# Patient Record
Sex: MALE | HISPANIC OR LATINO | Employment: OTHER | URBAN - METROPOLITAN AREA
[De-identification: names, ages, dates, MRNs, and addresses within clinical notes are randomized per-mention and may not be internally consistent; named-entity substitution may affect disease eponyms.]

---

## 2018-01-04 ENCOUNTER — TELEPHONE CONVERTED (OUTPATIENT)
Dept: ONCOLOGY | Facility: HOSPITAL | Age: 63
End: 2018-01-04

## 2018-03-06 ENCOUNTER — OFFICE VISIT CONVERTED (OUTPATIENT)
Dept: ONCOLOGY | Facility: HOSPITAL | Age: 63
End: 2018-03-06
Attending: INTERNAL MEDICINE

## 2018-03-08 ENCOUNTER — OFFICE VISIT CONVERTED (OUTPATIENT)
Dept: ORTHOPEDIC SURGERY | Facility: CLINIC | Age: 63
End: 2018-03-08
Attending: ORTHOPAEDIC SURGERY

## 2018-04-03 ENCOUNTER — OFFICE VISIT CONVERTED (OUTPATIENT)
Dept: ONCOLOGY | Facility: HOSPITAL | Age: 63
End: 2018-04-03
Attending: INTERNAL MEDICINE

## 2018-07-06 ENCOUNTER — OFFICE VISIT CONVERTED (OUTPATIENT)
Dept: ONCOLOGY | Facility: HOSPITAL | Age: 63
End: 2018-07-06
Attending: NURSE PRACTITIONER

## 2018-07-20 ENCOUNTER — OFFICE VISIT CONVERTED (OUTPATIENT)
Dept: SURGERY | Facility: CLINIC | Age: 63
End: 2018-07-20
Attending: SURGERY

## 2018-11-06 ENCOUNTER — OFFICE VISIT CONVERTED (OUTPATIENT)
Dept: ONCOLOGY | Facility: HOSPITAL | Age: 63
End: 2018-11-06
Attending: INTERNAL MEDICINE

## 2018-11-29 ENCOUNTER — OFFICE VISIT CONVERTED (OUTPATIENT)
Dept: NEUROSURGERY | Facility: CLINIC | Age: 63
End: 2018-11-29
Attending: PHYSICIAN ASSISTANT

## 2018-12-18 ENCOUNTER — OFFICE VISIT CONVERTED (OUTPATIENT)
Dept: NEUROSURGERY | Facility: CLINIC | Age: 63
End: 2018-12-18
Attending: PHYSICIAN ASSISTANT

## 2018-12-31 ENCOUNTER — OFFICE VISIT CONVERTED (OUTPATIENT)
Dept: ONCOLOGY | Facility: HOSPITAL | Age: 63
End: 2018-12-31
Attending: INTERNAL MEDICINE

## 2019-03-25 ENCOUNTER — OFFICE VISIT CONVERTED (OUTPATIENT)
Dept: ONCOLOGY | Facility: HOSPITAL | Age: 64
End: 2019-03-25
Attending: INTERNAL MEDICINE

## 2019-03-25 ENCOUNTER — HOSPITAL ENCOUNTER (OUTPATIENT)
Dept: OTHER | Facility: HOSPITAL | Age: 64
Discharge: HOME | End: 2019-03-25
Attending: INTERNAL MEDICINE

## 2019-03-25 LAB
ALBUMIN SERPL-MCNC: 3.8 G/DL (ref 3.5–5)
ALBUMIN/GLOB SERPL: 1 {RATIO} (ref 1.4–2.6)
ALP SERPL-CCNC: 131 U/L (ref 56–155)
ALT SERPL-CCNC: 6 U/L (ref 10–40)
ANION GAP SERPL CALC-SCNC: 18 MMOL/L (ref 8–19)
AST SERPL-CCNC: 11 U/L (ref 15–50)
BASOPHILS # BLD AUTO: 0.11 10*3/UL (ref 0–0.2)
BASOPHILS NFR BLD AUTO: 0.7 % (ref 0–3)
BILIRUB SERPL-MCNC: 0.43 MG/DL (ref 0.2–1.3)
BUN SERPL-MCNC: 19 MG/DL (ref 5–25)
BUN/CREAT SERPL: 13 {RATIO} (ref 6–20)
CALCIUM SERPL-MCNC: 9.8 MG/DL (ref 8.7–10.4)
CEA SERPL-MCNC: 7.6 NG/ML (ref 0–5)
CHLORIDE SERPL-SCNC: 103 MMOL/L (ref 99–111)
CONV ABS IMM GRAN: 0.05 10*3/UL (ref 0–0.2)
CONV CO2: 24 MMOL/L (ref 22–32)
CONV IMMATURE GRAN: 0.3 % (ref 0–1.8)
CONV TOTAL PROTEIN: 7.8 G/DL (ref 6.3–8.2)
CREAT UR-MCNC: 1.45 MG/DL (ref 0.7–1.2)
DEPRECATED RDW RBC AUTO: 50.1 FL (ref 35.1–43.9)
EOSINOPHIL # BLD AUTO: 0.31 10*3/UL (ref 0–0.7)
EOSINOPHIL # BLD AUTO: 2 % (ref 0–7)
ERYTHROCYTE [DISTWIDTH] IN BLOOD BY AUTOMATED COUNT: 16.3 % (ref 11.6–14.4)
GFR SERPLBLD BASED ON 1.73 SQ M-ARVRAT: 50 ML/MIN/{1.73_M2}
GLOBULIN UR ELPH-MCNC: 4 G/DL (ref 2–3.5)
GLUCOSE SERPL-MCNC: 58 MG/DL (ref 70–99)
HBA1C MFR BLD: 15.2 G/DL (ref 14–18)
HCT VFR BLD AUTO: 46.1 % (ref 42–52)
LYMPHOCYTES # BLD AUTO: 2.83 10*3/UL (ref 1–5)
MCH RBC QN AUTO: 27.6 PG (ref 27–31)
MCHC RBC AUTO-ENTMCNC: 33 G/DL (ref 33–37)
MCV RBC AUTO: 83.7 FL (ref 80–96)
MONOCYTES # BLD AUTO: 0.95 10*3/UL (ref 0.2–1.2)
MONOCYTES NFR BLD AUTO: 6.1 % (ref 3–10)
NEUTROPHILS # BLD AUTO: 11.24 10*3/UL (ref 2–8)
NEUTROPHILS NFR BLD AUTO: 72.6 % (ref 30–85)
NRBC CBCN: 0 % (ref 0–0.7)
OSMOLALITY SERPL CALC.SUM OF ELEC: 292 MOSM/KG (ref 273–304)
PLATELET # BLD AUTO: 363 10*3/UL (ref 130–400)
PMV BLD AUTO: 10.1 FL (ref 9.4–12.4)
POTASSIUM SERPL-SCNC: 4.1 MMOL/L (ref 3.5–5.3)
RBC # BLD AUTO: 5.51 10*6/UL (ref 4.7–6.1)
SODIUM SERPL-SCNC: 141 MMOL/L (ref 135–147)
VARIANT LYMPHS NFR BLD MANUAL: 18.3 % (ref 20–45)
WBC # BLD AUTO: 15.49 10*3/UL (ref 4.8–10.8)

## 2019-05-07 ENCOUNTER — OFFICE VISIT CONVERTED (OUTPATIENT)
Dept: CARDIOLOGY | Facility: CLINIC | Age: 64
End: 2019-05-07
Attending: SPECIALIST

## 2019-06-11 ENCOUNTER — HOSPITAL ENCOUNTER (OUTPATIENT)
Dept: OTHER | Facility: HOSPITAL | Age: 64
Discharge: HOME | End: 2019-06-11
Attending: SPECIALIST

## 2019-06-11 LAB
ALBUMIN SERPL-MCNC: 4.1 G/DL (ref 3.5–5)
ALP SERPL-CCNC: 127 U/L (ref 56–155)
ALT SERPL-CCNC: 13 U/L (ref 10–40)
ANION GAP SERPL CALC-SCNC: 19 MMOL/L (ref 8–19)
AST SERPL-CCNC: 13 U/L (ref 15–50)
BILIRUB SERPL-MCNC: 0.37 MG/DL (ref 0.2–1.3)
BUN SERPL-MCNC: 14 MG/DL (ref 5–25)
BUN/CREAT SERPL: 16 {RATIO} (ref 6–20)
CALCIUM SERPL-MCNC: 9.3 MG/DL (ref 8.7–10.4)
CHLORIDE SERPL-SCNC: 101 MMOL/L (ref 99–111)
CHOLEST SERPL-MCNC: 208 MG/DL (ref 107–200)
CHOLEST/HDLC SERPL: 4 {RATIO} (ref 3–6)
CONV BILI, CONJUGATED: <0.2 MG/DL (ref 0–0.6)
CONV CO2: 25 MMOL/L (ref 22–32)
CONV TOTAL PROTEIN: 7.3 G/DL (ref 6.3–8.2)
CONV UNCONJUGATED BILIRUBIN: 0.2 MG/DL (ref 0–1.1)
CREAT UR-MCNC: 0.9 MG/DL (ref 0.7–1.2)
GFR SERPLBLD BASED ON 1.73 SQ M-ARVRAT: >60 ML/MIN/{1.73_M2}
GLUCOSE SERPL-MCNC: 230 MG/DL (ref 70–99)
HDLC SERPL-MCNC: 52 MG/DL (ref 40–60)
LDLC SERPL CALC-MCNC: 127 MG/DL (ref 70–100)
OSMOLALITY SERPL CALC.SUM OF ELEC: 298 MOSM/KG (ref 273–304)
POTASSIUM SERPL-SCNC: 4.7 MMOL/L (ref 3.5–5.3)
SODIUM SERPL-SCNC: 140 MMOL/L (ref 135–147)
TRIGL SERPL-MCNC: 144 MG/DL (ref 40–150)
VLDLC SERPL-MCNC: 29 MG/DL (ref 5–37)

## 2019-06-13 ENCOUNTER — HOSPITAL ENCOUNTER (OUTPATIENT)
Dept: OTHER | Facility: HOSPITAL | Age: 64
Discharge: HOME | End: 2019-06-13
Attending: INTERNAL MEDICINE

## 2019-06-13 LAB
ALBUMIN SERPL-MCNC: 4 G/DL (ref 3.5–5)
ALBUMIN/GLOB SERPL: 1.3 {RATIO} (ref 1.4–2.6)
ALP SERPL-CCNC: 130 U/L (ref 56–155)
ALT SERPL-CCNC: 12 U/L (ref 10–40)
ANION GAP SERPL CALC-SCNC: 18 MMOL/L (ref 8–19)
AST SERPL-CCNC: 13 U/L (ref 15–50)
BASOPHILS # BLD AUTO: 0.13 10*3/UL (ref 0–0.2)
BASOPHILS NFR BLD AUTO: 0.9 % (ref 0–3)
BILIRUB SERPL-MCNC: 0.62 MG/DL (ref 0.2–1.3)
BUN SERPL-MCNC: 13 MG/DL (ref 5–25)
BUN/CREAT SERPL: 13 {RATIO} (ref 6–20)
CALCIUM SERPL-MCNC: 9.5 MG/DL (ref 8.7–10.4)
CHLORIDE SERPL-SCNC: 98 MMOL/L (ref 99–111)
CHOLEST SERPL-MCNC: 218 MG/DL (ref 107–200)
CHOLEST/HDLC SERPL: 3.7 {RATIO} (ref 3–6)
CONV ABS IMM GRAN: 0.06 10*3/UL (ref 0–0.2)
CONV CO2: 28 MMOL/L (ref 22–32)
CONV IMMATURE GRAN: 0.4 % (ref 0–1.8)
CONV TOTAL PROTEIN: 7.2 G/DL (ref 6.3–8.2)
CREAT UR-MCNC: 1.03 MG/DL (ref 0.7–1.2)
DEPRECATED RDW RBC AUTO: 57.1 FL (ref 35.1–43.9)
EOSINOPHIL # BLD AUTO: 0.24 10*3/UL (ref 0–0.7)
EOSINOPHIL # BLD AUTO: 1.7 % (ref 0–7)
ERYTHROCYTE [DISTWIDTH] IN BLOOD BY AUTOMATED COUNT: 19.3 % (ref 11.6–14.4)
EST. AVERAGE GLUCOSE BLD GHB EST-MCNC: 223 MG/DL
FOLATE SERPL-MCNC: >20 NG/ML (ref 4.8–20)
GFR SERPLBLD BASED ON 1.73 SQ M-ARVRAT: >60 ML/MIN/{1.73_M2}
GLOBULIN UR ELPH-MCNC: 3.2 G/DL (ref 2–3.5)
GLUCOSE SERPL-MCNC: 249 MG/DL (ref 70–99)
HBA1C MFR BLD: 17.2 G/DL (ref 14–18)
HBA1C MFR BLD: 9.4 % (ref 3.5–5.7)
HCT VFR BLD AUTO: 52.1 % (ref 42–52)
HDLC SERPL-MCNC: 59 MG/DL (ref 40–60)
LDLC SERPL CALC-MCNC: 133 MG/DL (ref 70–100)
LYMPHOCYTES # BLD AUTO: 2.41 10*3/UL (ref 1–5)
MCH RBC QN AUTO: 27.7 PG (ref 27–31)
MCHC RBC AUTO-ENTMCNC: 33 G/DL (ref 33–37)
MCV RBC AUTO: 84 FL (ref 80–96)
MONOCYTES # BLD AUTO: 1.01 10*3/UL (ref 0.2–1.2)
MONOCYTES NFR BLD AUTO: 7 % (ref 3–10)
NEUTROPHILS # BLD AUTO: 10.49 10*3/UL (ref 2–8)
NEUTROPHILS NFR BLD AUTO: 73.2 % (ref 30–85)
NRBC CBCN: 0 % (ref 0–0.7)
OSMOLALITY SERPL CALC.SUM OF ELEC: 298 MOSM/KG (ref 273–304)
PLATELET # BLD AUTO: 209 10*3/UL (ref 130–400)
PMV BLD AUTO: 9.7 FL (ref 9.4–12.4)
POTASSIUM SERPL-SCNC: 4.4 MMOL/L (ref 3.5–5.3)
PSA SERPL-MCNC: 1.09 NG/ML (ref 0–4)
RBC # BLD AUTO: 6.2 10*6/UL (ref 4.7–6.1)
SODIUM SERPL-SCNC: 140 MMOL/L (ref 135–147)
TRIGL SERPL-MCNC: 132 MG/DL (ref 40–150)
TSH SERPL-ACNC: 0.6 M[IU]/L (ref 0.27–4.2)
VARIANT LYMPHS NFR BLD MANUAL: 16.8 % (ref 20–45)
VIT B12 SERPL-MCNC: 451 PG/ML (ref 211–911)
VLDLC SERPL-MCNC: 26 MG/DL (ref 5–37)
WBC # BLD AUTO: 14.34 10*3/UL (ref 4.8–10.8)

## 2019-06-18 ENCOUNTER — CONVERSION ENCOUNTER (OUTPATIENT)
Dept: CARDIOLOGY | Facility: CLINIC | Age: 64
End: 2019-06-18

## 2019-06-18 ENCOUNTER — CONVERSION ENCOUNTER (OUTPATIENT)
Dept: CARDIOLOGY | Facility: CLINIC | Age: 64
End: 2019-06-18
Attending: SPECIALIST

## 2019-07-05 LAB — GLUCOSE BLD-MCNC: 283 MG/DL (ref 70–99)

## 2019-07-08 ENCOUNTER — HOSPITAL ENCOUNTER (OUTPATIENT)
Dept: OTHER | Facility: HOSPITAL | Age: 64
Discharge: HOME | End: 2019-07-08
Attending: INTERNAL MEDICINE

## 2019-07-08 ENCOUNTER — HOSPITAL ENCOUNTER (OUTPATIENT)
Dept: CARDIAC REHAB | Facility: HOSPITAL | Age: 64
Setting detail: RECURRING SERIES
Discharge: HOME | End: 2019-10-25
Attending: SPECIALIST

## 2019-07-08 LAB
CONV CREATININE URINE, RANDOM: 45.4 MG/DL (ref 10–300)
CONV MICROALBUM.,U,RANDOM: 64.9 MG/L (ref 0–20)
EST. AVERAGE GLUCOSE BLD GHB EST-MCNC: 235 MG/DL
GLUCOSE BLD-MCNC: 242 MG/DL (ref 70–99)
HBA1C MFR BLD: 9.8 % (ref 3.5–5.7)
MICROALBUMIN/CREAT UR: 143 MG/G{CRE} (ref 0–25)

## 2019-07-10 LAB
GLUCOSE BLD-MCNC: 165 MG/DL (ref 70–99)
GLUCOSE BLD-MCNC: 198 MG/DL (ref 70–99)

## 2019-07-15 LAB
GLUCOSE BLD-MCNC: 123 MG/DL (ref 70–99)
GLUCOSE BLD-MCNC: 70 MG/DL (ref 70–99)
GLUCOSE BLD-MCNC: 76 MG/DL (ref 70–99)
GLUCOSE BLD-MCNC: 83 MG/DL (ref 70–99)
GLUCOSE BLD-MCNC: 91 MG/DL (ref 70–99)
GLUCOSE BLD-MCNC: 92 MG/DL (ref 70–99)

## 2019-07-17 LAB
GLUCOSE BLD-MCNC: 150 MG/DL (ref 70–99)
GLUCOSE BLD-MCNC: 228 MG/DL (ref 70–99)

## 2019-07-19 LAB
GLUCOSE BLD-MCNC: 112 MG/DL (ref 70–99)
GLUCOSE BLD-MCNC: 284 MG/DL (ref 70–99)
GLUCOSE BLD-MCNC: 72 MG/DL (ref 70–99)

## 2019-07-22 LAB
GLUCOSE BLD-MCNC: 120 MG/DL (ref 70–99)
GLUCOSE BLD-MCNC: 162 MG/DL (ref 70–99)

## 2019-07-24 LAB
GLUCOSE BLD-MCNC: 132 MG/DL (ref 70–99)
GLUCOSE BLD-MCNC: 152 MG/DL (ref 70–99)
GLUCOSE BLD-MCNC: 85 MG/DL (ref 70–99)

## 2019-07-26 LAB
GLUCOSE BLD-MCNC: 270 MG/DL (ref 70–99)
GLUCOSE BLD-MCNC: 295 MG/DL (ref 70–99)

## 2019-07-29 LAB
GLUCOSE BLD-MCNC: 142 MG/DL (ref 70–99)
GLUCOSE BLD-MCNC: 158 MG/DL (ref 70–99)

## 2019-07-30 ENCOUNTER — OFFICE VISIT CONVERTED (OUTPATIENT)
Dept: SURGERY | Facility: CLINIC | Age: 64
End: 2019-07-30
Attending: SURGERY

## 2019-07-31 LAB
GLUCOSE BLD-MCNC: 119 MG/DL (ref 70–99)
GLUCOSE BLD-MCNC: 221 MG/DL (ref 70–99)
GLUCOSE BLD-MCNC: 318 MG/DL (ref 70–99)

## 2019-08-02 LAB
GLUCOSE BLD-MCNC: 164 MG/DL (ref 70–99)
GLUCOSE BLD-MCNC: 166 MG/DL (ref 70–99)

## 2019-08-05 LAB
GLUCOSE BLD-MCNC: 191 MG/DL (ref 70–99)
GLUCOSE BLD-MCNC: 263 MG/DL (ref 70–99)
GLUCOSE BLD-MCNC: 315 MG/DL (ref 70–99)

## 2019-08-07 LAB
GLUCOSE BLD-MCNC: 205 MG/DL (ref 70–99)
GLUCOSE BLD-MCNC: 214 MG/DL (ref 70–99)

## 2019-08-09 LAB — GLUCOSE BLD-MCNC: 347 MG/DL (ref 70–99)

## 2019-08-12 LAB
GLUCOSE BLD-MCNC: 186 MG/DL (ref 70–99)
GLUCOSE BLD-MCNC: 197 MG/DL (ref 70–99)

## 2019-08-16 LAB
GLUCOSE BLD-MCNC: 118 MG/DL (ref 70–99)
GLUCOSE BLD-MCNC: 264 MG/DL (ref 70–99)

## 2019-08-19 ENCOUNTER — CONVERSION ENCOUNTER (OUTPATIENT)
Dept: CARDIOLOGY | Facility: CLINIC | Age: 64
End: 2019-08-19
Attending: SPECIALIST

## 2019-08-26 LAB
GLUCOSE BLD-MCNC: 180 MG/DL (ref 70–99)
GLUCOSE BLD-MCNC: 229 MG/DL (ref 70–99)

## 2019-08-27 ENCOUNTER — OFFICE VISIT CONVERTED (OUTPATIENT)
Dept: CARDIOLOGY | Facility: CLINIC | Age: 64
End: 2019-08-27
Attending: SPECIALIST

## 2019-08-28 LAB
GLUCOSE BLD-MCNC: 314 MG/DL (ref 70–99)
GLUCOSE BLD-MCNC: 402 MG/DL (ref 70–99)

## 2019-09-20 ENCOUNTER — OFFICE VISIT CONVERTED (OUTPATIENT)
Dept: ORTHOPEDIC SURGERY | Facility: CLINIC | Age: 64
End: 2019-09-20
Attending: ORTHOPAEDIC SURGERY

## 2019-09-24 ENCOUNTER — HOSPITAL ENCOUNTER (OUTPATIENT)
Dept: PREADMISSION TESTING | Facility: HOSPITAL | Age: 64
Discharge: HOME | End: 2019-09-24
Attending: SURGERY

## 2019-09-24 LAB
ANION GAP SERPL CALC-SCNC: 14 MMOL/L (ref 8–19)
BUN SERPL-MCNC: 15 MG/DL (ref 5–25)
BUN/CREAT SERPL: 15 {RATIO} (ref 6–20)
CALCIUM SERPL-MCNC: 9.5 MG/DL (ref 8.7–10.4)
CHLORIDE SERPL-SCNC: 105 MMOL/L (ref 99–111)
CONV CO2: 27 MMOL/L (ref 22–32)
CREAT UR-MCNC: 1 MG/DL (ref 0.7–1.2)
GFR SERPLBLD BASED ON 1.73 SQ M-ARVRAT: >60 ML/MIN/{1.73_M2}
GLUCOSE SERPL-MCNC: 189 MG/DL (ref 70–99)
OSMOLALITY SERPL CALC.SUM OF ELEC: 298 MOSM/KG (ref 273–304)
POTASSIUM SERPL-SCNC: 4.6 MMOL/L (ref 3.5–5.3)
SODIUM SERPL-SCNC: 141 MMOL/L (ref 135–147)

## 2019-09-26 ENCOUNTER — HOSPITAL ENCOUNTER (OUTPATIENT)
Dept: CT IMAGING | Facility: HOSPITAL | Age: 64
Discharge: HOME | End: 2019-09-26
Attending: NURSE PRACTITIONER

## 2019-09-30 ENCOUNTER — OFFICE VISIT CONVERTED (OUTPATIENT)
Dept: ONCOLOGY | Facility: HOSPITAL | Age: 64
End: 2019-09-30
Attending: INTERNAL MEDICINE

## 2019-09-30 ENCOUNTER — HOSPITAL ENCOUNTER (OUTPATIENT)
Dept: OTHER | Facility: HOSPITAL | Age: 64
Discharge: HOME | End: 2019-09-30
Attending: INTERNAL MEDICINE

## 2019-09-30 LAB
ALBUMIN SERPL-MCNC: 4.2 G/DL (ref 3.5–5)
ALBUMIN/GLOB SERPL: 1.4 {RATIO} (ref 1.4–2.6)
ALP SERPL-CCNC: 123 U/L (ref 56–155)
ALT SERPL-CCNC: 13 U/L (ref 10–40)
ANION GAP SERPL CALC-SCNC: 20 MMOL/L (ref 8–19)
AST SERPL-CCNC: 14 U/L (ref 15–50)
BASOPHILS # BLD AUTO: 0.08 10*3/UL (ref 0–0.2)
BASOPHILS NFR BLD AUTO: 0.6 % (ref 0–3)
BILIRUB SERPL-MCNC: 0.7 MG/DL (ref 0.2–1.3)
BUN SERPL-MCNC: 17 MG/DL (ref 5–25)
BUN/CREAT SERPL: 17 {RATIO} (ref 6–20)
CALCIUM SERPL-MCNC: 9.4 MG/DL (ref 8.7–10.4)
CEA SERPL-MCNC: 11.5 NG/ML (ref 0–5)
CHLORIDE SERPL-SCNC: 103 MMOL/L (ref 99–111)
CONV ABS IMM GRAN: 0.06 10*3/UL (ref 0–0.2)
CONV CO2: 22 MMOL/L (ref 22–32)
CONV IMMATURE GRAN: 0.5 % (ref 0–1.8)
CONV TOTAL PROTEIN: 7.2 G/DL (ref 6.3–8.2)
CREAT UR-MCNC: 1.01 MG/DL (ref 0.7–1.2)
DEPRECATED RDW RBC AUTO: 55.7 FL (ref 35.1–43.9)
EOSINOPHIL # BLD AUTO: 0.19 10*3/UL (ref 0–0.7)
EOSINOPHIL # BLD AUTO: 1.5 % (ref 0–7)
ERYTHROCYTE [DISTWIDTH] IN BLOOD BY AUTOMATED COUNT: 18.8 % (ref 11.6–14.4)
GFR SERPLBLD BASED ON 1.73 SQ M-ARVRAT: >60 ML/MIN/{1.73_M2}
GLOBULIN UR ELPH-MCNC: 3 G/DL (ref 2–3.5)
GLUCOSE SERPL-MCNC: 323 MG/DL (ref 70–99)
HCT VFR BLD AUTO: 55.5 % (ref 42–52)
HGB BLD-MCNC: 18.9 G/DL (ref 14–18)
LYMPHOCYTES # BLD AUTO: 2.38 10*3/UL (ref 1–5)
LYMPHOCYTES NFR BLD AUTO: 18.5 % (ref 20–45)
MCH RBC QN AUTO: 29.3 PG (ref 27–31)
MCHC RBC AUTO-ENTMCNC: 34.1 G/DL (ref 33–37)
MCV RBC AUTO: 85.9 FL (ref 80–96)
MONOCYTES # BLD AUTO: 0.84 10*3/UL (ref 0.2–1.2)
MONOCYTES NFR BLD AUTO: 6.5 % (ref 3–10)
NEUTROPHILS # BLD AUTO: 9.34 10*3/UL (ref 2–8)
NEUTROPHILS NFR BLD AUTO: 72.4 % (ref 30–85)
NRBC CBCN: 0 % (ref 0–0.7)
OSMOLALITY SERPL CALC.SUM OF ELEC: 304 MOSM/KG (ref 273–304)
PLATELET # BLD AUTO: 157 10*3/UL (ref 130–400)
PMV BLD AUTO: 10.4 FL (ref 9.4–12.4)
POTASSIUM SERPL-SCNC: 4.8 MMOL/L (ref 3.5–5.3)
RBC # BLD AUTO: 6.46 10*6/UL (ref 4.7–6.1)
SODIUM SERPL-SCNC: 140 MMOL/L (ref 135–147)
WBC # BLD AUTO: 12.89 10*3/UL (ref 4.8–10.8)

## 2019-10-03 ENCOUNTER — HOSPITAL ENCOUNTER (OUTPATIENT)
Dept: PERIOP | Facility: HOSPITAL | Age: 64
Setting detail: HOSPITAL OUTPATIENT SURGERY
Discharge: HOME | End: 2019-10-03
Attending: SURGERY

## 2019-10-03 LAB — GLUCOSE BLD-MCNC: 245 MG/DL (ref 70–99)

## 2019-10-28 ENCOUNTER — OFFICE VISIT CONVERTED (OUTPATIENT)
Dept: SURGERY | Facility: CLINIC | Age: 64
End: 2019-10-28
Attending: SURGERY

## 2019-10-28 ENCOUNTER — CONVERSION ENCOUNTER (OUTPATIENT)
Dept: SURGERY | Facility: CLINIC | Age: 64
End: 2019-10-28

## 2019-11-07 ENCOUNTER — CONVERSION ENCOUNTER (OUTPATIENT)
Dept: ORTHOPEDIC SURGERY | Facility: CLINIC | Age: 64
End: 2019-11-07

## 2019-11-07 ENCOUNTER — HOSPITAL ENCOUNTER (OUTPATIENT)
Dept: URGENT CARE | Facility: CLINIC | Age: 64
Discharge: HOME | End: 2019-11-07
Attending: EMERGENCY MEDICINE

## 2019-11-07 ENCOUNTER — OFFICE VISIT CONVERTED (OUTPATIENT)
Dept: ORTHOPEDIC SURGERY | Facility: CLINIC | Age: 64
End: 2019-11-07
Attending: ORTHOPAEDIC SURGERY

## 2019-11-12 ENCOUNTER — OFFICE VISIT CONVERTED (OUTPATIENT)
Dept: GASTROENTEROLOGY | Facility: CLINIC | Age: 64
End: 2019-11-12
Attending: NURSE PRACTITIONER

## 2019-12-13 ENCOUNTER — HOSPITAL ENCOUNTER (OUTPATIENT)
Dept: GASTROENTEROLOGY | Facility: HOSPITAL | Age: 64
Setting detail: HOSPITAL OUTPATIENT SURGERY
Discharge: HOME | End: 2019-12-13
Attending: INTERNAL MEDICINE

## 2019-12-13 LAB — GLUCOSE BLD-MCNC: 257 MG/DL (ref 70–99)

## 2020-01-03 ENCOUNTER — OFFICE VISIT CONVERTED (OUTPATIENT)
Dept: CARDIOLOGY | Facility: CLINIC | Age: 65
End: 2020-01-03
Attending: SPECIALIST

## 2020-02-19 ENCOUNTER — OFFICE VISIT CONVERTED (OUTPATIENT)
Dept: NEUROSURGERY | Facility: CLINIC | Age: 65
End: 2020-02-19
Attending: PHYSICIAN ASSISTANT

## 2020-02-24 ENCOUNTER — HOSPITAL ENCOUNTER (OUTPATIENT)
Dept: MRI IMAGING | Facility: HOSPITAL | Age: 65
Discharge: HOME | End: 2020-02-24
Attending: PHYSICIAN ASSISTANT

## 2020-03-10 ENCOUNTER — OFFICE VISIT CONVERTED (OUTPATIENT)
Dept: NEUROSURGERY | Facility: CLINIC | Age: 65
End: 2020-03-10
Attending: NEUROLOGICAL SURGERY

## 2020-03-10 ENCOUNTER — CONVERSION ENCOUNTER (OUTPATIENT)
Dept: NEUROLOGY | Facility: CLINIC | Age: 65
End: 2020-03-10

## 2020-03-16 ENCOUNTER — HOSPITAL ENCOUNTER (OUTPATIENT)
Dept: PREADMISSION TESTING | Facility: HOSPITAL | Age: 65
Discharge: HOME | End: 2020-03-16
Attending: NEUROLOGICAL SURGERY

## 2020-03-16 LAB
ANION GAP SERPL CALC-SCNC: 14 MMOL/L (ref 8–19)
BUN SERPL-MCNC: 8 MG/DL (ref 5–25)
BUN/CREAT SERPL: 10 {RATIO} (ref 6–20)
CALCIUM SERPL-MCNC: 8.8 MG/DL (ref 8.7–10.4)
CHLORIDE SERPL-SCNC: 100 MMOL/L (ref 99–111)
CONV CO2: 24 MMOL/L (ref 22–32)
CREAT UR-MCNC: 0.82 MG/DL (ref 0.7–1.2)
GFR SERPLBLD BASED ON 1.73 SQ M-ARVRAT: >60 ML/MIN/{1.73_M2}
GLUCOSE SERPL-MCNC: 352 MG/DL (ref 70–99)
OSMOLALITY SERPL CALC.SUM OF ELEC: 288 MOSM/KG (ref 273–304)
POTASSIUM SERPL-SCNC: 4.5 MMOL/L (ref 3.5–5.3)
SODIUM SERPL-SCNC: 133 MMOL/L (ref 135–147)

## 2020-03-17 ENCOUNTER — OFFICE VISIT CONVERTED (OUTPATIENT)
Dept: GASTROENTEROLOGY | Facility: CLINIC | Age: 65
End: 2020-03-17
Attending: NURSE PRACTITIONER

## 2020-06-25 ENCOUNTER — HOSPITAL ENCOUNTER (OUTPATIENT)
Dept: OTHER | Facility: HOSPITAL | Age: 65
Discharge: HOME | End: 2020-06-25
Attending: NURSE PRACTITIONER

## 2020-06-25 LAB
ALBUMIN SERPL-MCNC: 4.6 G/DL (ref 3.5–5)
ALBUMIN/GLOB SERPL: 1.6 {RATIO} (ref 1.4–2.6)
ALP SERPL-CCNC: 130 U/L (ref 56–155)
ALT SERPL-CCNC: 10 U/L (ref 10–40)
ANION GAP SERPL CALC-SCNC: 17 MMOL/L (ref 8–19)
AST SERPL-CCNC: 11 U/L (ref 15–50)
BASOPHILS # BLD AUTO: 0.08 10*3/UL (ref 0–0.2)
BASOPHILS NFR BLD AUTO: 0.6 % (ref 0–3)
BILIRUB SERPL-MCNC: 0.73 MG/DL (ref 0.2–1.3)
BUN SERPL-MCNC: 11 MG/DL (ref 5–25)
BUN/CREAT SERPL: 10 {RATIO} (ref 6–20)
CALCIUM SERPL-MCNC: 9.7 MG/DL (ref 8.7–10.4)
CEA SERPL-MCNC: 14.1 NG/ML (ref 0–5)
CHLORIDE SERPL-SCNC: 100 MMOL/L (ref 99–111)
CONV ABS IMM GRAN: 0.03 10*3/UL (ref 0–0.2)
CONV CO2: 26 MMOL/L (ref 22–32)
CONV IMMATURE GRAN: 0.2 % (ref 0–1.8)
CONV TOTAL PROTEIN: 7.4 G/DL (ref 6.3–8.2)
CREAT BLD-MCNC: 0.9 MG/DL (ref 0.6–1.4)
CREAT UR-MCNC: 1.08 MG/DL (ref 0.7–1.2)
DEPRECATED RDW RBC AUTO: 48 FL (ref 35.1–43.9)
EOSINOPHIL # BLD AUTO: 0.16 10*3/UL (ref 0–0.7)
EOSINOPHIL # BLD AUTO: 1.3 % (ref 0–7)
ERYTHROCYTE [DISTWIDTH] IN BLOOD BY AUTOMATED COUNT: 14.7 % (ref 11.6–14.4)
GFR SERPLBLD BASED ON 1.73 SQ M-ARVRAT: >60 ML/MIN/{1.73_M2}
GFR SERPLBLD BASED ON 1.73 SQ M-ARVRAT: >60 ML/MIN/{1.73_M2}
GLOBULIN UR ELPH-MCNC: 2.8 G/DL (ref 2–3.5)
GLUCOSE SERPL-MCNC: 494 MG/DL (ref 70–99)
HCT VFR BLD AUTO: 55.2 % (ref 42–52)
HGB BLD-MCNC: 18.8 G/DL (ref 14–18)
LYMPHOCYTES # BLD AUTO: 1.64 10*3/UL (ref 1–5)
LYMPHOCYTES NFR BLD AUTO: 13.3 % (ref 20–45)
MCH RBC QN AUTO: 31 PG (ref 27–31)
MCHC RBC AUTO-ENTMCNC: 34.1 G/DL (ref 33–37)
MCV RBC AUTO: 91.1 FL (ref 80–96)
MONOCYTES # BLD AUTO: 0.81 10*3/UL (ref 0.2–1.2)
MONOCYTES NFR BLD AUTO: 6.6 % (ref 3–10)
NEUTROPHILS # BLD AUTO: 9.59 10*3/UL (ref 2–8)
NEUTROPHILS NFR BLD AUTO: 78 % (ref 30–85)
NRBC CBCN: 0 % (ref 0–0.7)
OSMOLALITY SERPL CALC.SUM OF ELEC: 305 MOSM/KG (ref 273–304)
PLATELET # BLD AUTO: 170 10*3/UL (ref 130–400)
PMV BLD AUTO: 11 FL (ref 9.4–12.4)
POTASSIUM SERPL-SCNC: 5.6 MMOL/L (ref 3.5–5.3)
RBC # BLD AUTO: 6.06 10*6/UL (ref 4.7–6.1)
SODIUM SERPL-SCNC: 137 MMOL/L (ref 135–147)
WBC # BLD AUTO: 12.31 10*3/UL (ref 4.8–10.8)

## 2020-06-30 ENCOUNTER — HOSPITAL ENCOUNTER (OUTPATIENT)
Dept: ONCOLOGY | Facility: HOSPITAL | Age: 65
Discharge: HOME | End: 2020-06-30
Attending: INTERNAL MEDICINE

## 2020-06-30 ENCOUNTER — OFFICE VISIT CONVERTED (OUTPATIENT)
Dept: ONCOLOGY | Facility: HOSPITAL | Age: 65
End: 2020-06-30
Attending: INTERNAL MEDICINE

## 2020-07-06 ENCOUNTER — OFFICE VISIT CONVERTED (OUTPATIENT)
Dept: CARDIOLOGY | Facility: CLINIC | Age: 65
End: 2020-07-06
Attending: SPECIALIST

## 2020-09-07 ENCOUNTER — HOSPITAL ENCOUNTER (OUTPATIENT)
Dept: PREADMISSION TESTING | Facility: HOSPITAL | Age: 65
Discharge: HOME | End: 2020-09-07
Attending: INTERNAL MEDICINE

## 2020-09-08 LAB — SARS-COV-2 RNA SPEC QL NAA+PROBE: NOT DETECTED

## 2020-09-10 ENCOUNTER — OFFICE VISIT CONVERTED (OUTPATIENT)
Dept: ORTHOPEDIC SURGERY | Facility: CLINIC | Age: 65
End: 2020-09-10
Attending: ORTHOPAEDIC SURGERY

## 2020-09-10 ENCOUNTER — CONVERSION ENCOUNTER (OUTPATIENT)
Dept: ORTHOPEDIC SURGERY | Facility: CLINIC | Age: 65
End: 2020-09-10

## 2020-09-11 ENCOUNTER — HOSPITAL ENCOUNTER (OUTPATIENT)
Dept: GASTROENTEROLOGY | Facility: HOSPITAL | Age: 65
Setting detail: HOSPITAL OUTPATIENT SURGERY
Discharge: HOME | End: 2020-09-11
Attending: INTERNAL MEDICINE

## 2020-09-11 LAB
GLUCOSE BLD-MCNC: 275 MG/DL (ref 70–99)
GLUCOSE BLD-MCNC: 364 MG/DL (ref 70–99)
GLUCOSE BLD-MCNC: 439 MG/DL (ref 70–99)

## 2021-01-08 ENCOUNTER — HOSPITAL ENCOUNTER (OUTPATIENT)
Dept: CT IMAGING | Facility: HOSPITAL | Age: 66
Discharge: HOME | End: 2021-01-08
Attending: INTERNAL MEDICINE

## 2021-01-08 LAB
ALBUMIN SERPL-MCNC: 4 G/DL (ref 3.5–5)
ALBUMIN/GLOB SERPL: 1.6 {RATIO} (ref 1.4–2.6)
ALP SERPL-CCNC: 104 U/L (ref 56–155)
ALT SERPL-CCNC: 10 U/L (ref 10–40)
ANION GAP SERPL CALC-SCNC: 16 MMOL/L (ref 8–19)
AST SERPL-CCNC: 14 U/L (ref 15–50)
BASOPHILS # BLD AUTO: 0.06 10*3/UL (ref 0–0.2)
BASOPHILS NFR BLD AUTO: 0.5 % (ref 0–3)
BILIRUB SERPL-MCNC: 0.62 MG/DL (ref 0.2–1.3)
BUN SERPL-MCNC: 9 MG/DL (ref 5–25)
BUN/CREAT SERPL: 9 {RATIO} (ref 6–20)
CALCIUM SERPL-MCNC: 8.8 MG/DL (ref 8.7–10.4)
CEA SERPL-MCNC: 8.9 NG/ML (ref 0–5)
CHLORIDE SERPL-SCNC: 98 MMOL/L (ref 99–111)
CONV ABS IMM GRAN: 0.05 10*3/UL (ref 0–0.2)
CONV CO2: 27 MMOL/L (ref 22–32)
CONV IMMATURE GRAN: 0.4 % (ref 0–1.8)
CONV TOTAL PROTEIN: 6.5 G/DL (ref 6.3–8.2)
CREAT BLD-MCNC: 0.9 MG/DL (ref 0.6–1.4)
CREAT UR-MCNC: 0.98 MG/DL (ref 0.7–1.2)
DEPRECATED RDW RBC AUTO: 48 FL (ref 35.1–43.9)
EOSINOPHIL # BLD AUTO: 0.23 10*3/UL (ref 0–0.7)
EOSINOPHIL # BLD AUTO: 1.9 % (ref 0–7)
ERYTHROCYTE [DISTWIDTH] IN BLOOD BY AUTOMATED COUNT: 14.9 % (ref 11.6–14.4)
GFR SERPLBLD BASED ON 1.73 SQ M-ARVRAT: >60 ML/MIN/{1.73_M2}
GFR SERPLBLD BASED ON 1.73 SQ M-ARVRAT: >60 ML/MIN/{1.73_M2}
GLOBULIN UR ELPH-MCNC: 2.5 G/DL (ref 2–3.5)
GLUCOSE SERPL-MCNC: 370 MG/DL (ref 70–99)
HCT VFR BLD AUTO: 49.6 % (ref 42–52)
HGB BLD-MCNC: 17.2 G/DL (ref 14–18)
LYMPHOCYTES # BLD AUTO: 2.12 10*3/UL (ref 1–5)
LYMPHOCYTES NFR BLD AUTO: 17.7 % (ref 20–45)
MCH RBC QN AUTO: 31 PG (ref 27–31)
MCHC RBC AUTO-ENTMCNC: 34.7 G/DL (ref 33–37)
MCV RBC AUTO: 89.4 FL (ref 80–96)
MONOCYTES # BLD AUTO: 0.85 10*3/UL (ref 0.2–1.2)
MONOCYTES NFR BLD AUTO: 7.1 % (ref 3–10)
NEUTROPHILS # BLD AUTO: 8.68 10*3/UL (ref 2–8)
NEUTROPHILS NFR BLD AUTO: 72.4 % (ref 30–85)
NRBC CBCN: 0 % (ref 0–0.7)
OSMOLALITY SERPL CALC.SUM OF ELEC: 298 MOSM/KG (ref 273–304)
PLATELET # BLD AUTO: 123 10*3/UL (ref 130–400)
PMV BLD AUTO: 10.3 FL (ref 9.4–12.4)
POTASSIUM SERPL-SCNC: 4.2 MMOL/L (ref 3.5–5.3)
RBC # BLD AUTO: 5.55 10*6/UL (ref 4.7–6.1)
SODIUM SERPL-SCNC: 137 MMOL/L (ref 135–147)
WBC # BLD AUTO: 11.99 10*3/UL (ref 4.8–10.8)

## 2021-01-11 ENCOUNTER — HOSPITAL ENCOUNTER (OUTPATIENT)
Dept: ONCOLOGY | Facility: HOSPITAL | Age: 66
Discharge: HOME | End: 2021-01-11
Attending: INTERNAL MEDICINE

## 2021-01-11 ENCOUNTER — OFFICE VISIT CONVERTED (OUTPATIENT)
Dept: URGENT CARE | Facility: CLINIC | Age: 66
End: 2021-01-11
Attending: INTERNAL MEDICINE

## 2021-01-26 ENCOUNTER — OFFICE VISIT CONVERTED (OUTPATIENT)
Dept: CARDIOLOGY | Facility: CLINIC | Age: 66
End: 2021-01-26
Attending: SPECIALIST

## 2021-01-26 ENCOUNTER — CONVERSION ENCOUNTER (OUTPATIENT)
Dept: OTHER | Facility: HOSPITAL | Age: 66
End: 2021-01-26

## 2021-01-29 ENCOUNTER — HOSPITAL ENCOUNTER (OUTPATIENT)
Dept: GASTROENTEROLOGY | Facility: HOSPITAL | Age: 66
Setting detail: HOSPITAL OUTPATIENT SURGERY
Discharge: HOME | End: 2021-01-29
Attending: INTERNAL MEDICINE

## 2021-01-29 LAB — GLUCOSE BLD-MCNC: 345 MG/DL (ref 70–99)

## 2021-03-12 ENCOUNTER — OFFICE VISIT CONVERTED (OUTPATIENT)
Dept: ORTHOPEDIC SURGERY | Facility: CLINIC | Age: 66
End: 2021-03-12
Attending: ORTHOPAEDIC SURGERY

## 2021-04-15 ENCOUNTER — OFFICE VISIT CONVERTED (OUTPATIENT)
Dept: ORTHOPEDIC SURGERY | Facility: CLINIC | Age: 66
End: 2021-04-15
Attending: ORTHOPAEDIC SURGERY

## 2021-04-27 ENCOUNTER — OFFICE VISIT CONVERTED (OUTPATIENT)
Dept: GASTROENTEROLOGY | Facility: CLINIC | Age: 66
End: 2021-04-27
Attending: NURSE PRACTITIONER

## 2021-05-04 ENCOUNTER — HOSPITAL ENCOUNTER (OUTPATIENT)
Dept: MRI IMAGING | Facility: HOSPITAL | Age: 66
Discharge: HOME | End: 2021-05-04
Attending: ORTHOPAEDIC SURGERY

## 2021-05-10 NOTE — H&P
History and Physical      Patient Name: Dhaval Nieto   Patient ID: [id number]   Sex: Male   YOB: 1955    Primary Care Provider: Christiano Mcbride MD   Referring Provider: Randy Rand MD    Visit Date: September 10, 2020    Provider: Misbah Vick MD   Location: [facility]   Location Address: [address] [place] [address]  [id number]   Location Phone: [phone]          Chief Complaint  · Right wrist pain      History Of Present Illness  Dhaval Nieto is a 65 year old,  or  male who presents today to [facility].      Patient is here for right wrist pain. Patient states pain on the ulnar side of the wrist. Patient states pain with gripping.       Past Medical History  Arthritis; Asthma; Bladder Disorder; Blood Diseases; BPH with Urinary Obstruction; Cancer; Cervical radiculopathy; Cervical spinal stenosis; Cervical stenosis of spinal canal; Cervicalgia; Chest pain; Decreased sensation; Depression; Diabetes; Diabetes mellitus, insulin dependent (IDDM), controlled; Headache; Hematuria (Gross); Hernia; Herniated Disc; History of colon cancer; Hyperlipemia; Hyperlipidemia; Hypertension; Hypertension, Benign Essential; Insomnia; Leg pain; Limb Swelling; Loss of balance; Low back pain; Lumbar degenerative disc disease; Lumbar radiculopathy; Mid back pain; Migraines; Muscle cramps; Neurologic disorder; Neurologic disorder, unspecified; Pain, Back; Pain, Cervical Spine; Pain, Generalized; Pain, Joint; Pain, Leg; Seasonal allergies; Shortness of Breath; Stomach Disorder; Thoracic disc degeneration         Past Surgical History  Abdomen; Bladder Surg.; CABG; Carpal Tunnel Release; Cholecystecomy; Colon Resection; Colonoscopy; Cystoscopy; Gallbladder; Hernia; Hernia Repair; Joint Surgery; Shoulder surgery; Ventral Hernia Repair         Medication List  Ambien 10 mg oral tablet; diclofenac sodium 75 mg oral tablet,delayed release (DR/EC); Eliquis 5  "mg oral tablet; famotidine 20 mg oral tablet; gabapentin 300 mg oral capsule; Lantus Solostar 100 unit/mL (3 mL) subcutaneous insulin pen; Linzess 72 mcg oral capsule; metoprolol tartrate 25 mg oral tablet; MoviPrep 100-7.5-2.691 gram oral powder in packet; Novolog Flexpen 100 unit/mL Subcutaneous Insulin Pen; omeprazole 40 mg oral capsule,delayed release(DR/EC); Trulicity 1.5 mg/0.5 mL subcutaneous pen injector; Viagra 100 mg oral tablet; Vitamin D2 50,000 unit oral capsule         Allergy List  NO KNOWN DRUG ALLERGIES; Latex; Latex Exam Gloves         Family Medical History  Stroke; Heart failure; Heart Attack (MI)         Social History  Alcohol (Never); ; lives alone; lives with spouse; .; Recreational Drug Use (Never); Retired; Single; Tobacco (Current some day)         Review of Systems  · Constitutional  o Denies  o : fever, chills, weight loss  · Cardiovascular  o Denies  o : chest pain, shortness of breath  · Gastrointestinal  o Denies  o : liver disease, heartburn, nausea, blood in stools  · Genitourinary  o Denies  o : painful urination, blood in urine  · Integument  o Denies  o : rash, itching  · Neurologic  o Denies  o : headache, weakness, loss of consciousness  · Musculoskeletal  o Denies  o : painful, swollen joints  · Psychiatric  o Denies  o : drug/alcohol addiction, anxiety, depression      Vitals  Date Time BP Position Site L\R Cuff Size HR RR TEMP (F) WT  HT  BMI kg/m2 BSA m2 O2 Sat FR L/min FiO2        09/10/2020 03:14 PM      92 - R   170lbs 0oz 5'  9\" 25.1 1.94 96 %            Physical Examination  · Constitutional  o Appearance  o : well developed, well-nourished, no obvious deformities present  · Head and Face  o Head  o :   § Inspection  § : normocephalic  o Face  o :   § Inspection  § : no facial lesions  · Eyes  o Conjunctivae  o : conjunctivae normal  o Sclerae  o : sclerae white  · Ears, Nose, Mouth and Throat  o Ears  o :   § External Ears  § : appearance within " normal limits  § Hearing  § : intact  o Nose  o :   § External Nose  § : appearance normal  · Neck  o Inspection/Palpation  o : normal appearance  o Range of Motion  o : full range of motion  · Respiratory  o Respiratory Effort  o : breathing unlabored  o Inspection of Chest  o : normal appearance  o Auscultation of Lungs  o : no audible wheezing or rales  · Cardiovascular  o Heart  o : regular rate  · Gastrointestinal  o Abdominal Examination  o : soft and non-tender  · Skin and Subcutaneous Tissue  o General Inspection  o : intact, no rashes  · Psychiatric  o General  o : Alert and oriented x3  o Judgement and Insight  o : judgment and insight intact  o Mood and Affect  o : mood normal, affect appropriate  · Right Hand-Street  o Inspection  o : No swelling, no erythema, no ecchymosis, no thenar atrophy, no mallet deformity, no boutonniere deformity, no heberden's nodes, no rima's nodes  o Palpation  o : No tenderness at distal radius, tenderness at UCL, tenderness at ECU, tenderness at distal ulna  o ROM  o : Full wrist extension, full wrist flexion, full ulnar deviation, full raidal deviation, full MCP/PIP/DIP flexion, full MCP/PIP/DIP Extension, full opposition  o Strength  o : full wrist extension, full wrist flexion, weak , full thumb oppostion, full PIP flexors, full DIP flexors, full PIP extensors, full finger adduction, full finger abduction  o Special Tests  o : negative compression test, negative shift test  o Neurovascular  o : Full sensation, radial pulse 2+, ulnar pulse 2+          Assessment  · Pain: Wrist     719.43/M25.539  · Wrist pain     719.43/M25.539      Plan  · Medications  o Medications have been Reconciled  o Transition of Care or Provider Policy  · Instructions  o Reviewed the patient's Past Medical, Social, and Family history as well as the ROS at today's visit, no changes.  o Call or return if worsening symptoms.  o Exercise handout given.  o Follow Up PRN.  o Electronically  Identified Patient Education Materials Provided Electronically     Prescribed Voltaren 75mg one po BID #60 with food  Provided brace and HEP             Electronically Signed by: Tarsha Miller PA-C -Author on November 6, 2020 08:06:03 AM  Electronically Co-signed by: Misbah Vick MD -Reviewer on November 6, 2020 08:20:48 AM

## 2021-05-13 ENCOUNTER — OFFICE VISIT CONVERTED (OUTPATIENT)
Dept: ORTHOPEDIC SURGERY | Facility: CLINIC | Age: 66
End: 2021-05-13
Attending: ORTHOPAEDIC SURGERY

## 2021-05-13 NOTE — PROGRESS NOTES
"   Progress Note      Patient Name: Dhaval Nieto   Patient ID: [id number]   Sex: Male   YOB: 1955    Primary Care Provider: Christiano Mcbride MD   Referring Provider: Randy Rand MD    Visit Date: July 6, 2020    Provider: Viral Alvarez MD   Location: [facility]   Location Address: [address] [place] [address], [facility]   [place], [place]  [id number]   Location Phone: [phone]          Chief Complaint  · Status post mitral valve repair   · Hypertension       History Of Present Illness  Dhaval Nieto is a 65 year old,  or  male with a history of hypertension, mitral valve repair, who denies any chest pain or shortness of breath.   CURRENT MEDICATIONS: include Eliquis 5 mg b.i.d.; Metoprolol Tartrate 25 mg b.i.d.; Lantus; NovoLog; Gabapentin 300 mg 2 tablets p.r.n.; Glucophage 500 mg daily; Lansoprazole. The dosage and frequency of the medications were reviewed with the patient.   PAST MEDICAL HISTORY: Coronary artery disease; diabetes mellitus; hypertension; mitral valve replacement in February 2019 at [facility].   FAMILY HISTORY: Negative for diabetes, hypertension and heart disease.   PSYCHOSOCIAL HISTORY: Positive for mood changes and depression. He never used alcohol. He smokes 3-4 cigarettes a day.       Review of Systems  · Cardiovascular  o Admits  o : swelling (feet, ankles, hands), chest pain or angina pectoris   o Denies  o : palpitations (fast, fluttering, or skipping beats), shortness of breath while walking or lying flat  · Respiratory  o Admits  o : asthma or wheezing  o Denies  o : chronic or frequent cough      Vitals  Date Time BP Position Site L\R Cuff Size HR RR TEMP (F) WT  HT  BMI kg/m2 BSA m2 O2 Sat        07/06/2020 01:49 /72 Sitting    98 - R   165lbs 0oz 5'  9\" 24.37 1.91           Physical Examination  · Constitutional  o Appearance  o : Awake, alert, cooperative, pleasant.  · Respiratory  o Inspection of " Chest  o : No chest wall deformities, moving equal.  o Auscultation of Lungs  o : Good air entry with vesicular breath sounds.  · Cardiovascular  o Heart  o :   § Auscultation of Heart  § : S1 and S2 regular. No S3. No S4. No murmurs.  o Peripheral Vascular System  o :   § Extremities  § : Peripheral pulses were well felt. No edema. No cyanosis.  · Gastrointestinal  o Abdominal Examination  o : No masses or organomegaly noted.     Neurologic:  Mental Status Examination:  Mental status within normal limits.  Alert and oriented x 3.  Psychiatric:  Mood and Affect:  Mood and Affect appear normal.           Assessment     ASSESSMENT AND PLAN:    1.  Mitral stenosis, s/p bioprosthetic mitral valve, stable:  Continue Eliquis for left atrial thrombus.  Continue        Metoprolol for rate control.  Increase Metoprolol 50 mg b.i.d.  2.  Type II diabetes mellitus:  Managed by his PMD.  3.  Essential hypertension controlled:  Continue current dose of Metoprolol.  4.  Positive for nicotine use:  Smoking-cessation instructions were discussed with the patient.    Viral Alvarez MD, [facility]  WILLIE/rahul           This note was transcribed by Eri Godwin.  rahul/WILLIE  The above service was transcribed by Eri Godwin, and I attest to the accuracy of the note.  WILLIE               Electronically Signed by: Eri Godwin-, -Author on July 8, 2020 07:56:41 AM  Electronically Co-signed by: Viral Alvarez MD -Reviewer on July 8, 2020 11:45:21 AM

## 2021-05-14 VITALS — HEIGHT: 69 IN | WEIGHT: 175 LBS | HEART RATE: 65 BPM | OXYGEN SATURATION: 98 % | BODY MASS INDEX: 25.92 KG/M2

## 2021-05-14 VITALS — BODY MASS INDEX: 25.92 KG/M2 | WEIGHT: 175 LBS | HEART RATE: 92 BPM | HEIGHT: 69 IN | OXYGEN SATURATION: 96 %

## 2021-05-14 VITALS
TEMPERATURE: 98.2 F | HEIGHT: 69 IN | HEART RATE: 102 BPM | DIASTOLIC BLOOD PRESSURE: 67 MMHG | SYSTOLIC BLOOD PRESSURE: 103 MMHG | BODY MASS INDEX: 25.55 KG/M2 | WEIGHT: 172.5 LBS

## 2021-05-14 VITALS — OXYGEN SATURATION: 96 % | BODY MASS INDEX: 25.18 KG/M2 | HEIGHT: 69 IN | HEART RATE: 92 BPM | WEIGHT: 170 LBS

## 2021-05-14 VITALS
HEART RATE: 99 BPM | WEIGHT: 172 LBS | DIASTOLIC BLOOD PRESSURE: 64 MMHG | SYSTOLIC BLOOD PRESSURE: 116 MMHG | HEIGHT: 69 IN | BODY MASS INDEX: 25.48 KG/M2

## 2021-05-14 NOTE — PROGRESS NOTES
Progress Note      Patient Name: Dhaval Nieto   Patient ID: [id number]   Sex: Male   YOB: 1955    Primary Care Provider: Christiano Mcbride MD   Referring Provider: Randy Rand MD    Visit Date: April 15, 2021    Provider: Misbah Vick MD   Location: [facility]   Location Address: [address] [place] [address]  [id number]   Location Phone: [phone]          Chief Complaint  · Follow up Bilateral Wrist Pain      History Of Present Illness  Dhaval Nieto is a 65 year old,  or  male who presents today to [facility]. Patient is here for evaluation of his right wrist. He is still being bothered by ulnar sided pain. We tried injections. We tried therapy, a brace and he is still being bothered by pain. He localizes it to the lateral aspect of the wrist, ulnar sided.       Past Medical History  Arthritis; Asthma; Bladder disorder; Blood Diseases; BPH with Urinary Obstruction; Cancer; Cervical radiculopathy; Cervical spinal stenosis; Cervical stenosis of spinal canal; Cervicalgia; Chest pain; Decreased sensation; Depression; Diabetes; Diabetes mellitus, insulin dependent (IDDM), controlled; Headache; Hematuria (Gross); Hernia; Herniated Disc; History of colon cancer; Hyperlipemia; Hyperlipidemia; Hypertension; Hypertension, Benign Essential; Insomnia; Leg pain; Limb Swelling; Loss of balance; Low back pain; Lumbar degenerative disc disease; Lumbar radiculopathy; Mid back pain; Migraines; Muscle cramps; Neurologic disorder; Neurologic disorder, unspecified; Pain, Back; Pain, Cervical Spine; Pain, Generalized; Pain, Joint; Pain, Leg; Seasonal allergies; Shortness of Breath; Stomach Disorder; Thoracic disc degeneration         Past Surgical History  Abdomen; Bladder Surg.; CABG; Carpal Tunnel Release; Cholecystecomy; Colon Resection; Colonoscopy; Cystoscopy; Gallbladder; Hernia; Hernia Repair; Joint Surgery; Shoulder surgery; Ventral Hernia  "Repair         Medication List  Ambien 10 mg oral tablet; diclofenac sodium 75 mg oral tablet,delayed release (DR/EC); Eliquis 5 mg oral tablet; famotidine 20 mg oral tablet; gabapentin 300 mg oral capsule; Lantus Solostar 100 unit/mL (3 mL) subcutaneous insulin pen; Linzess 72 mcg oral capsule; metoprolol tartrate 25 mg oral tablet; MoviPrep 100-7.5-2.691 gram oral powder in packet; Novolog Flexpen 100 unit/mL Subcutaneous Insulin Pen; omeprazole 40 mg oral capsule,delayed release(DR/EC); Percocet 5-325 mg oral tablet; Trulicity 1.5 mg/0.5 mL subcutaneous pen injector; Viagra 100 mg oral tablet; Vitamin D2 50,000 unit oral capsule; Voltaren 1 % topical gel         Allergy List  NO KNOWN DRUG ALLERGIES; Latex; Latex Exam Gloves         Family Medical History  Stroke; Heart failure; Heart Attack (MI)         Social History  Alcohol (Never); ; lives alone; lives with spouse; .; Recreational Drug Use (Never); Retired; Single; Tobacco (Current some day)         Review of Systems  · Constitutional  o Denies  o : fever, chills, weight loss  · Cardiovascular  o Denies  o : chest pain, shortness of breath  · Gastrointestinal  o Denies  o : liver disease, heartburn, nausea, blood in stools  · Genitourinary  o Denies  o : painful urination, blood in urine  · Integument  o Denies  o : rash, itching  · Neurologic  o Denies  o : headache, weakness, loss of consciousness  · Musculoskeletal  o Denies  o : painful, swollen joints  · Psychiatric  o Denies  o : drug/alcohol addiction, anxiety, depression      Vitals  Date Time BP Position Site L\R Cuff Size HR RR TEMP (F) WT  HT  BMI kg/m2 BSA m2 O2 Sat FR L/min FiO2        04/15/2021 02:35 PM      92 - R   174lbs 16oz 5'  9\" 25.84 1.97 96 %            Physical Examination  · Constitutional  o Appearance  o : well developed, well-nourished, no obvious deformities present  · Head and Face  o Head  o :   § Inspection  § : normocephalic  o Face  o :   § Inspection  § : " no facial lesions  · Eyes  o Conjunctivae  o : conjunctivae normal  o Sclerae  o : sclerae white  · Ears, Nose, Mouth and Throat  o Ears  o :   § External Ears  § : appearance within normal limits  § Hearing  § : intact  o Nose  o :   § External Nose  § : appearance normal  · Neck  o Inspection/Palpation  o : normal appearance  o Range of Motion  o : full range of motion  · Respiratory  o Respiratory Effort  o : breathing unlabored  o Inspection of Chest  o : normal appearance  o Auscultation of Lungs  o : no audible wheezing or rales  · Cardiovascular  o Heart  o : regular rate  · Gastrointestinal  o Abdominal Examination  o : soft and non-tender  · Skin and Subcutaneous Tissue  o General Inspection  o : intact, no rashes  · Psychiatric  o General  o : Alert and oriented x3  o Judgement and Insight  o : judgment and insight intact  o Mood and Affect  o : mood normal, affect appropriate  · Right Wrist  o Inspection  o : No skin discoloration, atrophy or swelling. Patient able to wiggle fingers and make a fist. Full wrist extension, full wrist flexion.           Assessment  · Pain in both wrists       Pain in right wrist     719.43/M25.531  Pain in left wrist     719.43/M25.532  · Wrist pain, right ulnar sided     719.43/M25.531      Plan  · Medications  o Medications have been Reconciled  o Transition of Care or Provider Policy  · Instructions  o Reviewed the patient's Past Medical, Social, and Family history as well as the ROS at today's visit, no changes.  o Call or return if worsening symptoms.  o This note is transcribed by Isabel Escobar /sofi  o We are going to set him up for a MRI to rule out TFCC tear or other ligamentous injury. See him back after the MRI for the results.             Electronically Signed by: Isabel Escobar, -Author on April 20, 2021 10:14:07 AM  Electronically Co-signed by: Misbah Vick MD -Reviewer on April 20, 2021 05:57:49 PM

## 2021-05-14 NOTE — PROGRESS NOTES
"   Progress Note      Patient Name: Dhaval Nieto   Patient ID: [id number]   Sex: Male   YOB: 1955    Primary Care Provider: Christiano Mcbride MD   Referring Provider: Randy Rand MD    Visit Date: January 26, 2021    Provider: Viral Alvarez MD   Location: [facility]   Location Address: [address] [place] [address], [facility]   [place], [place]  [id number]   Location Phone: [phone]          Chief Complaint  · Palpitations  · Hypertension   · Status post mitral valve repair      History Of Present Illness  Dhaval Nieto is a 65-year-old male with history of hypertension and mitral valve repair, denies any chest pain or shortness of breath. No PND, no orthopnea, no syncopal or presyncopal episodes. He has some occasional palpitations.   CURRENT MEDICATIONS: Novolog; Lantus; Eliquis 5 mg b.i.d.; Trulicity once a week; Melatonin 2 daily; Prazosin 182 mg daily; Metoprolol Tartrate 50 mg b.i.d.   PAST MEDICAL HISTORY: Coronary artery disease; diabetes mellitus; hypertension; mitral valve replacement in February 2019 at [facility].   PSYCHOSOCIAL HISTORY: Denies alcohol. Current smoker.      ALLERGIES:  Aspirin, latex.       Review of Systems  · Cardiovascular  o Admits  o : shortness of breath while walking or lying flat, chest pain or angina pectoris   o Denies  o : palpitations (fast, fluttering, or skipping beats), swelling (feet, ankles, hands)  · Respiratory  o Denies  o : chronic or frequent cough      Vitals  Date Time BP Position Site L\R Cuff Size HR RR TEMP (F) WT  HT  BMI kg/m2 BSA m2 O2 Sat FR L/min FiO2 HC       01/26/2021 02:09 /64 Sitting    99 - R   172lbs 0oz 5'  9\" 25.4 1.95             Physical Examination  · Constitutional  o Appearance  o : Awake, alert, cooperative, pleasant.  · Respiratory  o Inspection of Chest  o : No chest wall deformities, moving equal.  o Auscultation of Lungs  o : Good air entry with vesicular breath " sounds.  · Cardiovascular  o Heart  o :   § Auscultation of Heart  § : S1 and S2 regular. No S3. No S4. No murmurs.  o Peripheral Vascular System  o :   § Extremities  § : Peripheral pulses were well felt. No edema. No cyanosis.  · Gastrointestinal  o Abdominal Examination  o : No masses or organomegaly noted.          Assessment     ASSESSMENT AND PLAN:  1.  Mitral stenosis, status post bioprosthetic mitral valve repair, stable. History of left atrial thrombus. Continue        Eliquis. Increase Metoprolol to 75 mg b.i.d. in view of his palpitations.  2.  Type 2 diabetes mellitus, managed by PMD.  3.  Essential hypertension, controlled. Continue current dose of Metoprolol.   4.  See me back in 6 months.      Viral Alvarez MD  WILLIE/pap             Electronically Signed by: Merlene Hutchins-, Other -Author on February 2, 2021 08:03:24 AM  Electronically Co-signed by: Viral Alvarez MD -Reviewer on February 8, 2021 09:08:57 AM

## 2021-05-14 NOTE — PROGRESS NOTES
Progress Note      Patient Name: Dhaval Nieto   Patient ID: [id number]   Sex: Male   YOB: 1955    Primary Care Provider: Christiano Mcbride MD   Referring Provider: Randy Rand MD    Visit Date: April 27, 2021    Provider: DUANE Morales   Location: [facility]   Location Address: [address] [place] [address], [address]  [place], [place]  [id number]   Location Phone: [phone]          Chief Complaint  · Follow up GERD  · Follow up Constipation      History Of Present Illness     He presents for follow-up of GERD and constipation.      Past medical history includes colon cancer.    1/29/2021 colonoscopy-evidence of previous colocolonic anastomosis seen in the sigmoid colon.  2 polyps (5 mm - 1 cm) in the descending colon-tubular adenoma, completely removed. 7 sessile polyps (4-7 mm) in the sigmoid colon hyperplastic, completely removed.  Recommend repeat colonoscopy in 3 years.    He was told by hem/onc that he needed a colonoscopy every year and is concerned about waiting 3 years.      States that he has reduced the amount of spicy foods that he was eating, but is continuing to experience some reflux after meals.  Previously felt that Prilosec was working well.  He needs a refill of this.  Otherwise reports that he's doing well.  Denies change in bowel pattern, hematochezia and melena.             Past Medical History  Arthritis; Asthma; Bladder disorder; Blood Diseases; BPH with Urinary Obstruction; Cancer; Cervical radiculopathy; Cervical spinal stenosis; Cervical stenosis of spinal canal; Cervicalgia; Chest pain; Decreased sensation; Depression; Diabetes; Diabetes mellitus, insulin dependent (IDDM), controlled; Headache; Hematuria (Gross); Hernia; Herniated Disc; History of colon cancer; Hyperlipemia; Hyperlipidemia; Hypertension; Hypertension, Benign Essential; Insomnia; Leg pain; Limb Swelling; Loss of balance; Low back pain; Lumbar degenerative disc  "disease; Lumbar radiculopathy; Mid back pain; Migraines; Muscle cramps; Neurologic disorder; Neurologic disorder, unspecified; Pain, Back; Pain, Cervical Spine; Pain, Generalized; Pain, Joint; Pain, Leg; Seasonal allergies; Shortness of Breath; Stomach Disorder; Thoracic disc degeneration         Past Surgical History  Abdomen; Bladder Surg.; CABG; Carpal Tunnel Release; Cholecystecomy; Colon Resection; Colonoscopy; Cystoscopy; Gallbladder; Hernia; Hernia Repair; Joint Surgery; Shoulder surgery; Ventral Hernia Repair         Medication List  Ambien 10 mg oral tablet; diclofenac sodium 75 mg oral tablet,delayed release (DR/EC); Eliquis 5 mg oral tablet; famotidine 20 mg oral tablet; gabapentin 300 mg oral capsule; Lantus Solostar 100 unit/mL (3 mL) subcutaneous insulin pen; Linzess 72 mcg oral capsule; metoprolol tartrate 25 mg oral tablet; Novolog Flexpen 100 unit/mL Subcutaneous Insulin Pen; omeprazole 40 mg oral capsule,delayed release(DR/EC); Percocet 5-325 mg oral tablet; Trulicity 1.5 mg/0.5 mL subcutaneous pen injector; Viagra 100 mg oral tablet; Voltaren 1 % topical gel         Allergy List  NO KNOWN DRUG ALLERGIES; Latex; Latex Exam Gloves       Allergies Reconciled  Family Medical History  Stroke; Heart failure; Heart Attack (MI)         Social History  Alcohol (Never); ; lives alone; lives with spouse; .; Recreational Drug Use (Never); Retired; Single; Tobacco (Current some day)         Review of Systems  · Constitutional  o Denies  o : chills, fever  · Cardiovascular  o Denies  o : chest pain, irregular heart beats  · Respiratory  o Denies  o : cough, shortness of breath  · Gastrointestinal  o Admits  o : see HPI   · Endocrine  o Denies  o : weight gain, weight loss      Vitals  Date Time BP Position Site L\R Cuff Size HR RR TEMP (F) WT  HT  BMI kg/m2 BSA m2 O2 Sat FR L/min FiO2        04/27/2021 02:51 /67 Sitting    102 - R  98.2 172lbs 8oz 5'  9\" 25.47 1.95             Physical " Examination  · Constitutional  o Appearance  o : Healthy-appearing, awake and alert in no acute distress  · Head and Face  o Head  o : Normocephalic with no worriesome skin lesions  · Eyes  o Vision  o :   § Visual Fields  § : eyes move symmetrical in all directions  o Sclerae  o : sclerae anicteric  o Pupils and Irises  o : pupils equal and symmetrical  · Neck  o Inspection/Palpation  o : Trachea is midline, no adenopathy  · Respiratory  o Respiratory Effort  o : Breathing is unlabored.  o Inspection of Chest  o : normal appearance  o Auscultation of Lungs  o : Chest is clear to auscultation bilaterally.  · Cardiovascular  o Heart  o :   § Auscultation of Heart  § : no murmurs, rubs, or gallops  o Peripheral Vascular System  o :   § Extremities  § : no cyanosis, clubbing or edema;   · Gastrointestinal  o Abdominal Examination  o : Abdomen is soft, nontender to palpation, with normal active bowel sounds, no appreciable hepatosplenomegaly.  o Digital Rectal Exam  o : deferred  · Skin and Subcutaneous Tissue  o General Inspection  o : without focal lesions; turgor is normal  · Psychiatric  o General  o : Alert and oriented x3  o Mood and Affect  o : Mood and affect are appropriate to circumstances  · Extremities  o Extremities  o : No edema, no cyanosis          Assessment  · Adenomatous polyp of descending colon     211.3/D12.4  · Constipation     564.00/K59.00  · Gastroesophageal Reflux     530.81/K21.9  · History of colon cancer     V10.05/Z85.038  · History of colon resection     V45.89/Z90.49      Plan  · Medications  o famotidine 20 mg oral tablet   SIG: take 1 tablet by oral route 2 times a day for 90 days PRN heartburn   DISP: (180) Tablet with 2 refills  Adjusted on 04/27/2021     o Linzess 72 mcg oral capsule   SIG: take 1 capsule (72 mcg) by oral route once daily on an empty stomach at least 30 minutes before 1st meal of the day   DISP: (90) Capsule with 1 refills  Refilled on 04/27/2021     o omeprazole 40  mg oral capsule,delayed release(/EC)   SIG: take 1 capsule (40 mg) by oral route once daily before breakfast   DISP: (90) Capsule with 1 refills  Refilled on 04/27/2021     o MoviPrep 100-7.5-2.691 gram oral powder in packet   SIG: Take as directed by your providers instructions   DISP: (1) Box with 0 refills  Discontinued on 04/27/2021     o Medications have been Reconciled  o Transition of Care or Provider Policy  · Instructions  o Information given on current diagnoses.  · Disposition  o Follow up 6 months            Electronically Signed by: Tarsha Nichols APRN -Author on April 27, 2021 04:04:48 PM

## 2021-05-14 NOTE — PROGRESS NOTES
Progress Note      Patient Name: Dhaval Nieto   Patient ID: [id number]   Sex: Male   YOB: 1955    Primary Care Provider: Christiano Mcbride MD   Referring Provider: Randy Rand MD    Visit Date: March 12, 2021    Provider: Misbah Vick MD   Location: [facility]   Location Address: [address] [place] [address]  [id number]   Location Phone: [phone]          Chief Complaint  · Right Hand/Wrist Pain      History Of Present Illness  Dhaval Nieto is a 65 year old,  or  male who presents today to [facility].      Patient presents today for a follow-up of right wrist pain, right hand pain. Patient has been having pain on the ulnar aspect of his right wrist for some time. He states trouble with gripping. He denies any recent trauma or injury to his right wrist.          Past Medical History  Arthritis; Asthma; Bladder disorder; Blood Diseases; BPH with Urinary Obstruction; Cancer; Cervical radiculopathy; Cervical spinal stenosis; Cervical stenosis of spinal canal; Cervicalgia; Chest pain; Decreased sensation; Depression; Diabetes; Diabetes mellitus, insulin dependent (IDDM), controlled; Headache; Hematuria (Gross); Hernia; Herniated Disc; History of colon cancer; Hyperlipemia; Hyperlipidemia; Hypertension; Hypertension, Benign Essential; Insomnia; Leg pain; Limb Swelling; Loss of balance; Low back pain; Lumbar degenerative disc disease; Lumbar radiculopathy; Mid back pain; Migraines; Muscle cramps; Neurologic disorder; Neurologic disorder, unspecified; Pain, Back; Pain, Cervical Spine; Pain, Generalized; Pain, Joint; Pain, Leg; Seasonal allergies; Shortness of Breath; Stomach Disorder; Thoracic disc degeneration         Past Surgical History  Abdomen; Bladder Surg.; CABG; Carpal Tunnel Release; Cholecystecomy; Colon Resection; Colonoscopy; Cystoscopy; Gallbladder; Hernia; Hernia Repair; Joint Surgery; Shoulder surgery; Ventral Hernia  "Repair         Medication List  Ambien 10 mg oral tablet; diclofenac sodium 75 mg oral tablet,delayed release (DR/EC); Eliquis 5 mg oral tablet; famotidine 20 mg oral tablet; gabapentin 300 mg oral capsule; Lantus Solostar 100 unit/mL (3 mL) subcutaneous insulin pen; Linzess 72 mcg oral capsule; metoprolol tartrate 25 mg oral tablet; MoviPrep 100-7.5-2.691 gram oral powder in packet; Novolog Flexpen 100 unit/mL Subcutaneous Insulin Pen; omeprazole 40 mg oral capsule,delayed release(DR/EC); Trulicity 1.5 mg/0.5 mL subcutaneous pen injector; Viagra 100 mg oral tablet; Vitamin D2 50,000 unit oral capsule; Voltaren 1 % topical gel         Allergy List  NO KNOWN DRUG ALLERGIES; Latex; Latex Exam Gloves       Allergies Reconciled  Family Medical History  Stroke; Heart failure; Heart Attack (MI)         Social History  Alcohol (Never); ; lives alone; lives with spouse; .; Recreational Drug Use (Never); Retired; Single; Tobacco (Current some day)         Review of Systems  · Constitutional  o Denies  o : fever, chills, weight loss  · Cardiovascular  o Denies  o : chest pain, shortness of breath  · Gastrointestinal  o Denies  o : liver disease, heartburn, nausea, blood in stools  · Genitourinary  o Denies  o : painful urination, blood in urine  · Integument  o Denies  o : rash, itching  · Neurologic  o Denies  o : headache, weakness, loss of consciousness  · Musculoskeletal  o Denies  o : painful, swollen joints  · Psychiatric  o Denies  o : drug/alcohol addiction, anxiety, depression      Vitals  Date Time BP Position Site L\R Cuff Size HR RR TEMP (F) WT  HT  BMI kg/m2 BSA m2 O2 Sat FR L/min FiO2 HC       03/12/2021 09:52 AM      65 - R   174lbs 16oz 5'  9\" 25.84 1.97 98 %            Physical Examination  · Constitutional  o Appearance  o : well developed, well-nourished, no obvious deformities present  · Head and Face  o Head  o :   § Inspection  § : normocephalic  o Face  o :   § Inspection  § : no facial " lesions  · Eyes  o Conjunctivae  o : conjunctivae normal  o Sclerae  o : sclerae white  · Ears, Nose, Mouth and Throat  o Ears  o :   § External Ears  § : appearance within normal limits  § Hearing  § : intact  o Nose  o :   § External Nose  § : appearance normal  · Neck  o Inspection/Palpation  o : normal appearance  o Range of Motion  o : full range of motion  · Respiratory  o Respiratory Effort  o : breathing unlabored  o Inspection of Chest  o : normal appearance  o Auscultation of Lungs  o : no audible wheezing or rales  · Cardiovascular  o Heart  o : regular rate  · Gastrointestinal  o Abdominal Examination  o : soft and non-tender  · Skin and Subcutaneous Tissue  o General Inspection  o : intact, no rashes  · Psychiatric  o General  o : Alert and oriented x3  o Judgement and Insight  o : judgment and insight intact  o Mood and Affect  o : mood normal, affect appropriate  · Right Hand  o Inspection  o : No swelling, skin discoloration or atrophy. Skin intact. Pulses are pleasant. Patient able to wiggle fingers and make a fist. Full wrist extension, full wrist flexion, full , full thumb opposition, full PIP flexors, full DIP flexors, full PIP extensors, full finger adduction, full finger abduction. Radial pulse 2+, ulnar pulse 2+. Tender ECU. Tender UCL. Negative Phalen's. Negative Tinel's. Negative finkelstein's.           Assessment  · Arthralgia of right hand     719.44/M25.541  · Right wrist pain     719.43/M25.531  · Right Hand ECU Tendinitis     726.90/M77.9      Plan  · Medications  o Medications have been Reconciled  o Transition of Care or Provider Policy  · Instructions  o Dr. Vick saw and examined the patient and agrees with plan.   o Reviewed the patient's Past Medical, Social, and Family history as well as the ROS at today's visit, no changes.  o Call or return if worsening symptoms.  o Follow Up PRN.  o The above service was scribed by Waleska Vincent on my behalf and I attest to the accuracy  of the note. mc   o Discussed treatment plans and diagnosis with the patient. Patient given a brace, Voltaren gel and a prescription for hand therapy.            Electronically Signed by: Waleska Vincent-, Other -Author on March 12, 2021 01:10:03 PM  Electronically Co-signed by: Misbah Vick MD -Reviewer on March 12, 2021 02:45:32 PM

## 2021-05-15 VITALS
HEIGHT: 69 IN | SYSTOLIC BLOOD PRESSURE: 118 MMHG | HEART RATE: 110 BPM | BODY MASS INDEX: 28.29 KG/M2 | DIASTOLIC BLOOD PRESSURE: 72 MMHG | WEIGHT: 191 LBS

## 2021-05-15 VITALS
BODY MASS INDEX: 26.36 KG/M2 | HEIGHT: 69 IN | DIASTOLIC BLOOD PRESSURE: 80 MMHG | WEIGHT: 178 LBS | HEART RATE: 92 BPM | SYSTOLIC BLOOD PRESSURE: 122 MMHG

## 2021-05-15 VITALS
BODY MASS INDEX: 26.22 KG/M2 | HEIGHT: 69 IN | WEIGHT: 177 LBS | SYSTOLIC BLOOD PRESSURE: 141 MMHG | DIASTOLIC BLOOD PRESSURE: 80 MMHG | HEART RATE: 101 BPM

## 2021-05-15 VITALS
SYSTOLIC BLOOD PRESSURE: 128 MMHG | DIASTOLIC BLOOD PRESSURE: 73 MMHG | HEIGHT: 69 IN | WEIGHT: 180 LBS | BODY MASS INDEX: 26.66 KG/M2

## 2021-05-15 VITALS
HEIGHT: 69 IN | BODY MASS INDEX: 24.44 KG/M2 | HEART RATE: 98 BPM | DIASTOLIC BLOOD PRESSURE: 72 MMHG | WEIGHT: 165 LBS | SYSTOLIC BLOOD PRESSURE: 116 MMHG

## 2021-05-15 VITALS — HEART RATE: 111 BPM | WEIGHT: 187.37 LBS | OXYGEN SATURATION: 95 % | BODY MASS INDEX: 27.75 KG/M2 | HEIGHT: 69 IN

## 2021-05-15 VITALS — HEIGHT: 69 IN | WEIGHT: 197.25 LBS | HEART RATE: 79 BPM | BODY MASS INDEX: 29.21 KG/M2 | OXYGEN SATURATION: 97 %

## 2021-05-15 VITALS
SYSTOLIC BLOOD PRESSURE: 132 MMHG | BODY MASS INDEX: 26.36 KG/M2 | DIASTOLIC BLOOD PRESSURE: 78 MMHG | WEIGHT: 178 LBS | HEIGHT: 69 IN | HEART RATE: 90 BPM

## 2021-05-15 VITALS
DIASTOLIC BLOOD PRESSURE: 62 MMHG | BODY MASS INDEX: 26.84 KG/M2 | HEIGHT: 69 IN | WEIGHT: 181.19 LBS | SYSTOLIC BLOOD PRESSURE: 106 MMHG

## 2021-05-15 VITALS — BODY MASS INDEX: 28.44 KG/M2 | HEIGHT: 69 IN | WEIGHT: 192 LBS | RESPIRATION RATE: 16 BRPM

## 2021-05-15 VITALS — WEIGHT: 179 LBS | HEIGHT: 69 IN | BODY MASS INDEX: 26.51 KG/M2 | RESPIRATION RATE: 14 BRPM

## 2021-05-15 VITALS
SYSTOLIC BLOOD PRESSURE: 136 MMHG | HEART RATE: 109 BPM | BODY MASS INDEX: 25.98 KG/M2 | HEIGHT: 69 IN | WEIGHT: 175.37 LBS | DIASTOLIC BLOOD PRESSURE: 64 MMHG

## 2021-05-15 VITALS
BODY MASS INDEX: 25.92 KG/M2 | DIASTOLIC BLOOD PRESSURE: 64 MMHG | WEIGHT: 175 LBS | HEIGHT: 69 IN | SYSTOLIC BLOOD PRESSURE: 126 MMHG

## 2021-05-16 VITALS
DIASTOLIC BLOOD PRESSURE: 65 MMHG | HEART RATE: 100 BPM | BODY MASS INDEX: 27.4 KG/M2 | HEIGHT: 69 IN | SYSTOLIC BLOOD PRESSURE: 103 MMHG | WEIGHT: 185 LBS

## 2021-05-16 VITALS — WEIGHT: 181.37 LBS | HEIGHT: 69 IN | OXYGEN SATURATION: 98 % | BODY MASS INDEX: 26.86 KG/M2 | HEART RATE: 95 BPM

## 2021-05-16 VITALS — RESPIRATION RATE: 16 BRPM | WEIGHT: 178 LBS | BODY MASS INDEX: 26.36 KG/M2 | HEIGHT: 69 IN

## 2021-05-16 VITALS
HEIGHT: 69 IN | BODY MASS INDEX: 27.7 KG/M2 | SYSTOLIC BLOOD PRESSURE: 122 MMHG | DIASTOLIC BLOOD PRESSURE: 78 MMHG | WEIGHT: 187 LBS | HEART RATE: 89 BPM

## 2021-05-18 ENCOUNTER — HOSPITAL ENCOUNTER (OUTPATIENT)
Dept: URGENT CARE | Facility: CLINIC | Age: 66
Discharge: HOME | End: 2021-05-18
Attending: EMERGENCY MEDICINE

## 2021-05-22 ENCOUNTER — TRANSCRIBE ORDERS (OUTPATIENT)
Dept: ONCOLOGY | Facility: HOSPITAL | Age: 66
End: 2021-05-22

## 2021-05-22 DIAGNOSIS — C18.9 MALIGNANT NEOPLASM OF COLON, UNSPECIFIED PART OF COLON (HCC): Primary | ICD-10-CM

## 2021-05-28 VITALS
SYSTOLIC BLOOD PRESSURE: 95 MMHG | BODY MASS INDEX: 28.34 KG/M2 | DIASTOLIC BLOOD PRESSURE: 68 MMHG | HEIGHT: 69 IN | SYSTOLIC BLOOD PRESSURE: 113 MMHG | HEIGHT: 69 IN | BODY MASS INDEX: 25.31 KG/M2 | OXYGEN SATURATION: 98 % | TEMPERATURE: 98 F | RESPIRATION RATE: 16 BRPM | BODY MASS INDEX: 24.71 KG/M2 | HEART RATE: 106 BPM | DIASTOLIC BLOOD PRESSURE: 70 MMHG | TEMPERATURE: 98.5 F | TEMPERATURE: 98 F | WEIGHT: 167.33 LBS | SYSTOLIC BLOOD PRESSURE: 106 MMHG | DIASTOLIC BLOOD PRESSURE: 59 MMHG | HEART RATE: 87 BPM | OXYGEN SATURATION: 100 % | WEIGHT: 170.86 LBS | WEIGHT: 191.36 LBS | HEART RATE: 94 BPM | RESPIRATION RATE: 16 BRPM | RESPIRATION RATE: 20 BRPM | OXYGEN SATURATION: 94 %

## 2021-05-28 VITALS
HEIGHT: 69 IN | DIASTOLIC BLOOD PRESSURE: 75 MMHG | OXYGEN SATURATION: 99 % | WEIGHT: 198.63 LBS | DIASTOLIC BLOOD PRESSURE: 67 MMHG | HEIGHT: 69 IN | OXYGEN SATURATION: 97 % | HEIGHT: 69 IN | SYSTOLIC BLOOD PRESSURE: 113 MMHG | OXYGEN SATURATION: 96 % | TEMPERATURE: 98.6 F | BODY MASS INDEX: 29.42 KG/M2 | BODY MASS INDEX: 27 KG/M2 | TEMPERATURE: 98.5 F | SYSTOLIC BLOOD PRESSURE: 116 MMHG | HEART RATE: 105 BPM | BODY MASS INDEX: 26.71 KG/M2 | SYSTOLIC BLOOD PRESSURE: 123 MMHG | WEIGHT: 182.32 LBS | TEMPERATURE: 99.1 F | HEART RATE: 103 BPM | SYSTOLIC BLOOD PRESSURE: 112 MMHG | OXYGEN SATURATION: 97 % | WEIGHT: 180.34 LBS | WEIGHT: 182.76 LBS | HEART RATE: 95 BPM | BODY MASS INDEX: 27.07 KG/M2 | HEART RATE: 97 BPM | HEIGHT: 69 IN | DIASTOLIC BLOOD PRESSURE: 65 MMHG | TEMPERATURE: 97.5 F | DIASTOLIC BLOOD PRESSURE: 70 MMHG

## 2021-05-28 VITALS
RESPIRATION RATE: 16 BRPM | SYSTOLIC BLOOD PRESSURE: 130 MMHG | DIASTOLIC BLOOD PRESSURE: 65 MMHG | OXYGEN SATURATION: 99 % | BODY MASS INDEX: 26.37 KG/M2 | HEART RATE: 85 BPM | TEMPERATURE: 98 F | WEIGHT: 178.57 LBS

## 2021-05-28 VITALS
DIASTOLIC BLOOD PRESSURE: 68 MMHG | BODY MASS INDEX: 27.46 KG/M2 | TEMPERATURE: 98.5 F | SYSTOLIC BLOOD PRESSURE: 127 MMHG | HEART RATE: 87 BPM | WEIGHT: 185.41 LBS | OXYGEN SATURATION: 98 % | HEIGHT: 69 IN

## 2021-05-28 NOTE — PROGRESS NOTES
Patient: WILLIAN CA     Acct: [id number]     Report: #[id number]  UNIT #: [id number]     : 1955    Encounter Date:2021  PRIMARY CARE: Shonda Seals  ***Signed***  --------------------------------------------------------------------------------------------------------------------  NURSE INTAKE      Visit Type      Established Patient Visit            Chief Complaint      COLON CA F/U      Intent of Therapy:  Curative            Referring Provider/Copies To      Copies To:   BEN SANDOVAL; Shonda Seals            History and Present Illness      Past Oncology Illness History      This is a very pleasant 65-year-old man with history of colon cancer.Patient     returns for follow-up of colon cancer. . Colon Resection; left     hemicolectomy and transverse colectomy.  Invasive adenocarcinoma moderately to     poorly differentiated.  Extensive lymphovascular invasion.  Margins negative.  8    out of 16 lymph nodes positive. [id number]. Patient had severe side effects of     hand-foot syndrome.  Therefore a lower dose of Xeloda was given to start [place]     ox.  2017 completed 6C FOLFOX      17 thru 10/11/17 [facility]            HPI - Oncology Interim      Patient returns for follow-up of colon cancer.  He completed resection and     adjuvant treatment 10/17.  Patient states he is doing well.  He denies new     masses lymphadenopathy.  No unusual aches or pains.  He has an upcoming     colonoscopy later this month.  He notes normal appetite and energy level     although not as good as he would like.  It is still adequate for all of his     daily needs.  He continues to smoke around 1 pack/week and is not motivated     toward cessation.            Cancer Details            Sigmoid colon--moderate to poorly differentiated invasive adenocarcinoma            Clinical Staging      Stage IIIC ([id number])            Treatments      Chemotherapy      2017 completed 6C FOLFOX       17 thru 10/11/17 [facility]      Surgeries      . Colon Resection; left hemicolectomy and transverse colectomy.      Invasive adenocarcinoma moderately to poorly differentiated.  Extensive     lymphovascular invasion.  Margins negative.  8 out of 16 lymph nodes positive.     [id number].            Clinical Trial Participant      No            ECOG Performance Status      0            Most Recent Lab Findings            Item Value  Date Time             Carcinoembryonic Antigen 8.9 ng/mL H 21 0918            Laboratory Tests      21 09:18            Most Recent Imaging Findings      Patient: WILLIAN CA   Acct: #[id number]   Report: #[id number]            UNIT #: [id number]    DOS: 21 0905      INSURANCE:MEDICARE PART A   LOCATION:CT     : 1955            PROVIDERS      ADMITTING:     ATTENDING: MIKE SEGURA      FAMILY:  Christiano Mcbride   ORDERING:  MIKE SEGURA         OTHER:    DICTATING:  NICK GOMEZ MD            REQ #:[id number]   EXAM:CTACP[facility] - CT ABD CHEST PEL w CONTR      REASON FOR EXAM:  COLON CA      REASON FOR VISIT:  COLON CA            *******Signed******         PROCEDURE:   CT ABDOMEN; CT CHEST; CT PELVIS WITH CONTRAST             COMPARISON:   [facility], CT, CT CHEST ABD PEL W CONTRAST,     2020, 10:24.             INDICATIONS:   COLON CA             TECHNIQUE:   After obtaining the patient's consent, CT images were obtained with    intravenous contrast       material.             FINDINGS:         CT chest:      The visualized soft tissue structures at the base of the neck including the     thyroid appear within       normal limits.  There is no lower cervical or axillary adenopathy.             The heart size is normal.  There is no pericardial effusion.  There is a mitral     valve replacement.        The aorta and main pulmonary artery is normal in caliber.  There are no central     filling defects to       suggest presence  of underlying pulmonary embolism.  There is no mediastinal or     hilar       lymphadenopathy.  The esophagus is normal in course and caliber.             The tracheobronchial tree is patent.  There is no abnormal bronchial wall thic    kening or       bronchiectasis.  There is minimal bandlike atelectasis within the bilateral     lower lobes, left       greater than right.  There are no pleural effusions or pneumothorax.  There is a    small amount of       paraseptal emphysema within the lung apices.  There are no suspicious pulmonary     nodules.  No lytic       or sclerotic osseous lesion within the thorax.             CT abdomen and pelvis:      The liver is normal in size and contour.  There is a small hypodensity within     the posterior right       hepatic lobe measuring 7 mm which is unchanged from prior examinations.  The     gallbladder is       surgically absent.  There is no intrahepatic or extrahepatic biliary ductal     dilatation.  The       spleen, adrenal glands, and pancreas appear within normal limits.             The kidneys are symmetric in size and enhancement.  There are bilateral simple     appearing renal       cysts.  There is no hydronephrosis or hydroureter.  The urinary bladder is fluid    filled without       focal wall thickening.  The prostate is enlarged.             The stomach and duodenum are normal in caliber and configuration.  There no     abnormally dilated       loops of small bowel to suggest small bowel obstruction or small bowel     inflammation.  The appendix       is well visualized and normal within the right lower quadrant.  There is a     moderate to large       colonic stool burden.  There is evidence of prior left-sided colonic resection     without evidence of       local recurrence.  There are postsurgical changes of prior ventral hernia repair    with diastasis       rectus.  There is no adenopathy within the abdomen or pelvis.  There is     aortoiliac  atherosclerotic       calcification.  There are multilevel degenerative changes of the lumbar spine.      No suspicious lytic       or sclerotic osseous lesion.             CONCLUSION:         1. No evidence of residual or recurrent disease within the abdomen or pelvis.      2. Stable 7 mm low-density lesion within the posterior right hepatic lobe,     unchanged from prior       exams.      3. No evidence of adenopathy within the chest, abdomen, or pelvis.                NICK GOMEZ MD             Electronically Signed and Approved By: NICK GOMEZ MD on 1/08/2021 at 10:11                                  Until signed, this is an unconfirmed preliminary report that may contain      errors and is subject to change.                                              BATB1:      D:01/08/21 1012            PAST, FAMILY   Past Medical History      Past Medical History:  Diabetes Type 2      Hematology/Oncology (M):  Colon            Past Surgical History      Biopsy, CABG Surgery, VAD Placement            Social History      Lives independently:  Yes      Number of Children:  7            Tobacco Use      Tobacco status:  Current every day smoker      Smoking packs/day:  .50            Substance Use      Substance use:  Denies use            REVIEW OF SYSTEMS      General:  Admits: Weight Gain;          Denies: Fatigue      Eye:  Denies Vision Changes      ENT:  Denies Headache      Cardiovascular:  Denies Chest Pain, Denies Palpitations      Respiratory:  Denies: Cough, Shortness of Air      Gastrointestinal:  Denies Diarrhea      Musculoskeletal:  Denies Painful Joints            VITAL SIGNS AND SCORES      Vitals      Weight 178 lbs 9.162 oz / 81 kg      Temperature 98 F / 36.67 C - Temporal      Pulse 85      Respirations 16      Blood Pressure 130/65 Sitting, Left Arm      Pulse Oximetry 99%, RM AIR            Pain Score      Pain Scale Used:  Numerical            Fatigue Score      Fatigue (0-10 scale):  0  (none)            EXAM      General Appearance:  Positive for: Alert, Cooperative;          Negative for: Acute Distress      Neck:  Positive for: Supple;          Negative for: JVD, Masses      Respiratory:  Positive for: CTAB, Normal Respiratory Effort      Abdomen/Gastro:  Positive for: Normal Active Bowel Sounds, Soft;          Negative for: Distention, Hepatosplenomegaly, Tenderness      Cardiovascular:  Positive for: RRR;          Negative for: Gallop, Murmur, Peripheral Edema, Rub      Psychiatric:  Positive for: Appropriate Affect, Intact Judgement      Lymphatic:  Negative for: Cervical, Infraclavicular, Supraclavicular            PREVENTION      Hx Influenza Vaccination:  Yes      Date Influenza Vaccine Given:  Oct 1, 2020      Influenza Vaccine Declined:  Yes      2 or More Falls in Past Year?:  No      Fall Past Year with Injury?:  No      Encouraged to follow-up with:  PCP regarding preventative exams.      Chart initiated by      ANTOINE PAGAN MA            ALLERGY/MEDS      Allergies      Coded Allergies:             NO KNOWN DRUG ALLERGIES (Verified  Allergy, Unknown, 1/11/21)            Medications      Last Reconciled on 1/11/21 15:43 by MIKE SEGURA      Metoprolol Succinate (Metoprolol Succinate) 50 Mg Tab.er.24h      50 MG PO BID, #60 TAB 0 Refills         Reported         9/10/20       Ergocalciferol (Vitamin D2) (Vitamin D2) 50,000 Units      33096 UNITS PO MO, #8 CAP         Reported         3/16/20       Omeprazole (Omeprazole*) 40 Mg Capsule      40 MG PO QDAY PRN for HEARTBURN, #30 CAP 0 Refills         Reported         3/16/20       (Linzess)   No Conflict Check      72 MCG PD QDAY PRN for CONSTIPATION         Reported         3/16/20       Apixaban (Eliquis) 5 Mg Tablet      5 MG PO BID for 30 Days, #60 TAB         Reported         3/16/20       MDI-Albuterol (Proair HFA) 8.5 Gm Hfa.aer.ad      1 PUFFS INH Q6H PRN for SHORTNESS OF BREATH, #1 MDI 0 Refills         Reported          12/11/19       Dulaglutide (Trulicity) 1.5 Mg/0.5 Ml Pen.injctr      1.5 MG SUBQ Q7DAY, #1 PEN.INJCTR         Reported         12/11/19       Insulin Human Aspart (novoLOG VIAL) 100 Unit/1 Ml Vial      30 UNITS SUBQ TID, #1 VIAL 0 Refills         Reported         12/11/19       Insulin Glargine (Lantus SOLOSTAR) 100 Unit/1 Ml Insuln.pen      50 UNITS SUBQ HS, #1 BOX 0 Refills         Reported         2/1/19       Sildenafil Citrate (Viagra) 100 Mg Tab      100 MG PO QDAY PRN for sexual activity, TAB         Reported         8/6/15      Medications Reviewed:  No Changes made to meds            IMPRESSION/PLAN      Diagnosis      Colon adenocarcinoma - C18.9      Status post treatments as outlined.  Patient is doing well.  I see no evidence     of disease recurrence by his history, physical examination, scans-small     subcentimeter hepatic mass is stable and likely  represents benign etiology.      CEA is mildly elevated although decreased over previous.  I suspect that this is     related to his ongoing tobacco use.  Counseled smoking cessation but he is not     motivated.  RTC 6 months for ongoing surveillance with labs and scans prior.            Notes      New Diagnostics      * CT Abd/Pelvis/Chest W/Contrast, 6 Months         Dx: Colon adenocarcinoma - C18.9      * CBC With Auto Diff, 6 Months         Dx: Colon adenocarcinoma - C18.9      * CMP Comp Metabolic Panel, 6 Months         Dx: Colon adenocarcinoma - C18.9      * Cea/Carcinoembryonic, 6 Months         Dx: Colon adenocarcinoma - C18.9            Pain      Pain Zero Today            Advanced Care Plan Discussion      Declines Discussion [id number]            Patient Education            All Forms of Smoking Are Bad for You      Patient Education Provided:  Yes            Tobacco Counseling      Tobacco Cessation Counseling:  for 3 - 10 minutes            Electronically signed by MIKE SEGURA  01/11/2021 15:43       Disclaimer: Converted document may not  contain table formatting or lab diagrams. Please see [employer] for the authenticated document.

## 2021-05-28 NOTE — PROGRESS NOTES
Patient: WILLIAN CA     Acct: [id number]     Report: #[id number]  UNIT #: [id number]     : 1955    Encounter Date:2019  PRIMARY CARE: Shonda eSals  ***Signed***  --------------------------------------------------------------------------------------------------------------------  NURSE INTAKE      Visit Type      Established Patient Visit            Chief Complaint      colon ca      Intent of Therapy:  Curative            Referring Provider/Copies To      Referring Provider:  Randy Rand      Primary Care Provider:  Shonda Seals            History and Present Illness      Past Oncology Illness History      This is a very pleasant 62-year-old man with history of colon cancer.  Patient     is currently receiving chemotherapy.  . Colon Resection; left     hemicolectomy and transverse colectomy.  Invasive adenocarcinoma moderately to     poorly differentiated.  Extensive lymphovascular invasion.  Margins negative.  8    out of 16 lymph nodes positive. [id number]. Patient had severe side effects of     hand-foot syndrome.  Therefore a lower dose of Xeloda was given to start [place]     ox.  May 2017. Hernia repair  August .  PET/CT showed no evidence of     disease.  CEA is still elevated.  Discussed with patient risk and benefits of     completing treatment.  Patient stated he would like to complete 6 months of a    djuvant treatment.  2017. Iron 72, TIBC 469, Iron sat 15%, Ferritin    46. Vitamin B-12 WNL and folate WNL.   CEA 9.4.  Patient's CEA is    increasing.  2017. CT Chest/abdomen/pelvis - No definite signs of     new metastatic disease in the chest, abdomen, or pelvis. Small pulmonary nodules    appear unchanged compared to 2015 and are therefore favored to be benign.     Postoperative changes with partial colectomy. Stable hypodense lesion in the     liver, likely representing a hemangioma. Prostatomegaly.  Probable  "renal cysts,     as before. Calcific atherosclerosis of the aorta and coronary arteries.       December 4-2017 Colonoscopy showed a polyp in the descending colon.  CEA is 8.9     March 6-2018.  CEA is 8.9   July 6, 2018. Presents for follow up for colon     cancer. Patient reports \"I have clots in my lungs\". Completed CT of chest at     [facility] and was started back on his Eliquis. Denies any worsening cough,     shortness of breath, weight loss or GI or rectal bleeding. Continues to have     neuropathy to feet. Taking Lyrica BID.         3/9/19 underwent CABG x1 @ [facility].            [facility] - Oncology Interim      F/u colon ca--pt has not had recent lab work--having difficulty with abd hernia     and hoping to have repaired soon; as well as removal of rt chest port.  States     port does not work.  Denies bowel issues at this time. He reports good appetite;    wt is stable.  He reports he is current with his colonoscopy.  No distress     noted.            Cancer Details            Sigmoid colon--moderate to poorly differentiated invasive adenocarcinoma            Clinical Staging      Stage IIIC ([id number])            Treatments      Chemotherapy      4/2017 completed 6C FOLFOX      5/19/17 thru 10/11/17 CAPEOX            Clinical Trial Participant      No            ECOG Performance Status      0            Most Recent Lab Findings      Laboratory Tests      9/24/19 09:42            Most Recent Imaging Findings      9/26/19            PROCEDURE:   CT ABDOMEN; CT CHEST; CT PELVIS WITH CONTRAST             COMPARISON:   [facility], CT, CHEST W/ CONTRAST, 2/01/2019, 3:39.     [facility], CT, ABDOMEN/PELVIS WITH CONTRAST, 8/24/2019, 11:35.             INDICATIONS:   COLON CA             TECHNIQUE:   After obtaining the patient's consent, CT images were obtained with    intravenous contrast       material.      PROTOCOL:     Standard imaging protocol performed                RADIATION: "     DLP: 1966mGy*cm          Automated exposure control was utilized to minimize radiation dose.       CONTRAST:   100cc Isovue 370 I.V.      LABS:     eGFR: >60ml/min/1.73m2             FINDINGS:         CT chest:      The visualized soft tissue structures at the base of the neck including portions    of the thyroid       appear within normal limits.  There is no lower cervical or axillary adenopathy.     There is a right       sided chest port with tip terminating in the SVC.  The heart size is normal.      The ascending aorta       is normal in caliber.  There is normal variant bovine aortic arch anatomy.  Th    ere is aortic and       coronary artery atherosclerotic calcification.  There is a prosthetic mitral     valve.  The main       pulmonary artery is normal in caliber.  There are no pathologically enlarged     mediastinal or hilar       lymph nodes.  The previously seen enlarged lymph nodes from 2/1/2019 appear     resolved and likely or       reactive due to underlying acute pulmonary process.  The esophagus is normal in     course and caliber.             When compared to the most recent prior chest CT from 2/1/2019, there is     resolution of the       previously seen bilateral pleural effusions with suspected cardiogenic pulmonary    edema.  There is       mild upper lobe predominant centrilobular and paraseptal emphysema.  The     tracheobronchial tree is       patent.  There is minimal bandlike atelectasis within the left lower lobe.      There is a tiny 3 mm       focus of scarring within the anterior left upper lobe seen on image 15 which     appears unchanged from       prior examination.  There is a stable 2 mm subpleural nodule best seen on image     23 within the left       upper lobe.  There are no new or enlarging pulmonary nodules.  The patient is     status post prior       median sternotomy with intact wires.  There are mild degenerative changes of the    mid thoracic       spine.  No  suspicious lytic or sclerotic osseous lesion.             CT abdomen and pelvis:  The liver is normal in size and contour.  There is no     evidence of a patent       metastasis.  The gallbladder is surgically absent.  There is stable mild     dilatation of the common       bile duct likely related to postcholecystectomy changes.  The spleen and adrenal    glands appear       within normal limits.  There is mild pancreatic parenchymal atrophy without     pancreatic ductal       dilatation.  The kidneys are symmetric in size and enhancement.  There is no     hydronephrosis or       hydroureter.  There are bilateral simple renal cysts which require no dedicated     follow-up.  There       is no hydronephrosis or hydroureter.  The urinary bladder is collapsed which     limits evaluation.        The prostate is enlarged.             The stomach is distended with ingested material.  There are no abnormally     dilated or thickened       loops of small bowel to suggest small bowel obstruction or small bowel     inflammation.  Enteric       contrast traverses the entirety of the small bowel and reaches the colon.  The     appendix is well       visualized and normal within the right lower quadrant.  There are postsurgical     changes of prior       descending colonic resection.  There is moderate colonic stool burden.  There is    evidence of prior       ventral hernia repair with diastases rectus.  There is a tiny fat containing     umbilical hernia.  The       aorta is normal in caliber without aneurysm formation.  There is no mesenteric,     retroperitoneal, or       pelvic lymphadenopathy by size criteria.  There are no suspicious lytic or     sclerotic osseous       lesions within the lumbar spine or pelvis.             CONCLUSION:         CT chest:      1. Stable tiny pulmonary nodules within the left upper lobe measuring up to 3     mm.      2. Resolution of the previously seen enlarged mediastinal and hilar lymph  nodes     as well as the       prior findings of cardiogenic pulmonary edema.               CT abdomen and pelvis:             1. Postsurgical changes of prior descending colonic resection without evidence     of recurrent       disease.      2. No adenopathy within the abdomen or pelvis.      3. Postsurgical changes of prior ventral hernia repair with diastases rectus.      Small fat containing       umbilical hernia, similar to the prior examination.            PAST, FAMILY   Past Medical History      Past Medical History:  Diabetes Type 2      Hematology/Oncology (M):  Colon            Past Surgical History      Biopsy, VAD Placement            Social History      Marital Status:        Lives independently:  Yes      Number of Children:  7            Tobacco Use      Tobacco status:  Current every day smoker      Smoking packs/day:  .50      Smoking history:  25-50 pack years            Alcohol Use      Alcohol intake:  None            Substance Use      Substance use:  Denies use            REVIEW OF SYSTEMS      General:  Admits: Fatigue      Eye:  Denies Blurred Vision      ENT:  Denies Headache      Cardiovascular:  Denies Chest Pain      Respiratory:  Denies: Coughing Blood      Gastrointestinal:  Denies Constipation      Genitourinary:  Denies Blood in Urine      Musculoskeletal:  Denies Back Pain      Integumentary:  Denies Itching      Neurologic:  Denies Dizziness            VITAL SIGNS AND SCORES      Vitals      Height 5 ft 9 in / 175.26 cm      Weight 191 lbs 5.748 oz / 86.8 kg      BSA 2.03 m2      BMI 28.3 kg/m2      Temperature 98.5 F / 36.94 C - Temporal      Pulse 106      Respirations 20      Blood Pressure 106/70 Sitting, Left Arm      Pulse Oximetry 94%, rm air            Pain Score      Experiencing any pain?:  Yes      Pain Scale Used:  Numerical      Pain Intensity:  9            Fatigue Score      Experiencing any fatigue?:  Yes      Fatigue (0-10 scale):  7            EXAM       General Appearance:  Positive for: Alert, Oriented x3, Cooperative;          Negative for: Acute Distress      Eye:  Positive for: Anicteric Sclerae, Moist Conjunctiva      Neck:  Positive for: Supple;          Negative for: JVD, Masses      Respiratory:  Positive for: CTAB, Normal Respiratory Effort      Abdomen/Gastro:  Positive for: Normal Active Bowel Sounds, Soft;          Negative for: Distention, Hepatosplenomegaly, Tenderness      Other      Well-healed midline incision with ventral nonincarcerated hernia      Cardiovascular:  Positive for: RRR;          Negative for: Gallop, Murmur, Peripheral Edema, Rub      Psychiatric:  Positive for: Appropriate Affect, Intact Judgement      Lymphatic:  Negative for: Cervical, Infraclavicular, Supraclavicular            PREVENTION      Hx Influenza Vaccination:  Yes      Date Influenza Vaccine Given:  Nov 1, 2018      Influenza Vaccine Declined:  Yes      2 or More Falls Past Year?:  No      Fall Past Year with Injury?:  No      Encouraged to follow-up with:  PCP regarding preventative exams.      Chart initiated by      jeremiah max ma            ALLERGY/MEDS      Allergies      Coded Allergies:             LATEX (Verified  Allergy, Unknown, RASH,BLISTER, 9/30/19)           NO KNOWN DRUG ALLERGIES (Verified  Allergy, Unknown, 9/30/19)            Medications      Last Reconciled on 9/30/19 14:58 by DUANE MERCER      Zolpidem Tartrate (Ambien) 5 Mg Tablet      5 MG PO HS PRN for SLEEP, #30 TAB 0 Refills         Reported         9/24/19       Metformin Hcl* (Glucophage*) 500 Mg Tablet      500 MG PO BID, #60 TAB 0 Refills         Reported         9/24/19       Apixaban (Eliquis) 5 Mg Tablet      5 MG PO BID for 30 Days, #60 TAB         Reported         7/5/19       Insulin Glargine (Lantus SOLOSTAR) 100 Unit/1 Ml Insuln.pen      50 UNITS SUBQ HS, #1 BOX 0 Refills         Reported         2/1/19       Insulin Human Aspart (NovoLOG FLEXPEN*) 100 Unit/1 Ml  Insuln.pen      25 UNITS SUBQ TID, #1 BOX 0 Refills         Reported         2/1/19       Albuterol (Proair HFA) 8.5 Gm Inh      1-2 PUFFS INH RTQ6H PRN for SHORTNESS OF BREATH, #1 INH 0 Refills         Reported         7/25/18       Pantoprazole (Protonix*) 40 Mg Tablet.dr      40 MG PO BIDAC, #60 TAB 0 Refills         Reported         7/25/18       Pregabalin (Lyrica*) 300 Mg Cap      300 MG PO BID, #60 CAP         Reported         4/3/18       Ranitidine HCl (Ranitidine HCl) 150 Mg Tablet      150 MG PO QDAY for 30 Days, #30 TAB 0 Refills         Reported         4/19/17       Atorvastatin Calcium (Lipitor*) 40 Mg Tablet      40 MG PO HS, #30 TAB 0 Refills         Reported         4/19/17       MDI-Advair 500/50 (Advair 500/50 Diskus) 1 Each Blst.w.dev      1 PUFF INH QDAY, INH         Reported         10/6/16       Sildenafil Citrate (Viagra) 100 Mg Tab      100 MG PO QDAY PRN for sexual activity, TAB         Reported         8/6/15      Medications Reviewed:  No Changes made to meds            IMPRESSION/PLAN      Diagnosis      Colon adenocarcinoma - C18.9      Status post treatment as above.  Patient doing well.  I see no evidence of disea    se recurrence by his history, physical examination or recent scans.  Lab work     today including CEA tumor marker.  He is up-to-date on colonoscopy.  RTC 6     months for OV with labs and scans prior.      New Diagnostics      * CMP Comp Metabolic Panel, 6 Months      * Cea/Carcinoembryonic, 6 Months      * CBC With Auto Diff, 6 Months      * CT Abd/Pelvis/Chest W/Contrast, 6 Months            Elevated hematocrit - R71.8      Trending up.  Plan repeat in 1 month      New Diagnostics      * CBC With Auto Diff, Month            Patient Education            Carcinoembryonic Antigen      Patient Education Provided:  Yes            Topics Patient Counseled on      CT scan results                 Disclaimer: Converted document may not contain table formatting or lab diagrams.  Please see [employer] for the authenticated document.

## 2021-05-28 NOTE — PROGRESS NOTES
Patient: WILLIAN CA     Acct: [id number]     Report: #[id number]  UNIT #: [id number]     : 1955    Encounter Date:2019  PRIMARY CARE: Shonda Seals  ***Signed***  --------------------------------------------------------------------------------------------------------------------  NURSE INTAKE      Visit Type      Established Patient Visit            Chief Complaint      colon ca      Intent of Therapy:  Curative            Referring Provider/Copies To      Referring Provider:  Randy Rand      Primary Care Provider:  Shonda Seals            History and Present Illness      Past Oncology Illness History      This is a very pleasant 62-year-old man with history of colon cancer.  Patient     is currently receiving chemotherapy.  . Colon Resection; left     hemicolectomy and transverse colectomy.  Invasive adenocarcinoma moderately to     poorly differentiated.  Extensive lymphovascular invasion.  Margins negative.  8    out of 16 lymph nodes positive. [id number]. Patient had severe side effects of     hand-foot syndrome.  Therefore a lower dose of Xeloda was given to start [place]     ox.  May 2017. Hernia repair  August .  PET/CT showed no evidence of     disease.  CEA is still elevated.  Discussed with patient risk and benefits of     completing treatment.  Patient stated he would like to complete 6 months of     adjuvant treatment.  2017. Iron 72, TIBC 469, Iron sat 15%,     Ferritin 46. Vitamin B-12 WNL and folate WNL.   CEA 9.4.      Patient's CEA is increasing.  2017. CT Chest/abdomen/pelvis - No     definite signs of new metastatic disease in the chest, abdomen, or pelvis. Small    pulmonary nodules appear unchanged compared to 2015 and are therefore favored to    be benign. Postoperative changes with partial colectomy. Stable hypodense lesion    in the liver, likely representing a hemangioma. Prostatomegaly.  Probable  "renal     cysts, as before. Calcific atherosclerosis of the aorta and coronary arteries.      December 4-2017 Colonoscopy showed a polyp in the descending colon.  CEA is 8.9     March 6-2018.  CEA is 8.9   July 6, 2018. Presents for follow up for colon     cancer. Patient reports \"I have clots in my lungs\". Completed CT of chest at     [facility] and was started back on his Eliquis. Denies any worsening cough,     shortness of breath, weight loss or GI or rectal bleeding. Continues to have     neuropathy to feet. Taking Lyrica BID.         3/9/19 underwent CABG x1 @ [facility].            [facility] - Oncology Interim      F/U colon cancer--recent CABG x1 on 2/9/19 @ [facility] in [place].  He reports     pain in his back, knee and chest incision.  Chest incision is CDI w/o s/sx     infection noted.  He denies SOA or distress.  Reports bowels are still not     working great; however, recent sx may have altered.  He ambulates with a cane.      Appetite is not great; slowly improving.  Denies diarrhea and n/v at this time.            Cancer Details            Sigmoid colon--moderate to poorly differentiated invasive adenocarcinoma            Clinical Staging      Stage IIIC ([id number])            Treatments      Chemotherapy      4/2017 completed 6C FOLFOX      5/19/17 thru 10/11/17 [facility]            Clinical Trial Participant      No            ECOG Performance Status      0            PAST, FAMILY   Past Medical History      Past Medical History:  Diabetes Type 2      Hematology/Oncology (M):  Colon            Past Surgical History      Biopsy, VAD Placement            Family History      none            Social History      Marital Status:        Lives independently:  Yes      Number of Children:  7            Tobacco Use      Tobacco status:  Current every day smoker      Smoking packs/day:  .50      Smoking history:  25-50 pack years            Alcohol Use      Alcohol intake:  None            Substance Use    "   Substance use:  Denies use            REVIEW OF SYSTEMS      General:  Admits: Weight Gain;          Denies: Appetite Change, Fatigue, Fever, Night Sweats, Weight Loss      Eye:  Denies: Blurred Vision, Corrective Lenses, Diplopia, Eye Irritation, Eye     Pain, Eye Redness, Spots in Vision, Vision Loss      ENT:  Denies: Headache, Hearing Loss, Hoarseness, Seizures, Sinus Congestion,     Sore Throat      Cardiovascular:  Admits: Chest Pain (Related to recent surgery);          Denies: Edema Ankles, Edema Legs, Irregular Heartbeat, Palpitations      Respiratory:  Denies: Coughing Blood, Productive Cough, Shortness of Air,     Wheezing      Gastrointestinal:  Denies: Bloody Stools, Constipation, Diarrhea, Frequent     Heartburn, Nausea, Problem Swallowing, Tarry Stools, Unable to Control Bowels,     Vomiting      Genitourinary (male):  Denies: Blood in Urine, Decrease Urine Stream, Frequent     Urination, Incontinence, Painful Urination      Musculoskeletal:  Denies: Back Pain, Leg Cramps, Muscle Pain, Muscle Weakness,     Painful Joints, Swollen Joints      Integumentary:  Denies: Bleeds Easily, Bruises Easily, Hair Changes, Jaundice,     Lesions, Mole Changes, Nail Changes, Pigment Changes, Rash, Skin Discoloration      Neurologic:  Denies: Dizziness, Fainting, Numbness\Tingling, Paralysis, Seizures      Psychiatric:  Denies: Anxiety, Confused, Depression, Disoriented, Memory Loss      Endocrine:  Denies: Cold Intolerance, Diabetes, Excessive Sweating, Excessive     Thirst, Excessive Urination, Heat Intolerance, Flushing, Hyperthyroidism,     Hypothyroidism      Hematologic/Lymphatic:  Denies: Bruising, Bleeding, Enlarged Lymph Nodes,     Recurrent Infections, Transfusions            VITAL SIGNS AND SCORES      Vitals      Height 5 ft 9.00 in / 175.26 cm      Weight 170 lbs 13.704 oz / 77.5 kg      BSA 1.93 m2      BMI 25.2 kg/m2      Temperature 98 F / 36.67 C - Temporal      Pulse 87      Respirations 16       Blood Pressure 95/59 Sitting, Right Arm      Pulse Oximetry 100%, rm air            EXAM      General Appearance:  Positive for: Alert, Oriented x3, Cooperative;          Negative for: Acute Distress      Respiratory:  Positive for: CTAB, Normal Respiratory Effort      Other      Well-healed median sternotomy incision      Abdomen/Gastro:  Positive for: Normal Active Bowel Sounds, Soft;          Negative for: Distention, Hepatosplenomegaly, Tenderness      Other      Well-healed midline incision      Cardiovascular:  Positive for: RRR;          Negative for: Gallop, Murmur, Peripheral Edema, Rub      Psychiatric:  Positive for: Appropriate Affect, Intact Judgement            PREVENTION      Hx Influenza Vaccination:  Yes      Date Influenza Vaccine Given:  Nov 1, 2018      Influenza Vaccine Declined:  Yes      2 or More Falls Past Year?:  No      Fall Past Year with Injury?:  No      Encouraged to follow-up with:  PCP regarding preventative exams.      Chart initiated by      linette yee ma            ALLERGY/MEDS      Allergies      Coded Allergies:             LATEX (Verified  Allergy, Unknown, RASH,BLISTER, 3/25/19)            Medications      Last Reconciled on 3/25/19 16:09 by DUANE MERCER      Insulin Glargine (Lantus SOLOSTAR) 100 Unit/1 Ml Insuln.pen      50 UNITS SUBQ HS, #1 BOX 0 Refills         Reported         2/1/19       Insulin Human Aspart (NovoLOG FLEXPEN*) 100 Unit/1 Ml Insuln.pen      25 UNITS SUBQ TID, #1 BOX 0 Refills         Reported         2/1/19       Enoxaparin (Lovenox) 30 Mg/0.3 Ml Syringe      80 MG SUBQ Q12H, SYRINGE         Reported         8/2/18       Albuterol (Proair HFA) 8.5 Gm Inh      1-2 PUFFS INH RTQ6H PRN for SHORTNESS OF BREATH, #1 INH 0 Refills         Reported         7/25/18       Pantoprazole (Protonix*) 40 Mg Tablet.dr      40 MG PO BIDAC, #60 TAB 0 Refills         Reported         7/25/18       Furosemide* (Lasix*) 20 Mg Tablet      20 MG PO QDAY, #30  TAB 0 Refills         Reported         5/22/18       Pregabalin (Lyrica*) 300 Mg Cap      300 MG PO BID, #60 CAP         Reported         4/3/18       Ranitidine HCl (Zantac*) 150 Mg Tablet      150 MG PO QDAY for 30 Days, #30 TAB 0 Refills         Reported         4/19/17       Atorvastatin Calcium (Lipitor*) 40 Mg Tablet      40 MG PO HS, #30 TAB 0 Refills         Reported         4/19/17       MDI-Advair 500/50 (Advair 500/50 Diskus) 1 Each Blst.w.dev      1 PUFF INH QDAY, INH         Reported         10/6/16       Sildenafil Citrate (Viagra) 100 Mg Tab      100 MG PO QDAY PRN for sexual activity, TAB         Reported         8/6/15      Medications Reviewed:  No Changes made to meds            IMPRESSION/PLAN      Impression      Colon cancer, stage III            Diagnosis      Colon adenocarcinoma - C18.9      Status post resection followed by adjuvant chemotherapy.  Patient is doing well     from a cancer standpoint.  He is currently recovering from a cardiac surgery.      RTC 6 months for ongoing surveillance with labs and scans prior.  Follow-up with    gastroenterology as previously scheduled      New Diagnostics      * CMP Comp Metabolic Panel, Routine      * Cea/Carcinoembryonic, Routine      * CBC With Auto Diff, Routine      * CMP Comp Metabolic Panel, 6 Months      * Cea/Carcinoembryonic, 6 Months      * CBC With Auto Diff, 6 Months      * Abd/Pel W/ Cont CT, 6 Months      * Chest W/ Cont CT, 6 Months            Patient Education            Back Pain (Alternative Therapy)      Colon Cancer      Patient Education Provided:  Yes                 Disclaimer: Converted document may not contain table formatting or lab diagrams. Please see [employer] for the authenticated document.

## 2021-05-28 NOTE — PROGRESS NOTES
Patient: WILLIAN CA     Acct: [id number]     Report: #[id number]  UNIT #: [id number]     : 1955    Encounter Date:2018  PRIMARY CARE: Shonda Seals  ***Signed***  --------------------------------------------------------------------------------------------------------------------  Chief Complaint      Mar 6, 2018      COLON CANCER      Intent of Therapy?:  Curative            Current Plan      -High risk Colorectal Cancer      -Stage III.  [id number]. S/P partial colectomy with intraoperative flexible     sigmoidoscopy      -Patient completed 6 cycles of FOLFOX.  Last treatment on  Patient     prefers oral medications over the 5-FU pump.  Switched to 6 cycles of Xelox to     complete treatment.       -Patient had hernia repairs so therefore and therefore chemotherapy was held     for over 28 days.       -Patient started  on Oxaliplatin x 4 cycles with Xeloda 1650mg BID on days 1-14     on May 19, 2017.  Patient completed 6 cycles of treatment with the last dose     without oxaliplatin due to severe neuropathy.      -Colonoscopy showed polyp in the descending colon that was removed.  CT chest,     abdomen, and pelvis showed no colon cancer.            -Interval Clinic Visit Changes      -Discussed increased CEA.      -CT ordered.      -Continuous neuropathy symptoms to hands and feet.      -Patient has worsening pain.  Started on Lyrica.             -History SVC thrombus/Right Atrium Thrombus/PE      -2015 patient had a CT of the chest, abdomen showed right atrial clot     and SVT clot and PE. Patient was treated with Lovenox and Coumadin for his     muralclot and for his PE.        -hypercoagulable profile was negative. Was followed up as outpatient and     treated per medical records as a provoked clot after a cardiac catherization     procedure.      -Patient is on Eliquis.             Medical Evaluation of Chemotherapy Associated Toxicities Today      -Chemotherapy  associated fatigue      -Patient's weakness has remained stable.      -Recommended Exercise.       -Continue close observation.      -Chemotherapy associated nausea      -Patient's nausea has remained stable.      -Currently on Anti-Nausea medications.       -Continue close observation.      -Chemotherapy associated anemia      -Does not require blood transfusion on today's visit.       -Transfuse blood when hemoglobin is less than 7.      -Continue close observation.      -Chemotherapy associated thrombocytopenia      -Does not require platelet transfusion on today's visit.       -Transfuse platelet when platelet count < 20,000.       -Continue close observation.      -Anxiety due to cancer      -Patient's anxiety is well controlled today.       -Continue current management.       -Today's Assessment      -ECOG 1.       -Patient's imaging exams, blood tests, physicians' notes, and any new findings     since our last visit were reviewed today to reassess patient's medical     treatment plan. .       -Old medical records were reviewed and summarized in chronological order in the     HPI today to maintain an updated medical record.       -Patient's radiology imaging tests from our last visit were reviewed     independently by me by direct visualization of the images.        -Patients current lab tests and medications were carefully reviewed to evaluate     patient's current treatment plan today.       -Patient was advised to call us right away if there are any new symptoms for an     urgent visit for further evaluation. Patient voiced understanding and agreed to     do so.      Colon cancer - C18.9            Notes      Discontinued Medications      * Prochlorperazine Maleate 10 MG TAB: 10 MG PO TID PRN NAUSEA 30 Days #90      * Ibuprofen (Motrin) 800 MG TABLET: 800 MG PO TID #90      * Amitriptyline HCl 50 MG TABLET: 50 MG PO HS #30      * Ibuprofen (Motrin) 800 MG TABLET: 800 MG PO TID #90      * Amitriptyline HCl  100 MG TABLET: 100 MG PO HS #30      New Diagnostics      * Iron Profile, As Soon As Possible       Dx: Anemia - D64.9      * CBC, As Soon As Possible       Dx: Anemia - D64.9      * Cea/Carcinoembryonic, As Soon As Possible       Dx: Anemia - D64.9      * Serum Ferritin Level, As Soon As Possible       Dx: Anemia - D64.9      * Abd/Pel W/ Cont CT, 03/26/18       Dx: Anemia - D64.9      * Chest W/ Cont CT, 03/26/18       Dx: Anemia - D64.9      Time Spent:  > 50% /Coord Care      Patient Education Provided:  Yes            ECOG      ECOG Performance Status:  0            History and Present Illness      -October . Colon Resection.  Invasive adenocarcinoma moderately to     poorly differentiated.  Extensive lymphovascular invasion.  Margins negative.      8 out of 16 lymph nodes positive. [id number].       -April 4-2017.  Completed his 6 cycles of FOLFOX.  Patient had severe side     effects of hand-foot syndrome.  Therefore a lower dose of Xeloda was given to     start [place].      -May 2017. Hernia repair      -May 19, 2017. Cycle 1 day 1 of Oxaliplatin every 21 days x 4 cycles with Xeloda    , 1650 mg BID on days 1-14.  \      -June 9, 2017. Cycle 2 of Oxaliplatin every 21 days x 4 cycles with Xeloda,     1650 mg BID on days 1-14.       -June .  Discussed with patient he is tolerating the lower dose of 850     mm or meters square.  Well.  We'll titrate up to Xeloda 1000 mg/m2.        -June .  Cycle 3 Oxaliplatin and Xeloda 2000 mg twice a day on days 1     through 14.      -July .  Cycle 4 Oxaliplatin and Xeloda 2000 twice a day on days 1     through 14      -August .  PET/CT showed no evidence of disease.  CEA is still elevated.      Discussed with patient risk and benefits of completing treatment.  Patient     stated he would like to complete 6 months of adjuvant treatment.      September 7, 2017. Iron 72, TIBC 469, Iron sat 15%, Ferritin 46. Vitamin B-12     WNL and  folate WNL.       -September -October .  Completed 2 cycles of 5-FU      -November 1-2017 CEA 9.4.  Patient's CEA is increasing.      -November 22, 2017. CT Chest/abdomen/pelvis - No definite signs of new     metastatic disease in the chest, abdomen, or pelvis. Small pulmonary nodules     appear unchanged compared to 2015 and are therefore favored to be benign.     Postoperative changes with partial colectomy. Stable hypodense lesion in the     liver, likely representing a hemangioma. Prostatomegaly.  Probable renal cysts,     as before. Calcific atherosclerosis of the aorta and coronary arteries.      -December 4-2017 Colonoscopy showed a polyp in the descending colon.  CEA is 8.9      -March 6-2018.  CEA is 8.9             This is a very pleasant 62-year-old man with history of colon cancer.  Patient     is currently receiving chemotherapy.            Vitals      Height 5 ft 9.00 in / 175.26 cm      Weight 180 lbs 5.380 oz / 81.8 kg      BSA 2.01 m2      BMI 26.6 kg/m2      Temperature 98.6 F / 37 C - Temporal      Pulse 97      Blood Pressure 116/70 Sitting, Left Arm      Pulse Oximetry 97%, ROOM AIR            Allergies      Coded Allergies:             LATEX (Verified  Allergy, Unknown, RASH,BLISTER, 3/6/18)           NO KNOWN DRUG ALLERGIES (Verified  Allergy, Unknown, 3/6/18)            Medications      Last Reconciled on 3/6/18 20:26 by SEDA CASTILLO MD      Gabapentin (Gabapentin) 250 Mg/5 Ml Solution      300 MG PO HS, #180 ML 0 Refills         Reported         11/1/17       Insulin Glargine (Lantus SOLOSTAR) 100 Unit/1 Ml Insuln.pen      100 UNITS SUBQ HS, #1 BOX 0 Refills         Prov: Seda Castillo         6/23/17       Insulin Human Aspart (NovoLOG FLEXPEN*) 100 Unit/1 Ml Insuln.pen      30 UNITS SUBQ BID INSULIN, #1 BOX 0 Refills         Prov: Seda Castillo         6/23/17       Ranitidine HCl (Zantac*) 150 Mg Tablet      150 MG PO QDAY for 30 Days, #30 TAB 0 Refills         Reported          4/19/17       Atorvastatin Calcium (Lipitor*) 40 Mg Tablet      40 MG PO QDAY, #30 TAB 0 Refills         Reported         4/19/17       Apixaban (Eliquis) 5 Mg Tablet      5 MG PO BID, #60 TAB         Reported         10/25/16       MDI-Advair 500/50 (Advair 500/50 Diskus) 1 Each Blst.w.dev      1 PUFF INH QDAY, INH         Reported         10/6/16       Sildenafil Citrate (Viagra) 100 Mg Tab      100 MG PO QDAY Y for sexual activity, TAB         Reported         8/6/15            General Information      Referring Provider:  Randy Rand      Primary Provider:  [facility]            Pain and Fatigue Scales      Pain Assessment:           Experiencing any pain?:  Yes         Pain Scale Used:  Numerical         Pain Intensity:  10         Pain Location:  Shoulder (LEFT)         Description:  Aching         Duration:  Continuous            Chemo Status      On Oral Chemotherapy?:  Yes            Other      Clinical Trial Participant?:  No            Past Medical History      No Diabetes Type 1, Yes Diabetes Type 2, No Thyroid Disease, No COPD, No     Emphysema, Yes Hypertension, No Stroke, Yes High Cholesterol, No Heart Attack,     Yes Bleeding Condition, No Low or High RBC Count, Yes Low or High WBC Count, No     Low or High Platelet Coun, No Hepatitis, No Kidney Disease, Yes Depression, No     Alzheimer's Disease, Yes Mental Disease, No Seizures, Yes Arthritis, No     Osteoporosis, No Osteopenia, No Short of Air, Yes Sleep apnea, No Liver Disease    , No STD, No Enlarged Prostate, No Other      Hematology/oncology:  REPORTS HX OF: Anemia, Colorectal cancer, DENIES HX OF:     Previous Treatment for CA, Bladder Cancer, Blood cancer, Brain cancer, Breast     cancer, Coagulopathy, Colon Cancer, Endocrine cancer, Eye cancer, GI cancer,      cancer, Kidney cancer, Leukemia, Leukocytosis, Leukopenia, Liver cancer, Lung     cancer, Lymphoma, Musculoskeletal cancer, Myeloma, Neurologic cancer, Oral      cancer, Pancreatic Cancer, Prostate cancer, Skin cancer, Stomach cancer,     Testicular cancer, Thrombocytopenia, Thyroid cancer, Other cancer history,     Other hematologic history      Genetic/metabolic:  DENIES HX OF: Cystic fibrosis, Down syndrome, Other genetic     history, Other metabolic history            Past Surgical History      REPORTS HX OF: Other Past Surgical Hx (hernia repair), DENIES HX OF: Cataract     extraction, Thyroid surgery, Lung biopsy, CABG surgery, Coronary stent, Valve     replacement, Appendectomy, Cholecystectomy, Splenectomy, Bladder surgery,     Nephrectomy, Joint replacement, Frature repair, Skin cancer removal, Melanoma     excision, Spinal surgery, Breast biopsy, Lumpectomy, Mastectomy, bilateral,     Mastectomy, right, Mastectomy, left, Hysterectomy, Peg Tube Placement, VAD     Placement, Biopsy            Family History      DENIES HX OF: Anemia, Blood disorders, Blood Cancer, Breast cancer, Cervical     cancer, Coagulopathy, Colorectal cancer, Endocrine Cancer, Eye Cancer, GI Cancer    ,  Cancer, Kidney Cancer, Leukemia, Leukocytosis, Leukopenia, Liver Cancer,     Lung cancer, Lymphoma, Melanoma, Musculoskeletal Cancer, Myeloma, Neurologic     Cancer, Oral Cancer, Ovarian cancer, Prostate cancer, Skin Cancer, Stomach     Cancer, Testicular Cancer, Thrombocytopenia, Thyroid cancer, Uterine cancer,     Other Cancer History, Other Hematology History            Social History      Yes            Tobacco Use      Tobacco status:  Current every day smoker      Smoking packs/day:  .50      Smoking history:  25-50 pack years            Alcohol Use      Alcohol intake:  None            Substance Use      Substance use:  Denies use            ROS      General:  Denies: Appetite change, Excessive sweating, Fatigue, Fever, Night     sweats, Weight gain, Weight loss, Other      Eyes:  Denies: Blurred vision, Corrective lenses, Diplopia, Eye irritation, Eye     pain, Eye redness, Spots in  vision, Vision loss, Other      Ears, nose, mouth, throat:  Denies: Headache, Seizures, Visual Changes, Hearing     loss, Sinus Congestion, Hoarseness, Sore throat, Other      Cardiovascular:  Denies: Chest pain, Irregular heartbeat, Palpitations, Swollen     ankles/legs, Other      Respiratory:  Denies: Chest pain, Shortness of Air, Productive cough, Coughing     blood, Other      Gastrointestinal:  Denies: Nausea, Vomiting, Problem swallowing, Frequent     heartburn, Constipation, Diarrhea, Tarry stools, Bloody stools, Unable to     control bowels, Other      Kidney/Bladder:  Denies: Painful Urination, Blood in Urine, Incontinence,     Frequent Urination, Decreased Urine Stream, Other      Musculoskeletal:  Denies: New Back pain, Leg Cramps, Painful Joints, Swollen     Joints, Muscle Pain, Muscle weakness, Other      Skin:  DENIES: Bruises Easily, Hair changes, Lesions/changes in moles, Nail     changes, Pigment changes, Rash, Other      Neurological:  Denies: Dizziness, Fainting, Numbness\Tingling, Paralysis,     Seizures, Other      Psychiatric:  Complains of: AAO X 3, Denies: Anxiety, Panic attacks, Depression    , Memory loss, Other      Endocrine:  DiabetesThyroid DisorderOsteoporosisEndocrine Other      Hematologic/lymphatic:  Denies: Bruising, Bleeding, Enlarged Lymph Nodes,     Recurrent infections, Other            General Appearance:  Alert, Oriented X3, Cooperative, No acute distress      Eyes:  Anicteric Sclerae, Moist Conjunctiva, PERRLA      HEENT:  Orophraynx clear, No Erythema, No Exudates      Neck:  Supple, Full ROM, No Carotid Bruits      Respiratory:  CTAB, No Diminished Breath, No Rales      Breast\Chest:  Symmetrical, No Nipple Discharge, No Masses      Abdomen\Gastro:  Soft, No Masses, No Hepatosplenomegaly      Cardio:  RRR, No Murmur, No, Peripheral Edema, Normal PMI      Skin:  Normal Temperature, Normal Tone, Normal Texture and Turgor, No Rash,     lesions, ulcers      Psychiatric:   Appropriate Affect, Intact Judgement, AAO x3      Neuro:  Cranial Nerve II-XII Inta, No Focal Sensory Deficit      Genitourinary:  No Tovar Catheter, No Bladder Distention      Muscularskeletal:  Normal Gait and Station, Full ROM of extremeties, Full     muscle strength\tone      Extremities:  No Digital Cyanosis, No Digital Ischemia, Pedal Pulses Intact,     Pedal Pulses Symetrical, Normal Gait and station      Lymphatic:  No Cervical, No Supraclavicular, No Axillary            Hx Influenza Vaccination:  No      Influenza Vaccine Declined:  Yes      2 or More Falls Past Year?:  No      Fall Past Year with Injury?:  No      Hx Pneumococcal Vaccination:  No      Encouraged to follow-up with:  PCP regarding preventative exams.      Chart initiated by      LIZETH SHERMAN CMA                 Disclaimer: Converted document may not contain table formatting or lab diagrams. Please see [employer] for the authenticated document.

## 2021-05-28 NOTE — PROGRESS NOTES
Patient: WILLIAN CA     Acct: [id number]     Report: #[id number]  UNIT #: [id number]     : 1955    Encounter Date:2018  PRIMARY CARE: Shonda Seals  ***Signed***  --------------------------------------------------------------------------------------------------------------------  NURSE INTAKE      Visit Type      Established Patient Visit            Chief Complaint      COLON CANCER            Referring Provider/Copies To      Referring Provider:  Randy Rand            History and Present Illness      Past Oncology Illness History      This is a very pleasant 62-year-old man with history of colon cancer.  Patient     is currently receiving chemotherapy.             -. Colon Resection.  Invasive adenocarcinoma moderately to poorly    differentiated.  Extensive lymphovascular invasion.  Margins negative.  8 out of    16 lymph nodes positive. [id number].       -.  Completed his 6 cycles of FOLFOX.  Patient had severe side     effects of hand-foot syndrome.  Therefore a lower dose of Xeloda was given to     start [facility].      -May 2017. Hernia repair      -May 19, 2017. Cycle 1 day 1 of Oxaliplatin every 21 days x 4 cycles with     Xeloda, 1650 mg BID on days 1-14.  \      -2017. Cycle 2 of Oxaliplatin every 21 days x 4 cycles with Xeloda, 1650    mg BID on days 1-14.       -.  Discussed with patient he is tolerating the lower dose of 850 mm    or meters square.  Well.  We'll titrate up to Xeloda 1000 mg/m2.        -.  Cycle 3 Oxaliplatin and Xeloda 2000 mg twice a day on days 1     through 14.      -.  Cycle 4 Oxaliplatin and Xeloda 2000 twice a day on days 1     rough 14      -August .  PET/CT showed no evidence of disease.  CEA is still elevated.     Discussed with patient risk and benefits of completing treatment.  Patient     stated he would like to complete 6 months of adjuvant treatment.      ,  "2017. Iron 72, TIBC 469, Iron sat 15%, Ferritin 46. Vitamin B-12     WNL and folate WNL.       -September -October .  Completed 2 cycles of 5-FU      -November 1-2017 CEA 9.4.  Patient's CEA is increasing.      -November 22, 2017. CT Chest/abdomen/pelvis - No definite signs of new     metastatic disease in the chest, abdomen, or pelvis. Small pulmonary nodules     appear unchanged compared to 2015 and are therefore favored to be benign.     Postoperative changes with partial colectomy. Stable hypodense lesion in the     liver, likely representing a hemangioma. Prostatomegaly.  Probable renal cysts,     as before. Calcific atherosclerosis of the aorta and coronary arteries.      -December 4-2017 Colonoscopy showed a polyp in the descending colon.  CEA is 8.9      -March 6-2018.  CEA is 8.9      -July 6, 2018. Presents for follow up for colon cancer. Patient reports \"I have     clots in my lungs\". Completed CT of chest at [facility] and was started back on     his Eliquis. Denies any worsening cough, shortness of breath, weight loss or GI     or rectal bleeding. Continues to have neuropathy to feet. Taking Lyrica BID.       -November 6-2018.  WBC 11.9.  Hemoglobin 17.  Platelet count 123,000.  CEA 7.2      -December .  Patient presents with worsening chest pain.  Scheduled to     follow-up for a endovascular procedure.  No abdominal pain            HPI - Oncology Interim      Patient presents today with chest pain.  No shortness of breath            Clinical Trial Participant      No            ECOG Performance Status      0            PAST, FAMILY   Past Medical History      Past Medical History:  Diabetes Type 2      Hematology/Oncology (M):  Colon            Past Surgical History      Biopsy, VAD Placement      BYPASS            Family History      NONE            Social History      Marital Status:        Lives independently:  Yes            Tobacco Use      Tobacco status:  Current " every day smoker      Smoking packs/day:  .50      Smoking history:  25-50 pack years            Alcohol Use      Alcohol intake:  None            Substance Use      Substance use:  Denies use            REVIEW OF SYSTEMS      General:  Admits: Appetite Change, Fatigue, Weight Loss (16LB SINCE LAST VISIT);             Denies: Fever, Night Sweats, Weight Gain      Eye:  Denies: Blurred Vision, Corrective Lenses, Diplopia, Eye Irritation, Eye     Pain, Eye Redness, Spots in Vision, Vision Loss      ENT:  Denies: Headache, Hearing Loss, Hoarseness, Seizures, Sinus Congestion,     Sore Throat      Cardiovascular:  Admits: Chest Pain;          Denies: Edema Ankles, Edema Legs, Irregular Heartbeat, Palpitations      Respiratory:  Denies: Coughing Blood, Productive Cough, Shortness of Air,     Wheezing      Gastrointestinal:  Denies: Bloody Stools, Constipation, Diarrhea, Frequent     Heartburn, Nausea, Problem Swallowing, Tarry Stools, Unable to Control Bowels,     Vomiting      Genitourinary (male):  Denies: Blood in Urine, Decrease Urine Stream, Frequent     Urination, Incontinence, Painful Urination      Musculoskeletal:  Denies: Back Pain, Leg Cramps, Muscle Pain, Muscle Weakness,     Painful Joints, Swollen Joints      Integumentary:  Denies: Bleeds Easily, Bruises Easily, Hair Changes, Jaundice,     Lesions, Mole Changes, Nail Changes, Pigment Changes, Rash, Skin Discoloration      Neurologic:  Denies: Dizziness, Fainting, Numbness\Tingling, Paralysis, Seizures      Psychiatric:  Denies: Anxiety, Confused, Depression, Disoriented, Memory Loss      Endocrine:  Denies: Cold Intolerance, Diabetes, Excessive Sweating, Excessive     Thirst, Excessive Urination, Heat Intolerance, Flushing, Hyperthyroidism,     Hypothyroidism      Hematologic/Lymphatic:  Denies: Bruising, Bleeding, Enlarged Lymph Nodes,     Recurrent Infections, Transfusions            VITAL SIGNS AND SCORES      Vitals      Height 5 ft 9 in / 175.26 cm       Weight 182 lbs 12.181 oz / 82.9 kg      BSA 1.99 m2      BMI 27.0 kg/m2      Temperature 97.5 F / 36.39 C - Temporal      Pulse 103      Blood Pressure 112/67 Sitting, Left Arm      Pulse Oximetry 97%, ROOM AIR            Pain Score      Experiencing any pain?:  No      Pain Scale Used:  Numerical      Pain Intensity:  0            Fatigue Score      Experiencing any fatigue?:  Yes      Fatigue (0-10 scale):  7            EXAM      General Appearance:  Positive for: Alert, Oriented x3, Cooperative      Eye:  Positive for: Anicteric Sclerae, Moist Conjunctiva, PERRLA      HEENT:  Positive for: Oropharynx clear;          Negative for: Dentition, Erythema      Neck:  Positive for: Full ROM;          Negative for: JVD      Respiratory:  Positive for: CTAB, Normal Respiratory Effort      Abdomen/Gastro:  Positive for: Normal Active Bowel Sounds;          Negative for: Hepatosplenomegaly      Cardiovascular:  Positive for: Normal PMI;          Negative for: Murmur      Skin:  Positive for: Normal Temperature, Normal Texture and Turgor, Normal Tone      Psychiatric:  Positive for: AAO X 3, Appropriate Affect      Neurologic:  Positive for: Cranial Ner II-XII Intact, Deep Tendon Reflexes      Lower Extremities:  Positive for: Normal Gait and Station;          Negative for: Edema      Lymphatic:  Negative for: Axillary, Cervical, Epitrochlear, Femoral,     Infraclavicular, Inguinal, Occipital, Popliteal, Posterior auricular,     Preauricular, Supraclavicular            PREVENTION      Hx Influenza Vaccination:  Yes      Date Influenza Vaccine Given:  Nov 1, 2018      Influenza Vaccine Declined:  Yes      2 or More Falls Past Year?:  No      Fall Past Year with Injury?:  No      Hx Pneumococcal Vaccination:  No      Encouraged to follow-up with:  PCP regarding preventative exams.      Chart initiated by      LIZETH SHERMAN CMA            ALLERGY/MEDS      Allergies      Coded Allergies:             LATEX (Verified  Allergy,  Unknown, RASH,BLISTER, 12/31/18)            Medications      Last Reconciled on 1/3/19 00:09 by SEDA CASTILLO MD      Enoxaparin (Lovenox) 30 Mg/0.3 Ml Syringe      80 MG SUBQ Q12H, SYRINGE         Reported         8/2/18       Albuterol (Proair HFA) 8.5 Gm Inh      1-2 PUFFS INH RTQ6H PRN for SHORTNESS OF BREATH, #1 INH 0 Refills         Reported         7/25/18       (bydureon pen)   No Conflict Check               Reported         7/25/18       Pantoprazole (Protonix*) 40 Mg Tablet.dr      40 MG PO BIDAC, #60 TAB 0 Refills         Reported         7/25/18       Furosemide* (Lasix*) 20 Mg Tablet      20 MG PO QDAY, #30 TAB 0 Refills         Reported         5/22/18       Pregabalin (Lyrica*) 300 Mg Cap      300 MG PO TID, #60 CAP         Reported         4/3/18       Insulin Glargine (Lantus SOLOSTAR) 100 Unit/1 Ml Insuln.pen      100 UNITS SUBQ HS, #1 BOX 0 Refills         Prov: Seda Castillo         6/23/17       Insulin Human Aspart (NovoLOG FLEXPEN*) 100 Unit/1 Ml Insuln.pen      30 UNITS SUBQ BID INSULIN, #1 BOX 0 Refills         Prov: Seda Castillo         6/23/17       Ranitidine HCl (Zantac*) 150 Mg Tablet      150 MG PO QDAY for 30 Days, #30 TAB 0 Refills         Reported         4/19/17       Atorvastatin Calcium (Lipitor*) 40 Mg Tablet      40 MG PO HS, #30 TAB 0 Refills         Reported         4/19/17       MDI-Advair 500/50 (Advair 500/50 Diskus) 1 Each Blst.w.dev      1 PUFF INH QDAY, INH         Reported         10/6/16       Sildenafil Citrate (Viagra) 100 Mg Tab      100 MG PO QDAY PRN for sexual activity, TAB         Reported         8/6/15      Medications Reviewed:  No Changes made to meds            IMPRESSION/PLAN      Impression      Colon adenocarcinoma - C18.9      -Stage III.  [id number]. S/P partial colectomy with intraoperative flexible     sigmoidoscopy      -Patient completed 6 cycles of FOLFOX.  Last treatment on April 4-2017 Patient     prefers oral medications over the 5-FU pump.   Switched to 6 cycles of Xelox to     complete treatment.       -Patient had hernia repairs so therefore and therefore chemotherapy was held for    over 28 days.       -Patient started  on Oxaliplatin x 4 cycles with Xeloda 1650mg BID on days 1-14     on May 19, 2017.  Patient completed 6 cycles of treatment with the last dose     without oxaliplatin due to severe neuropathy.      -Colonoscopy showed polyp in the descending colon that was removed.  CT chest,     abdomen, and pelvis showed no colon cancer.      -No signs and symptoms of recurrence.  Continue observation            Abdominal pain - R10.9      -Multifactorial differential diagnoses includes cancer      -Was placed for CT chest, abdomen, and pelvis for further evaluation      -Improving.  Still persistent            Peripheral neuropathy - G62.9      -Patient has peripheral neuropathy.       -Discussed ways to protect hands and feet.        -Continue neuropathy medication.  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 Patient was treated with Lovenox and Coumadin for his muralclot    and for his PE.        -hypercoagulable profile was negative. Was followed up as outpatient and treated    per medical records as a provoked clot after a cardiac catherization procedure.      -Patient is on Eliquis.       -Patient is scheduled to have an endovascular procedure done.  Will follow up     with his result            GERD      -We will need to continue to follow-up because patient is on a blood thinner      -Continue ranitidine            Left atrial thrombus      -2.3 cm      -Patient will follow up with surgeon       -Patient will have endovascular procedure            Diagnosis      Notes       -Interval clinic visit changes      -Patient's imaging exams, blood tests, physicians' notes, and any new findings     since our last clinic visit were reviewed today to reassess patient's medical     treatment plan. .       -Old medical records were re-reviewed and re-summarized in chronological order     in the HPI today to maintain an updated medical record.       -Radiology imaging tests from December 2018 to today were reviewed independently    by me by direct visualization of the images.        -Patients current lab tests and medications were carefully reviewed to evaluate     patient's current treatment plan today.       -Patient was advised to call us right away if there was any new symptoms for an     urgent visit.  Patient voiced understanding and agreed to do so.            Plan      Counseled patient to call us for an urgent visit if needed.  Check blood tests     and/or imaging tests as shown above.  Return to clinic in 3 months.  Follow-up     for endovascular procedure            Patient Education      Patient Education Provided:  Yes                 Disclaimer: Converted document may not contain table formatting or lab diagrams. Please see [employer] for the authenticated document.

## 2021-05-28 NOTE — PROGRESS NOTES
Patient: WILLIAN CA     Acct: [id number]     Report: #[id number]  UNIT #: [id number]     : 1955    Encounter Date:2018  PRIMARY CARE: Shonda Seals  ***Signed***  --------------------------------------------------------------------------------------------------------------------  Chief Complaint      Apr 3, 2018      COLON CANCER      Intent of Therapy?:  Curative            Current Plan      -High risk Colorectal Cancer      -Stage III.  [id number]. S/P partial colectomy with intraoperative flexible     sigmoidoscopy      -Patient completed 6 cycles of FOLFOX.  Last treatment on  Patient     prefers oral medications over the 5-FU pump.  Switched to 6 cycles of Xelox to     complete treatment.       -Patient had hernia repairs so therefore and therefore chemotherapy was held     for over 28 days.       -Patient started  on Oxaliplatin x 4 cycles with Xeloda 1650mg BID on days 1-14     on May 19, 2017.  Patient completed 6 cycles of treatment with the last dose     without oxaliplatin due to severe neuropathy.      -Colonoscopy showed polyp in the descending colon that was removed.  CT chest,     abdomen, and pelvis showed no colon cancer.      -CEA is improving.  Next colonoscopy due in 2018            -Worsening neuropathy      -Increase Lyrica to 300 mg twice a day            -Interval Clinic Visit Changes      -Return to clinic in 3 months.      -CT ordered.      -Continuous neuropathy symptoms to hands and feet.  Increase Lyrica            -History SVC thrombus/Right Atrium Thrombus/PE      -2015 patient had a CT of the chest, abdomen showed right atrial clot     and SVT clot and PE. Patient was treated with Lovenox and Coumadin for his     muralclot and for his PE.        -hypercoagulable profile was negative. Was followed up as outpatient and     treated per medical records as a provoked clot after a cardiac catherization     procedure.      -Patient is on  Eliquis.             Medical Evaluation of Cancer Associated Symptoms today      -Cancer associated pain      -Patient's pain has remained stable.      -Continue current pain regimen.      -Cancer associated muscle weakness      -Patient's weakness has remained stable.      -Continue close observation.      -Cancer associated fatigue      -Patient's fatigue has remained stable.      -Recommended exercise.      -Anxiety due to cancer      -Patient's anxiety is well controlled today.       -Continue current management.       -Today's Assessment      -ECOG 1.       -Patient's imaging exams, blood tests, physicians' notes, and any new findings     since our last visit were reviewed today to reassess patient's medical     treatment plan. .       -Old medical records were reviewed and summarized in chronological order in the     HPI today to maintain an updated medical record.       -Patient's radiology imaging tests from our last visit were reviewed     independently by me by direct visualization of the images.        -Patients current lab tests and medications were carefully reviewed to evaluate     patient's current treatment plan today.       -Patient was advised to call us right away if there are any new symptoms for an     urgent visit for further evaluation. Patient voiced understanding and agreed to     do so.      Colon cancer - C18.9            Notes      New Medications      * Pregabalin (Lyrica*) 300 MG CAP: 300 MG PO BID #60      New Diagnostics      * CBC, As Soon As Possible       Dx: Colon cancer - C18.9      * Comp Metabolic Panel, As Soon As Possible       Dx: Colon cancer - C18.9      * Iron Profile, As Soon As Possible       Dx: Colon cancer - C18.9      * Cea/Carcinoembryonic, As Soon As Possible       Dx: Colon cancer - C18.9      * Serum Ferritin Level, As Soon As Possible       Dx: Colon cancer - C18.9      Time Spent:  > 50% /Coord Care      Patient Education Provided:  Yes            ECOG       ECOG Performance Status:  0            History and Present Illness      This is a very pleasant 62-year-old man with history of colon cancer.  Patient     is currently receiving chemotherapy.             -October . Colon Resection.  Invasive adenocarcinoma moderately to     poorly differentiated.  Extensive lymphovascular invasion.  Margins negative.      8 out of 16 lymph nodes positive. [id number].       -April 4-2017.  Completed his 6 cycles of FOLFOX.  Patient had severe side     effects of hand-foot syndrome.  Therefore a lower dose of Xeloda was given to     start [place] ox.      -May 2017. Hernia repair      -May 19, 2017. Cycle 1 day 1 of Oxaliplatin every 21 days x 4 cycles with Xeloda    , 1650 mg BID on days 1-14.  \      -June 9, 2017. Cycle 2 of Oxaliplatin every 21 days x 4 cycles with Xeloda,     1650 mg BID on days 1-14.       -June .  Discussed with patient he is tolerating the lower dose of 850     mm or meters square.  Well.  We'll titrate up to Xeloda 1000 mg/m2.        -June .  Cycle 3 Oxaliplatin and Xeloda 2000 mg twice a day on days 1     through 14.      -July .  Cycle 4 Oxaliplatin and Xeloda 2000 twice a day on days 1     through 14      -August .  PET/CT showed no evidence of disease.  CEA is still elevated.      Discussed with patient risk and benefits of completing treatment.  Patient     stated he would like to complete 6 months of adjuvant treatment.      September 7, 2017. Iron 72, TIBC 469, Iron sat 15%, Ferritin 46. Vitamin B-12     WNL and folate WNL.       -September -October .  Completed 2 cycles of 5-FU      -November 1-2017 CEA 9.4.  Patient's CEA is increasing.      -November 22, 2017. CT Chest/abdomen/pelvis - No definite signs of new     metastatic disease in the chest, abdomen, or pelvis. Small pulmonary nodules     appear unchanged compared to 2015 and are therefore favored to be benign.     Postoperative changes with  partial colectomy. Stable hypodense lesion in the     liver, likely representing a hemangioma. Prostatomegaly.  Probable renal cysts,     as before. Calcific atherosclerosis of the aorta and coronary arteries.      -December 4-2017 Colonoscopy showed a polyp in the descending colon.  CEA is 8.9      -March 6-2018.  CEA is 8.9            Vitals      Height 5 ft 9.00 in / 175.26 cm      Weight 182 lbs 5.127 oz / 82.7 kg      BSA 2.02 m2      BMI 26.9 kg/m2      Temperature 98.5 F / 36.94 C - Temporal      Pulse 95      Blood Pressure 123/75 Sitting, Left Arm      Pulse Oximetry 99%, ROOM AIR            Allergies      Coded Allergies:             LATEX (Verified  Allergy, Unknown, RASH,BLISTER, 4/3/18)           NO KNOWN DRUG ALLERGIES (Verified  Allergy, Unknown, 4/3/18)            Medications      Last Reconciled on 4/4/18 19:24 by SEDA CASTILLO MD      Pregabalin (Lyrica*) 300 Mg Cap      300 MG PO BID, #60 CAP         Reported         4/3/18       Gabapentin (Gabapentin) 250 Mg/5 Ml Solution      300 MG PO HS, #180 ML 0 Refills         Reported         11/1/17       Insulin Glargine (Lantus SOLOSTAR) 100 Unit/1 Ml Insuln.pen      100 UNITS SUBQ HS, #1 BOX 0 Refills         Prov: Seda Castillo         6/23/17       Insulin Human Aspart (NovoLOG FLEXPEN*) 100 Unit/1 Ml Insuln.pen      30 UNITS SUBQ BID INSULIN, #1 BOX 0 Refills         Prov: Seda Castillo         6/23/17       Ranitidine HCl (Zantac*) 150 Mg Tablet      150 MG PO QDAY for 30 Days, #30 TAB 0 Refills         Reported         4/19/17       Atorvastatin Calcium (Lipitor*) 40 Mg Tablet      40 MG PO QDAY, #30 TAB 0 Refills         Reported         4/19/17       Apixaban (Eliquis) 5 Mg Tablet      5 MG PO BID, #60 TAB         Reported         10/25/16       MDI-Advair 500/50 (Advair 500/50 Diskus) 1 Each Blst.w.dev      1 PUFF INH QDAY, INH         Reported         10/6/16       Sildenafil Citrate (Viagra) 100 Mg Tab      100 MG PO QDAY Y for sexual  activity, TAB         Reported         8/6/15            General Information      Referring Provider:  Randy Rand      Primary Provider:  [facility]            Chemo Status      On Oral Chemotherapy?:  Yes            Other      Clinical Trial Participant?:  No            Past Medical History      No Diabetes Type 1, Yes Diabetes Type 2, No Thyroid Disease, No COPD, No     Emphysema, Yes Hypertension, No Stroke, Yes High Cholesterol, No Heart Attack,     Yes Bleeding Condition, No Low or High RBC Count, Yes Low or High WBC Count, No     Low or High Platelet Coun, No Hepatitis, No Kidney Disease, Yes Depression, No     Alzheimer's Disease, Yes Mental Disease, No Seizures, Yes Arthritis, No     Osteoporosis, No Osteopenia, No Short of Air, Yes Sleep apnea, No Liver Disease    , No STD, No Enlarged Prostate, No Other      Hematology/oncology:  REPORTS HX OF: Anemia, Colorectal cancer, DENIES HX OF:     Previous Treatment for CA, Bladder Cancer, Blood cancer, Brain cancer, Breast     cancer, Coagulopathy, Colon Cancer, Endocrine cancer, Eye cancer, GI cancer,      cancer, Kidney cancer, Leukemia, Leukocytosis, Leukopenia, Liver cancer, Lung     cancer, Lymphoma, Musculoskeletal cancer, Myeloma, Neurologic cancer, Oral     cancer, Pancreatic Cancer, Prostate cancer, Skin cancer, Stomach cancer,     Testicular cancer, Thrombocytopenia, Thyroid cancer, Other cancer history,     Other hematologic history      Genetic/metabolic:  DENIES HX OF: Cystic fibrosis, Down syndrome, Other genetic     history, Other metabolic history            Past Surgical History      REPORTS HX OF: Other Past Surgical Hx (hernia repair), DENIES HX OF: Cataract     extraction, Thyroid surgery, Lung biopsy, CABG surgery, Coronary stent, Valve     replacement, Appendectomy, Cholecystectomy, Splenectomy, Bladder surgery,     Nephrectomy, Joint replacement, Frature repair, Skin cancer removal, Melanoma     excision, Spinal surgery, Breast  biopsy, Lumpectomy, Mastectomy, bilateral,     Mastectomy, right, Mastectomy, left, Hysterectomy, Peg Tube Placement, VAD     Placement, Biopsy            Family History      DENIES HX OF: Anemia, Blood disorders, Blood Cancer, Breast cancer, Cervical     cancer, Coagulopathy, Colorectal cancer, Endocrine Cancer, Eye Cancer, GI Cancer    ,  Cancer, Kidney Cancer, Leukemia, Leukocytosis, Leukopenia, Liver Cancer,     Lung cancer, Lymphoma, Melanoma, Musculoskeletal Cancer, Myeloma, Neurologic     Cancer, Oral Cancer, Ovarian cancer, Prostate cancer, Skin Cancer, Stomach     Cancer, Testicular Cancer, Thrombocytopenia, Thyroid cancer, Uterine cancer,     Other Cancer History, Other Hematology History            Social History      Yes            Tobacco Use      Tobacco status:  Current every day smoker      Smoking packs/day:  .50      Smoking history:  25-50 pack years            Alcohol Use      Alcohol intake:  None            Substance Use      Substance use:  Denies use            ROS      General:  Denies: Appetite change, Excessive sweating, Fatigue, Fever, Night     sweats, Weight gain, Weight loss, Other      Eyes:  Denies: Blurred vision, Corrective lenses, Diplopia, Eye irritation, Eye     pain, Eye redness, Spots in vision, Vision loss, Other      Ears, nose, mouth, throat:  Denies: Headache, Seizures, Visual Changes, Hearing     loss, Sinus Congestion, Hoarseness, Sore throat, Other      Cardiovascular:  Denies: Chest pain, Irregular heartbeat, Palpitations, Swollen     ankles/legs, Other      Respiratory:  Denies: Chest pain, Shortness of Air, Productive cough, Coughing     blood, Other      Gastrointestinal:  Denies: Nausea, Vomiting, Problem swallowing, Frequent     heartburn, Constipation, Diarrhea, Tarry stools, Bloody stools, Unable to     control bowels, Other      Kidney/Bladder:  Denies: Painful Urination, Blood in Urine, Incontinence,     Frequent Urination, Decreased Urine Stream, Other       Musculoskeletal:  Denies: New Back pain, Leg Cramps, Painful Joints, Swollen     Joints, Muscle Pain, Muscle weakness, Other      Skin:  DENIES: Bruises Easily, Hair changes, Lesions/changes in moles, Nail     changes, Pigment changes, Rash, Other      Neurological:  Denies: Dizziness, Fainting, Numbness\Tingling, Paralysis,     Seizures, Other      Psychiatric:  Complains of: AAO X 3, Denies: Anxiety, Panic attacks, Depression    , Memory loss, Other      Endocrine:  DiabetesThyroid DisorderOsteoporosisEndocrine Other      Hematologic/lymphatic:  Denies: Bruising, Bleeding, Enlarged Lymph Nodes,     Recurrent infections, Other            Vitals            Vital Signs              Date Time  Temp Pulse Resp B/P (MAP) Pulse Ox O2 Delivery O2 Flow Rate FiO2             3/6/18 13:40 98.6 97  116/70 97 ROOM AIR              General Appearance:  Alert, Oriented X3, Cooperative, No acute distress      Eyes:  Anicteric Sclerae, Moist Conjunctiva, PERRLA      HEENT:  Orophraynx clear, No Erythema, No Exudates      Neck:  Supple, Full ROM, No Carotid Bruits      Respiratory:  CTAB, No Diminished Breath, No Rales      Breast\Chest:  Symmetrical, No Nipple Discharge, No Masses      Abdomen\Gastro:  Soft, No Masses, No Hepatosplenomegaly      Cardio:  RRR, No Murmur, No, Peripheral Edema, Normal PMI      Skin:  Normal Temperature, Normal Tone, Normal Texture and Turgor, No Rash,     lesions, ulcers      Psychiatric:  Appropriate Affect, Intact Judgement, AAO x3      Neuro:  Cranial Nerve II-XII Inta, No Focal Sensory Deficit      Genitourinary:  No Tovar Catheter, No Bladder Distention      Muscularskeletal:  Normal Gait and Station, Full ROM of extremeties, Full     muscle strength\tone      Extremities:  No Digital Cyanosis, No Digital Ischemia, Pedal Pulses Intact,     Pedal Pulses Symetrical, Normal Gait and station      Lymphatic:  No Cervical, No Supraclavicular, No Axillary            Lab Results      Laboratory Tests       3/6/18 15:00            Laboratory Tests            Test        3/6/18      15:00             Ferritin        62 ng/mL      ()            Hx Influenza Vaccination:  No      Influenza Vaccine Declined:  Yes      2 or More Falls Past Year?:  No      Fall Past Year with Injury?:  No      Hx Pneumococcal Vaccination:  No      Encouraged to follow-up with:  PCP regarding preventative exams.      Chart initiated by      LIEZTH SHERMAN CMA                 Disclaimer: Converted document may not contain table formatting or lab diagrams. Please see [facility] for the authenticated document.

## 2021-05-28 NOTE — PROGRESS NOTES
Patient: WILLIAN CA     Acct: [id number]     Report: #[id number]  UNIT #: [id number]     : 1955    Encounter Date:2018  PRIMARY CARE: Shonda Seals  ***Signed***  --------------------------------------------------------------------------------------------------------------------  ADDENDUM: 18          Addendum: Edward Castillo on 18 @ 20:16                   -Colon cancer      -Currently in remission      -Continue observation            -Worsening neuropathy      -Increased Lyrica dose.       -Will continue close observation            -Knee pain      -Follow-up with primary care physician pain       -Also discussed knee pain injection.              Patient was seen in clinic by me. I performed a physical exam, added to the     history of present illness, documented review of systems and physical exam     findings, and reviewed lab and imaging tests.  Also discussed with patient face     to face on treatment plan.              Edward Castillo MD FACP      Board Certified Hematologist      Board Certified Medical Oncologist     Chief Complaint      2018      COLON CANCER      Intent of Therapy?:  Curative            Current Plan      -High risk Colorectal Cancer      -Stage III.  [id number]. S/P partial colectomy with intraoperative flexible     sigmoidoscopy      -Patient completed 6 cycles of FOLFOX.  Last treatment on  Patient     prefers oral medications over the 5-FU pump.  Switched to 6 cycles of Xelox to     complete treatment.       -Patient had hernia repairs so therefore and therefore chemotherapy was held     for over 28 days.       -Patient started  on Oxaliplatin x 4 cycles with Xeloda 1650mg BID on days 1-14     on May 19, 2017.  Patient completed 6 cycles of treatment with the last dose     without oxaliplatin due to severe neuropathy.      -Colonoscopy showed polyp in the descending colon that was removed.  CT chest,     abdomen, and pelvis  showed no colon cancer.      -CEA is improving.  Next colonoscopy due in November 2018            -Worsening neuropathy      -Increase Lyrica to 300 mg TID, refilled by Dr. Castillo.             -Interval Clinic Visit Changes      -Return to clinic in 3 months.      -Obtain CT scan reports from [facility] facility.       -Continue Eliquis.       -Lyrica increased to TID dosing.       -Colonoscopy scheduled for November 2018 at [facility].             -History SVC thrombus/Right Atrium Thrombus/PE      -August 2015 patient had a CT of the chest, abdomen showed right atrial clot     and SVT clot and PE. Patient was treated with Lovenox and Coumadin for his     muralclot and for his PE.        -hypercoagulable profile was negative. Was followed up as outpatient and     treated per medical records as a provoked clot after a cardiac catherization     procedure.      -Patient is on Eliquis.             Medical Evaluation of Cancer Associated Symptoms today      -Cancer associated pain      -Patient's pain has remained stable.      -Continue current pain regimen.      -Cancer associated muscle weakness      -Patient's weakness has remained stable.      -Continue close observation.      -Cancer associated fatigue      -Patient's fatigue has remained stable.      -Recommended exercise.      -Anxiety due to cancer      -Patient's anxiety is well controlled today.       -Continue current management.       -Today's Assessment      -ECOG 1.       -Patient's imaging exams, blood tests, physicians' notes, and any new findings     since our last visit were reviewed today to reassess patient's medical     treatment plan. .       -Old medical records were reviewed and summarized in chronological order in the     HPI today to maintain an updated medical record.       -Patient's radiology imaging tests from our last visit were reviewed     independently by me by direct visualization of the images.        -Patients current lab tests and  medications were carefully reviewed to evaluate     patient's current treatment plan today.       -Patient was advised to call us right away if there are any new symptoms for an     urgent visit for further evaluation. Patient voiced understanding and agreed to     do so.      Colon cancer - C18.9            Notes      Changed Medications      * Pregabalin (Lyrica*) 300 MG CAP: 300 MG PO TID #60      New Diagnostics      * Path Review Peripheral Smear, Stat       Dx: Colon cancer - C18.9      * Comp Metabolic Panel       Dx: Colon cancer - C18.9      * CBC       Dx: Colon cancer - C18.9      * Cea/Carcinoembryonic       Dx: Colon cancer - C18.9      * LDH       Dx: Colon cancer - C18.9      Intensive Monitor for Toxicity:  Labs Reviewed, Chemo Orders Reviewd, Meds\    Narcotics Reviewed      Labs Reviewed During Visit?:  Yes      Time Spent:  > 50% /Coord Care      Patient Education Provided:  Yes      ECOG Score:  0            ECOG      ECOG Performance Status:  0            History and Present Illness      This is a very pleasant 62-year-old man with history of colon cancer.  Patient     is currently receiving chemotherapy.             -October . Colon Resection.  Invasive adenocarcinoma moderately to     poorly differentiated.  Extensive lymphovascular invasion.  Margins negative.      8 out of 16 lymph nodes positive. [id number].       -April 4-2017.  Completed his 6 cycles of FOLFOX.  Patient had severe side     effects of hand-foot syndrome.  Therefore a lower dose of Xeloda was given to     start [facility].      -May 2017. Hernia repair      -May 19, 2017. Cycle 1 day 1 of Oxaliplatin every 21 days x 4 cycles with Xeloda    , 1650 mg BID on days 1-14.  \      -June 9, 2017. Cycle 2 of Oxaliplatin every 21 days x 4 cycles with Xeloda,     1650 mg BID on days 1-14.       -June .  Discussed with patient he is tolerating the lower dose of 850     mm or meters square.  Well.  We'll titrate up to  "Xeloda 1000 mg/m2.        -June .  Cycle 3 Oxaliplatin and Xeloda 2000 mg twice a day on days 1     through 14.      -July .  Cycle 4 Oxaliplatin and Xeloda 2000 twice a day on days 1     through 14      -August .  PET/CT showed no evidence of disease.  CEA is still elevated.      Discussed with patient risk and benefits of completing treatment.  Patient     stated he would like to complete 6 months of adjuvant treatment.      September 7, 2017. Iron 72, TIBC 469, Iron sat 15%, Ferritin 46. Vitamin B-12     WNL and folate WNL.       -September -October .  Completed 2 cycles of 5-FU      -November 1-2017 CEA 9.4.  Patient's CEA is increasing.      -November 22, 2017. CT Chest/abdomen/pelvis - No definite signs of new     metastatic disease in the chest, abdomen, or pelvis. Small pulmonary nodules     appear unchanged compared to 2015 and are therefore favored to be benign.     Postoperative changes with partial colectomy. Stable hypodense lesion in the     liver, likely representing a hemangioma. Prostatomegaly.  Probable renal cysts,     as before. Calcific atherosclerosis of the aorta and coronary arteries.      -December 4-2017 Colonoscopy showed a polyp in the descending colon.  CEA is 8.9      -March 6-2018.  CEA is 8.9      -July 6, 2018. Presents for follow up for colon cancer. Patient reports \"I have     clots in my lungs\". Completed CT of chest at [place] and was started back on     his Eliquis. Denies any worsening cough, shortness of breath, weight loss or GI     or rectal bleeding. Continues to have neuropathy to feet. Taking Lyrica BID.            Vitals      Height 5 ft 9 in / 175.26 cm      Weight 185 lbs 6.510 oz / 84.1 kg      BSA 2.04 m2      BMI 27.4 kg/m2      Temperature 98.5 F / 36.94 C - Temporal      Pulse 87      Blood Pressure 127/68 Sitting, Left Arm      Pulse Oximetry 98%, room air            Allergies      Coded Allergies:             LATEX (Verified  " Allergy, Unknown, RASH,BLISTER, 5/31/18)            Medications      Last Reconciled on 7/6/18 15:21 by VINCENT SANDOVAL      Furosemide* (Lasix*) 20 Mg Tablet      20 MG PO QDAY, #30 TAB 0 Refills         Reported         5/22/18       Pregabalin (Lyrica*) 300 Mg Cap      300 MG PO BID, #60 CAP         Reported         4/3/18       Insulin Glargine (Lantus SOLOSTAR) 100 Unit/1 Ml Insuln.pen      100 UNITS SUBQ HS, #1 BOX 0 Refills         Prov: AnnaEdward         6/23/17       Insulin Human Aspart (NovoLOG FLEXPEN*) 100 Unit/1 Ml Insuln.pen      30 UNITS SUBQ BID INSULIN, #1 BOX 0 Refills         Prov: Edward Castillo         6/23/17       Ranitidine HCl (Zantac*) 150 Mg Tablet      150 MG PO QDAY for 30 Days, #30 TAB 0 Refills         Reported         4/19/17       Atorvastatin Calcium (Lipitor*) 40 Mg Tablet      40 MG PO QDAY, #30 TAB 0 Refills         Reported         4/19/17       Apixaban (Eliquis) 5 Mg Tablet      5 MG PO BID, #60 TAB         Reported         10/25/16       MDI-Advair 500/50 (Advair 500/50 Diskus) 1 Each Blst.w.dev      1 PUFF INH QDAY, INH         Reported         10/6/16       Sildenafil Citrate (Viagra) 100 Mg Tab      100 MG PO QDAY Y for sexual activity, TAB         Reported         8/6/15            General Information      Referring Provider:  Randy Rand      Primary Provider:  [facility]            Pain and Fatigue Scales      Pain Assessment:           Experiencing any pain?:  Yes         Pain Scale Used:  Numerical         Pain Intensity:  8         Pain Location:  Back, Hand, Knee      Fatigue:           Experiencing any fatigue?:  Yes         Fatigue (0-10 scale):  4            Chemo Status      On Oral Chemotherapy?:  Yes            Other      Clinical Trial Participant?:  No            PAST, FAMILY   Past Medical History      Past Medical History:  No Diabetes Type 1, Diabetes Type 2, No Thyroid Disease,     No COPD, No Emphysema, Hypertension, No Stroke, High  Cholesterol, No Heart     Attack, Bleeding Condition, No Low or High RBC Count, Low or High WBC Count, No     Low or High Platelet Coun, No Hepatitis, No Kidney Disease, Depression, No     Alzheimer's Disease, Mental Disease, No Seizures, Arthritis, No Osteoporosis,     No Osteopenia, No Short of Air, Sleep apnea, No Liver Disease, No STD, No     Enlarged Prostate, No Other      Hematology/oncology:  REPORTS HX OF: Anemia, Colorectal cancer, DENIES HX OF:     Previous Treatment for CA, Bladder Cancer, Blood cancer, Brain cancer, Breast     cancer, Coagulopathy, Colon Cancer, Endocrine cancer, Eye cancer, GI cancer,      cancer, Kidney cancer, Leukemia, Leukocytosis, Leukopenia, Liver cancer, Lung     cancer, Lymphoma, Musculoskeletal cancer, Myeloma, Neurologic cancer, Oral     cancer, Pancreatic Cancer, Prostate cancer, Skin cancer, Stomach cancer,     Testicular cancer, Thrombocytopenia, Thyroid cancer, Other cancer history,     Other hematologic history      Genetic/metabolic:  DENIES HX OF: Cystic fibrosis, Down syndrome, Other genetic     history, Other metabolic history            Past Surgical History      REPORTS HX OF: Other Past Surgical Hx (hernia repair), DENIES HX OF: Cataract     extraction, Thyroid surgery, Lung biopsy, CABG surgery, Coronary stent, Valve     replacement, Appendectomy, Cholecystectomy, Splenectomy, Bladder surgery,     Nephrectomy, Joint replacement, Frature repair, Skin cancer removal, Melanoma     excision, Spinal surgery, Breast biopsy, Lumpectomy, Mastectomy, bilateral,     Mastectomy, right, Mastectomy, left, Hysterectomy, Peg Tube Placement, VAD     Placement, Biopsy            Family History      DENIES HX OF: Anemia, Blood disorders, Blood Cancer, Breast cancer, Cervical     cancer, Coagulopathy, Colorectal cancer, Endocrine Cancer, Eye Cancer, GI Cancer    ,  Cancer, Kidney Cancer, Leukemia, Leukocytosis, Leukopenia, Liver Cancer,     Lung cancer, Lymphoma, Melanoma,  Musculoskeletal Cancer, Myeloma, Neurologic     Cancer, Oral Cancer, Ovarian cancer, Prostate cancer, Skin Cancer, Stomach     Cancer, Testicular Cancer, Thrombocytopenia, Thyroid cancer, Uterine cancer,     Other Cancer History, Other Hematology History            Social History      Lives independently:  Yes            Tobacco Use      Tobacco status:  Current every day smoker      Smoking packs/day:  .50      Smoking history:  25-50 pack years            Alcohol Use      Alcohol intake:  None            Substance Use      Substance use:  Denies use            REVIEW OF SYSTEMS      General:  Complains of: Fatigue, Denies: Appetite change, Excessive sweating,     Fever, Night sweats, Weight gain, Weight loss, Other      Eyes:  Denies: Blurred vision, Corrective lenses, Diplopia, Eye irritation, Eye     pain, Eye redness, Spots in vision, Vision loss, Other      Ears, nose, mouth, throat:  Denies: Headache, Seizures, Visual Changes, Hearing     loss, Sinus Congestion, Hoarseness, Sore throat, Other      Cardiovascular:  Denies: Chest pain, Irregular heartbeat, Palpitations, Swollen     ankles/legs, Other      Respiratory:  Denies: Chest pain, Shortness of Air, Productive cough, Coughing     blood, Other      Gastrointestinal:  Denies: Nausea, Vomiting, Problem swallowing, Frequent     heartburn, Constipation, Diarrhea, Tarry stools, Bloody stools, Unable to     control bowels, Other      Kidney/Bladder:  Denies: Painful Urination, Blood in Urine, Incontinence,     Frequent Urination, Decreased Urine Stream, Other      Musculoskeletal:  Complains of: Muscle Pain, Denies: New Back pain, Leg Cramps,     Painful Joints, Swollen Joints, Muscle weakness, Other      Skin:  DENIES: Bruises Easily, Hair changes, Lesions/changes in moles, Nail     changes, Pigment changes, Rash, Other      Neurological:  Denies: Dizziness, Fainting, Numbness\Tingling, Paralysis,     Seizures, Other      Psychiatric:  Complains of: AAO X 3,  Denies: Anxiety, Panic attacks, Depression    , Memory loss, Other      Endocrine:  DiabetesThyroid DisorderOsteoporosisEndocrine Other      Hematologic/lymphatic:  Denies: Bruising, Bleeding, Enlarged Lymph Nodes,     Recurrent infections, Other            General Appearance:  Alert, Oriented X3, Cooperative, No acute distress      Eyes:  Anicteric Sclerae, Moist Conjunctiva, PERRLA      HEENT:  Orophraynx clear, No Erythema, No Exudates      Neck:  Supple, Full ROM, No Carotid Bruits      Respiratory:  CTAB, No Diminished Breath, No Rales      Breast\Chest:  Symmetrical, No Nipple Discharge, No Masses      Abdomen\Gastro:  Soft, No Masses, No Hepatosplenomegaly      Cardio:  RRR, No Murmur, No, Peripheral Edema, Normal PMI      Skin:  Normal Temperature, Normal Tone, Normal Texture and Turgor, No Rash,     lesions, ulcers      Psychiatric:  Appropriate Affect, Intact Judgement, AAO x3      Neuro:  Cranial Nerve II-XII Inta, No Focal Sensory Deficit      Genitourinary:  No Tovar Catheter, No Bladder Distention      Muscularskeletal:  Normal Gait and Station, Full ROM of extremeties, Full     muscle strength\tone      Extremities:  No Digital Cyanosis, No Digital Ischemia, Pedal Pulses Intact,     Pedal Pulses Symetrical, Normal Gait and station      Lymphatic:  No Cervical, No Supraclavicular, No Axillary            PREVENTION      Hx Influenza Vaccination:  No      Influenza Vaccine Declined:  Yes      2 or More Falls Past Year?:  No      Fall Past Year with Injury?:  No      Hx Pneumococcal Vaccination:  No      Encouraged to follow-up with:  PCP regarding preventative exams.      Chart initiated by      andres jean cma                 Disclaimer: Converted document may not contain table formatting or lab diagrams. Please see [facility] for the authenticated document.

## 2021-05-28 NOTE — PROGRESS NOTES
Patient: WILLIAN CA     Acct: [id number]     Report: #[id number]  UNIT #: [id number]     : 1955    Encounter Date:2018  PRIMARY CARE: Shonda Seals  ***Signed***  --------------------------------------------------------------------------------------------------------------------  NURSE INTAKE      Visit Type      Established Patient Visit            Chief Complaint      COLON CANCER            Referring Provider/Copies To      Referring Provider:  Randy Rand            History and Present Illness      Past Oncology Illness History      This is a very pleasant 62-year-old man with history of colon cancer.  Patient     is currently receiving chemotherapy.             -. Colon Resection.  Invasive adenocarcinoma moderately to poorly    differentiated.  Extensive lymphovascular invasion.  Margins negative.  8 out of    16 lymph nodes positive. [id number].       -.  Completed his 6 cycles of FOLFOX.  Patient had severe side     effects of hand-foot syndrome.  Therefore a lower dose of Xeloda was given to     start [facility].      -May 2017. Hernia repair      -May 19, 2017. Cycle 1 day 1 of Oxaliplatin every 21 days x 4 cycles with     Xeloda, 1650 mg BID on days 1-14.  \      -2017. Cycle 2 of Oxaliplatin every 21 days x 4 cycles with Xeloda, 1650    mg BID on days 1-14.       -.  Discussed with patient he is tolerating the lower dose of 850 mm    or meters square.  Well.  We'll titrate up to Xeloda 1000 mg/m2.        -.  Cycle 3 Oxaliplatin and Xeloda 2000 mg twice a day on days 1     through 14.      -.  Cycle 4 Oxaliplatin and Xeloda 2000 twice a day on days 1     rough 14      -August .  PET/CT showed no evidence of disease.  CEA is still elevated.     Discussed with patient risk and benefits of completing treatment.  Patient     stated he would like to complete 6 months of adjuvant treatment.      ,  "2017. Iron 72, TIBC 469, Iron sat 15%, Ferritin 46. Vitamin B-12     WNL and folate WNL.       -September -October .  Completed 2 cycles of 5-FU      -November 1-2017 CEA 9.4.  Patient's CEA is increasing.      -November 22, 2017. CT Chest/abdomen/pelvis - No definite signs of new     metastatic disease in the chest, abdomen, or pelvis. Small pulmonary nodules     appear unchanged compared to 2015 and are therefore favored to be benign.     Postoperative changes with partial colectomy. Stable hypodense lesion in the     liver, likely representing a hemangioma. Prostatomegaly.  Probable renal cysts,     as before. Calcific atherosclerosis of the aorta and coronary arteries.      -December 4-2017 Colonoscopy showed a polyp in the descending colon.  CEA is 8.9      -March 6-2018.  CEA is 8.9      -July 6, 2018. Presents for follow up for colon cancer. Patient reports \"I have     clots in my lungs\". Completed CT of chest at [facility] and was started back on     his Eliquis. Denies any worsening cough, shortness of breath, weight loss or GI     or rectal bleeding. Continues to have neuropathy to feet. Taking Lyrica BID.       -November 6-2018.  WBC 11.9.  Hemoglobin 17.  Platelet count 123,000.  CEA 7.2            HPI - Oncology Interim      Patient presents to clinic today because of worsening abdominal pain in setting     of history of colon cancer.            Clinical Trial Participant      No            ECOG Performance Status      0            PAST, FAMILY   Social History      Lives independently:  Yes            Tobacco Use      Tobacco status:  Current every day smoker      Smoking packs/day:  .50      Smoking history:  25-50 pack years            Alcohol Use      Alcohol intake:  None            Substance Use      Substance use:  Denies use            REVIEW OF SYSTEMS      General:  Admits: Fatigue;          Denies: Appetite Change, Fever, Night Sweats, Weight Gain, Weight Loss      Eye:  Denies: " Blurred Vision, Corrective Lenses, Diplopia, Eye Irritation, Eye     Pain, Eye Redness, Spots in Vision, Vision Loss      ENT:  Denies: Headache, Hearing Loss, Hoarseness, Seizures, Sinus Congestion,     Sore Throat      Cardiovascular:  Denies: Chest Pain, Edema Ankles, Edema Legs, Irregular     Heartbeat, Palpitations      Respiratory:  Denies: Coughing Blood, Productive Cough, Shortness of Air,     Wheezing      Gastrointestinal:  Denies: Bloody Stools, Constipation, Diarrhea, Frequent     Heartburn, Nausea, Problem Swallowing, Tarry Stools, Unable to Control Bowels,     Vomiting      Other      Abdominal pain      Genitourinary (male):  Denies: Blood in Urine, Decrease Urine Stream, Frequent     Urination, Incontinence, Painful Urination      Musculoskeletal:  Admits: Back Pain;          Denies: Leg Cramps, Muscle Pain, Muscle Weakness, Painful Joints, Swollen     Joints      Integumentary:  Denies: Bleeds Easily, Bruises Easily, Hair Changes, Jaundice,     Lesions, Mole Changes, Nail Changes, Pigment Changes, Rash, Skin Discoloration      Neurologic:  Denies: Dizziness, Fainting, Numbness\Tingling, Paralysis, Seizures      Psychiatric:  Denies: Anxiety, Confused, Depression, Disoriented, Memory Loss            VITAL SIGNS AND SCORES      Vitals      Height 5 ft 9 in / 175.26 cm      Weight 198 lbs 10.151 oz / 90.1 kg      BSA 2.06 m2      BMI 29.3 kg/m2      Temperature 99.1 F / 37.28 C - Temporal      Pulse 105      Blood Pressure 113/65 Sitting, Left Arm      Pulse Oximetry 96%, ROOM AIR            Pain Score      Experiencing any pain?:  Yes      Pain Scale Used:  Numerical      Pain Intensity:  4            Fatigue Score      Experiencing any fatigue?:  No            EXAM      General Appearance:  Positive for: Alert, Oriented x3, Cooperative      Eye:  Positive for: Anicteric Sclerae, Moist Conjunctiva, PERRLA      HEENT:  Positive for: Oropharynx clear;          Negative for: Dentition, Erythema       Neck:  Positive for: Full ROM;          Negative for: JVD      Respiratory:  Positive for: CTAB, Normal Respiratory Effort      Abdomen/Gastro:  Positive for: Normal Active Bowel Sounds;          Negative for: Hepatosplenomegaly      Cardiovascular:  Positive for: Normal PMI;          Negative for: Murmur      Skin:  Positive for: Normal Temperature, Normal Tone      Psychiatric:  Positive for: AAO X 3, Appropriate Affect      Neurologic:  Positive for: Cranial Ner II-XII Intact;          Negative for: Dysphagia      Musculoskeletal:  Positive for: Full ROM Lower Extremety, Full ROM Upper     Extremety, Full Muscle Strength      Lower Extremities:  Positive for: Normal Gait and Station;          Negative for: Weakness      Lymphatic:  Negative for: Axillary, Cervical, Epitrochlear, Femoral,     Infraclavicular, Inguinal, Occipital, Popliteal, Posterior auricular,     Preauricular, Supraclavicular            PREVENTION      Hx Influenza Vaccination:  Yes      Date Influenza Vaccine Given:  Nov 1, 2018      Influenza Vaccine Declined:  Yes      2 or More Falls Past Year?:  No      Fall Past Year with Injury?:  No      Hx Pneumococcal Vaccination:  No      Encouraged to follow-up with:  PCP regarding preventative exams.      Chart initiated by      LIZETH SHERMAN [facility]            ALLERGY/MEDS      Allergies      Coded Allergies:             LATEX (Verified  Allergy, Unknown, RASH,BLISTER, 11/6/18)            Medications      Last Reconciled on 11/13/18 00:19 by SEDA SHELDON MD      Enoxaparin (Lovenox) 30 Mg/0.3 Ml Syringe      80 MG SUBQ Q12H, SYRINGE         Reported         8/2/18       Albuterol (Proair HFA) 8.5 Gm Inh      1-2 PUFFS INH RTQ6H PRN for SHORTNESS OF BREATH, #1 INH 0 Refills         Reported         7/25/18       (bydureon pen)   No Conflict Check               Reported         7/25/18       Pantoprazole (Protonix*) 40 Mg Tablet.dr      40 MG PO BIDAC, #60 TAB 0 Refills         Reported          7/25/18       Furosemide* (Lasix*) 20 Mg Tablet      20 MG PO QDAY, #30 TAB 0 Refills         Reported         5/22/18       Pregabalin (Lyrica*) 300 Mg Cap      300 MG PO TID, #60 CAP         Reported         4/3/18       Insulin Glargine (Lantus SOLOSTAR) 100 Unit/1 Ml Insuln.pen      100 UNITS SUBQ HS, #1 BOX 0 Refills         Prov: AnnaEdward         6/23/17       Insulin Human Aspart (NovoLOG FLEXPEN*) 100 Unit/1 Ml Insuln.pen      30 UNITS SUBQ BID INSULIN, #1 BOX 0 Refills         Prov: AnnaEdward         6/23/17       Ranitidine HCl (Zantac*) 150 Mg Tablet      150 MG PO QDAY for 30 Days, #30 TAB 0 Refills         Reported         4/19/17       Atorvastatin Calcium (Lipitor*) 40 Mg Tablet      40 MG PO HS, #30 TAB 0 Refills         Reported         4/19/17       MDI-Advair 500/50 (Advair 500/50 Diskus) 1 Each Blst.w.dev      1 PUFF INH QDAY, INH         Reported         10/6/16       Sildenafil Citrate (Viagra) 100 Mg Tab      100 MG PO QDAY PRN for sexual activity, TAB         Reported         8/6/15      Medications Reviewed:  No Changes made to meds            IMPRESSION/PLAN      Diagnosis      Colon adenocarcinoma - C18.9      -Stage III.  [id number]. S/P partial colectomy with intraoperative flexible     sigmoidoscopy      -Patient completed 6 cycles of FOLFOX.  Last treatment on April 4-2017 Patient     prefers oral medications over the 5-FU pump.  Switched to 6 cycles of Xelox to     complete treatment.       -Patient had hernia repairs so therefore and therefore chemotherapy was held for    over 28 days.       -Patient started  on Oxaliplatin x 4 cycles with Xeloda 1650mg BID on days 1-14     on May 19, 2017.  Patient completed 6 cycles of treatment with the last dose     without oxaliplatin due to severe neuropathy.      -Colonoscopy showed polyp in the descending colon that was removed.  CT chest,     abdomen, and pelvis showed no colon cancer.      -CEA is improving.  Orders placed to see   [place] for colonoscopy            Abdominal pain - R10.9      -Multifactorial differential diagnoses includes cancer      -Was placed for CT chest, abdomen, and pelvis for further evaluation            Peripheral neuropathy - G62.9      -Patient has peripheral neuropathy.       -Discussed ways to protect hands and feet.        -Continue neuropathy medication.  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 Patient was treated with Lovenox and Coumadin for his muralclot    and for his PE.        -hypercoagulable profile was negative. Was followed up as outpatient and treated    per medical records as a provoked clot after a cardiac catherization procedure.      -Patient is on Eliquis.       -Continue follow-up primary care provider            Notes      -Today's visit is an encounter for hematology evaluation and treatment.       -Patient's radiology exams, blood tests, physicians' notes, and medications were    reviewed today to assess patient's medical treatment plan.      -Radiology imaging tests from March 2018 to today were reviewed independently by    me by direct visualization of the images.        -Old medical records were reviewed and summarized in chronological order in the     HPI today to maintain an updated medical record.      New Diagnostics      * CMP Comp Metabolic Panel, Routine         Dx: Colon cancer - C18.9      * Cea/Carcinoembryonic, Routine         Dx: Colon cancer - C18.9      * CBC With Auto Diff, Routine         Dx: Colon cancer - C18.9      * Chest W/ Cont CT, SCHEDULED PROCEDURE         Dx: Colon cancer - C18.9      * Abd/Pel W/ Cont CT, SCHEDULED PROCEDURE         Dx: Colon cancer - C18.9            Plan      -Return to clinic in 5 weeks.  Check CT chest, abdomen, and pelvis for  cancer     recurrence.  Increased diabetes medication            Patient Education      Patient Education Provided:  Yes                 Disclaimer: Converted document may not contain table formatting or lab diagrams. Please see [employer] [facility] for the authenticated document.

## 2021-05-28 NOTE — PROGRESS NOTES
Patient: WILLIAN CA     Acct: [id number]     Report: #[id number]  UNIT #: [id number]     : 1955    Encounter Date:2020  PRIMARY CARE: Shonda Seals  ***Signed***  --------------------------------------------------------------------------------------------------------------------  NURSE INTAKE      Visit Type      Established Patient Visit            Chief Complaint      COLON CA            Referring Provider/Copies To      Referring Provider:  Randy Rand      Primary Care Provider:  Shonda Seals      Copies To:   Shonda Seals            History and Present Illness      Past Oncology Illness History      This is a very pleasant 65-year-old man with history of colon cancer. . Colon Resection; left hemicolectomy and transverse colectomy.  Invasive    adenocarcinoma moderately to poorly differentiated.  Extensive lymphovascular     invasion.  Margins negative.  8 out of 16 lymph nodes positive. [id number].     Patient had severe side effects of hand-foot syndrome.  Therefore a lower dose     of Xeloda was given to start [place] [facility].  2017 completed 6C FOLFOX      17     thru 10/11/17 CAPEOX            HPI - Oncology Interim      Patient returns for follow-up of colon cancer.  He is status post resection     followed by adjuvant chemotherapy completed 10/17.  Patient states he is feeling    well.  He had recent colonoscopy in December of last year which showed only     benign polyps.  He denies abdominal or pelvic pain.  He reports normal bladder     and bowel habits without blood per rectum, melena or pain.  He has lost some     weight but he feels like his appetite has not been as good.  His energy level     still adequate for all of his daily needs.  He denies any masses or     lymphadenopathy.  He reports some ongoing back pain for which he is being     evaluated by his PCP            Cancer Details            Sigmoid colon--moderate to poorly differentiated  invasive adenocarcinoma            Clinical Staging      Stage IIIC ([id number])            Treatments      Chemotherapy      2017 completed 6C FOLFOX      17 thru 10/11/17 CAPEOX      Surgeries      . Colon Resection; left hemicolectomy and transverse colectomy.      Invasive adenocarcinoma moderately to poorly differentiated.  Extensive     lymphovascular invasion.  Margins negative.  8 out of 16 lymph nodes positive.     [id number].            Clinical Trial Participant      No            ECOG Performance Status      0            Most Recent Lab Findings      Laboratory Tests      20 10:41            Most Recent Imaging Findings      Patient: WILLIAN CA   Acct: #[id number]   Report: #[id number]            UNIT #: [id number]    DOS: 20 0930      INSURANCE:MEDICARE PART A   LOCATION:OP     : 1955            PROVIDERS      ADMITTING:     ATTENDING: ML LANDIS      FAMILY:  Christiano Mcbride   ORDERING:  ML LANDIS         OTHER:    DICTATING:  JORDI PORTER MD            REQ #:[id number]   EXAM:CTACPWC - CT ABD CHEST PEL w CONTR      REASON FOR EXAM:  COLON CA      REASON FOR VISIT:  COLON CA            *******Signed******         PROCEDURE:   CT ABDOMEN; CT CHEST; CT PELVIS WITH CONTRAST             COMPARISON:   [facility], CT, ABDOMEN/PELVIS WITH CONTRAST,     2019, 11:35.  [facility], CT, CT CHEST ABD PEL W CONTRAST, 2019, 13:42.             INDICATIONS:   FOLLOWUP COLON CA, BACK PAIN .             TECHNIQUE:   After obtaining the patient's consent, CT images were obtained with    intravenous contrast       material.             FINDINGS:         Chest:  Paraseptal and centrilobular emphysema.  There are few tiny nodules in     the left upper lobe       that are unchanged.  No new or enlarging pulmonary nodule.  No adenopathy in the    chest.  No       aggressive appearing bone change.             Abdomen:  7 mm  hypodensity in the posterior right hepatic lobe not significantly    changed compared       to 8/24/2019, and probably a small cyst.  Spleen, adrenal glands, pancreas are     unremarkable.        Previous cholecystectomy.  Moderate colonic stool burden.  Appendix normal.      Postsurgical change in       the descending colon with no evidence for abnormal soft tissue.  Renal cysts are    similar.  No       abdominal adenopathy.             Pelvis:  Prostatomegaly.  No pelvic mass or fluid.  No aggressive appearing bone    change.             CONCLUSION:   No convincing evidence for recurrent/residual disease or active     metastatic disease in       the chest, abdomen, or pelvis.              JORDI PORTER MD             Electronically Signed and Approved By: JORDI PORTER MD on 6/25/2020 at 10:56                        Until signed, this is an unconfirmed preliminary report that may contain      errors and is subject to change.                                              CHEMA:      D:06/25/20 1056            PAST, FAMILY   Past Medical History      Past Medical History:  Diabetes Type 2      Hematology/Oncology (M):  Colon            Past Surgical History      Biopsy, CABG Surgery, VAD Placement (AND REMOVAL)            Social History      Marital Status:        Lives independently:  Yes      Number of Children:  7            Tobacco Use      Tobacco status:  Current every day smoker      Smoking packs/day:  .50      Smoking history:  25-50 pack years            Alcohol Use      Alcohol intake:  None            Substance Use      Substance use:  Denies use            REVIEW OF SYSTEMS      General:  Admits: Fatigue, Weight Loss      ENT:  Denies Headache      Respiratory:  Denies: Shortness of Air      Gastrointestinal:  Denies Diarrhea, Denies Nausea/Vomiting      Musculoskeletal:  Admits Back Pain, Admits Muscle Pain      Neurologic:  Denies Dizziness, Denies Numbness\Tingling            VITAL SIGNS AND  SCORES      Vitals      Weight 167 lbs 5.266 oz / 75.9 kg      Temperature 98 F / 36.67 C - Temporal      Pulse 94      Respirations 16      Blood Pressure 113/68 Sitting, Left Arm      Pulse Oximetry 98%, RM AIR            Pain Score      Pain Scale Used:  Numerical      Pain Intensity:  9            Fatigue Score      Fatigue (0-10 scale):  7            EXAM      General Appearance:  Positive for: Alert, Cooperative;          Negative for: Acute Distress      Neck:  Positive for: Supple;          Negative for: JVD, Masses      Respiratory:  Positive for: CTAB, Normal Respiratory Effort      Abdomen/Gastro:  Positive for: Normal Active Bowel Sounds, Soft;          Negative for: Distention, Hepatosplenomegaly, Tenderness      Other      Well-healed surgical incision      Cardiovascular:  Positive for: RRR;          Negative for: Gallop, Murmur, Peripheral Edema, Rub      Psychiatric:  Positive for: Appropriate Affect, Intact Judgement      Lymphatic:  Negative for: Cervical, Infraclavicular, Supraclavicular            PREVENTION      Hx Influenza Vaccination:  Yes      Date Influenza Vaccine Given:  Nov 1, 2018      Influenza Vaccine Declined:  Yes      2 or More Falls Past Year?:  No      Fall Past Year with Injury?:  No      Encouraged to follow-up with:  PCP regarding preventative exams.      Chart initiated by      ANTOINE PAGAN MA            ALLERGY/MEDS      Allergies      Coded Allergies:             NO KNOWN DRUG ALLERGIES (Verified  Allergy, Unknown, 6/30/20)            Medications      Last Reconciled on 6/30/20 13:38 by MIKE SEGURA      Ergocalciferol (Vitamin D2) (Vitamin D2) 50,000 Units      37004 UNITS PO MO, #8 CAP         Reported         3/16/20       Omeprazole (Omeprazole*) 40 Mg Capsule      40 MG PO QDAY PRN for HEARTBURN, #30 CAP 0 Refills         Reported         3/16/20       (Linzess)   No Conflict Check      72 MCG PD QDAY         Reported         3/16/20       Zolpidem Tartrate  (Ambien) 10 Mg Tablet      10 MG PO HS PRN for SLEEP, #30 TAB 0 Refills         Reported         3/16/20       Apixaban (Eliquis) 5 Mg Tablet      5 MG PO BID for 30 Days, #60 TAB         Reported         3/16/20       MDI-Albuterol (Proair HFA) 8.5 Gm Hfa.aer.ad      1 PUFFS INH Q6H PRN for SHORTNESS OF BREATH, #1 MDI 0 Refills         Reported         12/11/19       Dulaglutide (Trulicity) 1.5 Mg/0.5 Ml Pen.injctr      1.5 MG SUBQ Q7DAY, #1 PEN.INJCTR         Reported         12/11/19       Metoprolol Tartrate (Metoprolol Tartrate) 25 Mg Tablet      25 MG PO BID, #60 TAB 0 Refills         Reported         12/11/19       Insulin Human Aspart (novoLOG VIAL) 100 Unit/1 Ml Vial      30 UNITS SUBQ BID INSULIN, #1 VIAL 0 Refills         Reported         12/11/19       Insulin Glargine (Lantus SOLOSTAR) 100 Unit/1 Ml Insuln.pen      50 UNITS SUBQ HS, #1 BOX 0 Refills         Reported         2/1/19       Sildenafil Citrate (Viagra) 100 Mg Tab      100 MG PO QDAY PRN for sexual activity, TAB         Reported         8/6/15      Medications Reviewed:  No Changes made to meds            IMPRESSION/PLAN      Diagnosis      Colon adenocarcinoma - C18.9      Patient status post treatments as outlined.  He is up-to-date on colonoscopy.  I     see no evidence of disease recurrence by his history, physical examination, CT     scans.  His CEA is slightly elevated which can be seen in smokers.  This will     need to be followed.  Smoking cessation strongly encouraged.  I will see him     back in 6 months for ongoing surveillance with labs and scans prior.            Diabetes - E11.9         Diabetes mellitus type: type 2      Blood sugar is very elevated today.  He reports compliance with his regimen.  He     should follow-up with his PCP regarding his diabetic control.            Notes      New Diagnostics      * CT Abd/Pelvis/Chest W/Contrast, 6 Months         Dx: Colon cancer - C18.9      * CBC With Auto Diff, 6 Months          Dx: Colon cancer - C18.9      * CMP Comp Metabolic Panel, 6 Months         Dx: Colon cancer - C18.9      * Cea/Carcinoembryonic, 6 Months         Dx: Colon cancer - C18.9            Pain      Follow-up with PCP/Pain Management            Patient Education            Eat Well, Exercise Well, Be Well: Dietary and Fitness Guidelines      Patient Education Provided:  Yes            Tobacco Counseling      Tobacco Cessation Counseling:  for 3 - 10 minutes            Electronically signed by MIKE SEGURA  07/01/2020 07:12       Disclaimer: Converted document may not contain table formatting or lab diagrams. Please see [employer] [facility] for the authenticated document.

## 2021-06-05 NOTE — PROGRESS NOTES
Progress Note      Patient Name: Dhaval Nieto   Patient ID: [id number]   Sex: Male   YOB: 1955    Primary Care Provider: Christiano Mcbride MD   Referring Provider: Randy Rand MD    Visit Date: May 13, 2021    Provider: Misbah Vick MD   Location: [facility]   Location Address: [address] [place] [address]  [id number]   Location Phone: [phone]          Chief Complaint  · Right Wrist Pain      History Of Present Illness  Dhaval Nieto is a 66 year old,  or  male who presents today to [facility].      Patient presents today for a follow-up of right wrist. He is still being bothered by ulnar sided pain. We tried injections. We tried therapy, a brace and he is still being bothered by pain. He localizes it to the lateral aspect of the wrist, ulnar sided. He presents today with MRI results of the right wrist. He states the one injection in the past did provide him with relief. He states the brace given to him broke.       Past Medical History  Arthritis; Asthma; Bladder disorder; Blood Diseases; BPH with Urinary Obstruction; Cancer; Cervical radiculopathy; Cervical spinal stenosis; Cervical stenosis of spinal canal; Cervicalgia; Chest pain; Decreased sensation; Depression; Diabetes; Diabetes mellitus, insulin dependent (IDDM), controlled; Headache; Hematuria (Gross); Hernia; Herniated Disc; History of colon cancer; Hyperlipemia; Hyperlipidemia; Hypertension; Hypertension, Benign Essential; Insomnia; Leg pain; Limb Swelling; Loss of balance; Low back pain; Lumbar degenerative disc disease; Lumbar radiculopathy; Mid back pain; Migraines; Muscle cramps; Neurologic disorder; Neurologic disorder, unspecified; Pain, Back; Pain, Cervical Spine; Pain, Generalized; Pain, Joint; Pain, Leg; Seasonal allergies; Shortness of Breath; Stomach Disorder; Thoracic disc degeneration         Past Surgical History  Abdomen; Bladder Surg.; CABG; Carpal  "Tunnel Release; Cholecystecomy; Colon Resection; Colonoscopy; Cystoscopy; Gallbladder; Hernia; Hernia Repair; Joint Surgery; Shoulder surgery; Ventral Hernia Repair         Medication List  Ambien 10 mg oral tablet; diclofenac sodium 75 mg oral tablet,delayed release (DR/EC); Eliquis 5 mg oral tablet; famotidine 20 mg oral tablet; gabapentin 300 mg oral capsule; Lantus Solostar 100 unit/mL (3 mL) subcutaneous insulin pen; Linzess 72 mcg oral capsule; metoprolol tartrate 25 mg oral tablet; Novolog Flexpen 100 unit/mL Subcutaneous Insulin Pen; omeprazole 40 mg oral capsule,delayed release(DR/EC); Percocet 5-325 mg oral tablet; Trulicity 1.5 mg/0.5 mL subcutaneous pen injector; Viagra 100 mg oral tablet; Voltaren 1 % topical gel         Allergy List  NO KNOWN DRUG ALLERGIES; Latex; Latex Exam Gloves       Allergies Reconciled  Family Medical History  Stroke; Heart failure; Heart Attack (MI)         Social History  Alcohol (Never); ; lives alone; lives with spouse; .; Recreational Drug Use (Never); Retired; Single; Tobacco (Current some day)         Review of Systems  · Constitutional  o Denies  o : fever, chills, weight loss  · Cardiovascular  o Denies  o : chest pain, shortness of breath  · Gastrointestinal  o Denies  o : liver disease, heartburn, nausea, blood in stools  · Genitourinary  o Denies  o : painful urination, blood in urine  · Integument  o Denies  o : rash, itching  · Neurologic  o Denies  o : headache, weakness, loss of consciousness  · Musculoskeletal  o Denies  o : painful, swollen joints  · Psychiatric  o Denies  o : drug/alcohol addiction, anxiety, depression      Vitals  Date Time BP Position Site L\R Cuff Size HR RR TEMP (F) WT  HT  BMI kg/m2 BSA m2 O2 Sat FR L/min FiO2        05/13/2021 09:50 AM      90 - R   176lbs 16oz 5'  9\" 26.14 1.98 98 %            Physical Examination  · Constitutional  o Appearance  o : well developed, well-nourished, no obvious deformities present  · Head " and Face  o Head  o :   § Inspection  § : normocephalic  o Face  o :   § Inspection  § : no facial lesions  · Eyes  o Conjunctivae  o : conjunctivae normal  o Sclerae  o : sclerae white  · Ears, Nose, Mouth and Throat  o Ears  o :   § External Ears  § : appearance within normal limits  § Hearing  § : intact  o Nose  o :   § External Nose  § : appearance normal  · Neck  o Inspection/Palpation  o : normal appearance  o Range of Motion  o : full range of motion  · Respiratory  o Respiratory Effort  o : breathing unlabored  o Inspection of Chest  o : normal appearance  o Auscultation of Lungs  o : no audible wheezing or rales  · Cardiovascular  o Heart  o : regular rate  · Gastrointestinal  o Abdominal Examination  o : soft and non-tender  · Skin and Subcutaneous Tissue  o General Inspection  o : intact, no rashes  · Psychiatric  o General  o : Alert and oriented x3  o Judgement and Insight  o : judgment and insight intact  o Mood and Affect  o : mood normal, affect appropriate  · Right Wrist  o Inspection  o : Sensation grossly intact. Neurovascular intact. Skin intact. Radial pulse 2+, ulnar pulse 2+. No swelling, skin discoloration or atrophy. Patient able to wiggle fingers and make a fist. Tenderness about the ulnar aspect of his wrist. Full flexion and extension. Pain with extension of wrist.   · Injection Note/Aspiration Note  o Site  o : right wrist  o Procedure  o : Procedure: After educating the patient, patient gave consent for procedure. After using Chloraprep, the joint space was injected. The patient tolerated the procedure well.   o Medication  o : 80 mg of DepoMedrol with 1cc of 1% Lidocaine  · Imaging  o Imaging  o : 5/4/21 RIGHT WRIST MRI: 1. Moderate extensor carpi ulnaris tendinopathy and tenosynovitis 2. Erosion of the ulnar styloid process. Correlate clinically for a history of gout or rheumatoid arthritis. 3. Osteoarthritic changes in the carpus, as above 4. Tears of the scapholunate and TFC  ligaments.           Assessment  · Right wrist pain     719.43/M25.531  · Right Extensor Carpi Ulnaris Tendinitis     726.90/M77.9      Plan  · Orders  o Depo-Medrol injection 80mg () - - 05/13/2021   Lot [id number] Exp 10 2021 [employer] Administered by ALYSSA Vick MD  o Arthrocentesis of wrist (20605) - - 05/13/2021   Lot [id number] Exp 12 2022 [employer] Administered by ALYSSA Vick MD  · Medications  o Medications have been Reconciled  o Transition of Care or Provider Policy  · Instructions  o Dr. Vick saw and examined the patient and agrees with plan.   o MRI reviewed by Dr. Vick.  o Reviewed the patient's Past Medical, Social, and Family history as well as the ROS at today's visit, no changes.  o Call or return if worsening symptoms.  o Follow up in 6 weeks.  o Discussed treatment plans with the patient. Patient was given a new brace today. He was given a right wrist injection and tolerated this well.   o The above service was scribed by Waleska Vincent on my behalf and I attest to the accuracy of the note. mc            Electronically Signed by: Waleska Vincent-, Other -Author on May 14, 2021 01:05:51 PM  Electronically Co-signed by: Misbah Vick MD -Reviewer on May 16, 2021 09:29:45 PM

## 2021-06-15 ENCOUNTER — OFFICE VISIT (OUTPATIENT)
Dept: ORTHOPEDIC SURGERY | Facility: CLINIC | Age: 66
End: 2021-06-15

## 2021-06-15 VITALS — HEIGHT: 69 IN | WEIGHT: 173.2 LBS | HEART RATE: 91 BPM | BODY MASS INDEX: 25.65 KG/M2 | OXYGEN SATURATION: 93 %

## 2021-06-15 DIAGNOSIS — M25.531 RIGHT WRIST PAIN: Primary | ICD-10-CM

## 2021-06-15 PROBLEM — G89.29 CHRONIC PAIN OF RIGHT KNEE: Status: ACTIVE | Noted: 2021-06-15

## 2021-06-15 PROBLEM — G89.29 CHRONIC PAIN OF RIGHT KNEE: Status: RESOLVED | Noted: 2021-06-15 | Resolved: 2021-06-15

## 2021-06-15 PROBLEM — J42 CHRONIC BRONCHITIS, UNSPECIFIED CHRONIC BRONCHITIS TYPE (HCC): Status: ACTIVE | Noted: 2021-06-15

## 2021-06-15 PROBLEM — S42.134A CLOSED NONDISPLACED FRACTURE OF CORACOID PROCESS OF RIGHT SHOULDER: Status: RESOLVED | Noted: 2021-06-15 | Resolved: 2021-06-15

## 2021-06-15 PROBLEM — C80.1 CANCER: Status: ACTIVE | Noted: 2021-06-15

## 2021-06-15 PROBLEM — S42.134A CLOSED NONDISPLACED FRACTURE OF CORACOID PROCESS OF RIGHT SHOULDER: Status: ACTIVE | Noted: 2021-06-15

## 2021-06-15 PROBLEM — M25.561 CHRONIC PAIN OF RIGHT KNEE: Status: ACTIVE | Noted: 2021-06-15

## 2021-06-15 PROBLEM — M25.561 CHRONIC PAIN OF RIGHT KNEE: Status: RESOLVED | Noted: 2021-06-15 | Resolved: 2021-06-15

## 2021-06-15 PROCEDURE — 99212 OFFICE O/P EST SF 10 MIN: CPT | Performed by: PHYSICIAN ASSISTANT

## 2021-06-15 RX ORDER — GABAPENTIN 300 MG/1
CAPSULE ORAL
COMMUNITY
End: 2022-05-10

## 2021-06-15 RX ORDER — TRAZODONE HYDROCHLORIDE 50 MG/1
TABLET ORAL
COMMUNITY
End: 2021-09-07

## 2021-06-15 RX ORDER — PAROXETINE HYDROCHLORIDE 20 MG/1
TABLET, FILM COATED ORAL
COMMUNITY
End: 2021-06-15 | Stop reason: HOSPADM

## 2021-06-15 RX ORDER — INSULIN GLARGINE 100 [IU]/ML
INJECTION, SOLUTION SUBCUTANEOUS DAILY
Status: ON HOLD | COMMUNITY
End: 2022-08-15

## 2021-06-15 RX ORDER — CYCLOBENZAPRINE HCL 10 MG
TABLET ORAL
COMMUNITY
End: 2021-09-07

## 2021-06-15 RX ORDER — OXYCODONE HYDROCHLORIDE AND ACETAMINOPHEN 5; 325 MG/1; MG/1
TABLET ORAL EVERY 12 HOURS
COMMUNITY
End: 2022-05-10

## 2021-06-15 RX ORDER — AMMONIUM LACTATE 12 G/100G
LOTION TOPICAL
Status: ON HOLD | COMMUNITY
End: 2022-08-15

## 2021-06-15 RX ORDER — DOXEPIN HYDROCHLORIDE 10 MG/1
CAPSULE ORAL
COMMUNITY
End: 2021-06-15 | Stop reason: HOSPADM

## 2021-06-15 RX ORDER — METFORMIN HYDROCHLORIDE 500 MG/1
TABLET, EXTENDED RELEASE ORAL
COMMUNITY
Start: 2021-03-10 | End: 2021-09-07

## 2021-06-15 RX ORDER — CARBAMAZEPINE 100 MG/1
TABLET, CHEWABLE ORAL
COMMUNITY
End: 2021-09-07

## 2021-06-15 RX ORDER — PRAZOSIN HYDROCHLORIDE 2 MG/1
CAPSULE ORAL
COMMUNITY
End: 2021-09-07

## 2021-06-15 RX ORDER — KETOTIFEN FUMARATE 0.35 MG/ML
SOLUTION/ DROPS OPHTHALMIC
Status: ON HOLD | COMMUNITY
Start: 2021-03-10 | End: 2022-08-15

## 2021-06-15 RX ORDER — DEXTROAMPHETAMINE SACCHARATE, AMPHETAMINE ASPARTATE MONOHYDRATE, DEXTROAMPHETAMINE SULFATE AND AMPHETAMINE SULFATE 3.75; 3.75; 3.75; 3.75 MG/1; MG/1; MG/1; MG/1
CAPSULE, EXTENDED RELEASE ORAL
Status: ON HOLD | COMMUNITY
End: 2022-08-15

## 2021-06-15 RX ORDER — CEFDINIR 300 MG/1
CAPSULE ORAL
COMMUNITY
End: 2021-09-07

## 2021-06-15 RX ORDER — ESZOPICLONE 2 MG/1
TABLET, FILM COATED ORAL
COMMUNITY
End: 2021-09-07

## 2021-06-15 RX ORDER — INSULIN ASPART 100 [IU]/ML
INJECTION, SOLUTION INTRAVENOUS; SUBCUTANEOUS ONCE
Status: ON HOLD | COMMUNITY
End: 2022-08-15

## 2021-06-15 RX ORDER — ATORVASTATIN CALCIUM 40 MG/1
TABLET, FILM COATED ORAL DAILY
Status: ON HOLD | COMMUNITY
End: 2022-08-15

## 2021-06-15 RX ORDER — ALBUTEROL SULFATE 90 UG/1
2 AEROSOL, METERED RESPIRATORY (INHALATION) EVERY 4 HOURS PRN
COMMUNITY
End: 2022-11-08 | Stop reason: ALTCHOICE

## 2021-06-15 NOTE — ASSESSMENT & PLAN NOTE
Patient was under the impression that he was going to be able to have an injection today, unfortunately given that he has had an injection within the past month we were unable to do this today.  Patient will follow up as needed in the future.  Patient declines hand therapy today.

## 2021-06-15 NOTE — ASSESSMENT & PLAN NOTE
Patient was under the impression that he was going to be able to get a right wrist steroid injection today, unfortunately given that he had an injection roughly 1 month ago we will not be able to do this until August.  Patient declines hand therapy today.  Patient plans to discontinue his brace.  He will follow up as needed in the future.

## 2021-06-15 NOTE — PROGRESS NOTES
"Chief Complaint  Pain of the Left Wrist and Pain of the Right Wrist    Subjective          Dhaval Stuart presents to [facility] for   History of Present Illness  Patient is here today to follow-up on right wrist pain.  The pain is on the ulnar aspect of the right wrist.  He has had injections in the past which she states has helped.  Patient tried therapy previously which she states did not help.  Patient was given a new brace at his last visit which she states was not helpful for his symptoms.  He had an MRI of the wrist May 4, 2021 showing moderate extensor carpi ulnaris tendinopathy and tenosynovitis, erosion of the ulnar styloid process, osteoarthritic changes in the carpus, tears of the scapholunate and TFCC ligaments.  Objective   Vital Signs:   Pulse 91   Ht 175.3 cm (69\")   Wt 78.6 kg (173 lb 3.2 oz)   SpO2 93%   BMI 25.58 kg/m²       Physical Exam  Constitutional:       Appearance: Normal appearance. He is well-developed and normal weight.   HENT:      Head: Normocephalic.      Right Ear: Hearing and external ear normal.      Left Ear: Hearing and external ear normal.      Nose: Nose normal.   Eyes:      Conjunctiva/sclera: Conjunctivae normal.   Cardiovascular:      Rate and Rhythm: Normal rate.   Pulmonary:      Effort: Pulmonary effort is normal.      Breath sounds: No wheezing or rales.   Abdominal:      Palpations: Abdomen is soft.      Tenderness: There is no abdominal tenderness.   Musculoskeletal:      Cervical back: Normal range of motion.   Skin:     Findings: No rash.   Neurological:      Mental Status: He is alert and oriented to person, place, and time.   Psychiatric:         Mood and Affect: Mood and affect normal.         Judgment: Judgment normal.     Ortho Exam  Right wrist, tenderness to palpation over the distal ulnar styloid, skin intact, mild swelling, no erythema, full flexion and extension as well as supination and pronation.  Radial pulses " 2+ bilaterally.  Cap refill less than 2 seconds in bilateral upper extremity digits.  Full range of motion of all digits on right upper extremity.   strength is symmetric and within normal limits bilaterally.    Result Review :               Assessment and Plan    Problem List Items Addressed This Visit        Musculoskeletal and Injuries    Right wrist pain - Primary    Current Assessment & Plan     Patient was under the impression that he was going to be able to get a right wrist steroid injection today, unfortunately given that he had an injection roughly 1 month ago we will not be able to do this until August.  Patient declines hand therapy today.  Patient plans to discontinue his brace.  He will follow up as needed in the future.               Follow Up   Return if symptoms worsen or fail to improve.  Patient Instructions   Follow up as needed.     Patient was given instructions and counseling regarding his condition or for health maintenance advice. Please see specific information pulled into the AVS if appropriate.

## 2021-07-09 PROBLEM — IMO0002 DEGENERATION OF THORACIC OR THORACOLUMBAR INTERVERTEBRAL DISC: Status: ACTIVE | Noted: 2018-12-18

## 2021-07-09 PROBLEM — M79.606 LEG PAIN: Status: ACTIVE | Noted: 2021-07-09

## 2021-07-09 PROBLEM — M19.90 ARTHRITIS: Status: ACTIVE | Noted: 2021-07-09

## 2021-07-09 PROBLEM — F45.42 PAIN DISORDER WITH PSYCHOLOGICAL FACTORS: Status: ACTIVE | Noted: 2021-07-09

## 2021-07-09 PROBLEM — R26.89 LOSS OF BALANCE: Status: ACTIVE | Noted: 2018-12-18

## 2021-07-09 PROBLEM — E78.5 HYPERLIPIDEMIA: Status: ACTIVE | Noted: 2021-07-09

## 2021-07-09 PROBLEM — J30.2 SEASONAL ALLERGIC RHINITIS: Status: ACTIVE | Noted: 2021-07-09

## 2021-07-09 PROBLEM — M51.36 LUMBAR DEGENERATIVE DISC DISEASE: Status: ACTIVE | Noted: 2018-12-18

## 2021-07-09 PROBLEM — N32.9 BLADDER DISORDER: Status: ACTIVE | Noted: 2021-07-09

## 2021-07-09 PROBLEM — E11.9 DIABETES (HCC): Status: ACTIVE | Noted: 2021-07-09

## 2021-07-09 PROBLEM — R25.2 MUSCLE CRAMPS: Status: ACTIVE | Noted: 2021-07-09

## 2021-07-09 PROBLEM — M54.2 NECK PAIN: Status: ACTIVE | Noted: 2018-12-18

## 2021-07-09 PROBLEM — I10 HYPERTENSION: Status: ACTIVE | Noted: 2021-07-09

## 2021-07-09 PROBLEM — K46.9 ABDOMINAL HERNIA: Status: ACTIVE | Noted: 2021-07-09

## 2021-07-09 PROBLEM — J45.909 ASTHMA: Status: ACTIVE | Noted: 2021-07-09

## 2021-07-09 PROBLEM — M79.89 LIMB SWELLING: Status: ACTIVE | Noted: 2021-07-09

## 2021-07-09 PROBLEM — R51.9 HEADACHE: Status: ACTIVE | Noted: 2021-07-09

## 2021-07-09 PROBLEM — M54.6 THORACIC BACK PAIN: Status: ACTIVE | Noted: 2018-12-18

## 2021-07-09 PROBLEM — M48.02 CERVICAL STENOSIS OF SPINAL CANAL: Status: ACTIVE | Noted: 2020-03-10

## 2021-07-09 PROBLEM — R52 PAIN, GENERALIZED: Status: ACTIVE | Noted: 2021-07-09

## 2021-07-09 PROBLEM — Z85.038 HISTORY OF COLON CANCER: Status: ACTIVE | Noted: 2018-12-18

## 2021-07-09 PROBLEM — G98.8 NEUROLOGIC DISORDER: Status: ACTIVE | Noted: 2021-07-09

## 2021-07-09 PROBLEM — E78.5 HYPERLIPEMIA: Status: ACTIVE | Noted: 2021-07-09

## 2021-07-09 PROBLEM — R07.9 CHEST PAIN: Status: ACTIVE | Noted: 2021-07-09

## 2021-07-09 PROBLEM — F32.A DEPRESSION: Status: ACTIVE | Noted: 2021-07-09

## 2021-07-09 PROBLEM — R06.02 SHORTNESS OF BREATH: Status: ACTIVE | Noted: 2021-07-09

## 2021-07-09 PROBLEM — G43.909 MIGRAINES: Status: ACTIVE | Noted: 2021-07-09

## 2021-07-09 PROBLEM — K31.9 STOMACH DISORDER: Status: ACTIVE | Noted: 2021-07-09

## 2021-07-09 PROBLEM — M54.16 LUMBAR RADICULOPATHY: Status: ACTIVE | Noted: 2020-03-10

## 2021-07-09 PROBLEM — M54.50 LOW BACK PAIN: Status: ACTIVE | Noted: 2020-03-10

## 2021-07-09 PROBLEM — G47.00 INSOMNIA: Status: ACTIVE | Noted: 2021-07-09

## 2021-07-09 RX ORDER — FEXOFENADINE HCL 180 MG/1
TABLET ORAL
COMMUNITY
End: 2021-09-07

## 2021-07-12 ENCOUNTER — HOSPITAL ENCOUNTER (OUTPATIENT)
Dept: CT IMAGING | Facility: HOSPITAL | Age: 66
Discharge: HOME | End: 2021-07-12
Admitting: INTERNAL MEDICINE

## 2021-07-12 DIAGNOSIS — C18.9 MALIGNANT NEOPLASM OF COLON, UNSPECIFIED PART OF COLON (HCC): ICD-10-CM

## 2021-07-12 LAB — CREAT BLDA-MCNC: 0.9 MG/DL

## 2021-07-12 PROCEDURE — 82565 ASSAY OF CREATININE: CPT

## 2021-07-12 PROCEDURE — 74177 CT ABD & PELVIS W/CONTRAST: CPT

## 2021-07-12 PROCEDURE — 71260 CT THORAX DX C+: CPT

## 2021-07-12 PROCEDURE — 0 IOPAMIDOL PER 1 ML: Performed by: INTERNAL MEDICINE

## 2021-07-12 RX ADMIN — IOPAMIDOL 100 ML: 755 INJECTION, SOLUTION INTRAVENOUS at 11:42

## 2021-07-13 ENCOUNTER — OFFICE VISIT (OUTPATIENT)
Dept: ONCOLOGY | Facility: HOSPITAL | Age: 66
End: 2021-07-13

## 2021-07-13 VITALS
SYSTOLIC BLOOD PRESSURE: 118 MMHG | BODY MASS INDEX: 25.69 KG/M2 | DIASTOLIC BLOOD PRESSURE: 64 MMHG | RESPIRATION RATE: 16 BRPM | TEMPERATURE: 96.3 F | WEIGHT: 173.94 LBS | OXYGEN SATURATION: 98 % | HEART RATE: 94 BPM

## 2021-07-13 DIAGNOSIS — C18.7 CANCER OF SIGMOID COLON (HCC): Primary | ICD-10-CM

## 2021-07-13 PROCEDURE — 99213 OFFICE O/P EST LOW 20 MIN: CPT | Performed by: INTERNAL MEDICINE

## 2021-07-13 PROCEDURE — G0463 HOSPITAL OUTPT CLINIC VISIT: HCPCS | Performed by: INTERNAL MEDICINE

## 2021-07-13 RX ORDER — ERGOCALCIFEROL 1.25 MG/1
CAPSULE ORAL
COMMUNITY
End: 2021-09-07

## 2021-07-13 RX ORDER — NALOXONE HYDROCHLORIDE 4 MG/.1ML
SPRAY NASAL
COMMUNITY
End: 2022-05-10

## 2021-07-13 RX ORDER — DICLOFENAC SODIUM 75 MG/1
TABLET, DELAYED RELEASE ORAL
COMMUNITY
End: 2021-09-07

## 2021-07-13 RX ORDER — PREGABALIN 100 MG/1
100 CAPSULE ORAL 2 TIMES DAILY
Status: ON HOLD | COMMUNITY
End: 2022-08-15

## 2021-07-13 NOTE — ASSESSMENT & PLAN NOTE
Status post resection followed by adjuvant treatment.  Patient is doing well.  I see no evidence of disease recurrence by his history, physical examination or recent scans.  He is up-to-date on colonoscopy-those results reviewed.  RTC 6 months for ongoing surveillance with labs and scans prior.

## 2021-07-13 NOTE — PROGRESS NOTES
Chief Complaint  Colon Cancer and Follow-up    Shonda Seals Jr.*  Christiano Mcbride MD    Subjective          Dhaval Nieto presents to [facility] for follow-up of his colon cancer.  He status post resection followed by treatment.  On return today, he states he is doing well.  He is having some neurologic problems and reports a recent MRI and upcoming neurology evaluation.  He reports the bowels are working normally without blood per rectum, pain or melena.  He denies any masses or adenopathy.  No unusual aches or pains.  He reports good energy level.  His appetite is good.  He is up-to-date on colonoscopy with his most recent 1/21      Oncology/Hematology History Overview Note   Sigmoid colon--moderate to poorly differentiated invasive adenocarcinoma            Clinical Staging      Stage IIIC ([id number])            Treatments      Chemotherapy      4/2017 completed 6C FOLFOX      5/19/17 thru 10/11/17 CAPEOX        Surgeries      October . Colon Resection; left hemicolectomy and transverse colectomy.      Invasive adenocarcinoma moderately to poorly differentiated.  Extensive     lymphovascular invasion.  Margins negative.  8 out of 16 lymph nodes positive.     [id number].         Cancer of sigmoid colon (CMS/HCC)   7/13/2021 Initial Diagnosis    Cancer of sigmoid colon (CMS/HCC)     7/13/2021 Cancer Staged    Staging form: Colon And Rectum, AJCC 8th Edition  - Clinical: cT2, cN2, cM0 - Signed by Tacos Newby MD on 7/13/2021         Review of Systems   Constitutional: Negative for appetite change, diaphoresis, fatigue, fever, unexpected weight gain and unexpected weight loss.   HENT: Negative for hearing loss, mouth sores, sore throat, swollen glands, trouble swallowing and voice change.    Eyes: Negative for blurred vision.   Respiratory: Negative for cough, shortness of breath and wheezing.    Cardiovascular: Negative for chest pain and  palpitations.   Gastrointestinal: Negative for abdominal pain, blood in stool, constipation, diarrhea, nausea and vomiting.   Endocrine: Negative for cold intolerance and heat intolerance.   Genitourinary: Negative for difficulty urinating, dysuria, frequency, hematuria and urinary incontinence.   Musculoskeletal: Positive for back pain. Negative for arthralgias and myalgias.   Skin: Negative for rash, skin lesions and bruise.   Neurological: Negative for dizziness, seizures, weakness, numbness and headache.   Hematological: Does not bruise/bleed easily.   Psychiatric/Behavioral: Negative for depressed mood. The patient is not nervous/anxious.      Current Outpatient Medications on File Prior to Visit   Medication Sig Dispense Refill   • albuterol sulfate HFA (ProAir HFA) 108 (90 Base) MCG/ACT inhaler ProAir HFA 90 mcg/actuation aerosol inhaler     • ammonium lactate (LAC-HYDRIN) 12 % lotion ammonium lactate 12 % lotion     • amphetamine-dextroamphetamine XR (Adderall XR) 15 MG 24 hr capsule Adderall XR 15 mg oral capsule,extended release 24hr take 1 capsule (15 mg) by oral route once daily in the morning upon awakening   Suspended     • apixaban (ELIQUIS) 5 MG tablet tablet Take 5 mg by mouth 2 (Two) Times a Day.     • atorvastatin (LIPITOR) 40 MG tablet atorvastatin 40 mg oral tablet take 1 tablet (40 mg) by oral route once daily   Suspended     • carBAMazepine (TEGretol) 100 MG chewable tablet carbamazepine 100 mg chewable tablet     • cefdinir (OMNICEF) 300 MG capsule cefdinir 300 mg capsule   TAKE 1 CAPSULE BY MOUTH TWICE DAILY     • cyclobenzaprine (FLEXERIL) 10 MG tablet cyclobenzaprine 10 mg tablet     • diclofenac (VOLTAREN) 75 MG EC tablet diclofenac sodium 75 mg tablet,delayed release     • ergocalciferol (ERGOCALCIFEROL) 1.25 MG (40518 UT) capsule ergocalciferol (vitamin D2) 1,250 mcg (50,000 unit) capsule     • eszopiclone (Lunesta) 2 MG tablet Lunesta 2 mg oral tablet take 1 tablet (2 mg) by oral route  once daily at bedtime   Suspended     • fexofenadine (Allegra Allergy) 180 MG tablet Allegra Allergy 180 mg oral tablet take 1 tablet (180 mg) by oral route once daily   Suspended     • gabapentin (NEURONTIN) 300 MG capsule gabapentin 300 mg oral capsule take 1 capsule (300 mg) by oral route 3 times per day   Active     • insulin aspart (NovoLOG FlexPen) 100 UNIT/ML solution pen-injector sc pen Novolog Flexpen U-100 Insulin aspart 100 unit/mL (3 mL) subcutaneous     • Insulin Glargine (Lantus SoloStar) 100 UNIT/ML injection pen Lantus Solostar U-100 Insulin 100 unit/mL (3 mL) subcutaneous pen     • ketotifen (ZADITOR) 0.025 % ophthalmic solution      • metFORMIN ER (GLUCOPHAGE-XR) 500 MG 24 hr tablet      • naloxone (Narcan) 4 MG/0.1ML nasal spray Narcan 4 mg/actuation nasal spray     • oxyCODONE-acetaminophen (Percocet) 5-325 MG per tablet Every 12 (Twelve) Hours.     • prazosin (MINIPRESS) 2 MG capsule prazosin 2 mg capsule     • pregabalin (LYRICA) 100 MG capsule Take 100 mg by mouth 2 (Two) Times a Day.     • traZODone (DESYREL) 50 MG tablet trazodone 50 mg tablet       No current facility-administered medications on file prior to visit.       Allergies   Allergen Reactions   • Latex Itching     Past Medical History:   Diagnosis Date   • Arthritis    • Asthma    • Benign essential hypertension    • Bladder disorder    • Blood disease    • BPH with urinary obstruction 05/30/2014   • Cancer (CMS/[facility])    • Cervical radiculopathy 05/01/2015    left   • Cervical spinal stenosis 03/10/2020   • Cervicalgia 12/18/2018   • Chest pain    • Colon cancer (CMS/[facility]) 12/18/2018   • Condition not found     Hernia   • Condition not found     herniated disc   • DDD (degenerative disc disease), thoracic 12/18/2018   • Decreased sensation 05/01/2015    left   • Depression    • Diabetes (CMS/[facility])    • Diabetes mellitus type 1 with coma, insulin dependent (CMS/[facility])     controlled   • Headache    • Hematuria, gross 05/30/2014   •  Hyperlipemia    • Hyperlipidemia    • Hypertension    • Insomnia    • Leg pain    • Leg pain    • Limb swelling    • Loss of balance 12/18/2018   • Low back pain 03/10/2020   • Lumbar degenerative disc disease 12/18/2018   • Lumbar radiculopathy 03/10/2020   • Mid back pain 12/18/2018   • Migraines    • Muscle cramps    • Neurologic disorder    • Pain in back    • Pain in joint    • Pain of cervical spine    • Pain, generalized    • Seasonal allergies    • Shortness of breath    • Stomach disorder      Past Surgical History:   Procedure Laterality Date   • ABDOMINAL SURGERY     • BLADDER SURGERY     • CARPAL TUNNEL RELEASE     • COLON RESECTION     • COLONOSCOPY  01/30/2017    [place]   • CORONARY ARTERY BYPASS GRAFT  12/2018    one vessel   • GALLBLADDER SURGERY      cholecystecomy   • HERNIA REPAIR     • JOINT REPLACEMENT      joint surgery   • OTHER SURGICAL HISTORY  05/30/2014    office cystoscopy w/DR SHANICE Young   • SHOULDER SURGERY Bilateral    • VENTRAL HERNIA REPAIR       Social History     Socioeconomic History   • Marital status:      Spouse name: Not on file   • Number of children: Not on file   • Years of education: Not on file   • Highest education level: Not on file   Tobacco Use   • Smoking status: Current Some Day Smoker     Packs/day: 0.50   Substance and Sexual Activity   • Alcohol use: Never   • Drug use: Never     Family History   Problem Relation Age of Onset   • Heart attack Father         MI   • Heart failure Father    • Stroke Brother        Objective   Physical Exam  Vitals reviewed.   Constitutional:       Appearance: Normal appearance.   Cardiovascular:      Rate and Rhythm: Normal rate.      Heart sounds: Normal heart sounds.   Pulmonary:      Effort: Pulmonary effort is normal.      Breath sounds: Normal breath sounds.   Abdominal:      General: Abdomen is flat. Bowel sounds are normal. There is no distension.      Palpations: Abdomen is soft.      Tenderness: There is no abdominal  tenderness.      Comments: No HSM   Musculoskeletal:      Cervical back: Neck supple. No tenderness.      Right lower leg: No edema.      Left lower leg: No edema.   Neurological:      Mental Status: He is alert and oriented to person, place, and time.   Psychiatric:         Mood and Affect: Mood normal.         Behavior: Behavior normal.         Vitals:    07/13/21 1254   BP: 118/64   Pulse: 94   Resp: 16   Temp: 96.3 °F (35.7 °C)   SpO2: 98%   Weight: 78.9 kg (173 lb 15.1 oz)   PainSc:   8   PainLoc: Back     ECOG score: 0         PHQ-9 Total Score: 0                  Result Review :   The following data was reviewed by: Tacos Newby MD on 07/13/2021:  Lab Results   Component Value Date    HGB 17.2 01/08/2021    HCT 49.6 01/08/2021    MCV 89.4 01/08/2021     (L) 01/08/2021    WBC 11.99 (H) 01/08/2021    NEUTROABS 8.68 (H) 01/08/2021    LYMPHSABS 2.12 01/08/2021    MONOSABS 0.85 01/08/2021    EOSABS 0.23 01/08/2021    BASOSABS 0.06 01/08/2021     Lab Results   Component Value Date    GLUCOSE 721 (C) 02/01/2019    BUN 9 01/08/2021    CREATININE 0.90 07/12/2021     01/08/2021    K 4.2 01/08/2021    CL 98 (L) 01/08/2021    CO2 27 01/08/2021    CALCIUM 8.8 01/08/2021    PROTEINTOT 6.5 01/08/2021    ALBUMIN 4.0 01/08/2021    BILITOT 0.62 01/08/2021    ALKPHOS 104 01/08/2021    AST 14 (L) 01/08/2021    ALT 10 01/08/2021           Assessment and Plan    Diagnoses and all orders for this visit:    1. Cancer of sigmoid colon (CMS/HCC) (Primary)  Assessment & Plan:  Status post resection followed by adjuvant treatment.  Patient is doing well.  I see no evidence of disease recurrence by his history, physical examination or recent scans.  He is up-to-date on colonoscopy-those results reviewed.  RTC 6 months for ongoing surveillance with labs and scans prior.    Orders:  -     CT chest w contrast; Future  -     CT abdomen pelvis w contrast; Future  -     CBC & Differential; Future  -     Comprehensive  Metabolic Panel; Future  -     CEA; Future          Patient Follow Up: 6 months with labs and scans prior      Patient was given instructions and counseling regarding his condition or for health maintenance advice. Please see specific information pulled into the AVS if appropriate.     Tacos Newby MD    7/13/2021

## 2021-07-15 VITALS — HEIGHT: 69 IN | BODY MASS INDEX: 26.22 KG/M2 | WEIGHT: 177 LBS | HEART RATE: 90 BPM | OXYGEN SATURATION: 98 %

## 2021-07-29 ENCOUNTER — TELEPHONE (OUTPATIENT)
Dept: CARDIOLOGY | Facility: CLINIC | Age: 66
End: 2021-07-29

## 2021-07-29 DIAGNOSIS — Z98.890 HISTORY OF MITRAL VALVE REPAIR: Primary | ICD-10-CM

## 2021-07-29 RX ORDER — METOPROLOL TARTRATE 50 MG/1
100 TABLET, FILM COATED ORAL 2 TIMES DAILY
COMMUNITY
End: 2021-11-24 | Stop reason: SDUPTHER

## 2021-07-30 ENCOUNTER — TELEPHONE (OUTPATIENT)
Dept: NEUROSURGERY | Facility: CLINIC | Age: 66
End: 2021-07-30

## 2021-07-30 NOTE — TELEPHONE ENCOUNTER
Patient had CT lumbar spine 2-2021 at [facility]. Could be scheduled next available with Dr. Veras or NP.

## 2021-07-30 NOTE — TELEPHONE ENCOUNTER
Pt called: he is having increased LBP that is radiating down his (R) leg into his thigh. He has tried heat and ice and nothing helps. It bothers him most when he sits down for a long periods of time. No b/b issues at this time but he said he does have colon cancer. No current imaging. Please advise! Thanks!      Last office visit: 3/10/20      Pt contact: [phone]  Best time to call: anytime after 11am

## 2021-08-25 ENCOUNTER — OFFICE VISIT (OUTPATIENT)
Dept: GASTROENTEROLOGY | Facility: CLINIC | Age: 66
End: 2021-08-25

## 2021-08-25 VITALS
BODY MASS INDEX: 26.04 KG/M2 | DIASTOLIC BLOOD PRESSURE: 63 MMHG | SYSTOLIC BLOOD PRESSURE: 117 MMHG | WEIGHT: 175.8 LBS | HEART RATE: 92 BPM | HEIGHT: 69 IN

## 2021-08-25 DIAGNOSIS — K21.9 GASTROESOPHAGEAL REFLUX DISEASE, UNSPECIFIED WHETHER ESOPHAGITIS PRESENT: Primary | ICD-10-CM

## 2021-08-25 DIAGNOSIS — K59.04 CHRONIC IDIOPATHIC CONSTIPATION: ICD-10-CM

## 2021-08-25 DIAGNOSIS — Z85.038 HISTORY OF COLON CANCER: ICD-10-CM

## 2021-08-25 PROCEDURE — 99214 OFFICE O/P EST MOD 30 MIN: CPT | Performed by: NURSE PRACTITIONER

## 2021-08-25 RX ORDER — PANTOPRAZOLE SODIUM 40 MG/1
40 TABLET, DELAYED RELEASE ORAL DAILY
Qty: 90 TABLET | Refills: 1 | Status: SHIPPED | OUTPATIENT
Start: 2021-08-25 | End: 2021-11-23

## 2021-08-31 ENCOUNTER — TELEPHONE (OUTPATIENT)
Dept: CARDIOLOGY | Facility: CLINIC | Age: 66
End: 2021-08-31

## 2021-08-31 NOTE — TELEPHONE ENCOUNTER
----- Message from DUANE Cox sent at 8/31/2021 12:08 PM EDT -----  Notify pt echo result:  Normal left ventricular systolic function. Calculated left ventricular EF = 54%  Bioprosthetic mitral valve functioning normally.  Mild aortic stenosis. Trace aortic regurgitation. Trace tricuspid regurgitation. Mild LVH.   Dr Alvarez will discuss at 09/07/21 appt

## 2021-09-06 NOTE — PROGRESS NOTES
[facility]  Cardiology progress Note    Patient Name: Dhaval Nieto  : 1955    CHIEF COMPLAINT  Coronary artery disease, s/p mitral replacement.      Subjective   Subjective     HISTORY OF PRESENT ILLNESS    Dhaval Nieto is a 66 y.o. male with history of coronary disease history of mitral replacement with a bioprosthetic valve.  Denies any chest pain or shortness of breath.    Review of Systems:   Constitutional no fever,  no weight loss   Skin no rash   Otolaryngeal no difficulty swallowing   Cardiovascular See HPI   Pulmonary no cough, no sputum production   Gastrointestinal no constipation, no diarrhea   Genitourinary no dysuria, no hematuria   Hematologic no easy bruisability, no abnormal bleeding   Musculoskeletal no muscle pain   Neurologic no dizziness, no falls         Personal History     Social History:  reports that he has been smoking cigarettes. He started smoking about 51 years ago. He has been smoking about 0.50 packs per day. He has never used smokeless tobacco. He reports that he does not drink alcohol and does not use drugs.    Home Medications:  Current Outpatient Medications on File Prior to Visit   Medication Sig   • albuterol sulfate HFA (ProAir HFA) 108 (90 Base) MCG/ACT inhaler ProAir HFA 90 mcg/actuation aerosol inhaler   • ammonium lactate (LAC-HYDRIN) 12 % lotion ammonium lactate 12 % lotion   • amphetamine-dextroamphetamine XR (Adderall XR) 15 MG 24 hr capsule Adderall XR 15 mg oral capsule,extended release 24hr take 1 capsule (15 mg) by oral route once daily in the morning upon awakening   Suspended   • apixaban (ELIQUIS) 5 MG tablet tablet Take 5 mg by mouth 2 (Two) Times a Day.   • atorvastatin (LIPITOR) 40 MG tablet atorvastatin 40 mg oral tablet take 1 tablet (40 mg) by oral route once daily   Suspended   • Dulaglutide (TRULICITY SC) Inject  under the skin into the appropriate area as directed Every 7 (Seven) Days.   • gabapentin  (NEURONTIN) 300 MG capsule gabapentin 300 mg oral capsule take 1 capsule (300 mg) by oral route 3 times per day   Active   • insulin aspart (NovoLOG FlexPen) 100 UNIT/ML solution pen-injector sc pen Novolog Flexpen U-100 Insulin aspart 100 unit/mL (3 mL) subcutaneous   • Insulin Glargine (Lantus SoloStar) 100 UNIT/ML injection pen Lantus Solostar U-100 Insulin 100 unit/mL (3 mL) subcutaneous pen   • ketotifen (ZADITOR) 0.025 % ophthalmic solution    • linaclotide (Linzess) 72 MCG capsule capsule Take 1 capsule by mouth Every Morning Before Breakfast for 90 days.   • metoprolol tartrate (LOPRESSOR) 50 MG tablet Take 50 mg by mouth 2 (Two) Times a Day.   • naloxone (Narcan) 4 MG/0.1ML nasal spray Narcan 4 mg/actuation nasal spray   • oxyCODONE-acetaminophen (Percocet) 5-325 MG per tablet Every 12 (Twelve) Hours.   • pantoprazole (Protonix) 40 MG EC tablet Take 1 tablet by mouth Daily for 90 days.   • pregabalin (LYRICA) 100 MG capsule Take 100 mg by mouth 2 (Two) Times a Day.   • carBAMazepine (TEGretol) 100 MG chewable tablet carbamazepine 100 mg chewable tablet   • cyclobenzaprine (FLEXERIL) 10 MG tablet cyclobenzaprine 10 mg tablet   • diclofenac (VOLTAREN) 75 MG EC tablet diclofenac sodium 75 mg tablet,delayed release   • ergocalciferol (ERGOCALCIFEROL) 1.25 MG (80735 UT) capsule ergocalciferol (vitamin D2) 1,250 mcg (50,000 unit) capsule   • eszopiclone (Lunesta) 2 MG tablet Lunesta 2 mg oral tablet take 1 tablet (2 mg) by oral route once daily at bedtime   Suspended   • fexofenadine (Allegra Allergy) 180 MG tablet Allegra Allergy 180 mg oral tablet take 1 tablet (180 mg) by oral route once daily   Suspended   • metFORMIN ER (GLUCOPHAGE-XR) 500 MG 24 hr tablet    • prazosin (MINIPRESS) 2 MG capsule prazosin 2 mg capsule   • traZODone (DESYREL) 50 MG tablet trazodone 50 mg tablet   • [DISCONTINUED] cefdinir (OMNICEF) 300 MG capsule cefdinir 300 mg capsule   TAKE 1 CAPSULE BY MOUTH TWICE DAILY   • [DISCONTINUED]  linaclotide (LINZESS) 72 MCG capsule capsule Take 1 capsule by mouth Every Morning Before Breakfast.     No current facility-administered medications on file prior to visit.     Allergies:  Allergies   Allergen Reactions   • Latex Itching       Objective    Objective       Vitals:   Heart Rate:  [84] 84  BP: (116)/(76) 116/76  Body mass index is 25.4 kg/m².     Physical Exam:   Constitutional: Awake, alert, No acute distress    Eyes: PERRLA, sclerae anicteric, no conjunctival injection   HENT: NCAT, mucous membranes moist   Neck: Supple, no thyromegaly, no lymphadenopathy, trachea midline   Respiratory: Clear to auscultation bilaterally, nonlabored respirations    Cardiovascular: RRR, no murmurs or rubs. Palpable pedal pulses bilaterally   Gastrointestinal: Positive bowel sounds, soft, nontender, nondistended   Musculoskeletal: No bilateral ankle edema, no cyanosis to extremities   Psychiatric: Appropriate affect, cooperative   Neurologic: Oriented x 3, strength symmetric in all extremities, Cranial Nerves grossly intact to confrontation, speech clear   Skin: No rashes.    Result Review    Result Review:  I have personally reviewed the available results from  [x]  Laboratory  [x]  EKG  [x]  Cardiology  [x]  Medications  [x]  Old records  []  Other:     ECG 12 Lead    Date/Time: 9/7/2021 2:44 PM  Performed by: Viral Alvarez MD  Authorized by: Viral Alvarez MD   Comparison: compared with previous ECG   Similar to previous ECG  Rhythm: sinus rhythm  Conduction: left anterior fascicular block and non-specific intraventricular conduction delay  Other findings: T wave abnormality and left atrial abnormality    Clinical impression: abnormal EKG  Comments: Normal sinus rhythm.  No significant changes compared to previous EKG.          No results found for: CHOL  Lab Results   Component Value Date    TRIG 132 06/13/2019    TRIG 144 06/11/2019    TRIG 86 02/02/2019     Lab Results   Component Value Date    HDL  59 06/13/2019    HDL 52 06/11/2019    HDL 51 02/02/2019     Lab Results   Component Value Date     (H) 06/13/2019     (H) 06/11/2019     (H) 02/02/2019     Lab Results   Component Value Date    VLDL 26 06/13/2019    VLDL 29 06/11/2019    VLDL 17 02/02/2019         Impression/Plan:  1.  Coronary disease s/p bioprosthetic mitral valve replacement: Stable.  Continue aspirin.  2.  Hyperlipidemia: Continue Lipitor.  Low-fat diet advised.  Lipid profile reviewed.  3.  Paroxysmal atrial fibrillation: Continue Eliquis.  Continue metoprolol for rate control.  4.  Hyperlipidemia: Lipid profile reviewed.  Continue Lipitor.  Low-fat diet regular exercise advised.  Compliance with medication advised to the patient.           Viral Alvarez MD   09/07/21   14:31 EDT

## 2021-09-07 ENCOUNTER — OFFICE VISIT (OUTPATIENT)
Dept: CARDIOLOGY | Facility: CLINIC | Age: 66
End: 2021-09-07

## 2021-09-07 VITALS
HEART RATE: 84 BPM | SYSTOLIC BLOOD PRESSURE: 116 MMHG | HEIGHT: 69 IN | BODY MASS INDEX: 25.48 KG/M2 | DIASTOLIC BLOOD PRESSURE: 76 MMHG | WEIGHT: 172 LBS

## 2021-09-07 DIAGNOSIS — I48.0 PAROXYSMAL ATRIAL FIBRILLATION (HCC): ICD-10-CM

## 2021-09-07 DIAGNOSIS — I34.2 NONRHEUMATIC MITRAL VALVE STENOSIS: Primary | ICD-10-CM

## 2021-09-07 DIAGNOSIS — I10 HYPERTENSION, ESSENTIAL: ICD-10-CM

## 2021-09-07 PROCEDURE — 99214 OFFICE O/P EST MOD 30 MIN: CPT | Performed by: SPECIALIST

## 2021-09-07 PROCEDURE — 93000 ELECTROCARDIOGRAM COMPLETE: CPT | Performed by: SPECIALIST

## 2021-11-03 ENCOUNTER — TELEPHONE (OUTPATIENT)
Dept: GASTROENTEROLOGY | Facility: CLINIC | Age: 66
End: 2021-11-03

## 2021-11-03 NOTE — TELEPHONE ENCOUNTER
Patient has been trying Protonix 40 mg since his last follow up appointment, and he doesn't see any change he is still having a lot of burning and reflux. He is wanting to know if he should try something else.

## 2021-11-04 RX ORDER — FAMOTIDINE 40 MG/1
40 TABLET, FILM COATED ORAL
Qty: 90 TABLET | Refills: 1 | Status: SHIPPED | OUTPATIENT
Start: 2021-11-04 | End: 2022-03-01 | Stop reason: SDUPTHER

## 2021-11-24 ENCOUNTER — TELEPHONE (OUTPATIENT)
Dept: CARDIOLOGY | Facility: CLINIC | Age: 66
End: 2021-11-24

## 2021-11-24 RX ORDER — METOPROLOL TARTRATE 100 MG/1
100 TABLET ORAL 2 TIMES DAILY
Qty: 180 TABLET | Refills: 1 | Status: SHIPPED | OUTPATIENT
Start: 2021-11-24 | End: 2022-05-10

## 2021-11-26 ENCOUNTER — APPOINTMENT (OUTPATIENT)
Dept: GENERAL RADIOLOGY | Facility: HOSPITAL | Age: 66
End: 2021-11-26

## 2021-11-26 ENCOUNTER — APPOINTMENT (OUTPATIENT)
Dept: CT IMAGING | Facility: HOSPITAL | Age: 66
End: 2021-11-26

## 2021-11-26 ENCOUNTER — HOSPITAL ENCOUNTER (EMERGENCY)
Facility: HOSPITAL | Age: 66
Discharge: HOME | End: 2021-11-26
Attending: EMERGENCY MEDICINE | Admitting: EMERGENCY MEDICINE

## 2021-11-26 VITALS
WEIGHT: 168.21 LBS | HEART RATE: 71 BPM | TEMPERATURE: 98.1 F | SYSTOLIC BLOOD PRESSURE: 95 MMHG | HEIGHT: 69 IN | OXYGEN SATURATION: 98 % | RESPIRATION RATE: 18 BRPM | DIASTOLIC BLOOD PRESSURE: 66 MMHG | BODY MASS INDEX: 24.91 KG/M2

## 2021-11-26 DIAGNOSIS — R53.1 WEAKNESS: Primary | ICD-10-CM

## 2021-11-26 DIAGNOSIS — T88.7XXA MEDICATION SIDE EFFECT: ICD-10-CM

## 2021-11-26 LAB
ALBUMIN SERPL-MCNC: 4 G/DL (ref 3.5–5.2)
ALBUMIN/GLOB SERPL: 1.5 G/DL
ALP SERPL-CCNC: 103 U/L (ref 39–117)
ALT SERPL W P-5'-P-CCNC: 9 U/L (ref 1–41)
AMPHET+METHAMPHET UR QL: NEGATIVE
ANION GAP SERPL CALCULATED.3IONS-SCNC: 9.1 MMOL/L (ref 5–15)
APAP SERPL-MCNC: <5 MCG/ML (ref 0–30)
ARTERIAL PATENCY WRIST A: ABNORMAL
AST SERPL-CCNC: 12 U/L (ref 1–40)
BACTERIA UR QL AUTO: ABNORMAL /HPF
BARBITURATES UR QL SCN: NEGATIVE
BASE EXCESS BLDA CALC-SCNC: -3.6 MMOL/L (ref -2–2)
BASOPHILS # BLD AUTO: 0.11 10*3/MM3 (ref 0–0.2)
BASOPHILS NFR BLD AUTO: 0.7 % (ref 0–1.5)
BDY SITE: ABNORMAL
BENZODIAZ UR QL SCN: NEGATIVE
BILIRUB SERPL-MCNC: 0.6 MG/DL (ref 0–1.2)
BILIRUB UR QL STRIP: NEGATIVE
BUN SERPL-MCNC: 22 MG/DL (ref 8–23)
BUN/CREAT SERPL: 17.1 (ref 7–25)
CA-I BLDA-SCNC: 1.13 MMOL/L (ref 1.13–1.32)
CALCIUM SPEC-SCNC: 9 MG/DL (ref 8.6–10.5)
CANNABINOIDS SERPL QL: NEGATIVE
CHLORIDE BLDA-SCNC: 104 MMOL/L (ref 98–106)
CHLORIDE SERPL-SCNC: 101 MMOL/L (ref 98–107)
CK SERPL-CCNC: 53 U/L (ref 20–200)
CLARITY UR: CLEAR
CO2 SERPL-SCNC: 25.9 MMOL/L (ref 22–29)
COCAINE UR QL: NEGATIVE
COHGB MFR BLD: 5 % (ref 0–1.5)
COLOR UR: YELLOW
CREAT SERPL-MCNC: 1.29 MG/DL (ref 0.76–1.27)
D-LACTATE SERPL-SCNC: 1.8 MMOL/L (ref 0.5–2)
DEPRECATED RDW RBC AUTO: 46.4 FL (ref 37–54)
EOSINOPHIL # BLD AUTO: 0.17 10*3/MM3 (ref 0–0.4)
EOSINOPHIL NFR BLD AUTO: 1 % (ref 0.3–6.2)
ERYTHROCYTE [DISTWIDTH] IN BLOOD BY AUTOMATED COUNT: 14.6 % (ref 12.3–15.4)
ETHANOL BLD-MCNC: <10 MG/DL (ref 0–10)
ETHANOL UR QL: <0.01 %
FHHB: 4.9 % (ref 0–5)
GFR SERPL CREATININE-BSD FRML MDRD: 56 ML/MIN/1.73
GFR SERPL CREATININE-BSD FRML MDRD: 67 ML/MIN/1.73
GLOBULIN UR ELPH-MCNC: 2.6 GM/DL
GLUCOSE BLDA-MCNC: 119 MMOL/L (ref 70–99)
GLUCOSE BLDC GLUCOMTR-MCNC: 144 MG/DL (ref 70–99)
GLUCOSE SERPL-MCNC: 124 MG/DL (ref 65–99)
GLUCOSE UR STRIP-MCNC: ABNORMAL MG/DL
HCO3 BLDA-SCNC: 20.8 MMOL/L (ref 22–26)
HCT VFR BLD AUTO: 48 % (ref 37.5–51)
HGB BLD-MCNC: 17 G/DL (ref 13–17.7)
HGB BLDA-MCNC: 17.1 G/DL (ref 13.8–16.4)
HGB UR QL STRIP.AUTO: ABNORMAL
HOLD SPECIMEN: NORMAL
HOLD SPECIMEN: NORMAL
HYALINE CASTS UR QL AUTO: ABNORMAL /LPF
IMM GRANULOCYTES # BLD AUTO: 0.09 10*3/MM3 (ref 0–0.05)
IMM GRANULOCYTES NFR BLD AUTO: 0.5 % (ref 0–0.5)
INHALED O2 CONCENTRATION: 21 %
KETONES UR QL STRIP: NEGATIVE
LACTATE BLDA-SCNC: 2.26 MMOL/L (ref 0.5–2)
LEUKOCYTE ESTERASE UR QL STRIP.AUTO: NEGATIVE
LYMPHOCYTES # BLD AUTO: 2.1 10*3/MM3 (ref 0.7–3.1)
LYMPHOCYTES NFR BLD AUTO: 12.8 % (ref 19.6–45.3)
MCH RBC QN AUTO: 30.5 PG (ref 26.6–33)
MCHC RBC AUTO-ENTMCNC: 35.4 G/DL (ref 31.5–35.7)
MCV RBC AUTO: 86 FL (ref 79–97)
METHADONE UR QL SCN: NEGATIVE
METHGB BLD QL: 0.2 % (ref 0–1.5)
MODALITY: ABNORMAL
MONOCYTES # BLD AUTO: 1.2 10*3/MM3 (ref 0.1–0.9)
MONOCYTES NFR BLD AUTO: 7.3 % (ref 5–12)
NEUTROPHILS NFR BLD AUTO: 12.77 10*3/MM3 (ref 1.7–7)
NEUTROPHILS NFR BLD AUTO: 77.7 % (ref 42.7–76)
NITRITE UR QL STRIP: NEGATIVE
NRBC BLD AUTO-RTO: 0 /100 WBC (ref 0–0.2)
NT-PROBNP SERPL-MCNC: 455.2 PG/ML (ref 0–900)
OPIATES UR QL: NEGATIVE
OXYCODONE UR QL SCN: NEGATIVE
OXYHGB MFR BLDV: 89.9 % (ref 94–99)
PCO2 BLDA: 36.3 MM HG (ref 35–45)
PH BLDA: 7.38 PH UNITS (ref 7.35–7.45)
PH UR STRIP.AUTO: <=5 [PH] (ref 5–8)
PLATELET # BLD AUTO: 168 10*3/MM3 (ref 140–450)
PMV BLD AUTO: 10.4 FL (ref 6–12)
PO2 BLD: 342 MM[HG] (ref 0–500)
PO2 BLDA: 71.9 MM HG (ref 80–100)
POTASSIUM BLDA-SCNC: 3.91 MMOL/L (ref 3.5–5)
POTASSIUM SERPL-SCNC: 4 MMOL/L (ref 3.5–5.2)
PROT SERPL-MCNC: 6.6 G/DL (ref 6–8.5)
PROT UR QL STRIP: ABNORMAL
RBC # BLD AUTO: 5.58 10*6/MM3 (ref 4.14–5.8)
RBC # UR STRIP: ABNORMAL /HPF
REF LAB TEST METHOD: ABNORMAL
SAO2 % BLDCOA: 94.8 % (ref 95–99)
SODIUM BLDA-SCNC: 133.5 MMOL/L (ref 136–146)
SODIUM SERPL-SCNC: 136 MMOL/L (ref 136–145)
SP GR UR STRIP: 1.02 (ref 1–1.03)
SQUAMOUS #/AREA URNS HPF: ABNORMAL /HPF
TROPONIN T SERPL-MCNC: <0.01 NG/ML (ref 0–0.03)
UROBILINOGEN UR QL STRIP: ABNORMAL
WBC # UR STRIP: ABNORMAL /HPF
WBC NRBC COR # BLD: 16.44 10*3/MM3 (ref 3.4–10.8)
WHOLE BLOOD HOLD SPECIMEN: NORMAL
WHOLE BLOOD HOLD SPECIMEN: NORMAL

## 2021-11-26 PROCEDURE — 81001 URINALYSIS AUTO W/SCOPE: CPT | Performed by: EMERGENCY MEDICINE

## 2021-11-26 PROCEDURE — 83050 HGB METHEMOGLOBIN QUAN: CPT | Performed by: EMERGENCY MEDICINE

## 2021-11-26 PROCEDURE — 93005 ELECTROCARDIOGRAM TRACING: CPT | Performed by: EMERGENCY MEDICINE

## 2021-11-26 PROCEDURE — 80307 DRUG TEST PRSMV CHEM ANLYZR: CPT | Performed by: EMERGENCY MEDICINE

## 2021-11-26 PROCEDURE — 99283 EMERGENCY DEPT VISIT LOW MDM: CPT

## 2021-11-26 PROCEDURE — 85025 COMPLETE CBC W/AUTO DIFF WBC: CPT | Performed by: EMERGENCY MEDICINE

## 2021-11-26 PROCEDURE — 36600 WITHDRAWAL OF ARTERIAL BLOOD: CPT | Performed by: EMERGENCY MEDICINE

## 2021-11-26 PROCEDURE — 80143 DRUG ASSAY ACETAMINOPHEN: CPT | Performed by: EMERGENCY MEDICINE

## 2021-11-26 PROCEDURE — P9612 CATHETERIZE FOR URINE SPEC: HCPCS

## 2021-11-26 PROCEDURE — 93005 ELECTROCARDIOGRAM TRACING: CPT

## 2021-11-26 PROCEDURE — 82962 GLUCOSE BLOOD TEST: CPT

## 2021-11-26 PROCEDURE — 87040 BLOOD CULTURE FOR BACTERIA: CPT | Performed by: EMERGENCY MEDICINE

## 2021-11-26 PROCEDURE — 82550 ASSAY OF CK (CPK): CPT | Performed by: EMERGENCY MEDICINE

## 2021-11-26 PROCEDURE — 71045 X-RAY EXAM CHEST 1 VIEW: CPT

## 2021-11-26 PROCEDURE — 70450 CT HEAD/BRAIN W/O DYE: CPT

## 2021-11-26 PROCEDURE — 84484 ASSAY OF TROPONIN QUANT: CPT | Performed by: EMERGENCY MEDICINE

## 2021-11-26 PROCEDURE — 80053 COMPREHEN METABOLIC PANEL: CPT | Performed by: EMERGENCY MEDICINE

## 2021-11-26 PROCEDURE — 83605 ASSAY OF LACTIC ACID: CPT | Performed by: EMERGENCY MEDICINE

## 2021-11-26 PROCEDURE — 93010 ELECTROCARDIOGRAM REPORT: CPT | Performed by: INTERNAL MEDICINE

## 2021-11-26 PROCEDURE — 82077 ASSAY SPEC XCP UR&BREATH IA: CPT | Performed by: EMERGENCY MEDICINE

## 2021-11-26 PROCEDURE — 83880 ASSAY OF NATRIURETIC PEPTIDE: CPT | Performed by: EMERGENCY MEDICINE

## 2021-11-26 PROCEDURE — 82375 ASSAY CARBOXYHB QUANT: CPT | Performed by: EMERGENCY MEDICINE

## 2021-11-26 PROCEDURE — 82805 BLOOD GASES W/O2 SATURATION: CPT | Performed by: EMERGENCY MEDICINE

## 2021-11-26 RX ORDER — SODIUM CHLORIDE 0.9 % (FLUSH) 0.9 %
10 SYRINGE (ML) INJECTION AS NEEDED
Status: DISCONTINUED | OUTPATIENT
Start: 2021-11-26 | End: 2021-11-26 | Stop reason: HOSPADM

## 2021-12-01 LAB
BACTERIA SPEC AEROBE CULT: NORMAL
BACTERIA SPEC AEROBE CULT: NORMAL

## 2021-12-25 LAB
QT INTERVAL: 409 MS
QT INTERVAL: 419 MS

## 2022-01-12 ENCOUNTER — HOSPITAL ENCOUNTER (OUTPATIENT)
Dept: CT IMAGING | Facility: HOSPITAL | Age: 67
Discharge: HOME | End: 2022-01-12
Admitting: INTERNAL MEDICINE

## 2022-01-12 DIAGNOSIS — C18.7 CANCER OF SIGMOID COLON: ICD-10-CM

## 2022-01-12 LAB — CREAT BLDA-MCNC: 0.9 MG/DL

## 2022-01-12 PROCEDURE — 74177 CT ABD & PELVIS W/CONTRAST: CPT

## 2022-01-12 PROCEDURE — 82565 ASSAY OF CREATININE: CPT

## 2022-01-12 PROCEDURE — 0 IOPAMIDOL PER 1 ML: Performed by: INTERNAL MEDICINE

## 2022-01-12 PROCEDURE — 71260 CT THORAX DX C+: CPT

## 2022-01-12 RX ADMIN — IOPAMIDOL 100 ML: 755 INJECTION, SOLUTION INTRAVENOUS at 11:46

## 2022-01-13 ENCOUNTER — LAB (OUTPATIENT)
Dept: ONCOLOGY | Facility: HOSPITAL | Age: 67
End: 2022-01-13

## 2022-01-13 ENCOUNTER — OFFICE VISIT (OUTPATIENT)
Dept: ONCOLOGY | Facility: HOSPITAL | Age: 67
End: 2022-01-13

## 2022-01-13 VITALS
TEMPERATURE: 98.8 F | SYSTOLIC BLOOD PRESSURE: 120 MMHG | OXYGEN SATURATION: 98 % | DIASTOLIC BLOOD PRESSURE: 60 MMHG | HEART RATE: 70 BPM | RESPIRATION RATE: 18 BRPM | BODY MASS INDEX: 24.74 KG/M2 | WEIGHT: 167.55 LBS

## 2022-01-13 DIAGNOSIS — C18.7 CANCER OF SIGMOID COLON: ICD-10-CM

## 2022-01-13 DIAGNOSIS — Z85.038 HISTORY OF COLON CANCER: Primary | ICD-10-CM

## 2022-01-13 LAB
ALBUMIN SERPL-MCNC: 4.2 G/DL (ref 3.5–5.2)
ALBUMIN/GLOB SERPL: 1.7 G/DL
ALP SERPL-CCNC: 112 U/L (ref 39–117)
ALT SERPL W P-5'-P-CCNC: 10 U/L (ref 1–41)
ANION GAP SERPL CALCULATED.3IONS-SCNC: 8.3 MMOL/L (ref 5–15)
AST SERPL-CCNC: 13 U/L (ref 1–40)
BASOPHILS # BLD AUTO: 0.07 10*3/MM3 (ref 0–0.2)
BASOPHILS NFR BLD AUTO: 0.5 % (ref 0–1.5)
BILIRUB SERPL-MCNC: 0.5 MG/DL (ref 0–1.2)
BUN SERPL-MCNC: 13 MG/DL (ref 8–23)
BUN/CREAT SERPL: 11.6 (ref 7–25)
CALCIUM SPEC-SCNC: 9.3 MG/DL (ref 8.6–10.5)
CEA SERPL-MCNC: 12.8 NG/ML
CHLORIDE SERPL-SCNC: 98 MMOL/L (ref 98–107)
CO2 SERPL-SCNC: 28.7 MMOL/L (ref 22–29)
CREAT SERPL-MCNC: 1.12 MG/DL (ref 0.76–1.27)
DEPRECATED RDW RBC AUTO: 45.5 FL (ref 37–54)
EOSINOPHIL # BLD AUTO: 0.11 10*3/MM3 (ref 0–0.4)
EOSINOPHIL NFR BLD AUTO: 0.8 % (ref 0.3–6.2)
ERYTHROCYTE [DISTWIDTH] IN BLOOD BY AUTOMATED COUNT: 14.5 % (ref 12.3–15.4)
GFR SERPL CREATININE-BSD FRML MDRD: 65 ML/MIN/1.73
GFR SERPL CREATININE-BSD FRML MDRD: 79 ML/MIN/1.73
GLOBULIN UR ELPH-MCNC: 2.5 GM/DL
GLUCOSE SERPL-MCNC: 455 MG/DL (ref 65–99)
HCT VFR BLD AUTO: 49.4 % (ref 37.5–51)
HGB BLD-MCNC: 17.1 G/DL (ref 13–17.7)
IMM GRANULOCYTES # BLD AUTO: 0.02 10*3/MM3 (ref 0–0.05)
IMM GRANULOCYTES NFR BLD AUTO: 0.1 % (ref 0–0.5)
LYMPHOCYTES # BLD AUTO: 1.69 10*3/MM3 (ref 0.7–3.1)
LYMPHOCYTES NFR BLD AUTO: 12 % (ref 19.6–45.3)
MCH RBC QN AUTO: 29.9 PG (ref 26.6–33)
MCHC RBC AUTO-ENTMCNC: 34.6 G/DL (ref 31.5–35.7)
MCV RBC AUTO: 86.4 FL (ref 79–97)
MONOCYTES # BLD AUTO: 0.7 10*3/MM3 (ref 0.1–0.9)
MONOCYTES NFR BLD AUTO: 5 % (ref 5–12)
NEUTROPHILS NFR BLD AUTO: 11.45 10*3/MM3 (ref 1.7–7)
NEUTROPHILS NFR BLD AUTO: 81.6 % (ref 42.7–76)
PLATELET # BLD AUTO: 148 10*3/MM3 (ref 140–450)
PMV BLD AUTO: 10.7 FL (ref 6–12)
POTASSIUM SERPL-SCNC: 4.9 MMOL/L (ref 3.5–5.2)
PROT SERPL-MCNC: 6.7 G/DL (ref 6–8.5)
RBC # BLD AUTO: 5.72 10*6/MM3 (ref 4.14–5.8)
SODIUM SERPL-SCNC: 135 MMOL/L (ref 136–145)
WBC NRBC COR # BLD: 14.04 10*3/MM3 (ref 3.4–10.8)

## 2022-01-13 PROCEDURE — 99213 OFFICE O/P EST LOW 20 MIN: CPT | Performed by: INTERNAL MEDICINE

## 2022-01-13 PROCEDURE — 82378 CARCINOEMBRYONIC ANTIGEN: CPT

## 2022-01-13 PROCEDURE — 85025 COMPLETE CBC W/AUTO DIFF WBC: CPT

## 2022-01-13 PROCEDURE — 80053 COMPREHEN METABOLIC PANEL: CPT

## 2022-01-13 PROCEDURE — G0463 HOSPITAL OUTPT CLINIC VISIT: HCPCS

## 2022-01-13 PROCEDURE — 36415 COLL VENOUS BLD VENIPUNCTURE: CPT

## 2022-01-13 RX ORDER — PANTOPRAZOLE SODIUM 40 MG/1
TABLET, DELAYED RELEASE ORAL DAILY
COMMUNITY
End: 2022-07-14 | Stop reason: SDUPTHER

## 2022-01-13 RX ORDER — CARBAMAZEPINE 200 MG/1
TABLET ORAL 2 TIMES DAILY
COMMUNITY
End: 2022-05-10 | Stop reason: SINTOL

## 2022-01-13 RX ORDER — HYDROCODONE BITARTRATE AND ACETAMINOPHEN 7.5; 325 MG/1; MG/1
TABLET ORAL
COMMUNITY
End: 2022-05-10

## 2022-01-13 NOTE — ASSESSMENT & PLAN NOTE
Patient is now 4 years out from his resection and adjuvant chemotherapy.  I see no evidence of disease recurrence by his history, physical examination or CT scans.  He will have lab work today including CBC, CMP, CEA.  He is up-to-date on colonoscopy.  I will see him back in 6 months for continued surveillance with labs and scans prior.

## 2022-01-13 NOTE — PROGRESS NOTES
Chief Complaint  Colon Cancer and Follow-up    Christiano Mcbride MD Houk, Brandon Lyle, MD Subjective Pedro L Laly Nieto presents to [facility] for follow-up of colon cancer.  He is status post resection followed by adjuvant chemotherapy completed 10/17.  On return today, patient states he is doing well from a bowel standpoint.  He reports normal bowel habits without blood per rectum, pain or melena.  He is up-to-date on colonoscopy.  He is having more trouble from chronic low back disc disease and has an upcoming appointment with pain management in [place].  He reports good appetite.  He denies any masses or adenopathy.    Oncology/Hematology History Overview Note   2016 Sigmoid colon--moderate to poorly differentiated invasive adenocarcinoma            Clinical Staging      Stage IIIC ([id number])            Treatments      Chemotherapy      4/2017 completed 6C FOLFOX      5/19/17 thru 10/11/17 CAPEOX        Surgeries      October . Colon Resection; left hemicolectomy and transverse colectomy.      Invasive adenocarcinoma moderately to poorly differentiated.  Extensive     lymphovascular invasion.  Margins negative.  8 out of 16 lymph nodes positive.     [id number].         Cancer of sigmoid colon (HCC)   7/13/2021 Initial Diagnosis    Cancer of sigmoid colon (CMS/HCC)     7/13/2021 Cancer Staged    Staging form: Colon And Rectum, AJCC 8th Edition  - Clinical: cT2, cN2, cM0 - Signed by Tacos Newby MD on 7/13/2021         Review of Systems   Constitutional: Positive for fatigue (From his pain medicines). Negative for appetite change, diaphoresis, fever, unexpected weight gain and unexpected weight loss.   HENT: Negative for hearing loss, mouth sores, sore throat, swollen glands, trouble swallowing and voice change.    Eyes: Negative for blurred vision.   Respiratory: Negative for cough, shortness of breath and wheezing.    Cardiovascular:  Negative for chest pain and palpitations.   Gastrointestinal: Negative for abdominal pain, blood in stool, constipation, diarrhea, nausea and vomiting.   Endocrine: Negative for cold intolerance and heat intolerance.   Genitourinary: Negative for difficulty urinating, dysuria, frequency, hematuria and urinary incontinence.   Musculoskeletal: Positive for back pain. Negative for arthralgias and myalgias.   Skin: Negative for rash, skin lesions and wound.   Neurological: Negative for dizziness, seizures, weakness, numbness and headache.   Hematological: Does not bruise/bleed easily.   Psychiatric/Behavioral: Negative for depressed mood. The patient is not nervous/anxious.      Current Outpatient Medications on File Prior to Visit   Medication Sig Dispense Refill   • albuterol sulfate HFA (ProAir HFA) 108 (90 Base) MCG/ACT inhaler ProAir HFA 90 mcg/actuation aerosol inhaler     • ammonium lactate (LAC-HYDRIN) 12 % lotion ammonium lactate 12 % lotion     • amphetamine-dextroamphetamine XR (Adderall XR) 15 MG 24 hr capsule Adderall XR 15 mg oral capsule,extended release 24hr take 1 capsule (15 mg) by oral route once daily in the morning upon awakening   Suspended     • apixaban (ELIQUIS) 5 MG tablet tablet Take 5 mg by mouth 2 (Two) Times a Day.     • atorvastatin (LIPITOR) 40 MG tablet atorvastatin 40 mg oral tablet take 1 tablet (40 mg) by oral route once daily   Suspended     • carBAMazepine (TEGretol) 200 MG tablet carbamazepine 200 mg tablet     • Dulaglutide (TRULICITY SC) Inject  under the skin into the appropriate area as directed Every 7 (Seven) Days.     • famotidine (Pepcid) 40 MG tablet Take 1 tablet by mouth every night at bedtime. 90 tablet 1   • gabapentin (NEURONTIN) 300 MG capsule gabapentin 300 mg oral capsule take 1 capsule (300 mg) by oral route 3 times per day   Active     • HYDROcodone-acetaminophen (NORCO) 7.5-325 MG per tablet hydrocodone 7.5 mg-acetaminophen 325 mg tablet   Take 1 tablet every 12  hours by oral route as needed for 30 days.     • insulin aspart (NovoLOG FlexPen) 100 UNIT/ML solution pen-injector sc pen Novolog Flexpen U-100 Insulin aspart 100 unit/mL (3 mL) subcutaneous     • Insulin Glargine (Lantus SoloStar) 100 UNIT/ML injection pen Lantus Solostar U-100 Insulin 100 unit/mL (3 mL) subcutaneous pen     • ketotifen (ZADITOR) 0.025 % ophthalmic solution      • metoprolol tartrate (LOPRESSOR) 100 MG tablet Take 1 tablet by mouth 2 (Two) Times a Day. 180 tablet 1   • naloxone (Narcan) 4 MG/0.1ML nasal spray Narcan 4 mg/actuation nasal spray     • oxyCODONE-acetaminophen (Percocet) 5-325 MG per tablet Every 12 (Twelve) Hours.     • pantoprazole (PROTONIX) 40 MG EC tablet pantoprazole 40 mg tablet,delayed release     • pregabalin (LYRICA) 100 MG capsule Take 100 mg by mouth 2 (Two) Times a Day.       No current facility-administered medications on file prior to visit.       Allergies   Allergen Reactions   • Latex Itching     Past Medical History:   Diagnosis Date   • Arthritis    • Asthma    • Benign essential hypertension    • Bladder disorder    • Blood disease    • BPH with urinary obstruction 05/30/2014   • Cancer ([facility])    • Cervical radiculopathy 05/01/2015    left   • Cervical spinal stenosis 03/10/2020   • Cervicalgia 12/18/2018   • Chest pain    • CHF (congestive heart failure) ([facility])    • Colon cancer ([facility]) 12/18/2018   • Condition not found     Hernia   • Condition not found     herniated disc   • COPD (chronic obstructive pulmonary disease) ([facility])    • Coronary artery disease    • DDD (degenerative disc disease), thoracic 12/18/2018   • Decreased sensation 05/01/2015    left   • Deep vein thrombosis ([facility])    • Depression    • Diabetes ([facility])    • Diabetes mellitus type 1 with coma, insulin dependent ([facility])     controlled   • Headache    • Heart murmur    • Heart valve disease    • Hematuria, gross 05/30/2014   • Hyperlipemia    • Hyperlipidemia    • Hypertension    • Insomnia    • Leg pain     • Leg pain    • Limb swelling    • Loss of balance 12/18/2018   • Low back pain 03/10/2020   • Lumbar degenerative disc disease 12/18/2018   • Lumbar radiculopathy 03/10/2020   • Mid back pain 12/18/2018   • Migraines    • Muscle cramps    • Myocardial infarction (HCC)    • Neurologic disorder    • Pain in back    • Pain in joint    • Pain of cervical spine    • Pain, generalized    • Seasonal allergies    • Shortness of breath    • Sleep apnea    • Stomach disorder      Past Surgical History:   Procedure Laterality Date   • ABDOMINAL SURGERY     • BLADDER SURGERY     • CARPAL TUNNEL RELEASE     • COLON RESECTION     • COLONOSCOPY  01/30/2017    [place]   • CORONARY ARTERY BYPASS GRAFT  12/2019    one vessel   • GALLBLADDER SURGERY      [email or web address]   • HERNIA REPAIR     • JOINT REPLACEMENT      joint surgery   • MITRAL VALVE REPAIR/REPLACEMENT  02/2019    [facility]   • OTHER SURGICAL HISTORY  05/30/2014    office cystoscopy w/DR SHANICE Young   • SHOULDER SURGERY Bilateral    • VENTRAL HERNIA REPAIR       Social History     Socioeconomic History   • Marital status:    Tobacco Use   • Smoking status: Current Some Day Smoker     Packs/day: 0.50     Types: Cigarettes     Start date: 1970   • Smokeless tobacco: Never Used   Vaping Use   • Vaping Use: Never used   Substance and Sexual Activity   • Alcohol use: Never   • Drug use: Never   • Sexual activity: Defer     Family History   Problem Relation Age of Onset   • Heart attack Father         MI   • Heart failure Father    • Stroke Brother        Objective   Physical Exam  Vitals reviewed. Exam conducted with a chaperone present.   Constitutional:       General: He is not in acute distress.     Appearance: Normal appearance.   Cardiovascular:      Rate and Rhythm: Normal rate and regular rhythm.      Heart sounds: Normal heart sounds. No murmur heard.  No gallop.    Pulmonary:      Effort: Pulmonary effort is normal.      Breath sounds: Normal breath  sounds.   Abdominal:      General: Abdomen is flat. Bowel sounds are normal.      Palpations: Abdomen is soft.      Hernia: A hernia (Ventral hernia, nonincarcerated) is present.   Musculoskeletal:      Cervical back: Neck supple.      Right lower leg: No edema.      Left lower leg: No edema.   Lymphadenopathy:      Cervical: No cervical adenopathy.   Neurological:      Mental Status: He is alert and oriented to person, place, and time.   Psychiatric:         Mood and Affect: Mood normal.         Behavior: Behavior normal.         Vitals:    01/13/22 1321   BP: 120/60   Pulse: 70   Resp: 18   Temp: 98.8 °F (37.1 °C)   SpO2: 98%   Weight: 76 kg (167 lb 8.8 oz)   PainSc:   9   PainLoc: Back     ECOG score: 0         PHQ-9 Total Score: 0                  Result Review :   The following data was reviewed by: Tacos Newby MD on 01/13/2022:  Lab Results   Component Value Date    HGB 17.1 01/13/2022    HCT 49.4 01/13/2022    MCV 86.4 01/13/2022     01/13/2022    WBC 14.04 (H) 01/13/2022    NEUTROABS 11.45 (H) 01/13/2022    LYMPHSABS 1.69 01/13/2022    MONOSABS 0.70 01/13/2022    EOSABS 0.11 01/13/2022    BASOSABS 0.07 01/13/2022     Lab Results   Component Value Date    GLUCOSE 119 (H) 11/26/2021    BUN 22 11/26/2021    CREATININE 0.90 01/12/2022    .5 (L) 11/26/2021    K 4.0 11/26/2021     11/26/2021    CO2 25.9 11/26/2021    CALCIUM 9.0 11/26/2021    PROTEINTOT 6.6 11/26/2021    ALBUMIN 4.00 11/26/2021    BILITOT 0.6 11/26/2021    ALKPHOS 103 11/26/2021    AST 12 11/26/2021    ALT 9 11/26/2021     Data reviewed: Radiologic studies CT chest, abdomen, pelvis reviewed.  Very small pulmonary nodule new compared to previous.  Postoperative changes to the bowel with no evidence of new metastatic disease, masses or adenopathy.      Assessment and Plan    Diagnoses and all orders for this visit:    1. History of colon cancer (Primary)  Assessment & Plan:  Patient is now 4 years out from his resection and  adjuvant chemotherapy.  I see no evidence of disease recurrence by his history, physical examination or CT scans.  He will have lab work today including CBC, CMP, CEA.  He is up-to-date on colonoscopy.  I will see him back in 6 months for continued surveillance with labs and scans prior.    Orders:  -     CBC & Differential; Future  -     Comprehensive Metabolic Panel; Future  -     CT chest w contrast; Future  -     CT abdomen pelvis w contrast; Future  -     CEA; Future          Patient Follow Up: 6 months    Patient was given instructions and counseling regarding his condition or for health maintenance advice. Please see specific information pulled into the AVS if appropriate.     Tacos Newby MD    1/13/2022

## 2022-01-15 ENCOUNTER — APPOINTMENT (OUTPATIENT)
Dept: GENERAL RADIOLOGY | Facility: HOSPITAL | Age: 67
End: 2022-01-15

## 2022-01-15 ENCOUNTER — HOSPITAL ENCOUNTER (EMERGENCY)
Facility: HOSPITAL | Age: 67
Discharge: HOME | End: 2022-01-15
Attending: EMERGENCY MEDICINE | Admitting: EMERGENCY MEDICINE

## 2022-01-15 VITALS
RESPIRATION RATE: 16 BRPM | OXYGEN SATURATION: 98 % | SYSTOLIC BLOOD PRESSURE: 111 MMHG | HEIGHT: 69 IN | DIASTOLIC BLOOD PRESSURE: 58 MMHG | WEIGHT: 174.16 LBS | HEART RATE: 77 BPM | BODY MASS INDEX: 25.8 KG/M2 | TEMPERATURE: 98.1 F

## 2022-01-15 DIAGNOSIS — S80.02XA CONTUSION OF LEFT KNEE, INITIAL ENCOUNTER: Primary | ICD-10-CM

## 2022-01-15 DIAGNOSIS — S86.912A KNEE STRAIN, LEFT, INITIAL ENCOUNTER: ICD-10-CM

## 2022-01-15 DIAGNOSIS — S39.012A LUMBOSACRAL STRAIN, INITIAL ENCOUNTER: ICD-10-CM

## 2022-01-15 PROCEDURE — 25010000002 KETOROLAC TROMETHAMINE PER 15 MG: Performed by: EMERGENCY MEDICINE

## 2022-01-15 PROCEDURE — 99283 EMERGENCY DEPT VISIT LOW MDM: CPT

## 2022-01-15 PROCEDURE — 73562 X-RAY EXAM OF KNEE 3: CPT

## 2022-01-15 PROCEDURE — 96372 THER/PROPH/DIAG INJ SC/IM: CPT

## 2022-01-15 RX ORDER — OXYCODONE HYDROCHLORIDE AND ACETAMINOPHEN 5; 325 MG/1; MG/1
1 TABLET ORAL ONCE
Status: COMPLETED | OUTPATIENT
Start: 2022-01-15 | End: 2022-01-15

## 2022-01-15 RX ORDER — KETOROLAC TROMETHAMINE 30 MG/ML
30 INJECTION, SOLUTION INTRAMUSCULAR; INTRAVENOUS ONCE
Status: COMPLETED | OUTPATIENT
Start: 2022-01-15 | End: 2022-01-15

## 2022-01-15 RX ORDER — CYCLOBENZAPRINE HCL 10 MG
10 TABLET ORAL 3 TIMES DAILY PRN
Qty: 20 TABLET | Refills: 0 | OUTPATIENT
Start: 2022-01-15 | End: 2022-03-08

## 2022-01-15 RX ORDER — LIDOCAINE 50 MG/G
1 PATCH TOPICAL EVERY 24 HOURS
Qty: 15 EACH | Refills: 0 | Status: SHIPPED | OUTPATIENT
Start: 2022-01-15 | End: 2022-05-10

## 2022-01-15 RX ORDER — CYCLOBENZAPRINE HCL 10 MG
10 TABLET ORAL ONCE
Status: COMPLETED | OUTPATIENT
Start: 2022-01-15 | End: 2022-01-15

## 2022-01-15 RX ADMIN — KETOROLAC TROMETHAMINE 30 MG: 30 INJECTION, SOLUTION INTRAMUSCULAR; INTRAVENOUS at 14:17

## 2022-01-15 RX ADMIN — OXYCODONE HYDROCHLORIDE AND ACETAMINOPHEN 1 TABLET: 5; 325 TABLET ORAL at 14:16

## 2022-01-15 RX ADMIN — CYCLOBENZAPRINE 10 MG: 10 TABLET, FILM COATED ORAL at 14:16

## 2022-01-15 NOTE — DISCHARGE INSTRUCTIONS
Rest, apply ice for 20mins on and 20mins off to your knee and lower back. After 3 or 4 days if you are still having pain in your lower back you may alternate ice with moist heat.  Apply one lidoderm patch to your lower back every 24 hours for pain and take the Flexeril as prescribed. Wear the knee brace for support and continue to walk with your cane for stability. Follow up with your PCP next week, return to the ED for worsening or new symptoms of concern.

## 2022-01-15 NOTE — ED PROVIDER NOTES
Subjective   Pt states he slipped on wet floor at [facility], complaining of left knee pain and back pain. Pt walks with a cane, states there was water in the floor and his cane slipped, causing him to come down on his left knee onto the concrete floor. States he has chronic lower back pain, but is now having worsening left lower back pain with radiation into his left leg.      History provided by:  Patient   used: No        Review of Systems   Constitutional: Negative.    HENT: Negative.    Eyes: Negative.    Respiratory: Negative.    Cardiovascular: Negative.    Gastrointestinal: Negative.    Endocrine: Negative.    Genitourinary: Negative.    Musculoskeletal: Positive for back pain.   Skin: Negative.    Allergic/Immunologic: Negative.    Neurological: Negative.    Hematological: Negative.    Psychiatric/Behavioral: Negative.        Past Medical History:   Diagnosis Date   • Arthritis    • Asthma    • Benign essential hypertension    • Bladder disorder    • Blood disease    • BPH with urinary obstruction 05/30/2014   • Cancer ([facility])    • Cervical radiculopathy 05/01/2015    left   • Cervical spinal stenosis 03/10/2020   • Cervicalgia 12/18/2018   • Chest pain    • CHF (congestive heart failure) ([facility])    • Colon cancer ([facility]) 12/18/2018   • Condition not found     Hernia   • Condition not found     herniated disc   • COPD (chronic obstructive pulmonary disease) ([facility])    • Coronary artery disease    • DDD (degenerative disc disease), thoracic 12/18/2018   • Decreased sensation 05/01/2015    left   • Deep vein thrombosis ([facility])    • Depression    • Diabetes ([facility])    • Diabetes mellitus type 1 with coma, insulin dependent ([facility])     controlled   • Headache    • Heart murmur    • Heart valve disease    • Hematuria, gross 05/30/2014   • Hyperlipemia    • Hyperlipidemia    • Hypertension    • Insomnia    • Leg pain    • Leg pain    • Limb swelling    • Loss of balance 12/18/2018   • Low back pain 03/10/2020    • Lumbar degenerative disc disease 12/18/2018   • Lumbar radiculopathy 03/10/2020   • Mid back pain 12/18/2018   • Migraines    • Muscle cramps    • Myocardial infarction (HCC)    • Neurologic disorder    • Pain in back    • Pain in joint    • Pain of cervical spine    • Pain, generalized    • Seasonal allergies    • Shortness of breath    • Sleep apnea    • Stomach disorder        Allergies   Allergen Reactions   • Latex Itching       Past Surgical History:   Procedure Laterality Date   • ABDOMINAL SURGERY     • BLADDER SURGERY     • CARPAL TUNNEL RELEASE     • COLON RESECTION     • COLONOSCOPY  01/30/2017    [place]   • CORONARY ARTERY BYPASS GRAFT  12/2019    one vessel   • GALLBLADDER SURGERY      cholecystecomy   • HERNIA REPAIR     • JOINT REPLACEMENT      joint surgery   • MITRAL VALVE REPAIR/REPLACEMENT  02/2019    [facility]   • OTHER SURGICAL HISTORY  05/30/2014    office cystoscopy w/DR SHANICE Young   • SHOULDER SURGERY Bilateral    • VENTRAL HERNIA REPAIR         Family History   Problem Relation Age of Onset   • Heart attack Father         MI   • Heart failure Father    • Stroke Brother        Social History     Socioeconomic History   • Marital status:    Tobacco Use   • Smoking status: Current Some Day Smoker     Packs/day: 0.50     Types: Cigarettes     Start date: 1970   • Smokeless tobacco: Never Used   Vaping Use   • Vaping Use: Never used   Substance and Sexual Activity   • Alcohol use: Never   • Drug use: Never   • Sexual activity: Defer           Objective   Physical Exam  Constitutional:       Appearance: Normal appearance.   HENT:      Head: Normocephalic and atraumatic.      Nose: Nose normal.      Mouth/Throat:      Mouth: Mucous membranes are moist.   Eyes:      Pupils: Pupils are equal, round, and reactive to light.   Cardiovascular:      Rate and Rhythm: Normal rate and regular rhythm.      Pulses: Normal pulses.   Pulmonary:      Effort: Pulmonary effort is normal.       Breath sounds: Normal breath sounds.   Abdominal:      General: Abdomen is flat. Bowel sounds are normal.      Palpations: Abdomen is soft.   Musculoskeletal:      Cervical back: Normal and normal range of motion.      Thoracic back: Normal.      Lumbar back: Tenderness present. Decreased range of motion.        Back:         Legs:    Skin:     General: Skin is warm and dry.      Capillary Refill: Capillary refill takes less than 2 seconds.   Neurological:      General: No focal deficit present.      Mental Status: He is alert and oriented to person, place, and time. Mental status is at baseline.   Psychiatric:         Mood and Affect: Mood normal.         Procedures           ED Course                                                 MDM  Number of Diagnoses or Management Options  Contusion of left knee, initial encounter: new and requires workup  Knee strain, left, initial encounter: new and requires workup  Lumbosacral strain, initial encounter: new and requires workup     Amount and/or Complexity of Data Reviewed  Tests in the radiology section of CPT®: reviewed and ordered    Risk of Complications, Morbidity, and/or Mortality  Presenting problems: low  Diagnostic procedures: low  Management options: low    Patient Progress  Patient progress: stable      Final diagnoses:   Contusion of left knee, initial encounter   Knee strain, left, initial encounter   Lumbosacral strain, initial encounter       ED Disposition  ED Disposition     ED Disposition Condition Comment    Discharge Stable           Christiano Mcbride MD  [address] [place]  [place] [address]  [phone]      follow up in 1 week         Medication List      New Prescriptions    cyclobenzaprine 10 MG tablet  Commonly known as: FLEXERIL  Take 1 tablet by mouth 3 (Three) Times a Day As Needed for Muscle Spasms.     lidocaine 5 %  Commonly known as: Lidoderm  Place 1 patch on the skin as directed by provider Daily. Remove & Discard patch within 12 hours  or as directed by MD           Where to Get Your Medications      You can get these medications from any pharmacy    Bring a paper prescription for each of these medications  · cyclobenzaprine 10 MG tablet  · lidocaine 5 %          Vero Tesfaye, APRN  01/15/22 1457

## 2022-01-25 ENCOUNTER — OFFICE VISIT (OUTPATIENT)
Dept: ORTHOPEDIC SURGERY | Facility: CLINIC | Age: 67
End: 2022-01-25

## 2022-01-25 VITALS — WEIGHT: 174 LBS | HEIGHT: 69 IN | BODY MASS INDEX: 25.77 KG/M2

## 2022-01-25 DIAGNOSIS — S89.92XA LEFT KNEE INJURY, INITIAL ENCOUNTER: ICD-10-CM

## 2022-01-25 DIAGNOSIS — M25.562 LEFT KNEE PAIN, UNSPECIFIED CHRONICITY: Primary | ICD-10-CM

## 2022-01-25 PROCEDURE — 99213 OFFICE O/P EST LOW 20 MIN: CPT | Performed by: ORTHOPAEDIC SURGERY

## 2022-01-26 NOTE — PROGRESS NOTES
"Chief Complaint  Initial Evaluation of the Left Knee     Subjective      Dhaval Nieto presents to [facility] for evaluation of his left knee. He states he had a fall on 01/15/2022 while shopping at [employer]. He states he twisted the knee when he fell and since then he has had pain to his knee. He had x-rays taken in the Emergency room which showed no obvious fracture but it continues to bother him. He is currently using a cane for an assistance device.     Allergies   Allergen Reactions   • Latex Itching        Social History     Socioeconomic History   • Marital status:    Tobacco Use   • Smoking status: Current Some Day Smoker     Packs/day: 0.50     Types: Cigarettes     Start date: 1970   • Smokeless tobacco: Never Used   Vaping Use   • Vaping Use: Never used   Substance and Sexual Activity   • Alcohol use: Never   • Drug use: Never   • Sexual activity: Defer        Review of Systems     Objective   Vital Signs:   Ht 175.3 cm (69\")   Wt 78.9 kg (174 lb)   BMI 25.70 kg/m²       Physical Exam  Constitutional:       Appearance: Normal appearance. The patient is well-developed and normal weight.   HENT:      Head: Normocephalic.      Right Ear: Hearing and external ear normal.      Left Ear: Hearing and external ear normal.      Nose: Nose normal.   Eyes:      Conjunctiva/sclera: Conjunctivae normal.   Cardiovascular:      Rate and Rhythm: Normal rate.   Pulmonary:      Effort: Pulmonary effort is normal.      Breath sounds: No wheezing or rales.   Abdominal:      Palpations: Abdomen is soft.      Tenderness: There is no abdominal tenderness.   Musculoskeletal:      Cervical back: Normal range of motion.   Skin:     Findings: No rash.   Neurological:      Mental Status: The patient is alert and oriented to person, place, and time.   Psychiatric:         Mood and Affect: Mood and affect normal.         Judgment: Judgment normal.       Ortho Exam      Left " knee:Stable to varus/valgus stress. Stable to anterior/posterior drawer. Decreased ROM of the knee. Positive EHL, FHL, GS and TA. Sensation intact to all 5 nerves of the foot. Positive pulses. Neurovascularly intact. Good strength to hamstrings, quadriceps, dorsiflexors, and plantar flexors.     Procedures      Imaging Results (Most Recent)     None           Result Review :       XR Knee 3 View Left    Result Date: 1/15/2022  Narrative: PROCEDURE: XR KNEE 3 VW LEFT  COMPARISON: None  INDICATIONS: PAIN  FINDINGS:  BONES: There are minimal degenerative changes in the medial and patellofemoral compartments.  No fractures or acute osseous abnormalities are identified. SOFT TISSUES: Negative.  No visible soft tissue swelling.  EFFUSION: None visible.  OTHER: Atherosclerotic vascular calcification is present.      Impression:  No acute osseous abnormalities are identified.      CARMEN GREGORY MD       Electronically Signed and Approved By: CARMEN GREGORY MD on 1/15/2022 at 13:50             CT Chest With Contrast Diagnostic    Result Date: 1/12/2022  Narrative: PROCEDURE: CT CHEST W CONTRAST DIAGNOSTIC  COMPARISON:  [facility], CT, CT CHEST W CONTRAST DIAGNOSTIC, 7/12/2021, 11:38.  [facility], CT, CT ABDOMEN PELVIS W CONTRAST, 7/12/2021, 11:38. INDICATIONS: surveillance, sigmoid colon ca  TECHNIQUE: After obtaining the patient's consent, CT images were obtained with non-ionic intravenous contrast material.   PROTOCOL:   Standard imaging protocol performed    RADIATION:   DLP: 1498mGy*cm   Automated exposure control was utilized to minimize radiation dose. CONTRAST: 100cc Isovue 370 I.V. LABS:   eGFR: >60ml/min/1.73m2  FINDINGS:  1-2 mm nodule right upper lobe (image 17) not seen on the previous study.  No adenopathy in the chest.  No aggressive appearing bone change.      Impression:  Tiny nodule in the right upper lobe is not seen on the prior.  Consider attention on a 6 month follow-up  chest CT.     JORDI PORTER MD       Electronically Signed and Approved By: JORDI PORTER MD on 1/12/2022 at 13:09             CT Abdomen Pelvis With Contrast    Result Date: 1/12/2022  Narrative: PROCEDURE: CT ABDOMEN PELVIS W CONTRAST  COMPARISON: [facility], CT, CT ABDOMEN PELVIS W CONTRAST, 7/12/2021, 11:38.  INDICATIONS: surveillance, sigmoid colon ca  TECHNIQUE: After obtaining the patient's consent, CT images were created with non-ionic intravenous contrast material.   PROTOCOL:   Standard imaging protocol performed    RADIATION:   DLP: 1498mGy*cm   Automated exposure control was utilized to minimize radiation dose. CONTRAST: 100cc Isovue 370 I.V. LABS:   eGFR: >60ml/min/1.73m2  FINDINGS:  Refer to the separately dictated chest CT.  Abdomen:  No suspicious liver or splenic lesion.  There is a sub cm hypodensity in the posterior right hepatic lobe that is stable.  Renal cysts are similar.  Adrenal glands and pancreas are unremarkable.  Previous cholecystectomy.  Postoperative changes at the splenic flexure.  No appreciable soft tissue at the anastomosis.  No visible colonic mass.  No abnormal adenopathy in the abdomen.  Pelvis:  Prostate measures 5.8 cm.  No pelvic mass or fluid.  No aggressive appearing bone change.        Impression:  No definite evidence for active malignancy in the abdomen or pelvis.     JORDI PORTER MD       Electronically Signed and Approved By: JORDI PORTER MD on 1/12/2022 at 13:06                Assessment and Plan     DX: left knee injury      We discussed treatment plan with the patient. We are going to give him a knee brace today in the office. He will schedule an MRI on this knee to rule out any internal derangement of his knee.     Call or return if worsening symptoms.    Follow Up     After MRI        Patient was given instructions and counseling regarding his condition or for health maintenance advice. Please see specific information pulled into the AVS if  appropriate.     Scribed for Misbah Vick MD by Rochelle Gomez.  01/26/22   10:45 EST    I have personally performed the services described in this document as scribed by the above individual and it is both accurate and complete. Misbah Vick MD 01/27/22

## 2022-02-14 ENCOUNTER — HOSPITAL ENCOUNTER (OUTPATIENT)
Dept: MRI IMAGING | Facility: HOSPITAL | Age: 67
Discharge: HOME | End: 2022-02-14
Admitting: ORTHOPAEDIC SURGERY

## 2022-02-14 DIAGNOSIS — M25.562 LEFT KNEE PAIN, UNSPECIFIED CHRONICITY: ICD-10-CM

## 2022-02-14 PROCEDURE — 73721 MRI JNT OF LWR EXTRE W/O DYE: CPT

## 2022-02-17 ENCOUNTER — OFFICE VISIT (OUTPATIENT)
Dept: ORTHOPEDIC SURGERY | Facility: CLINIC | Age: 67
End: 2022-02-17

## 2022-02-17 VITALS — HEIGHT: 69 IN | HEART RATE: 95 BPM | BODY MASS INDEX: 25.77 KG/M2 | OXYGEN SATURATION: 96 % | WEIGHT: 174 LBS

## 2022-02-17 DIAGNOSIS — S89.92XD INJURY OF LEFT KNEE, SUBSEQUENT ENCOUNTER: ICD-10-CM

## 2022-02-17 DIAGNOSIS — S80.02XD CONTUSION OF LEFT KNEE, SUBSEQUENT ENCOUNTER: Primary | ICD-10-CM

## 2022-02-17 PROCEDURE — 99213 OFFICE O/P EST LOW 20 MIN: CPT | Performed by: ORTHOPAEDIC SURGERY

## 2022-02-17 NOTE — PROGRESS NOTES
"Chief Complaint  Follow-up of the Left Knee     Subjective      Dhavalalden Nieto presents to [facility] for a follow-up of left knee. Patient is present today using a cane for ambulation assistance. He states he had a fall on 01/15/2022 while shopping at [employer]. He states he twisted the knee when he fell and since then he has had pain to his knee. He obtained an MRI of the left knee. Patient reports knee pain has worsened since his last visit.     Allergies   Allergen Reactions   • Latex Itching        Social History     Socioeconomic History   • Marital status:    Tobacco Use   • Smoking status: Current Some Day Smoker     Packs/day: 0.50     Types: Cigarettes     Start date: 1970   • Smokeless tobacco: Never Used   Vaping Use   • Vaping Use: Never used   Substance and Sexual Activity   • Alcohol use: Never   • Drug use: Never   • Sexual activity: Defer        Review of Systems     Objective   Vital Signs:   Pulse 95   Ht 175.3 cm (69\")   Wt 78.9 kg (174 lb)   SpO2 96%   BMI 25.70 kg/m²       Physical Exam  Constitutional:       Appearance: Normal appearance. Patient is well-developed and normal weight.   HENT:      Head: Normocephalic.      Right Ear: Hearing and external ear normal.      Left Ear: Hearing and external ear normal.      Nose: Nose normal.   Eyes:      Conjunctiva/sclera: Conjunctivae normal.   Cardiovascular:      Rate and Rhythm: Normal rate.   Pulmonary:      Effort: Pulmonary effort is normal.      Breath sounds: No wheezing or rales.   Abdominal:      Palpations: Abdomen is soft.      Tenderness: There is no abdominal tenderness.   Musculoskeletal:      Cervical back: Normal range of motion.   Skin:     Findings: No rash.   Neurological:      Mental Status: Patient is alert and oriented to person, place, and time.   Psychiatric:         Mood and Affect: Mood and affect normal.         Judgment: Judgment normal.       Ortho Exam      LEFT KNEE: " Antalgic gait. Ambulation with a cane. Calf supple, non-tender, no signs of DVT. Dorsal Pedal Pulse 2+, posterior tibialis pulse 2+. Sensation grossly intact. Neurovascular intact.  Mild swelling. Full extension. Flexion to 95 degrees with pain. Tender anterior knee.       Procedures      Imaging Results (Most Recent)     None           Result Review :         MRI Knee Left Without Contrast    Result Date: 2/15/2022  Narrative: PROCEDURE: MRI KNEE LEFT  WO CONTRAST  COMPARISON: [facility], [facility], XR KNEE 3 VW LEFT, 1/15/2022, 13:33.  INDICATIONS: left knee pain      TECHNIQUE: A complete multi-planar MRI was performed.   FINDINGS:  No fracture or malalignment is demonstrated.  No meniscal tear is seen.  The cruciate ligaments, medial collateral ligament, lateral collateral ligament complex, patellar retinacula and extensor mechanism appear intact.  No joint effusion is seen.  Cartilage signal changes are consistent with mild chondromalacia.  No focal cartilage loss is seen.  No loose body is seen.      Impression:   1. Mild osteoarthritis 2. No acute internal derangement of the joint     Black Clark M.D.       Electronically Signed and Approved By: Black Clark M.D. on 2/15/2022 at 8:21                      Assessment and Plan     DX: Left knee contusion     Discussed treatment plans with the patient. A prescription for physical therapy. He is on Eliquis and can't have NSAIDs. Voltaren gel prescribed for the patient.     Call or return if worsening symptoms.    Follow Up     4-6 weeks.       Patient was given instructions and counseling regarding his condition or for health maintenance advice. Please see specific information pulled into the AVS if appropriate.     Scribed for Misbah Vick MD by Waleska Vincent.  02/17/22   13:42 EST        I have personally performed the services described in this document as scribed by the above individual and it is both accurate and complete. Misbah Vick MD  02/18/22

## 2022-03-01 ENCOUNTER — TELEPHONE (OUTPATIENT)
Dept: GASTROENTEROLOGY | Facility: CLINIC | Age: 67
End: 2022-03-01

## 2022-03-01 RX ORDER — FAMOTIDINE 40 MG/1
40 TABLET, FILM COATED ORAL
Qty: 90 TABLET | Refills: 1 | Status: SHIPPED | OUTPATIENT
Start: 2022-03-01 | End: 2022-05-10

## 2022-03-08 ENCOUNTER — APPOINTMENT (OUTPATIENT)
Dept: GENERAL RADIOLOGY | Facility: HOSPITAL | Age: 67
End: 2022-03-08

## 2022-03-08 ENCOUNTER — HOSPITAL ENCOUNTER (EMERGENCY)
Facility: HOSPITAL | Age: 67
Discharge: HOME | End: 2022-03-08
Attending: EMERGENCY MEDICINE | Admitting: EMERGENCY MEDICINE

## 2022-03-08 VITALS
BODY MASS INDEX: 24.75 KG/M2 | HEART RATE: 67 BPM | DIASTOLIC BLOOD PRESSURE: 61 MMHG | HEIGHT: 69 IN | SYSTOLIC BLOOD PRESSURE: 115 MMHG | OXYGEN SATURATION: 100 % | RESPIRATION RATE: 16 BRPM | TEMPERATURE: 98.4 F | WEIGHT: 167.11 LBS

## 2022-03-08 DIAGNOSIS — M54.32 LEFT SIDED SCIATICA: Primary | ICD-10-CM

## 2022-03-08 LAB
ALBUMIN SERPL-MCNC: 4.2 G/DL (ref 3.5–5.2)
ALBUMIN/GLOB SERPL: 1.5 G/DL
ALP SERPL-CCNC: 104 U/L (ref 39–117)
ALT SERPL W P-5'-P-CCNC: 9 U/L (ref 1–41)
ANION GAP SERPL CALCULATED.3IONS-SCNC: 8.8 MMOL/L (ref 5–15)
AST SERPL-CCNC: 16 U/L (ref 1–40)
BASOPHILS # BLD AUTO: 0.1 10*3/MM3 (ref 0–0.2)
BASOPHILS NFR BLD AUTO: 0.8 % (ref 0–1.5)
BILIRUB SERPL-MCNC: 0.4 MG/DL (ref 0–1.2)
BUN SERPL-MCNC: 13 MG/DL (ref 8–23)
BUN/CREAT SERPL: 11.2 (ref 7–25)
CALCIUM SPEC-SCNC: 9.4 MG/DL (ref 8.6–10.5)
CHLORIDE SERPL-SCNC: 96 MMOL/L (ref 98–107)
CO2 SERPL-SCNC: 28.2 MMOL/L (ref 22–29)
CREAT SERPL-MCNC: 1.16 MG/DL (ref 0.76–1.27)
DEPRECATED RDW RBC AUTO: 45.8 FL (ref 37–54)
EGFRCR SERPLBLD CKD-EPI 2021: 69 ML/MIN/1.73
EOSINOPHIL # BLD AUTO: 0.2 10*3/MM3 (ref 0–0.4)
EOSINOPHIL NFR BLD AUTO: 1.6 % (ref 0.3–6.2)
ERYTHROCYTE [DISTWIDTH] IN BLOOD BY AUTOMATED COUNT: 14.4 % (ref 12.3–15.4)
GLOBULIN UR ELPH-MCNC: 2.8 GM/DL
GLUCOSE SERPL-MCNC: 186 MG/DL (ref 65–99)
HCT VFR BLD AUTO: 48.2 % (ref 37.5–51)
HGB BLD-MCNC: 17.3 G/DL (ref 13–17.7)
HOLD SPECIMEN: NORMAL
HOLD SPECIMEN: NORMAL
IMM GRANULOCYTES # BLD AUTO: 0.04 10*3/MM3 (ref 0–0.05)
IMM GRANULOCYTES NFR BLD AUTO: 0.3 % (ref 0–0.5)
LIPASE SERPL-CCNC: 13 U/L (ref 13–60)
LYMPHOCYTES # BLD AUTO: 2.43 10*3/MM3 (ref 0.7–3.1)
LYMPHOCYTES NFR BLD AUTO: 18.9 % (ref 19.6–45.3)
MCH RBC QN AUTO: 31.2 PG (ref 26.6–33)
MCHC RBC AUTO-ENTMCNC: 35.9 G/DL (ref 31.5–35.7)
MCV RBC AUTO: 87 FL (ref 79–97)
MONOCYTES # BLD AUTO: 0.8 10*3/MM3 (ref 0.1–0.9)
MONOCYTES NFR BLD AUTO: 6.2 % (ref 5–12)
NEUTROPHILS NFR BLD AUTO: 72.2 % (ref 42.7–76)
NEUTROPHILS NFR BLD AUTO: 9.3 10*3/MM3 (ref 1.7–7)
NRBC BLD AUTO-RTO: 0 /100 WBC (ref 0–0.2)
PLATELET # BLD AUTO: 168 10*3/MM3 (ref 140–450)
PMV BLD AUTO: 10.4 FL (ref 6–12)
POTASSIUM SERPL-SCNC: 4.6 MMOL/L (ref 3.5–5.2)
PROT SERPL-MCNC: 7 G/DL (ref 6–8.5)
RBC # BLD AUTO: 5.54 10*6/MM3 (ref 4.14–5.8)
SODIUM SERPL-SCNC: 133 MMOL/L (ref 136–145)
WBC NRBC COR # BLD: 12.87 10*3/MM3 (ref 3.4–10.8)
WHOLE BLOOD HOLD SPECIMEN: NORMAL
WHOLE BLOOD HOLD SPECIMEN: NORMAL

## 2022-03-08 PROCEDURE — 72100 X-RAY EXAM L-S SPINE 2/3 VWS: CPT

## 2022-03-08 PROCEDURE — 96374 THER/PROPH/DIAG INJ IV PUSH: CPT

## 2022-03-08 PROCEDURE — 25010000002 HYDROMORPHONE 1 MG/ML SOLUTION: Performed by: EMERGENCY MEDICINE

## 2022-03-08 PROCEDURE — 83690 ASSAY OF LIPASE: CPT | Performed by: EMERGENCY MEDICINE

## 2022-03-08 PROCEDURE — 80053 COMPREHEN METABOLIC PANEL: CPT | Performed by: EMERGENCY MEDICINE

## 2022-03-08 PROCEDURE — 73502 X-RAY EXAM HIP UNI 2-3 VIEWS: CPT

## 2022-03-08 PROCEDURE — 36415 COLL VENOUS BLD VENIPUNCTURE: CPT | Performed by: EMERGENCY MEDICINE

## 2022-03-08 PROCEDURE — 99282 EMERGENCY DEPT VISIT SF MDM: CPT

## 2022-03-08 PROCEDURE — 85025 COMPLETE CBC W/AUTO DIFF WBC: CPT | Performed by: EMERGENCY MEDICINE

## 2022-03-08 RX ORDER — CYCLOBENZAPRINE HCL 10 MG
10 TABLET ORAL 3 TIMES DAILY PRN
Qty: 21 TABLET | Refills: 0 | OUTPATIENT
Start: 2022-03-08 | End: 2022-08-11

## 2022-03-08 RX ORDER — SODIUM CHLORIDE 0.9 % (FLUSH) 0.9 %
10 SYRINGE (ML) INJECTION AS NEEDED
Status: DISCONTINUED | OUTPATIENT
Start: 2022-03-08 | End: 2022-03-08 | Stop reason: HOSPADM

## 2022-03-08 RX ORDER — NAPROXEN 500 MG/1
500 TABLET ORAL 2 TIMES DAILY PRN
Qty: 20 TABLET | Refills: 0 | Status: SHIPPED | OUTPATIENT
Start: 2022-03-08 | End: 2022-05-10

## 2022-03-08 RX ORDER — METHYLPREDNISOLONE 4 MG/1
TABLET ORAL
Qty: 21 TABLET | Refills: 0 | Status: SHIPPED | OUTPATIENT
Start: 2022-03-08 | End: 2022-05-10

## 2022-03-08 RX ADMIN — HYDROMORPHONE HYDROCHLORIDE 0.5 MG: 1 INJECTION, SOLUTION INTRAMUSCULAR; INTRAVENOUS; SUBCUTANEOUS at 11:33

## 2022-03-08 NOTE — ED PROVIDER NOTES
Time: 14:20 EST  Arrived by: CHU  Chief Complaint: low back pain  History provided by: pt  History is limited by: N/A    History of Present Illness:    Dhaval Nieto is a 66 y.o. male who presents to the emergency department today with complaints of lower back pain. Pt states he slipped in [employer] and fell--landing on his left side. He now claims his lower back and left hip radiating to his knee is in a moderate amount of pain. He notes pain is exacerbated with standing up. He denies any LOC, numbness, neck pain, incontinence, or a headache.       History provided by:  Patient   used: No        Past Medical History:     Allergies   Allergen Reactions   • Latex Itching     Past Medical History:   Diagnosis Date   • Arthritis    • Asthma    • Benign essential hypertension    • Bladder disorder    • Blood disease    • BPH with urinary obstruction 05/30/2014   • Cancer ([facility])    • Cervical radiculopathy 05/01/2015    left   • Cervical spinal stenosis 03/10/2020   • Cervicalgia 12/18/2018   • Chest pain    • CHF (congestive heart failure) ([facility])    • Colon cancer ([facility]) 12/18/2018   • Condition not found     Hernia   • Condition not found     herniated disc   • COPD (chronic obstructive pulmonary disease) ([facility])    • Coronary artery disease    • DDD (degenerative disc disease), thoracic 12/18/2018   • Decreased sensation 05/01/2015    left   • Deep vein thrombosis ([facility])    • Depression    • Diabetes ([facility])    • Diabetes mellitus type 1 with coma, insulin dependent ([facility])     controlled   • Headache    • Heart murmur    • Heart valve disease    • Hematuria, gross 05/30/2014   • Hyperlipemia    • Hyperlipidemia    • Hypertension    • Insomnia    • Leg pain    • Leg pain    • Limb swelling    • Loss of balance 12/18/2018   • Low back pain 03/10/2020   • Lumbar degenerative disc disease 12/18/2018   • Lumbar radiculopathy 03/10/2020   • Mid back pain 12/18/2018   • Migraines    • Muscle cramps    •  Myocardial infarction (H[facility])    • Neurologic disorder    • Pain in back    • Pain in joint    • Pain of cervical spine    • Pain, generalized    • Seasonal allergies    • Shortness of breath    • Sleep apnea    • Stomach disorder      Past Surgical History:   Procedure Laterality Date   • ABDOMINAL SURGERY     • BLADDER SURGERY     • CARPAL TUNNEL RELEASE     • COLON RESECTION     • COLONOSCOPY  01/30/2017    [place]   • CORONARY ARTERY BYPASS GRAFT  12/2019    one vessel   • GALLBLADDER SURGERY      cholecystecomy   • HERNIA REPAIR     • JOINT REPLACEMENT      joint surgery   • MITRAL VALVE REPAIR/REPLACEMENT  02/2019    [facility]   • OTHER SURGICAL HISTORY  05/30/2014    office cystoscopy w/DR SHANICE Young   • SHOULDER SURGERY Bilateral    • VENTRAL HERNIA REPAIR       Family History   Problem Relation Age of Onset   • Heart attack Father         MI   • Heart failure Father    • Stroke Brother        Home Medications:  Prior to Admission medications    Medication Sig Start Date End Date Taking? Authorizing Provider   albuterol sulfate HFA (ProAir HFA) 108 (90 Base) MCG/ACT inhaler ProAir HFA 90 mcg/actuation aerosol inhaler    Carissa Boyle MD   ammonium lactate (LAC-HYDRIN) 12 % lotion ammonium lactate 12 % lotion    Carissa Boyle MD   amphetamine-dextroamphetamine XR (Adderall XR) 15 MG 24 hr capsule Adderall XR 15 mg oral capsule,extended release 24hr take 1 capsule (15 mg) by oral route once daily in the morning upon awakening   Suspended    Carissa Boyle MD   apixaban (ELIQUIS) 5 MG tablet tablet Take 5 mg by mouth 2 (Two) Times a Day.    Carissa Boyle MD   atorvastatin (LIPITOR) 40 MG tablet atorvastatin 40 mg oral tablet take 1 tablet (40 mg) by oral route once daily   Suspended    Carissa Boyle MD   carBAMazepine (TEGretol) 200 MG tablet carbamazepine 200 mg tablet    Carissa Boyle MD   cyclobenzaprine (FLEXERIL) 10 MG tablet Take 1 tablet by mouth 3  (Three) Times a Day As Needed for Muscle Spasms. 1/15/22   Vero Tesfaye APRN   Diclofenac Sodium (VOLTAREN) 1 % gel gel Apply 4 g topically to the appropriate area as directed 4 (Four) Times a Day As Needed (PAIN). 2/17/22   Misbah Vick MD   Dulaglutide (TRULICITY SC) Inject  under the skin into the appropriate area as directed Every 7 (Seven) Days.    Carissa Boyle MD   famotidine (Pepcid) 40 MG tablet Take 1 tablet by mouth every night at bedtime. 3/1/22   Tarsha Nichols APRN   gabapentin (NEURONTIN) 300 MG capsule gabapentin 300 mg oral capsule take 1 capsule (300 mg) by oral route 3 times per day   Active    ProviderCarissa MD   HYDROcodone-acetaminophen (NORCO) 7.5-325 MG per tablet hydrocodone 7.5 mg-acetaminophen 325 mg tablet   Take 1 tablet every 12 hours by oral route as needed for 30 days.    Carissa Boyle MD   insulin aspart (NovoLOG FlexPen) 100 UNIT/ML solution pen-injector sc pen Novolog Flexpen U-100 Insulin aspart 100 unit/mL (3 mL) subcutaneous    Carissa Boyle MD   Insulin Glargine (Lantus SoloStar) 100 UNIT/ML injection pen Lantus Solostar U-100 Insulin 100 unit/mL (3 mL) subcutaneous pen    Carissa Boyle MD   ketotifen (ZADITOR) 0.025 % ophthalmic solution  3/10/21   Carissa Boyle MD   lidocaine (Lidoderm) 5 % Place 1 patch on the skin as directed by provider Daily. Remove & Discard patch within 12 hours or as directed by MD 1/15/22   Vero Tesfaye APRN   metoprolol tartrate (LOPRESSOR) 100 MG tablet Take 1 tablet by mouth 2 (Two) Times a Day. 11/24/21   Carla Sargent APRN   naloxone (Narcan) 4 MG/0.1ML nasal spray Narcan 4 mg/actuation nasal spray    Carissa Boyle MD   oxyCODONE-acetaminophen (Percocet) 5-325 MG per tablet Every 12 (Twelve) Hours.    Carissa Boyle MD   pantoprazole (PROTONIX) 40 MG EC tablet pantoprazole 40 mg tablet,delayed release    Carissa Boyle MD   pregabalin (LYRICA) 100  "MG capsule Take 100 mg by mouth 2 (Two) Times a Day.    Provider, Carissa, MD        Social History:   PT  reports that he has been smoking cigarettes. He started smoking about 52 years ago. He has been smoking about 0.50 packs per day. He has never used smokeless tobacco. He reports that he does not drink alcohol and does not use drugs.    Record Review:  I have reviewed the patient's records in [facility].     Review of Systems  Review of Systems   Constitutional: Negative for chills and fever.   HENT: Negative for congestion, rhinorrhea and sore throat.    Eyes: Negative for pain and visual disturbance.   Respiratory: Negative for apnea, cough, chest tightness and shortness of breath.    Cardiovascular: Negative for chest pain and palpitations.   Gastrointestinal: Negative for abdominal pain, diarrhea, nausea and vomiting.   Genitourinary: Negative for difficulty urinating and dysuria.   Musculoskeletal: Positive for arthralgias and back pain. Negative for joint swelling and myalgias.   Skin: Negative for color change.   Neurological: Negative for seizures and headaches.   Psychiatric/Behavioral: Negative.    All other systems reviewed and are negative.       Physical Exam  /61 (BP Location: Right arm, Patient Position: Sitting)   Pulse 67   Temp 98.4 °F (36.9 °C) (Oral)   Resp 16   Ht 175.3 cm (69\")   Wt 75.8 kg (167 lb 1.7 oz)   SpO2 100%   BMI 24.68 kg/m²     Physical Exam  Vitals and nursing note reviewed.   Constitutional:       General: He is not in acute distress.     Appearance: Normal appearance.   HENT:      Head: Normocephalic and atraumatic.      Nose: Nose normal.      Mouth/Throat:      Pharynx: Oropharynx is clear.   Eyes:      General: No scleral icterus.     Conjunctiva/sclera: Conjunctivae normal.   Cardiovascular:      Rate and Rhythm: Normal rate and regular rhythm.      Heart sounds: Normal heart sounds. No murmur heard.  Pulmonary:      Effort: No respiratory distress.      Breath " "sounds: Normal breath sounds. No wheezing, rhonchi or rales.   Chest:      Chest wall: No tenderness.   Abdominal:      Palpations: Abdomen is soft.      Tenderness: There is no abdominal tenderness. There is no guarding or rebound.      Comments: No rigidity.   Musculoskeletal:         General: No tenderness. Normal range of motion.      Cervical back: Normal range of motion and neck supple.      Right lower leg: No edema.      Left lower leg: No edema.      Comments: Tender over left lumbar soft tissue and left posterior gluteal area. No midline back tenderness. Full ROM.   Skin:     General: Skin is warm and dry.   Neurological:      Mental Status: He is alert. Mental status is at baseline.   Psychiatric:         Mood and Affect: Mood normal.         Behavior: Behavior normal.                  ED Course  /61 (BP Location: Right arm, Patient Position: Sitting)   Pulse 67   Temp 98.4 °F (36.9 °C) (Oral)   Resp 16   Ht 175.3 cm (69\")   Wt 75.8 kg (167 lb 1.7 oz)   SpO2 100%   BMI 24.68 kg/m²   Results for orders placed or performed during the hospital encounter of 03/08/22   Comprehensive Metabolic Panel    Specimen: Blood   Result Value Ref Range    Glucose 186 (H) 65 - 99 mg/dL    BUN 13 8 - 23 mg/dL    Creatinine 1.16 0.76 - 1.27 mg/dL    Sodium 133 (L) 136 - 145 mmol/L    Potassium 4.6 3.5 - 5.2 mmol/L    Chloride 96 (L) 98 - 107 mmol/L    CO2 28.2 22.0 - 29.0 mmol/L    Calcium 9.4 8.6 - 10.5 mg/dL    Total Protein 7.0 6.0 - 8.5 g/dL    Albumin 4.20 3.50 - 5.20 g/dL    ALT (SGPT) 9 1 - 41 U/L    AST (SGOT) 16 1 - 40 U/L    Alkaline Phosphatase 104 39 - 117 U/L    Total Bilirubin 0.4 0.0 - 1.2 mg/dL    Globulin 2.8 gm/dL    A/G Ratio 1.5 g/dL    BUN/Creatinine Ratio 11.2 7.0 - 25.0    Anion Gap 8.8 5.0 - 15.0 mmol/L    eGFR 69.0 >60.0 mL/min/1.73   Lipase    Specimen: Blood   Result Value Ref Range    Lipase 13 13 - 60 U/L   CBC Auto Differential    Specimen: Blood   Result Value Ref Range    WBC " 12.87 (H) 3.40 - 10.80 10*3/mm3    RBC 5.54 4.14 - 5.80 10*6/mm3    Hemoglobin 17.3 13.0 - 17.7 g/dL    Hematocrit 48.2 37.5 - 51.0 %    MCV 87.0 79.0 - 97.0 fL    MCH 31.2 26.6 - 33.0 pg    MCHC 35.9 (H) 31.5 - 35.7 g/dL    RDW 14.4 12.3 - 15.4 %    RDW-SD 45.8 37.0 - 54.0 fl    MPV 10.4 6.0 - 12.0 fL    Platelets 168 140 - 450 10*3/mm3    Neutrophil % 72.2 42.7 - 76.0 %    Lymphocyte % 18.9 (L) 19.6 - 45.3 %    Monocyte % 6.2 5.0 - 12.0 %    Eosinophil % 1.6 0.3 - 6.2 %    Basophil % 0.8 0.0 - 1.5 %    Immature Grans % 0.3 0.0 - 0.5 %    Neutrophils, Absolute 9.30 (H) 1.70 - 7.00 10*3/mm3    Lymphocytes, Absolute 2.43 0.70 - 3.10 10*3/mm3    Monocytes, Absolute 0.80 0.10 - 0.90 10*3/mm3    Eosinophils, Absolute 0.20 0.00 - 0.40 10*3/mm3    Basophils, Absolute 0.10 0.00 - 0.20 10*3/mm3    Immature Grans, Absolute 0.04 0.00 - 0.05 10*3/mm3    nRBC 0.0 0.0 - 0.2 /100 WBC   Green Top (Gel)   Result Value Ref Range    Extra Tube Hold for add-ons.    Lavender Top   Result Value Ref Range    Extra Tube hold for add-on    Gold Top - SST   Result Value Ref Range    Extra Tube Hold for add-ons.    Light Blue Top   Result Value Ref Range    Extra Tube hold for add-on      Medications   sodium chloride 0.9 % flush 10 mL (has no administration in time range)   sodium chloride 0.9 % flush 10 mL (has no administration in time range)   HYDROmorphone (DILAUDID) injection 0.5 mg (0.5 mg Intravenous Given 3/8/22 1133)     MRI Knee Left Without Contrast    Result Date: 2/15/2022  Narrative: PROCEDURE: MRI KNEE LEFT  WO CONTRAST  COMPARISON: [facility], CR, XR KNEE 3 VW LEFT, 1/15/2022, 13:33.  INDICATIONS: left knee pain      TECHNIQUE: A complete multi-planar MRI was performed.   FINDINGS:  No fracture or malalignment is demonstrated.  No meniscal tear is seen.  The cruciate ligaments, medial collateral ligament, lateral collateral ligament complex, patellar retinacula and extensor mechanism appear intact.  No joint  effusion is seen.  Cartilage signal changes are consistent with mild chondromalacia.  No focal cartilage loss is seen.  No loose body is seen.      Impression:   1. Mild osteoarthritis 2. No acute internal derangement of the joint     Black Clark M.D.       Electronically Signed and Approved By: Black Clark M.D. on 2/15/2022 at 8:21             XR Spine Lumbar AP & Lateral    Result Date: 3/8/2022  Narrative: PROCEDURE: XR SPINE LUMBAR AP AND LATERAL, 3/08/2022, 10:34 XR HIP W OR WO PELVIS 2-3 VIEW LEFT, 3/08/2022, 10:34  COMPARISON: [facility], CT, CT ABDOMEN PELVIS W CONTRAST, 1/12/2022, 11:37.  INDICATIONS: low Back pain after fall x 4 weeks ago  FINDINGS:  Lumbar spine:  Mild degenerate endplate changes at L3-L4 and L4-L5.  The lumbar vertebral bodies otherwise demonstrate well preserved height and alignment.  No evidence of acute fracture or subluxation of the lumbar spine.  Mild to moderate multilevel degenerative disc space narrowing, most prominent at L3-L4 and L4-L5.  Multilevel facet hypertrophy, greatest at L4-L5.  Mild bilateral SI joint DJD.  Cholecystectomy clips.  Calcified atherosclerotic disease in the abdominal aorta and its distal branches.  Pelvis and left hip No evidence of acute fracture or dislocation.  Mild bilateral hip joint DJD.  The pubic symphysis is unremarkable.  Mild bilateral SI joint DJD.  Partially imaged lumbosacral spondylosis.  Phleboliths in the pelvis.      Impression:   1. Mild to moderate multilevel lumbar spondylosis, without radiographic evidence of acute fracture or subluxation of the lumbar spine. 2. Mild DJD, without radiographic evidence of acute fracture or dislocation in the pelvis or left hip.      HOLLY ALVAREZ MD       Electronically Signed and Approved By: HOLLY ALVAREZ MD on 3/08/2022 at 11:03             XR Hip With or Without Pelvis 2 - 3 View Left    Result Date: 3/8/2022  Narrative: PROCEDURE: XR SPINE LUMBAR AP AND LATERAL, 3/08/2022, 10:34  XR HIP W OR WO PELVIS 2-3 VIEW LEFT, 3/08/2022, 10:34  COMPARISON: [facility], CT, CT ABDOMEN PELVIS W CONTRAST, 1/12/2022, 11:37.  INDICATIONS: low Back pain after fall x 4 weeks ago  FINDINGS:  Lumbar spine:  Mild degenerate endplate changes at L3-L4 and L4-L5.  The lumbar vertebral bodies otherwise demonstrate well preserved height and alignment.  No evidence of acute fracture or subluxation of the lumbar spine.  Mild to moderate multilevel degenerative disc space narrowing, most prominent at L3-L4 and L4-L5.  Multilevel facet hypertrophy, greatest at L4-L5.  Mild bilateral SI joint DJD.  Cholecystectomy clips.  Calcified atherosclerotic disease in the abdominal aorta and its distal branches.  Pelvis and left hip No evidence of acute fracture or dislocation.  Mild bilateral hip joint DJD.  The pubic symphysis is unremarkable.  Mild bilateral SI joint DJD.  Partially imaged lumbosacral spondylosis.  Phleboliths in the pelvis.      Impression:   1. Mild to moderate multilevel lumbar spondylosis, without radiographic evidence of acute fracture or subluxation of the lumbar spine. 2. Mild DJD, without radiographic evidence of acute fracture or dislocation in the pelvis or left hip.      HOLLY ALVAREZ MD       Electronically Signed and Approved By: HOLLY ALVAREZ MD on 3/08/2022 at 11:03               Procedures/EKGs:  Procedures    Medical Decision Making:                     The patient was seen and evaluated in the ED by me.  The above history and physical examination was performed as document.  Diagnostic data was obtained.  Results reviewed.  Discussed with the patient and his wife.  Radiographically there is no evidence of acute bony abnormalities to cause his pain.  It is quite possible his pain is secondary to more of a sciatica.  I recommended outpatient follow-up and possibly even an MRI for further evaluation but this can be done as an outpatient.  The patient verbalizes understanding.  Patient  will be treated in the interim symptomatically with round of steroids, Flexeril, and hydrocodone.  Patient is aware that the steroids may make his blood sugar go up and he is comfortable treating his blood sugars accordingly.    MDM     Final diagnoses:   Left sided sciatica          Disposition:  ED Disposition     ED Disposition   Discharge    Condition   Stable    Comment   --             Documentation assistance provided by Randy Emery DO acting as scribe for Randy Emery DO. Information recorded by the scribe was done at my direction and has been verified and validated by me.        Serena King  03/08/22 1008       Randy Emery DO  03/08/22 [id number]

## 2022-03-17 ENCOUNTER — OFFICE VISIT (OUTPATIENT)
Dept: ORTHOPEDIC SURGERY | Facility: CLINIC | Age: 67
End: 2022-03-17

## 2022-03-17 VITALS — BODY MASS INDEX: 24.73 KG/M2 | WEIGHT: 167 LBS | HEIGHT: 69 IN

## 2022-03-17 DIAGNOSIS — M79.605 PAIN OF LEFT LOWER EXTREMITY: ICD-10-CM

## 2022-03-17 DIAGNOSIS — M54.2 NECK PAIN: Primary | ICD-10-CM

## 2022-03-17 PROCEDURE — 99213 OFFICE O/P EST LOW 20 MIN: CPT | Performed by: ORTHOPAEDIC SURGERY

## 2022-03-17 NOTE — PROGRESS NOTES
"Chief Complaint  Follow-up of the Left Knee     Subjective      Dhaval Nieto presents to [facility] for follow-up of left knee pain. Patient reports he has been in therapy and is having pain from the hip down to the knee and shoulder down of the left side. He reports pain started about two weeks ago and does not recall a specific injury to the left side of his body. He reports the lateral thigh is very painful as well as the shoulder/arm radiating up to the cervical spine.    Allergies   Allergen Reactions   • Latex Itching        Social History     Socioeconomic History   • Marital status:    Tobacco Use   • Smoking status: Current Some Day Smoker     Packs/day: 0.50     Types: Cigarettes     Start date: 1970   • Smokeless tobacco: Never Used   Vaping Use   • Vaping Use: Never used   Substance and Sexual Activity   • Alcohol use: Never   • Drug use: Never   • Sexual activity: Defer        Review of Systems     Objective   Vital Signs:   Ht 175.3 cm (69\")   Wt 75.8 kg (167 lb)   BMI 24.66 kg/m²       Physical Exam  Constitutional:       Appearance: Normal appearance. Patient is well-developed and normal weight.   HENT:      Head: Normocephalic.      Right Ear: Hearing and external ear normal.      Left Ear: Hearing and external ear normal.      Nose: Nose normal.   Eyes:      Conjunctiva/sclera: Conjunctivae normal.   Cardiovascular:      Rate and Rhythm: Normal rate.   Pulmonary:      Effort: Pulmonary effort is normal.      Breath sounds: No wheezing or rales.   Abdominal:      Palpations: Abdomen is soft.      Tenderness: There is no abdominal tenderness.   Musculoskeletal:      Cervical back: Normal range of motion.   Skin:     Findings: No rash.   Neurological:      Mental Status: Patient is alert and oriented to person, place, and time.   Psychiatric:         Mood and Affect: Mood and affect normal.         Judgment: Judgment normal.       Ortho Exam  "     Left knee: full extension, flexion to 110 degrees, some pain with hip flexion and extension, sensation intact to light touch, stable to varus and valgus stress;  Left arm: intact shoulder ROM, some pain with shoulder ROM, mild cervical spine pain and limitation with movement, radicular pain down the arm      Procedures      Imaging Results (Most Recent)     None           Result Review :       XR Spine Lumbar AP & Lateral    Result Date: 3/8/2022  Narrative: PROCEDURE: XR SPINE LUMBAR AP AND LATERAL, 3/08/2022, 10:34 XR HIP W OR WO PELVIS 2-3 VIEW LEFT, 3/08/2022, 10:34  COMPARISON: [facility], CT, CT ABDOMEN PELVIS W CONTRAST, 1/12/2022, 11:37.  INDICATIONS: low Back pain after fall x 4 weeks ago  FINDINGS:  Lumbar spine:  Mild degenerate endplate changes at L3-L4 and L4-L5.  The lumbar vertebral bodies otherwise demonstrate well preserved height and alignment.  No evidence of acute fracture or subluxation of the lumbar spine.  Mild to moderate multilevel degenerative disc space narrowing, most prominent at L3-L4 and L4-L5.  Multilevel facet hypertrophy, greatest at L4-L5.  Mild bilateral SI joint DJD.  Cholecystectomy clips.  Calcified atherosclerotic disease in the abdominal aorta and its distal branches.  Pelvis and left hip No evidence of acute fracture or dislocation.  Mild bilateral hip joint DJD.  The pubic symphysis is unremarkable.  Mild bilateral SI joint DJD.  Partially imaged lumbosacral spondylosis.  Phleboliths in the pelvis.      Impression:   1. Mild to moderate multilevel lumbar spondylosis, without radiographic evidence of acute fracture or subluxation of the lumbar spine. 2. Mild DJD, without radiographic evidence of acute fracture or dislocation in the pelvis or left hip.      HOLLY ALVAREZ MD       Electronically Signed and Approved By: HOLLY ALVAREZ MD on 3/08/2022 at 11:03             XR Hip With or Without Pelvis 2 - 3 View Left    Result Date: 3/8/2022  Narrative:  PROCEDURE: XR SPINE LUMBAR AP AND LATERAL, 3/08/2022, 10:34 XR HIP W OR WO PELVIS 2-3 VIEW LEFT, 3/08/2022, 10:34  COMPARISON: [facility], CT, CT ABDOMEN PELVIS W CONTRAST, 1/12/2022, 11:37.  INDICATIONS: low Back pain after fall x 4 weeks ago  FINDINGS:  Lumbar spine:  Mild degenerate endplate changes at L3-L4 and L4-L5.  The lumbar vertebral bodies otherwise demonstrate well preserved height and alignment.  No evidence of acute fracture or subluxation of the lumbar spine.  Mild to moderate multilevel degenerative disc space narrowing, most prominent at L3-L4 and L4-L5.  Multilevel facet hypertrophy, greatest at L4-L5.  Mild bilateral SI joint DJD.  Cholecystectomy clips.  Calcified atherosclerotic disease in the abdominal aorta and its distal branches.  Pelvis and left hip No evidence of acute fracture or dislocation.  Mild bilateral hip joint DJD.  The pubic symphysis is unremarkable.  Mild bilateral SI joint DJD.  Partially imaged lumbosacral spondylosis.  Phleboliths in the pelvis.      Impression:   1. Mild to moderate multilevel lumbar spondylosis, without radiographic evidence of acute fracture or subluxation of the lumbar spine. 2. Mild DJD, without radiographic evidence of acute fracture or dislocation in the pelvis or left hip.      HOLLY ALVAREZ MD       Electronically Signed and Approved By: HOLLY ALVAREZ MD on 3/08/2022 at 11:03                      Assessment and Plan     DX: Neck pain   Left leg pain    Call or return if worsening symptoms.    Follow Up     Patient is complaining of shoulder and neck pain, having radicular pain and with limited movement. We recommended an MRI of the cervical spine. Follow-up for results.       Patient was given instructions and counseling regarding his condition or for health maintenance advice. Please see specific information pulled into the AVS if appropriate.     Scribed for Misbah Vick MD by Brittany Dent.  03/17/22   13:50 EDT    I have personally  performed the services described in this document as scribed by the above individual and it is both accurate and complete. Misbah Vick MD 03/18/22

## 2022-03-20 ENCOUNTER — HOSPITAL ENCOUNTER (EMERGENCY)
Facility: HOSPITAL | Age: 67
Discharge: ANOTHER HOSPITAL | End: 2022-03-20
Attending: EMERGENCY MEDICINE | Admitting: EMERGENCY MEDICINE

## 2022-03-20 ENCOUNTER — APPOINTMENT (OUTPATIENT)
Dept: GENERAL RADIOLOGY | Facility: HOSPITAL | Age: 67
End: 2022-03-20

## 2022-03-20 ENCOUNTER — APPOINTMENT (OUTPATIENT)
Dept: CT IMAGING | Facility: HOSPITAL | Age: 67
End: 2022-03-20

## 2022-03-20 VITALS
RESPIRATION RATE: 16 BRPM | WEIGHT: 169.09 LBS | BODY MASS INDEX: 24.21 KG/M2 | OXYGEN SATURATION: 97 % | HEART RATE: 84 BPM | TEMPERATURE: 98.5 F | HEIGHT: 70 IN | SYSTOLIC BLOOD PRESSURE: 120 MMHG | DIASTOLIC BLOOD PRESSURE: 69 MMHG

## 2022-03-20 DIAGNOSIS — R41.82 ALTERED MENTAL STATUS, UNSPECIFIED ALTERED MENTAL STATUS TYPE: Primary | ICD-10-CM

## 2022-03-20 LAB
ALBUMIN SERPL-MCNC: 3.6 G/DL (ref 3.5–5.2)
ALBUMIN/GLOB SERPL: 1.6 G/DL
ALP SERPL-CCNC: 84 U/L (ref 39–117)
ALT SERPL W P-5'-P-CCNC: 9 U/L (ref 1–41)
AMMONIA BLD-SCNC: 20 UMOL/L (ref 16–60)
AMPHET+METHAMPHET UR QL: NEGATIVE
ANION GAP SERPL CALCULATED.3IONS-SCNC: 9.4 MMOL/L (ref 5–15)
APAP SERPL-MCNC: <5 MCG/ML (ref 0–30)
ARTERIAL PATENCY WRIST A: POSITIVE
AST SERPL-CCNC: 16 U/L (ref 1–40)
BACTERIA UR QL AUTO: ABNORMAL /HPF
BARBITURATES UR QL SCN: NEGATIVE
BASE EXCESS BLDA CALC-SCNC: -2 MMOL/L (ref -2–2)
BASOPHILS # BLD AUTO: 0.07 10*3/MM3 (ref 0–0.2)
BASOPHILS NFR BLD AUTO: 0.6 % (ref 0–1.5)
BDY SITE: ABNORMAL
BENZODIAZ UR QL SCN: NEGATIVE
BILIRUB SERPL-MCNC: 0.4 MG/DL (ref 0–1.2)
BILIRUB UR QL STRIP: NEGATIVE
BUN SERPL-MCNC: 12 MG/DL (ref 8–23)
BUN/CREAT SERPL: 11.2 (ref 7–25)
CALCIUM SPEC-SCNC: 8.4 MG/DL (ref 8.6–10.5)
CANNABINOIDS SERPL QL: NEGATIVE
CHLORIDE SERPL-SCNC: 106 MMOL/L (ref 98–107)
CLARITY UR: CLEAR
CO2 SERPL-SCNC: 23.6 MMOL/L (ref 22–29)
COCAINE UR QL: NEGATIVE
COHGB MFR BLD: 6.2 % (ref 0–1.5)
COLOR UR: YELLOW
CREAT SERPL-MCNC: 1.07 MG/DL (ref 0.76–1.27)
D-LACTATE SERPL-SCNC: 1.4 MMOL/L (ref 0.5–2)
DEPRECATED RDW RBC AUTO: 48.1 FL (ref 37–54)
EGFRCR SERPLBLD CKD-EPI 2021: 76.1 ML/MIN/1.73
EOSINOPHIL # BLD AUTO: 0.23 10*3/MM3 (ref 0–0.4)
EOSINOPHIL NFR BLD AUTO: 1.9 % (ref 0.3–6.2)
ERYTHROCYTE [DISTWIDTH] IN BLOOD BY AUTOMATED COUNT: 15.2 % (ref 12.3–15.4)
ETHANOL BLD-MCNC: <10 MG/DL (ref 0–10)
ETHANOL UR QL: <0.01 %
FHHB: 6.3 % (ref 0–5)
GLOBULIN UR ELPH-MCNC: 2.2 GM/DL
GLUCOSE BLDC GLUCOMTR-MCNC: 160 MG/DL (ref 70–99)
GLUCOSE BLDC GLUCOMTR-MCNC: 87 MG/DL (ref 70–99)
GLUCOSE SERPL-MCNC: 89 MG/DL (ref 65–99)
GLUCOSE UR STRIP-MCNC: ABNORMAL MG/DL
HCO3 BLDA-SCNC: 22.5 MMOL/L (ref 22–26)
HCT VFR BLD AUTO: 44.2 % (ref 37.5–51)
HGB BLD-MCNC: 15.8 G/DL (ref 13–17.7)
HGB BLDA-MCNC: 16.3 G/DL (ref 13.8–16.4)
HGB UR QL STRIP.AUTO: ABNORMAL
HOLD SPECIMEN: NORMAL
HOLD SPECIMEN: NORMAL
HYALINE CASTS UR QL AUTO: ABNORMAL /LPF
IMM GRANULOCYTES # BLD AUTO: 0.07 10*3/MM3 (ref 0–0.05)
IMM GRANULOCYTES NFR BLD AUTO: 0.6 % (ref 0–0.5)
INHALED O2 CONCENTRATION: 21 %
KETONES UR QL STRIP: NEGATIVE
LEUKOCYTE ESTERASE UR QL STRIP.AUTO: NEGATIVE
LYMPHOCYTES # BLD AUTO: 3.06 10*3/MM3 (ref 0.7–3.1)
LYMPHOCYTES NFR BLD AUTO: 25.3 % (ref 19.6–45.3)
MAGNESIUM SERPL-MCNC: 2.1 MG/DL (ref 1.6–2.4)
MCH RBC QN AUTO: 31.2 PG (ref 26.6–33)
MCHC RBC AUTO-ENTMCNC: 35.7 G/DL (ref 31.5–35.7)
MCV RBC AUTO: 87.4 FL (ref 79–97)
METHADONE UR QL SCN: NEGATIVE
METHGB BLD QL: 0.2 % (ref 0–1.5)
MODALITY: ABNORMAL
MONOCYTES # BLD AUTO: 0.86 10*3/MM3 (ref 0.1–0.9)
MONOCYTES NFR BLD AUTO: 7.1 % (ref 5–12)
NEUTROPHILS NFR BLD AUTO: 64.5 % (ref 42.7–76)
NEUTROPHILS NFR BLD AUTO: 7.8 10*3/MM3 (ref 1.7–7)
NITRITE UR QL STRIP: NEGATIVE
NRBC BLD AUTO-RTO: 0 /100 WBC (ref 0–0.2)
OPIATES UR QL: NEGATIVE
OXYCODONE UR QL SCN: NEGATIVE
OXYHGB MFR BLDV: 87.3 % (ref 94–99)
PCO2 BLDA: 38.1 MM HG (ref 35–45)
PH BLDA: 7.39 PH UNITS (ref 7.35–7.45)
PH UR STRIP.AUTO: 6 [PH] (ref 5–8)
PLATELET # BLD AUTO: 121 10*3/MM3 (ref 140–450)
PMV BLD AUTO: 10.2 FL (ref 6–12)
PO2 BLD: 296 MM[HG] (ref 0–500)
PO2 BLDA: 62.2 MM HG (ref 80–100)
POTASSIUM SERPL-SCNC: 4 MMOL/L (ref 3.5–5.2)
PROT SERPL-MCNC: 5.8 G/DL (ref 6–8.5)
PROT UR QL STRIP: ABNORMAL
QT INTERVAL: 423 MS
QT INTERVAL: 453 MS
RBC # BLD AUTO: 5.06 10*6/MM3 (ref 4.14–5.8)
RBC # UR STRIP: ABNORMAL /HPF
REF LAB TEST METHOD: ABNORMAL
SAO2 % BLDCOA: 93.3 % (ref 95–99)
SARS-COV-2 RNA PNL SPEC NAA+PROBE: NOT DETECTED
SODIUM SERPL-SCNC: 139 MMOL/L (ref 136–145)
SP GR UR STRIP: 1.01 (ref 1–1.03)
SQUAMOUS #/AREA URNS HPF: ABNORMAL /HPF
TROPONIN T SERPL-MCNC: <0.01 NG/ML (ref 0–0.03)
UROBILINOGEN UR QL STRIP: ABNORMAL
WBC # UR STRIP: ABNORMAL /HPF
WBC NRBC COR # BLD: 12.09 10*3/MM3 (ref 3.4–10.8)
WHOLE BLOOD HOLD SPECIMEN: NORMAL
WHOLE BLOOD HOLD SPECIMEN: NORMAL

## 2022-03-20 PROCEDURE — 87040 BLOOD CULTURE FOR BACTERIA: CPT | Performed by: EMERGENCY MEDICINE

## 2022-03-20 PROCEDURE — 82962 GLUCOSE BLOOD TEST: CPT

## 2022-03-20 PROCEDURE — 71045 X-RAY EXAM CHEST 1 VIEW: CPT

## 2022-03-20 PROCEDURE — 83605 ASSAY OF LACTIC ACID: CPT | Performed by: EMERGENCY MEDICINE

## 2022-03-20 PROCEDURE — 80143 DRUG ASSAY ACETAMINOPHEN: CPT | Performed by: EMERGENCY MEDICINE

## 2022-03-20 PROCEDURE — 80053 COMPREHEN METABOLIC PANEL: CPT | Performed by: EMERGENCY MEDICINE

## 2022-03-20 PROCEDURE — 83050 HGB METHEMOGLOBIN QUAN: CPT | Performed by: EMERGENCY MEDICINE

## 2022-03-20 PROCEDURE — 82375 ASSAY CARBOXYHB QUANT: CPT | Performed by: EMERGENCY MEDICINE

## 2022-03-20 PROCEDURE — 82805 BLOOD GASES W/O2 SATURATION: CPT | Performed by: EMERGENCY MEDICINE

## 2022-03-20 PROCEDURE — 80307 DRUG TEST PRSMV CHEM ANLYZR: CPT | Performed by: EMERGENCY MEDICINE

## 2022-03-20 PROCEDURE — 82140 ASSAY OF AMMONIA: CPT | Performed by: EMERGENCY MEDICINE

## 2022-03-20 PROCEDURE — 93005 ELECTROCARDIOGRAM TRACING: CPT

## 2022-03-20 PROCEDURE — 81001 URINALYSIS AUTO W/SCOPE: CPT | Performed by: EMERGENCY MEDICINE

## 2022-03-20 PROCEDURE — U0005 INFEC AGEN DETEC AMPLI PROBE: HCPCS | Performed by: EMERGENCY MEDICINE

## 2022-03-20 PROCEDURE — 93005 ELECTROCARDIOGRAM TRACING: CPT | Performed by: EMERGENCY MEDICINE

## 2022-03-20 PROCEDURE — U0004 COV-19 TEST NON-CDC HGH THRU: HCPCS | Performed by: EMERGENCY MEDICINE

## 2022-03-20 PROCEDURE — 36600 WITHDRAWAL OF ARTERIAL BLOOD: CPT | Performed by: EMERGENCY MEDICINE

## 2022-03-20 PROCEDURE — P9612 CATHETERIZE FOR URINE SPEC: HCPCS

## 2022-03-20 PROCEDURE — 70450 CT HEAD/BRAIN W/O DYE: CPT

## 2022-03-20 PROCEDURE — 36415 COLL VENOUS BLD VENIPUNCTURE: CPT

## 2022-03-20 PROCEDURE — 82077 ASSAY SPEC XCP UR&BREATH IA: CPT | Performed by: EMERGENCY MEDICINE

## 2022-03-20 PROCEDURE — 83735 ASSAY OF MAGNESIUM: CPT | Performed by: EMERGENCY MEDICINE

## 2022-03-20 PROCEDURE — 85025 COMPLETE CBC W/AUTO DIFF WBC: CPT | Performed by: EMERGENCY MEDICINE

## 2022-03-20 PROCEDURE — 84484 ASSAY OF TROPONIN QUANT: CPT | Performed by: EMERGENCY MEDICINE

## 2022-03-20 PROCEDURE — 99285 EMERGENCY DEPT VISIT HI MDM: CPT

## 2022-03-20 RX ORDER — SODIUM CHLORIDE 0.9 % (FLUSH) 0.9 %
10 SYRINGE (ML) INJECTION AS NEEDED
Status: DISCONTINUED | OUTPATIENT
Start: 2022-03-20 | End: 2022-03-20 | Stop reason: HOSPADM

## 2022-03-20 NOTE — ED PROVIDER NOTES
Subjective   Patient presents with a last known well approximate 3 to 4 hours ago with altered mental status.  History is limited due to altered mental status.  Symptoms are described as moderate to severe without exacerbating or alleviating factors.          Review of Systems   Unable to perform ROS: Mental status change       Past Medical History:   Diagnosis Date   • Arthritis    • Asthma    • Benign essential hypertension    • Bladder disorder    • Blood disease    • BPH with urinary obstruction 05/30/2014   • Cancer ([facility])    • Cervical radiculopathy 05/01/2015    left   • Cervical spinal stenosis 03/10/2020   • Cervicalgia 12/18/2018   • Chest pain    • CHF (congestive heart failure) ([facility])    • Colon cancer ([facility]) 12/18/2018   • Condition not found     Hernia   • Condition not found     herniated disc   • COPD (chronic obstructive pulmonary disease) ([facility])    • Coronary artery disease    • DDD (degenerative disc disease), thoracic 12/18/2018   • Decreased sensation 05/01/2015    left   • Deep vein thrombosis ([facility])    • Depression    • Diabetes ([facility])    • Diabetes mellitus type 1 with coma, insulin dependent ([facility])     controlled   • Headache    • Heart murmur    • Heart valve disease    • Hematuria, gross 05/30/2014   • Hyperlipemia    • Hyperlipidemia    • Hypertension    • Insomnia    • Leg pain    • Leg pain    • Limb swelling    • Loss of balance 12/18/2018   • Low back pain 03/10/2020   • Lumbar degenerative disc disease 12/18/2018   • Lumbar radiculopathy 03/10/2020   • Mid back pain 12/18/2018   • Migraines    • Muscle cramps    • Myocardial infarction ([facility])    • Neurologic disorder    • Pain in back    • Pain in joint    • Pain of cervical spine    • Pain, generalized    • Seasonal allergies    • Shortness of breath    • Sleep apnea    • Stomach disorder        Allergies   Allergen Reactions   • Latex Itching       Past Surgical History:   Procedure Laterality Date   • ABDOMINAL SURGERY     • BLADDER  SURGERY     • CARPAL TUNNEL RELEASE     • COLON RESECTION     • COLONOSCOPY  01/30/2017    [place]   • CORONARY ARTERY BYPASS GRAFT  12/2019    one vessel   • GALLBLADDER SURGERY      cholecystecomy   • HERNIA REPAIR     • JOINT REPLACEMENT      joint surgery   • MITRAL VALVE REPAIR/REPLACEMENT  02/2019    [facility]   • OTHER SURGICAL HISTORY  05/30/2014    office cystoscopy w/DR SHANICE Young   • SHOULDER SURGERY Bilateral    • VENTRAL HERNIA REPAIR         Family History   Problem Relation Age of Onset   • Heart attack Father         MI   • Heart failure Father    • Stroke Brother        Social History     Socioeconomic History   • Marital status:    Tobacco Use   • Smoking status: Current Some Day Smoker     Packs/day: 0.50     Types: Cigarettes     Start date: 1970   • Smokeless tobacco: Never Used   Vaping Use   • Vaping Use: Never used   Substance and Sexual Activity   • Alcohol use: Never   • Drug use: Never   • Sexual activity: Defer           Objective   Physical Exam  Constitutional:       Appearance: Normal appearance.   HENT:      Head: Normocephalic and atraumatic.      Nose: Nose normal.      Mouth/Throat:      Mouth: Mucous membranes are moist.   Eyes:      Extraocular Movements: Extraocular movements intact.      Conjunctiva/sclera: Conjunctivae normal.      Pupils: Pupils are equal, round, and reactive to light.   Cardiovascular:      Rate and Rhythm: Normal rate and regular rhythm.      Pulses: Normal pulses.      Heart sounds: Normal heart sounds.   Pulmonary:      Effort: Pulmonary effort is normal.      Breath sounds: Normal breath sounds. No wheezing.   Abdominal:      Palpations: Abdomen is soft.      Tenderness: There is no abdominal tenderness.   Musculoskeletal:         General: Normal range of motion.      Cervical back: Normal range of motion and neck supple.      Right lower leg: No edema.      Left lower leg: No edema.   Skin:     General: Skin is warm and dry.      Capillary  Refill: Capillary refill takes less than 2 seconds.      Findings: No rash.   Neurological:      General: No focal deficit present.      Cranial Nerves: No cranial nerve deficit.      Sensory: No sensory deficit.      Motor: No weakness.      Comments: Patient is lethargic         Procedures           ED Course  ED Course as of 03/20/22 0613   Sun Mar 20, 2022   0544 EKG: Rate 88, normal P waves, IVCD, nonspecific ST segment changes, prolonged QT interval, no previous for comparison. [RW]      ED Course User Index  [RW] Med Evans MD                                                 [facility]  Number of Diagnoses or Management Options  Altered mental status, unspecified altered mental status type  Diagnosis management comments: Patient presents with altered mental status.  Old records were reviewed and has had prior visits for opioid use.  Differential diagnostic consideration include but are not limited to central nervous system event such as stroke or overdose.  Urine drug screen is negative.  CT scan of the brain is without acute findings as read by radiology and reviewed by me.  Blood gas demonstrates a PO2 of 62 with a normal CO2.  Ethanol level less than 10 chemistries unremarkable CBC shows white count of 12.  ED course he remained somewhat lethargic and his daughter arrived [facility].  [facility].  He remains hemodynamically stable with an intact airway.  Safety watch is ordered.  He stable on final assessment.       Amount and/or Complexity of Data Reviewed  Clinical lab tests: reviewed  Tests in the radiology section of CPT®: reviewed  Tests in the medicine section of CPT®: reviewed    Risk of Complications, Morbidity, and/or Mortality  Presenting problems: high  Management options: low    Patient Progress  Patient progress: stable      Final diagnoses:   Altered mental status, unspecified altered mental status  type       ED Disposition  ED Disposition     ED Disposition   Transfer to Another Facility     Condition   --    Comment   --             No follow-up provider specified.       Medication List      No changes were made to your prescriptions during this visit.          Med Evans MD  03/20/22 0606

## 2022-03-25 LAB
BACTERIA SPEC AEROBE CULT: NORMAL
BACTERIA SPEC AEROBE CULT: NORMAL

## 2022-04-07 ENCOUNTER — HOSPITAL ENCOUNTER (OUTPATIENT)
Dept: MRI IMAGING | Facility: HOSPITAL | Age: 67
Discharge: HOME | End: 2022-04-07
Admitting: ORTHOPAEDIC SURGERY

## 2022-04-07 DIAGNOSIS — M54.2 NECK PAIN: ICD-10-CM

## 2022-04-07 PROCEDURE — 72141 MRI NECK SPINE W/O DYE: CPT

## 2022-04-19 ENCOUNTER — OFFICE VISIT (OUTPATIENT)
Dept: ORTHOPEDIC SURGERY | Facility: CLINIC | Age: 67
End: 2022-04-19

## 2022-04-19 VITALS — HEART RATE: 78 BPM | WEIGHT: 169 LBS | OXYGEN SATURATION: 97 % | BODY MASS INDEX: 24.2 KG/M2 | HEIGHT: 70 IN

## 2022-04-19 DIAGNOSIS — M54.2 NECK PAIN: Primary | ICD-10-CM

## 2022-04-19 DIAGNOSIS — M48.02 CERVICAL SPINAL STENOSIS: ICD-10-CM

## 2022-04-19 PROCEDURE — 99213 OFFICE O/P EST LOW 20 MIN: CPT | Performed by: ORTHOPAEDIC SURGERY

## 2022-04-19 NOTE — PROGRESS NOTES
"Chief Complaint  Follow-up of the Neck     Subjective      Dhaval Nieto presents to [facility] for a follow-up of neck. Patient has been having pain in the neck that radiates into the arm. He hasn’t had any recent injury or trauma. He is present today with MRI results. He also complains of pain in the low back radiating into the left leg.        Allergies   Allergen Reactions   • Latex Itching        Social History     Socioeconomic History   • Marital status:    Tobacco Use   • Smoking status: Current Some Day Smoker     Packs/day: 0.50     Types: Cigarettes     Start date: 1970   • Smokeless tobacco: Never Used   Vaping Use   • Vaping Use: Never used   Substance and Sexual Activity   • Alcohol use: Never   • Drug use: Never   • Sexual activity: Defer        Review of Systems     Objective   Vital Signs:   Pulse 78   Ht 177.8 cm (70\")   Wt 76.7 kg (169 lb)   SpO2 97%   BMI 24.25 kg/m²       Physical Exam  Constitutional:       Appearance: Normal appearance. Patient is well-developed and normal weight.   HENT:      Head: Normocephalic.      Right Ear: Hearing and external ear normal.      Left Ear: Hearing and external ear normal.      Nose: Nose normal.   Eyes:      Conjunctiva/sclera: Conjunctivae normal.   Cardiovascular:      Rate and Rhythm: Normal rate.   Pulmonary:      Effort: Pulmonary effort is normal.      Breath sounds: No wheezing or rales.   Abdominal:      Palpations: Abdomen is soft.      Tenderness: There is no abdominal tenderness.   Musculoskeletal:      Cervical back: Normal range of motion.   Skin:     Findings: No rash.   Neurological:      Mental Status: Patient is alert and oriented to person, place, and time.   Psychiatric:         Mood and Affect: Mood and affect normal.         Judgment: Judgment normal.       Ortho Exam      LEFT UPPER EXTREMITY: pain with neck range of motion, radicular pain down the arm. Sensation grossly intact. " Neurovascular intact. Radial pulse 2+, ulnar pulse 2+. Skin intact. No swelling, skin discoloration or atrophy. Tenderness at the base of the neck. No winging of scapula. Near full forward elevation of shoulder.       Procedures      Imaging Results (Most Recent)     None           Result Review :         MRI Cervical Spine Without Contrast    Result Date: 4/7/2022  Narrative: PROCEDURE: MRI CERVICAL SPINE WO CONTRAST  COMPARISON: None  INDICATIONS: CHRONIC NECK PAIN RADIATING INTO LEFT ARM.  TECHNIQUE: A variety of imaging planes and parameters were utilized for visualization of suspected pathology.   FINDINGS:  Limited imaging of the base of the brain and craniocervical junction is unremarkable.  Vertebral body height and alignment is within normal limits.  Mild marrow signal edema at C6-7 noted likely degenerative in nature.  Bone marrow signal is otherwise within normal limits.  Visualized spinal cord is normal signal.  Paraspinal soft tissues appear grossly unremarkable in appearance.  CERVICAL DISC LEVELS: C2-C3: Mild facet arthropathy noted left greater than right without significant central foraminal narrowing appreciated at the C2-C3 level. C3-C4: Mild broad-based disc bulge noted flattening the thecal sac.  There is mild central canal narrowing.  No neural foraminal narrowing noted. C4-C5: Mild right greater than left facet arthropathy noted left upper T1 hypertrophy noted.  There is a mild broad-base disc bulge.  Mild central canal narrowing noted. C5-C6: Mild left paracentral disc osteophyte complex noted causing mild deformity of the underlying spinal cord without signal abnormality.  Overall there is mild central canal narrowing with mild to moderate narrowing the left lateral recess.  Abutment of the C6 nerve root anteriorly cannot be excluded C6-C7: Mild to moderate broad-based disc osteophyte complex causing moderate central canal narrowing noted at C6-C7 there is mild bilateral neural foraminal  narrowing noted. C7-T1: No significant disc/facet abnormality, spinal stenosis, or foraminal stenosis.       Impression:  Multilevel degenerative changes as above greatest at C5-6 and C6-7     JESSICA GIL MD       Electronically Signed and Approved By: JESSICA GIL MD on 4/07/2022 at 9:30                      Assessment and Plan     DX: Neck pain     Discussed referral to a neurosurgeon. He understands and agrees. He was referred to Dr. Veras.     Call or return if worsening symptoms.    Follow Up     PRN.       Patient was given instructions and counseling regarding his condition or for health maintenance advice. Please see specific information pulled into the AVS if appropriate.     Scribed for Misbah Vick MD by Waleska Vincent.  04/19/22   14:14 EDT    I have personally performed the services described in this document as scribed by the above individual and it is both accurate and complete. Misbah Vick MD 04/21/22

## 2022-05-09 NOTE — PROGRESS NOTES
[facility]  Cardiology progress Note    Patient Name: Dhaval Nieto  : 1955    CHIEF COMPLAINT  Mitral valve disease      Subjective   Subjective     HISTORY OF PRESENT ILLNESS    Dhaval Nieto is a 67 y.o. male with history of mitral valve disease status post mitral valve replacement surgery.  Presence of left atrial thrombus on Eliquis.    Review of Systems:   Constitutional no fever,  no weight loss   Skin no rash   Otolaryngeal no difficulty swallowing   Cardiovascular See HPI   Pulmonary no cough, no sputum production   Gastrointestinal no constipation, no diarrhea   Genitourinary no dysuria, no hematuria   Hematologic no easy bruisability, no abnormal bleeding   Musculoskeletal no muscle pain   Neurologic no dizziness, no falls         Personal History     Social History:  reports that he has been smoking cigarettes. He started smoking about 52 years ago. He has been smoking about 0.50 packs per day. He has never used smokeless tobacco. He reports that he does not drink alcohol and does not use drugs.    Home Medications:  Current Outpatient Medications on File Prior to Visit   Medication Sig   • albuterol sulfate  (90 Base) MCG/ACT inhaler ProAir HFA 90 mcg/actuation aerosol inhaler   • ammonium lactate (LAC-HYDRIN) 12 % lotion ammonium lactate 12 % lotion   • amphetamine-dextroamphetamine XR (ADDERALL XR) 15 MG 24 hr capsule Adderall XR 15 mg oral capsule,extended release 24hr take 1 capsule (15 mg) by oral route once daily in the morning upon awakening   Suspended   • apixaban (ELIQUIS) 5 MG tablet tablet Take 5 mg by mouth 2 (Two) Times a Day.   • atorvastatin (LIPITOR) 40 MG tablet Daily.   • cyclobenzaprine (FLEXERIL) 10 MG tablet Take 1 tablet by mouth 3 (Three) Times a Day As Needed for Muscle Spasms.   • Diclofenac Sodium (VOLTAREN) 1 % gel gel Apply 4 g topically to the appropriate area as directed 4 (Four) Times a Day As Needed (PAIN).   •  insulin aspart (NovoLOG FlexPen) 100 UNIT/ML solution pen-injector sc pen 1 (One) Time.   • Insulin Glargine (Lantus SoloStar) 100 UNIT/ML injection pen Daily.   • ketotifen (ZADITOR) 0.025 % ophthalmic solution Administer  to both eyes.   • pantoprazole (PROTONIX) 40 MG EC tablet Daily.   • pregabalin (LYRICA) 100 MG capsule Take 100 mg by mouth 2 (Two) Times a Day.   • [DISCONTINUED] carBAMazepine (TEGretol) 200 MG tablet 2 (Two) Times a Day.   • [DISCONTINUED] Dulaglutide (TRULICITY SC) Inject  under the skin into the appropriate area as directed Every 7 (Seven) Days.   • [DISCONTINUED] famotidine (Pepcid) 40 MG tablet Take 1 tablet by mouth every night at bedtime.   • [DISCONTINUED] gabapentin (NEURONTIN) 300 MG capsule gabapentin 300 mg oral capsule take 1 capsule (300 mg) by oral route 3 times per day   Active   • [DISCONTINUED] HYDROcodone-acetaminophen (NORCO) 7.5-325 MG per tablet hydrocodone 7.5 mg-acetaminophen 325 mg tablet   Take 1 tablet every 12 hours by oral route as needed for 30 days.   • [DISCONTINUED] lidocaine (Lidoderm) 5 % Place 1 patch on the skin as directed by provider Daily. Remove & Discard patch within 12 hours or as directed by MD   • [DISCONTINUED] methylPREDNISolone (MEDROL) 4 MG dose pack Take as directed on package instructions.   • [DISCONTINUED] metoprolol tartrate (LOPRESSOR) 100 MG tablet Take 1 tablet by mouth 2 (Two) Times a Day.   • [DISCONTINUED] naloxone (NARCAN) 4 MG/0.1ML nasal spray Narcan 4 mg/actuation nasal spray   • [DISCONTINUED] naproxen (NAPROSYN) 500 MG tablet Take 1 tablet by mouth 2 (Two) Times a Day As Needed for Mild Pain .   • [DISCONTINUED] oxyCODONE-acetaminophen (PERCOCET) 5-325 MG per tablet Every 12 (Twelve) Hours.     No current facility-administered medications on file prior to visit.     Allergies:  Allergies   Allergen Reactions   • Latex Itching       Objective    Objective       Vitals:   Heart Rate:  [95] 95  BP: (119)/(61) 119/61  Body mass  index is 23.63 kg/m².     Physical Exam:   Constitutional: Awake, alert, No acute distress    Eyes: PERRLA, sclerae anicteric, no conjunctival injection   HENT: NCAT, mucous membranes moist   Neck: Supple, no thyromegaly, no lymphadenopathy, trachea midline   Respiratory: Clear to auscultation bilaterally, nonlabored respirations    Cardiovascular: RRR, no murmurs or rubs. Palpable pedal pulses bilaterally   Musculoskeletal: No bilateral ankle edema, no cyanosis to extremities   Psychiatric: Appropriate affect, cooperative   Neurologic: Oriented x 3, strength symmetric in all extremities, Cranial Nerves grossly intact to confrontation, speech clear   Skin: No rashes.    Result Review    Result Review:  I have personally reviewed the available results from  [x]  Laboratory  [x]  EKG  [x]  Cardiology  [x]  Medications  [x]  Old records  []  Other:   Procedures  No results found for: CHOL  Lab Results   Component Value Date    TRIG 132 06/13/2019    TRIG 144 06/11/2019    TRIG 86 02/02/2019     Lab Results   Component Value Date    HDL 59 06/13/2019    HDL 52 06/11/2019    HDL 51 02/02/2019     Lab Results   Component Value Date     (H) 06/13/2019     (H) 06/11/2019     (H) 02/02/2019     Lab Results   Component Value Date    VLDL 26 06/13/2019    VLDL 29 06/11/2019    VLDL 17 02/02/2019     Results for orders placed in visit on 08/30/21    Adult Transthoracic Echo Complete W/ Cont if Necessary Per Protocol    Interpretation Summary  Normal left ventricular systolic function.  Bioprosthetic mitral valve functioning normally.  Mild aortic stenosis.  Trace aortic regurgitation.  Trace tricuspid regurgitation.  Mild left-ventricular hypertrophy noted.     Impression/Plan:  1.  Presence of bioprosthetic mitral valve: Functioning normally on echo.  2.  Left atrial thrombus/Paroxysmal atrial fibrillation: Continue Eliquis 5 mg twice daily.  Continue Toprol- mg a day.  3.  Hyperlipidemia: Continue  Lipitor 40 mg a day.  Lipid profile reviewed.  4.  Essential hypertension controlled: Continue metoprolol 100 mg twice a day.      Viral Alvarez MD   05/10/22   13:38 EDT

## 2022-05-10 ENCOUNTER — OFFICE VISIT (OUTPATIENT)
Dept: CARDIOLOGY | Facility: CLINIC | Age: 67
End: 2022-05-10

## 2022-05-10 VITALS
HEART RATE: 95 BPM | WEIGHT: 160 LBS | DIASTOLIC BLOOD PRESSURE: 61 MMHG | BODY MASS INDEX: 23.7 KG/M2 | HEIGHT: 69 IN | SYSTOLIC BLOOD PRESSURE: 119 MMHG

## 2022-05-10 DIAGNOSIS — E78.2 HYPERLIPEMIA, MIXED: ICD-10-CM

## 2022-05-10 DIAGNOSIS — I48.0 PAROXYSMAL ATRIAL FIBRILLATION: Primary | ICD-10-CM

## 2022-05-10 PROCEDURE — 99214 OFFICE O/P EST MOD 30 MIN: CPT | Performed by: SPECIALIST

## 2022-05-10 RX ORDER — METOPROLOL TARTRATE 100 MG/1
100 TABLET ORAL 2 TIMES DAILY
Qty: 180 TABLET | Refills: 1 | Status: SHIPPED | OUTPATIENT
Start: 2022-05-10 | End: 2022-08-19 | Stop reason: HOSPADM

## 2022-06-14 ENCOUNTER — TELEPHONE (OUTPATIENT)
Dept: ORTHOPEDIC SURGERY | Facility: CLINIC | Age: 67
End: 2022-06-14

## 2022-06-14 DIAGNOSIS — S89.92XD INJURY OF LEFT KNEE, SUBSEQUENT ENCOUNTER: ICD-10-CM

## 2022-06-14 DIAGNOSIS — M79.605 PAIN OF LEFT LOWER EXTREMITY: ICD-10-CM

## 2022-06-14 DIAGNOSIS — M48.02 CERVICAL SPINAL STENOSIS: Primary | ICD-10-CM

## 2022-06-20 ENCOUNTER — TELEPHONE (OUTPATIENT)
Dept: ORTHOPEDIC SURGERY | Facility: CLINIC | Age: 67
End: 2022-06-20

## 2022-06-20 DIAGNOSIS — S89.92XD INJURY OF LEFT KNEE, SUBSEQUENT ENCOUNTER: Primary | ICD-10-CM

## 2022-06-20 NOTE — TELEPHONE ENCOUNTER
Caller: Dhaval Allen    Relationship to patient: Self    Best call back number: [phone]    Patient is needing: PATIENT CALLED- FOLLOWING UP ON CALL FROM LAST WEEK 6.14.22 -PATIENT NEEDS NEW PRESCRIPTION FOR CANE- HIS BROKE. HE WILL COME PICK IT UP  (PREERABLY TODAY) -- JUST GIVE HIM A CALL WHEN ITS READY OR IF THERE ARE ANY QUESTIONS OR CONCERNS. TY!

## 2022-07-07 ENCOUNTER — OFFICE VISIT (OUTPATIENT)
Dept: NEUROSURGERY | Facility: CLINIC | Age: 67
End: 2022-07-07

## 2022-07-07 VITALS
WEIGHT: 155.6 LBS | HEIGHT: 69 IN | BODY MASS INDEX: 23.05 KG/M2 | DIASTOLIC BLOOD PRESSURE: 65 MMHG | SYSTOLIC BLOOD PRESSURE: 102 MMHG

## 2022-07-07 DIAGNOSIS — M54.2 CERVICALGIA: Primary | ICD-10-CM

## 2022-07-07 DIAGNOSIS — M47.812 CERVICAL SPONDYLOSIS WITHOUT MYELOPATHY: ICD-10-CM

## 2022-07-07 PROCEDURE — 99214 OFFICE O/P EST MOD 30 MIN: CPT | Performed by: NEUROLOGICAL SURGERY

## 2022-07-07 RX ORDER — TRAMADOL HYDROCHLORIDE 50 MG/1
50 TABLET ORAL EVERY 8 HOURS PRN
Qty: 30 TABLET | Refills: 1 | Status: SHIPPED | OUTPATIENT
Start: 2022-07-07 | End: 2022-08-19 | Stop reason: HOSPADM

## 2022-07-07 NOTE — PROGRESS NOTES
"Chief Complaint  Neck Pain    Subjective          Dhaval Nieto who is a 67 y.o. year old male who presents to [facility] for Evaluation of the Spine.     The patient complains of pain located in the cervical spine.  Patients states the pain has been present for 2 months.  The pain came on gradually.  The pain scale level is 9.  The pain radiates into the left arm and all the fingers.  The pain is constant and described as needles.  The pain is worse at no particular time of day. Patient states kneeling on the left makes the pain worse.  Patient states nothing makes the pain better.    Associated Symptoms Include: Numbness and Tingling bilateral hands  Conservative Interventions Include: Lyrica-no help    Was this the result of an injury or accident?: No    History of Previous Spinal Surgery?: No    This patient  reports that he has been smoking cigarettes and cigarettes. He started smoking about 52 years ago. He has a 25.00 pack-year smoking history. He has never used smokeless tobacco.    Review of Systems   Musculoskeletal: Positive for myalgias and neck pain.        Objective   Vital Signs:   /65 (BP Location: Left arm, Patient Position: Sitting)   Ht 175.3 cm (69\")   Wt 70.6 kg (155 lb 9.6 oz)   BMI 22.98 kg/m²       Physical Exam  Constitutional:       Appearance: He is normal weight.   Neck:      Comments: Decreased ROM and increased pain turning left  Cardiovascular:      Comments: No edema  Pulmonary:      Effort: Pulmonary effort is normal.   Musculoskeletal:         General: Tenderness (mild cervical) present.   Neurological:      Mental Status: He is alert.      Sensory: No sensory deficit.      Motor: No weakness.      Deep Tendon Reflexes: Reflexes abnormal (2/4, neg Hoffmans).      Comments: Walks with a cane   Psychiatric:         Mood and Affect: Mood normal.          Result Review :   I personally reviewed the patient's MRI scan which " shows mild stenosis at C5-6 and C6-7. Age appropriate degenerative changes.        Assessment and Plan    Diagnoses and all orders for this visit:    1. Cervicalgia (Primary)    2. Cervical spondylosis without myelopathy    His mild cervical spinal stenosis does not account for his current symptoms. He reports failing PT. He might benefit from a dose of tramadol.     He can f/u with Dr. Seals for his polyneuropathy.     We discussed the importance of smoking/nicotine cessation. Smoking/nicotine use has multiple health risks. In particular related to the spine, nicotine increases the incidence of lower back pain, speeds up the progression of degenerative disc disease and dramatically reduces healing after spine surgery (particularly a fusion operation).     Follow Up   Return if symptoms worsen or fail to improve.  Patient was given instructions and counseling regarding his condition or for health maintenance advice. Please see specific information pulled into the AVS if appropriate.

## 2022-07-12 ENCOUNTER — HOSPITAL ENCOUNTER (OUTPATIENT)
Dept: CT IMAGING | Facility: HOSPITAL | Age: 67
Discharge: HOME | End: 2022-07-12
Admitting: INTERNAL MEDICINE

## 2022-07-12 DIAGNOSIS — Z85.038 HISTORY OF COLON CANCER: ICD-10-CM

## 2022-07-12 LAB
ALBUMIN SERPL-MCNC: 4.5 G/DL (ref 3.5–5.2)
ALBUMIN/GLOB SERPL: 1.8 G/DL
ALP SERPL-CCNC: 100 U/L (ref 39–117)
ALT SERPL W P-5'-P-CCNC: 10 U/L (ref 1–41)
ANION GAP SERPL CALCULATED.3IONS-SCNC: 10.5 MMOL/L (ref 5–15)
AST SERPL-CCNC: 11 U/L (ref 1–40)
BASOPHILS # BLD AUTO: 0.08 10*3/MM3 (ref 0–0.2)
BASOPHILS NFR BLD AUTO: 0.7 % (ref 0–1.5)
BILIRUB SERPL-MCNC: 0.9 MG/DL (ref 0–1.2)
BUN SERPL-MCNC: 13 MG/DL (ref 8–23)
BUN/CREAT SERPL: 13.7 (ref 7–25)
CALCIUM SPEC-SCNC: 9.6 MG/DL (ref 8.6–10.5)
CEA SERPL-MCNC: 11.7 NG/ML
CHLORIDE SERPL-SCNC: 97 MMOL/L (ref 98–107)
CO2 SERPL-SCNC: 26.5 MMOL/L (ref 22–29)
CREAT BLDA-MCNC: 0.8 MG/DL
CREAT SERPL-MCNC: 0.95 MG/DL (ref 0.76–1.27)
DEPRECATED RDW RBC AUTO: 42.9 FL (ref 37–54)
EGFRCR SERPLBLD CKD-EPI 2021: 87.2 ML/MIN/1.73
EGFRCR SERPLBLD CKD-EPI 2021: 96.4 ML/MIN/1.73
EOSINOPHIL # BLD AUTO: 0.16 10*3/MM3 (ref 0–0.4)
EOSINOPHIL NFR BLD AUTO: 1.4 % (ref 0.3–6.2)
ERYTHROCYTE [DISTWIDTH] IN BLOOD BY AUTOMATED COUNT: 14.1 % (ref 12.3–15.4)
GLOBULIN UR ELPH-MCNC: 2.5 GM/DL
GLUCOSE SERPL-MCNC: 410 MG/DL (ref 65–99)
HCT VFR BLD AUTO: 46.3 % (ref 37.5–51)
HGB BLD-MCNC: 16.5 G/DL (ref 13–17.7)
IMM GRANULOCYTES # BLD AUTO: 0.03 10*3/MM3 (ref 0–0.05)
IMM GRANULOCYTES NFR BLD AUTO: 0.3 % (ref 0–0.5)
LYMPHOCYTES # BLD AUTO: 2.5 10*3/MM3 (ref 0.7–3.1)
LYMPHOCYTES NFR BLD AUTO: 21.9 % (ref 19.6–45.3)
MCH RBC QN AUTO: 30.4 PG (ref 26.6–33)
MCHC RBC AUTO-ENTMCNC: 35.6 G/DL (ref 31.5–35.7)
MCV RBC AUTO: 85.3 FL (ref 79–97)
MONOCYTES # BLD AUTO: 0.73 10*3/MM3 (ref 0.1–0.9)
MONOCYTES NFR BLD AUTO: 6.4 % (ref 5–12)
NEUTROPHILS NFR BLD AUTO: 69.3 % (ref 42.7–76)
NEUTROPHILS NFR BLD AUTO: 7.94 10*3/MM3 (ref 1.7–7)
NRBC BLD AUTO-RTO: 0 /100 WBC (ref 0–0.2)
PLATELET # BLD AUTO: 148 10*3/MM3 (ref 140–450)
PMV BLD AUTO: 10.7 FL (ref 6–12)
POTASSIUM SERPL-SCNC: 4.4 MMOL/L (ref 3.5–5.2)
PROT SERPL-MCNC: 7 G/DL (ref 6–8.5)
RBC # BLD AUTO: 5.43 10*6/MM3 (ref 4.14–5.8)
SODIUM SERPL-SCNC: 134 MMOL/L (ref 136–145)
WBC NRBC COR # BLD: 11.44 10*3/MM3 (ref 3.4–10.8)

## 2022-07-12 PROCEDURE — 85025 COMPLETE CBC W/AUTO DIFF WBC: CPT | Performed by: INTERNAL MEDICINE

## 2022-07-12 PROCEDURE — 82378 CARCINOEMBRYONIC ANTIGEN: CPT | Performed by: INTERNAL MEDICINE

## 2022-07-12 PROCEDURE — 0 IOPAMIDOL PER 1 ML: Performed by: INTERNAL MEDICINE

## 2022-07-12 PROCEDURE — 74177 CT ABD & PELVIS W/CONTRAST: CPT

## 2022-07-12 PROCEDURE — 71260 CT THORAX DX C+: CPT

## 2022-07-12 PROCEDURE — 82565 ASSAY OF CREATININE: CPT

## 2022-07-12 PROCEDURE — 80053 COMPREHEN METABOLIC PANEL: CPT | Performed by: INTERNAL MEDICINE

## 2022-07-12 RX ADMIN — IOPAMIDOL 100 ML: 755 INJECTION, SOLUTION INTRAVENOUS at 11:18

## 2022-07-13 ENCOUNTER — OFFICE VISIT (OUTPATIENT)
Dept: ONCOLOGY | Facility: HOSPITAL | Age: 67
End: 2022-07-13

## 2022-07-13 VITALS
OXYGEN SATURATION: 99 % | HEART RATE: 90 BPM | DIASTOLIC BLOOD PRESSURE: 68 MMHG | BODY MASS INDEX: 22.24 KG/M2 | TEMPERATURE: 98.5 F | SYSTOLIC BLOOD PRESSURE: 111 MMHG | RESPIRATION RATE: 16 BRPM | WEIGHT: 150.57 LBS

## 2022-07-13 DIAGNOSIS — F32.A DEPRESSION, UNSPECIFIED DEPRESSION TYPE: ICD-10-CM

## 2022-07-13 DIAGNOSIS — C18.7 CANCER OF SIGMOID COLON: Primary | ICD-10-CM

## 2022-07-13 PROCEDURE — 99214 OFFICE O/P EST MOD 30 MIN: CPT | Performed by: INTERNAL MEDICINE

## 2022-07-13 PROCEDURE — G0463 HOSPITAL OUTPT CLINIC VISIT: HCPCS | Performed by: INTERNAL MEDICINE

## 2022-07-13 RX ORDER — DULOXETIN HYDROCHLORIDE 30 MG/1
30 CAPSULE, DELAYED RELEASE ORAL DAILY
Qty: 30 CAPSULE | Refills: 1 | Status: ON HOLD | OUTPATIENT
Start: 2022-07-13 | End: 2022-08-15

## 2022-07-13 NOTE — PROGRESS NOTES
Chief Complaint  Colon Cancer    Christiano Mcbride MD    Subjective     {Problem List  Visit Diagnosis   Encounters  Notes  Medications  Labs  Result Review Imaging  Media :23}     Dhaval Nieto presents to [facility] for follow-up of his colon cancer.  He status post treatments as outlined.  He states the bowels are working normally without blood productive, pain or melena.  He is up-to-date on colonoscopy.  He denies any masses or adenopathy.  No unusual aches or pains.  Patient notes that he has been having a lot of trouble with depression.  [facility] but does not feel like there very responsive to his issues.  He reports poor mood, sleeping all the time, no appetite, family stress.  He denies SI/HI.    Oncology/Hematology History Overview Note   2016 Sigmoid colon--moderate to poorly differentiated invasive adenocarcinoma            Clinical Staging      Stage IIIC ([id number])            Treatments      Chemotherapy      4/2017 completed 6C FOLFOX      5/19/17 thru 10/11/17 CAPEOX        Surgeries      October . Colon Resection; left hemicolectomy and transverse colectomy.      Invasive adenocarcinoma moderately to poorly differentiated.  Extensive     lymphovascular invasion.  Margins negative.  8 out of 16 lymph nodes positive.     [id number].         Cancer of sigmoid colon (HCC)   7/13/2021 Initial Diagnosis    Cancer of sigmoid colon (CMS/HCC)     7/13/2021 Cancer Staged    Staging form: Colon And Rectum, AJCC 8th Edition  - Clinical: cT2, cN2, cM0 - Signed by Tacos Newby MD on 7/13/2021         Review of Systems   Constitutional: Positive for fatigue. Negative for appetite change, diaphoresis, fever, unexpected weight gain and unexpected weight loss.   HENT: Negative for hearing loss, mouth sores, sore throat, swollen glands, trouble swallowing and voice change.    Eyes: Negative for blurred vision.    Respiratory: Negative for cough, shortness of breath and wheezing.    Cardiovascular: Negative for chest pain and palpitations.   Gastrointestinal: Negative for abdominal pain, blood in stool, constipation, diarrhea, nausea and vomiting.   Endocrine: Negative for cold intolerance and heat intolerance.   Genitourinary: Negative for difficulty urinating, dysuria, frequency, hematuria and urinary incontinence.   Musculoskeletal: Negative for arthralgias, back pain and myalgias.   Skin: Negative for rash, skin lesions and wound.   Neurological: Positive for dizziness, weakness and headache. Negative for seizures and numbness.   Hematological: Does not bruise/bleed easily.   Psychiatric/Behavioral: Negative for depressed mood. The patient is not nervous/anxious.      Current Outpatient Medications on File Prior to Visit   Medication Sig Dispense Refill   • albuterol sulfate  (90 Base) MCG/ACT inhaler ProAir HFA 90 mcg/actuation aerosol inhaler     • ammonium lactate (LAC-HYDRIN) 12 % lotion ammonium lactate 12 % lotion     • amphetamine-dextroamphetamine XR (ADDERALL XR) 15 MG 24 hr capsule Adderall XR 15 mg oral capsule,extended release 24hr take 1 capsule (15 mg) by oral route once daily in the morning upon awakening   Suspended     • apixaban (ELIQUIS) 5 MG tablet tablet Take 5 mg by mouth 2 (Two) Times a Day.     • atorvastatin (LIPITOR) 40 MG tablet Daily.     • cyclobenzaprine (FLEXERIL) 10 MG tablet Take 1 tablet by mouth 3 (Three) Times a Day As Needed for Muscle Spasms. 21 tablet 0   • Diclofenac Sodium (VOLTAREN) 1 % gel gel Apply 4 g topically to the appropriate area as directed 4 (Four) Times a Day As Needed (PAIN). 100 g 1   • insulin aspart (NovoLOG FlexPen) 100 UNIT/ML solution pen-injector sc pen 1 (One) Time.     • Insulin Glargine (Lantus SoloStar) 100 UNIT/ML injection pen Daily.     • ketotifen (ZADITOR) 0.025 % ophthalmic solution Administer  to both eyes.     • metoprolol tartrate  (LOPRESSOR) 100 MG tablet Take 1 tablet by mouth 2 (Two) Times a Day. 180 tablet 1   • pantoprazole (PROTONIX) 40 MG EC tablet Daily.     • pregabalin (LYRICA) 100 MG capsule Take 100 mg by mouth 2 (Two) Times a Day.     • traMADol (ULTRAM) 50 MG tablet Take 1 tablet by mouth Every 8 (Eight) Hours As Needed for Moderate Pain  or Severe Pain . 30 tablet 1     No current facility-administered medications on file prior to visit.       Allergies   Allergen Reactions   • Latex Itching     Past Medical History:   Diagnosis Date   • Arteriovenous malformation 03/09/1980   • Arthritis    • Asthma    • Benign essential hypertension    • Bladder disorder    • Bleeding disorder ([facility])    • Blood disease    • BPH with urinary obstruction 05/30/2014   • Brain concussion 1999   • Cancer ([facility])    • Cervical disc disorder long time   • Cervical radiculopathy 05/01/2015    left   • Cervical spinal stenosis 03/10/2020   • Cervicalgia 12/18/2018   • Chest pain    • CHF (congestive heart failure) ([facility])    • Clotting disorder ([facility]) Elequis   • Colon cancer ([facility]) 12/18/2018   • Condition not found     Hernia   • Condition not found     herniated disc   • COPD (chronic obstructive pulmonary disease) ([facility])    • Coronary artery disease    • CTS (carpal tunnel syndrome) 08/04/2002   • DDD (degenerative disc disease), thoracic 12/18/2018   • Decreased sensation 05/01/2015    left   • Deep vein thrombosis ([facility])    • Depression    • Diabetes ([facility])    • Diabetes mellitus type 1 with coma, insulin dependent ([facility])     controlled   • Headache    • Heart murmur    • Heart valve disease    • Hematuria, gross 05/30/2014   • Hyperlipemia    • Hyperlipidemia    • Hypertension    • Insomnia    • Leg pain    • Leg pain    • Limb swelling    • Loss of balance 12/18/2018   • Low back pain 03/10/2020   • Lumbar degenerative disc disease 12/18/2018   • Lumbar radiculopathy 03/10/2020   • Lumbosacral disc disease 05/05/1999   • Mid back pain 12/18/2018   •  Migraines    • Muscle cramps    • Myocardial infarction (HCC)    • Neurologic disorder    • Pain in back    • Pain in joint    • Pain of cervical spine    • Pain, generalized    • Pulmonary arterial hypertension ([facility]) 1999   • Seasonal allergies    • Seizures ([facility]) 2000   • Shortness of breath    • Sleep apnea    • Stomach disorder      Past Surgical History:   Procedure Laterality Date   • ABDOMINAL SURGERY     • BLADDER SURGERY     • CARPAL TUNNEL RELEASE     • CEREBRAL ANGIOGRAM  2021   • COLON RESECTION     • COLONOSCOPY  01/30/2017    [place]   • CORONARY ARTERY BYPASS GRAFT  12/2019    one vessel   • GALLBLADDER SURGERY      cholecystecomy   • HERNIA REPAIR     • JOINT REPLACEMENT      joint surgery   • MITRAL VALVE REPAIR/REPLACEMENT  02/2019    [facility]   • OTHER SURGICAL HISTORY  05/30/2014    office cystoscopy w/DR SHANICE Young   • SHOULDER SURGERY Bilateral    • VASCULAR SURGERY  2020   • VENTRAL HERNIA REPAIR       Social History     Socioeconomic History   • Marital status:    Tobacco Use   • Smoking status: Current Some Day Smoker     Packs/day: 0.50     Years: 50.00     Pack years: 25.00     Types: Cigarettes, Cigarettes     Start date: 1/1/1970   • Smokeless tobacco: Never Used   Vaping Use   • Vaping Use: Never used   Substance and Sexual Activity   • Alcohol use: Never   • Drug use: Never   • Sexual activity: Not Currently     Family History   Problem Relation Age of Onset   • Heart attack Father         MI   • Heart failure Father    • Anxiety disorder Father    • Cancer Father    • COPD Father    • Depression Father    • Diabetes Father    • Heart disease Father    • Hypertension Father    • Stroke Brother    • Developmental Disability Brother    • Hearing loss Brother    • Mental illness Brother    • Vision loss Brother    • Arthritis Mother    • Asthma Mother    • Hyperlipidemia Mother        Objective   Physical Exam    Vitals:    07/13/22 1359   BP: 111/68   Pulse: 90   Resp: 16    Temp: 98.5 °F (36.9 °C)   SpO2: 99%   Weight: 68.3 kg (150 lb 9.2 oz)   PainSc:   5   PainLoc: Head     ECOG score: 0         PHQ-9 Total Score:                    Result Review :   The following data was reviewed by: Tacos Newby MD on 07/13/2022:  Lab Results   Component Value Date    HGB 16.5 07/12/2022    HCT 46.3 07/12/2022    MCV 85.3 07/12/2022     07/12/2022    WBC 11.44 (H) 07/12/2022    NEUTROABS 7.94 (H) 07/12/2022    LYMPHSABS 2.50 07/12/2022    MONOSABS 0.73 07/12/2022    EOSABS 0.16 07/12/2022    BASOSABS 0.08 07/12/2022     Lab Results   Component Value Date    GLUCOSE 410 (C) 07/12/2022    BUN 13 07/12/2022    CREATININE 0.95 07/12/2022     (L) 07/12/2022    K 4.4 07/12/2022    CL 97 (L) 07/12/2022    CO2 26.5 07/12/2022    CALCIUM 9.6 07/12/2022    PROTEINTOT 7.0 07/12/2022    ALBUMIN 4.50 07/12/2022    BILITOT 0.9 07/12/2022    ALKPHOS 100 07/12/2022    AST 11 07/12/2022    ALT 10 07/12/2022     Lab Results   Component Value Date    MG 2.1 03/20/2022    TSH 0.598 06/13/2019     CEA 11            Assessment and Plan    Diagnoses and all orders for this visit:    1. Cancer of sigmoid colon (HCC) (Primary)  Assessment & Plan:  Patient is doing well.  I see no evidence of disease recurrence by his history, physical examination, lab work or scans.  He is up-to-date on colonoscopy.  Doing well from a cancer standpoint.  I will see him back in 6 months for continued surveillance with labs and scans prior.      Orders:  -     CT Abdomen Pelvis With Contrast; Future  -     CT Chest With Contrast Diagnostic; Future  -     CBC Auto Differential; Future  -     Comprehensive Metabolic Panel; Future  -     CEA; Future    2. Depression, unspecified depression type  Assessment & Plan:  Patient is not currently on any treatment for his depression.  I will ask our LCSW to touch base with him.  I will begin Cymbalta 30 mg daily.  He needs to follow-up with his PCP or psychiatry in the near future  for ongoing treatment of this issue.    Orders:  -     DULoxetine (Cymbalta) 30 MG capsule; Take 1 capsule by mouth Daily for 30 days.  Dispense: 30 capsule; Refill: 1          Patient Follow Up: 6 months    Patient was given instructions and counseling regarding his condition or for health maintenance advice. Please see specific information pulled into the AVS if appropriate.     Tacos Newby MD    7/14/2022

## 2022-07-14 ENCOUNTER — OFFICE VISIT (OUTPATIENT)
Dept: GASTROENTEROLOGY | Facility: CLINIC | Age: 67
End: 2022-07-14

## 2022-07-14 VITALS
HEIGHT: 69 IN | DIASTOLIC BLOOD PRESSURE: 52 MMHG | BODY MASS INDEX: 21.48 KG/M2 | WEIGHT: 145 LBS | SYSTOLIC BLOOD PRESSURE: 96 MMHG | HEART RATE: 91 BPM

## 2022-07-14 DIAGNOSIS — Z85.038 HISTORY OF COLON CANCER: ICD-10-CM

## 2022-07-14 DIAGNOSIS — K21.9 GASTROESOPHAGEAL REFLUX DISEASE, UNSPECIFIED WHETHER ESOPHAGITIS PRESENT: Primary | ICD-10-CM

## 2022-07-14 DIAGNOSIS — K59.04 CHRONIC IDIOPATHIC CONSTIPATION: ICD-10-CM

## 2022-07-14 PROCEDURE — 99214 OFFICE O/P EST MOD 30 MIN: CPT | Performed by: NURSE PRACTITIONER

## 2022-07-14 RX ORDER — PANTOPRAZOLE SODIUM 40 MG/1
40 TABLET, DELAYED RELEASE ORAL DAILY
Qty: 90 TABLET | Refills: 1 | Status: SHIPPED | OUTPATIENT
Start: 2022-07-14 | End: 2022-11-08 | Stop reason: ALTCHOICE

## 2022-07-14 NOTE — ASSESSMENT & PLAN NOTE
Patient is doing well.  I see no evidence of disease recurrence by his history, physical examination, lab work or scans.  He is up-to-date on colonoscopy.  Doing well from a cancer standpoint.  I will see him back in 6 months for continued surveillance with labs and scans prior.

## 2022-07-14 NOTE — ASSESSMENT & PLAN NOTE
Patient is not currently on any treatment for his depression.  I will ask our LCSW to touch base with him.  I will begin Cymbalta 30 mg daily.  He needs to follow-up with his PCP or psychiatry in the near future for ongoing treatment of this issue.

## 2022-07-14 NOTE — PROGRESS NOTES
Chief Complaint  Heartburn and Constipation    Dhaval Nieto is a 67 y.o. male who presents to [facility] for follow-up of GERD, constipation and history of colon cancer.      History of present Illness    He feels that protonix and pepcid are controlling GERD.      He continues to experience constipation. Reports a bowel movement about once per week.  He is not taking linzess but recalls that it worked for him the past.        Past Medical History:   Diagnosis Date   • Arteriovenous malformation 03/09/1980   • Arthritis    • Asthma    • Benign essential hypertension    • Bladder disorder    • Bleeding disorder ([facility])    • Blood disease    • BPH with urinary obstruction 05/30/2014   • Brain concussion 1999   • Cancer ([facility])    • Cervical disc disorder long time   • Cervical radiculopathy 05/01/2015    left   • Cervical spinal stenosis 03/10/2020   • Cervicalgia 12/18/2018   • Chest pain    • CHF (congestive heart failure) ([facility])    • Clotting disorder ([facility]) Elequis   • Colon cancer ([facility]) 12/18/2018   • Condition not found     Hernia   • Condition not found     herniated disc   • COPD (chronic obstructive pulmonary disease) ([facility])    • Coronary artery disease    • CTS (carpal tunnel syndrome) 08/04/2002   • DDD (degenerative disc disease), thoracic 12/18/2018   • Decreased sensation 05/01/2015    left   • Deep vein thrombosis ([facility])    • Depression    • Diabetes ([facility])    • Diabetes mellitus type 1 with coma, insulin dependent ([facility])     controlled   • Headache    • Heart murmur    • Heart valve disease    • Hematuria, gross 05/30/2014   • Hyperlipemia    • Hyperlipidemia    • Hypertension    • Insomnia    • Leg pain    • Leg pain    • Limb swelling    • Loss of balance 12/18/2018   • Low back pain 03/10/2020   • Lumbar degenerative disc disease 12/18/2018   • Lumbar radiculopathy 03/10/2020   • Lumbosacral disc disease 05/05/1999   • Mid back pain 12/18/2018   • Migraines     • Muscle cramps    • Myocardial infarction (HCC)    • Neurologic disorder    • Pain in back    • Pain in joint    • Pain of cervical spine    • Pain, generalized    • Pulmonary arterial hypertension ([facility]) 1999   • Seasonal allergies    • Seizures ([facility]) 2000   • Shortness of breath    • Sleep apnea    • Stomach disorder        Past Surgical History:   Procedure Laterality Date   • ABDOMINAL SURGERY     • BLADDER SURGERY     • CARPAL TUNNEL RELEASE     • CEREBRAL ANGIOGRAM  2021   • COLON RESECTION     • COLONOSCOPY  01/30/2017    [place]   • CORONARY ARTERY BYPASS GRAFT  12/2019    one vessel   • GALLBLADDER SURGERY      cholecystecomy   • HERNIA REPAIR     • JOINT REPLACEMENT      joint surgery   • MITRAL VALVE REPAIR/REPLACEMENT  02/2019    [facility]   • OTHER SURGICAL HISTORY  05/30/2014    office cystoscopy w/DR SHANICE Young   • SHOULDER SURGERY Bilateral    • VASCULAR SURGERY  2020   • VENTRAL HERNIA REPAIR           Current Outpatient Medications:   •  albuterol sulfate  (90 Base) MCG/ACT inhaler, ProAir HFA 90 mcg/actuation aerosol inhaler, Disp: , Rfl:   •  ammonium lactate (LAC-HYDRIN) 12 % lotion, ammonium lactate 12 % lotion, Disp: , Rfl:   •  amphetamine-dextroamphetamine XR (ADDERALL XR) 15 MG 24 hr capsule, Adderall XR 15 mg oral capsule,extended release 24hr take 1 capsule (15 mg) by oral route once daily in the morning upon awakening   Suspended, Disp: , Rfl:   •  apixaban (ELIQUIS) 5 MG tablet tablet, Take 5 mg by mouth 2 (Two) Times a Day., Disp: , Rfl:   •  atorvastatin (LIPITOR) 40 MG tablet, Daily., Disp: , Rfl:   •  cyclobenzaprine (FLEXERIL) 10 MG tablet, Take 1 tablet by mouth 3 (Three) Times a Day As Needed for Muscle Spasms., Disp: 21 tablet, Rfl: 0  •  Diclofenac Sodium (VOLTAREN) 1 % gel gel, Apply 4 g topically to the appropriate area as directed 4 (Four) Times a Day As Needed (PAIN)., Disp: 100 g, Rfl: 1  •  DULoxetine (Cymbalta) 30 MG capsule, Take 1 capsule by mouth Daily  "for 30 days., Disp: 30 capsule, Rfl: 1  •  insulin aspart (NovoLOG FlexPen) 100 UNIT/ML solution pen-injector sc pen, 1 (One) Time., Disp: , Rfl:   •  Insulin Glargine (Lantus SoloStar) 100 UNIT/ML injection pen, Daily., Disp: , Rfl:   •  ketotifen (ZADITOR) 0.025 % ophthalmic solution, Administer  to both eyes., Disp: , Rfl:   •  metoprolol tartrate (LOPRESSOR) 100 MG tablet, Take 1 tablet by mouth 2 (Two) Times a Day., Disp: 180 tablet, Rfl: 1  •  pantoprazole (PROTONIX) 40 MG EC tablet, Take 1 tablet by mouth Daily., Disp: 90 tablet, Rfl: 1  •  pregabalin (LYRICA) 100 MG capsule, Take 100 mg by mouth 2 (Two) Times a Day., Disp: , Rfl:   •  traMADol (ULTRAM) 50 MG tablet, Take 1 tablet by mouth Every 8 (Eight) Hours As Needed for Moderate Pain  or Severe Pain ., Disp: 30 tablet, Rfl: 1  •  linaclotide (Linzess) 72 MCG capsule capsule, Take 1 capsule by mouth Every Morning Before Breakfast for 90 days., Disp: 90 capsule, Rfl: 1     Allergies   Allergen Reactions   • Latex Itching       Family History   Problem Relation Age of Onset   • Heart attack Father         MI   • Heart failure Father    • Anxiety disorder Father    • Cancer Father    • COPD Father    • Depression Father    • Diabetes Father    • Heart disease Father    • Hypertension Father    • Stroke Brother    • Developmental Disability Brother    • Hearing loss Brother    • Mental illness Brother    • Vision loss Brother    • Arthritis Mother    • Asthma Mother    • Hyperlipidemia Mother         Social History     Social History Narrative   • Not on file       Objective       Vital Signs:   BP 96/52 (BP Location: Left arm)   Pulse 91   Ht 175.3 cm (69\")   Wt 65.8 kg (145 lb)   BMI 21.41 kg/m²       Physical Exam  Constitutional:       General: He is not in acute distress.     Appearance: Normal appearance. He is well-developed and normal weight.   HENT:      Head: Normocephalic and atraumatic.   Eyes:      Conjunctiva/sclera: Conjunctivae normal.     "  Pupils: Pupils are equal, round, and reactive to light.      Visual Fields: Right eye visual fields normal and left eye visual fields normal.   Cardiovascular:      Rate and Rhythm: Normal rate and regular rhythm.      Heart sounds: Normal heart sounds.   Pulmonary:      Effort: Pulmonary effort is normal. No retractions.      Breath sounds: Normal breath sounds and air entry.   Abdominal:      General: Bowel sounds are normal.      Palpations: Abdomen is soft.      Tenderness: There is no abdominal tenderness.      Comments: No appreciable hepatosplenomegaly or ascites   Musculoskeletal:         General: Normal range of motion.      Cervical back: Neck supple.      Right lower leg: No edema.      Left lower leg: No edema.   Lymphadenopathy:      Cervical: No cervical adenopathy.   Skin:     General: Skin is warm and dry.      Findings: No lesion.   Neurological:      General: No focal deficit present.      Mental Status: He is alert and oriented to person, place, and time.   Psychiatric:         Mood and Affect: Mood and affect normal.         Behavior: Behavior normal.         Result Review :     The following data was reviewed by: DUANE Werner on 07/14/2022:    CBC w/diff    CBC w/Diff 3/8/22 3/20/22 7/12/22   WBC 12.87 (A) 12.09 (A) 11.44 (A)   RBC 5.54 5.06 5.43   Hemoglobin 17.3 15.8 16.5   Hematocrit 48.2 44.2 46.3   MCV 87.0 87.4 85.3   MCH 31.2 31.2 30.4   MCHC 35.9 (A) 35.7 35.6   RDW 14.4 15.2 14.1   Platelets 168 121 (A) 148   Neutrophil Rel % 72.2 64.5 69.3   Immature Granulocyte Rel % 0.3 0.6 (A) 0.3   Lymphocyte Rel % 18.9 (A) 25.3 21.9   Monocyte Rel % 6.2 7.1 6.4   Eosinophil Rel % 1.6 1.9 1.4   Basophil Rel % 0.8 0.6 0.7   (A) Abnormal value            CMP    CMP 3/8/22 3/20/22 7/12/22 7/12/22      1108 1113   Glucose 186 (A) 89  410 (A)   BUN 13 12  13   Creatinine 1.16 1.07 0.80 0.95   Sodium 133 (A) 139  134 (A)   Potassium 4.6 4.0  4.4   Chloride 96 (A) 106  97 (A)   Calcium 9.4  8.4 (A)  9.6   Albumin 4.20 3.60  4.50   Total Bilirubin 0.4 0.4  0.9   Alkaline Phosphatase 104 84  100   AST (SGOT) 16 16  11   ALT (SGPT) 9 9  10   (A) Abnormal value       Comments are available for some flowsheets but are not being displayed.                           Assessment and Plan    Diagnoses and all orders for this visit:    1. Gastroesophageal reflux disease, unspecified whether esophagitis present (Primary)  -     pantoprazole (PROTONIX) 40 MG EC tablet; Take 1 tablet by mouth Daily.  Dispense: 90 tablet; Refill: 1    2. Chronic idiopathic constipation  -     linaclotide (Linzess) 72 MCG capsule capsule; Take 1 capsule by mouth Every Morning Before Breakfast for 90 days.  Dispense: 90 capsule; Refill: 1    3. History of colon cancer            Follow Up   Return in about 6 months (around 1/14/2023) for constipation.  Patient was given instructions and counseling regarding his condition or for health maintenance advice. Please see specific information pulled into the AVS if appropriate.

## 2022-07-14 NOTE — PATIENT INSTRUCTIONS
Food Choices for Gastroesophageal Reflux Disease, Adult  When you have gastroesophageal reflux disease (GERD), the foods you eat and your eating habits are very important. Choosing the right foods can help ease your discomfort. Think about working with a food expert (dietitian) to help you make good choices.  What are tips for following this plan?  Reading food labels  Look for foods that are low in saturated fat. Foods that may help with your symptoms include:  Foods that have less than 5% of daily value (DV) of fat.  Foods that have 0 grams of trans fat.  Cooking  Do not latham your food.  Cook your food by baking, steaming, grilling, or broiling. These are all methods that do not need a lot of fat for cooking.  To add flavor, try to use herbs that are low in spice and acidity.  Meal planning    Choose healthy foods that are low in fat, such as:  Fruits and vegetables.  Whole grains.  Low-fat dairy products.  Lean meats, fish, and poultry.  Eat small meals often instead of eating 3 large meals each day. Eat your meals slowly in a place where you are relaxed. Avoid bending over or lying down until 2-3 hours after eating.  Limit high-fat foods such as fatty meats or fried foods.  Limit your intake of fatty foods, such as oils, butter, and shortening.  Avoid the following as told by your doctor:  Foods that cause symptoms. These may be different for different people. Keep a food diary to keep track of foods that cause symptoms.  Alcohol.  Drinking a lot of liquid with meals.  Eating meals during the 2-3 hours before bed.    Lifestyle  Stay at a healthy weight. Ask your doctor what weight is healthy for you. If you need to lose weight, work with your doctor to do so safely.  Exercise for at least 30 minutes on 5 or more days each week, or as told by your doctor.  Wear loose-fitting clothes.  Do not smoke or use any products that contain nicotine or tobacco. If you need help quitting, ask your doctor.  Sleep with the head  of your bed higher than your feet. Use a wedge under the mattress or blocks under the bed frame to raise the head of the bed.  Chew sugar-free gum after meals.  What foods should eat?    Eat a healthy, well-balanced diet of fruits, vegetables, whole grains, low-fat dairy products, lean meats, fish, and poultry. Each person is different.  Foods that may cause symptoms in one person may not cause any symptoms in another person. Work with your doctor to find foods that are safe for you.  The items listed above may not be a complete list of what you can eat and drink. Contact a food expert for more options.  What foods should I avoid?  Limiting some of these foods may help in managing the symptoms of GERD. Everyone is different. Talk with a food expert or your doctor to help you find the exact foods to avoid, if any.  Fruits  Any fruits prepared with added fat. Any fruits that cause symptoms. For some people, this may include citrus fruits, such as oranges, grapefruit, pineapple, and gerardo.  Vegetables  Deep-fried vegetables. French fries. Any vegetables prepared with added fat. Any vegetables that cause symptoms. For some people, this may include tomatoes and tomato products, chili peppers, onions and garlic, and horseradish.  Grains  Pastries or quick breads with added fat.  Meats and other proteins  High-fat meats, such as fatty beef or pork, hot dogs, ribs, ham, sausage, salami, and pastrana. Fried meat or protein, including fried fish and fried chicken. Nuts and nut butters, in large amounts.  Dairy  Whole milk and chocolate milk. Sour cream. Cream. Ice cream. Cream cheese. Milkshakes.  Fats and oils  Butter. Margarine. Shortening. Ghee.  Beverages  Coffee and tea, with or without caffeine. Carbonated beverages. Sodas. Energy drinks. Fruit juice made with acidic fruits, such as orange or grapefruit. Tomato juice. Alcoholic drinks.  Sweets and desserts  Chocolate and cocoa. Donuts.  Seasonings and condiments  Pepper.  Peppermint and spearmint. Added salt. Any condiments, herbs, or seasonings that cause symptoms. For some people, this may include terry, hot sauce, or vinegar-based salad dressings.  The items listed above may not be a complete list of what you should not eat and drink. Contact a food expert for more options.  Questions to ask your doctor  Diet and lifestyle changes are often the first steps that are taken to manage symptoms of GERD. If diet and lifestyle changes do not help, talk with your doctor about taking medicines.  Where to find more information  [employer] Gastrointestinal [employer]: aboutgerd.org  Summary  When you have GERD, food and lifestyle choices are very important in easing your symptoms.  Eat small meals often instead of 3 large meals a day. Eat your meals slowly and in a place where you are relaxed.  Avoid bending over or lying down until 2-3 hours after eating.  Limit high-fat foods such as fatty meats or fried foods.  This information is not intended to replace advice given to you by your health care provider. Make sure you discuss any questions you have with your health care provider.  Document Revised: 06/28/2021 Document Reviewed: 06/28/2021  Elsevier Patient Education © 2021 [employer]

## 2022-07-15 ENCOUNTER — TELEPHONE (OUTPATIENT)
Dept: ONCOLOGY | Facility: HOSPITAL | Age: 67
End: 2022-07-15

## 2022-07-15 NOTE — TELEPHONE ENCOUNTER
Diagnosis: Cancer of sigmoid colon    Reason for referral: Mental Health    Comments: OSW assistance requested by Dr. Newby/clinical RN, Elba AARON. Pt reports experiencing depression and is not on any treatment for this. [facility], however, this has not been efficacious. Oncologist prescribed Cymbalta 30mg for 30 days until pt can f/u with PCP or psychiatry.  OSW attempted to reach pt via telephone this afternoon. Left pt a VM, requesting a call back to further assist pt in getting connected to appropriate mental health services, such as psychiatry and/or counseling. Provided my direct number where I may be reached back at ([phone]). OSW support remains available.    Update 7/19: Attempted to reach pt via telephone again this afternoon. Spoke with spouse. Spouse confirms pt has started Cymbalta prescription. Educated spouse that this prescription may take several weeks before noticing full effects and encouraged pt to continue taking as prescribed. Spouse reports [facility], however, pt and spouse feel this provider has not provided the support pt needs. Discussed opportunity to refer pt to [facility] outpatient psychiatry. Pt and spouse in agreement and requested a referral. Submitted referral via fax this afternoon. OSW support remains available.    Update 7/26: Received notification from Behavioral Health that pt is scheduled to see DUANE Schultz on 8/22/22.    Services/Referrals Provided: Outpatient psychiatry- [facility]

## 2022-08-11 ENCOUNTER — HOSPITAL ENCOUNTER (EMERGENCY)
Facility: HOSPITAL | Age: 67
Discharge: HOME | End: 2022-08-11
Attending: EMERGENCY MEDICINE | Admitting: EMERGENCY MEDICINE

## 2022-08-11 VITALS
BODY MASS INDEX: 22.92 KG/M2 | TEMPERATURE: 97.7 F | RESPIRATION RATE: 18 BRPM | HEART RATE: 73 BPM | DIASTOLIC BLOOD PRESSURE: 54 MMHG | OXYGEN SATURATION: 99 % | HEIGHT: 69 IN | SYSTOLIC BLOOD PRESSURE: 130 MMHG | WEIGHT: 154.76 LBS

## 2022-08-11 DIAGNOSIS — M54.9 ACUTE BACK PAIN, UNSPECIFIED BACK LOCATION, UNSPECIFIED BACK PAIN LATERALITY: Primary | ICD-10-CM

## 2022-08-11 LAB
ALBUMIN SERPL-MCNC: 3.9 G/DL (ref 3.5–5.2)
ALBUMIN/GLOB SERPL: 1.8 G/DL
ALP SERPL-CCNC: 84 U/L (ref 39–117)
ALT SERPL W P-5'-P-CCNC: 11 U/L (ref 1–41)
ANION GAP SERPL CALCULATED.3IONS-SCNC: 10 MMOL/L (ref 5–15)
AST SERPL-CCNC: 16 U/L (ref 1–40)
BASOPHILS # BLD AUTO: 0.06 10*3/MM3 (ref 0–0.2)
BASOPHILS NFR BLD AUTO: 0.5 % (ref 0–1.5)
BILIRUB SERPL-MCNC: 0.3 MG/DL (ref 0–1.2)
BILIRUB UR QL STRIP: NEGATIVE
BUN SERPL-MCNC: 12 MG/DL (ref 8–23)
BUN/CREAT SERPL: 10.7 (ref 7–25)
CALCIUM SPEC-SCNC: 8.8 MG/DL (ref 8.6–10.5)
CHLORIDE SERPL-SCNC: 100 MMOL/L (ref 98–107)
CK SERPL-CCNC: 106 U/L (ref 20–200)
CLARITY UR: CLEAR
CO2 SERPL-SCNC: 26 MMOL/L (ref 22–29)
COLOR UR: YELLOW
CREAT SERPL-MCNC: 1.12 MG/DL (ref 0.76–1.27)
DEPRECATED RDW RBC AUTO: 43.3 FL (ref 37–54)
EGFRCR SERPLBLD CKD-EPI 2021: 71.6 ML/MIN/1.73
EOSINOPHIL # BLD AUTO: 0.17 10*3/MM3 (ref 0–0.4)
EOSINOPHIL NFR BLD AUTO: 1.5 % (ref 0.3–6.2)
ERYTHROCYTE [DISTWIDTH] IN BLOOD BY AUTOMATED COUNT: 13.8 % (ref 12.3–15.4)
GLOBULIN UR ELPH-MCNC: 2.2 GM/DL
GLUCOSE SERPL-MCNC: 392 MG/DL (ref 65–99)
GLUCOSE UR STRIP-MCNC: ABNORMAL MG/DL
HCT VFR BLD AUTO: 38.8 % (ref 37.5–51)
HGB BLD-MCNC: 13.9 G/DL (ref 13–17.7)
HGB UR QL STRIP.AUTO: NEGATIVE
HOLD SPECIMEN: NORMAL
HOLD SPECIMEN: NORMAL
IMM GRANULOCYTES # BLD AUTO: 0.03 10*3/MM3 (ref 0–0.05)
IMM GRANULOCYTES NFR BLD AUTO: 0.3 % (ref 0–0.5)
KETONES UR QL STRIP: ABNORMAL
LEUKOCYTE ESTERASE UR QL STRIP.AUTO: NEGATIVE
LYMPHOCYTES # BLD AUTO: 1.81 10*3/MM3 (ref 0.7–3.1)
LYMPHOCYTES NFR BLD AUTO: 16.4 % (ref 19.6–45.3)
MAGNESIUM SERPL-MCNC: 1.8 MG/DL (ref 1.6–2.4)
MCH RBC QN AUTO: 31 PG (ref 26.6–33)
MCHC RBC AUTO-ENTMCNC: 35.8 G/DL (ref 31.5–35.7)
MCV RBC AUTO: 86.4 FL (ref 79–97)
MONOCYTES # BLD AUTO: 0.81 10*3/MM3 (ref 0.1–0.9)
MONOCYTES NFR BLD AUTO: 7.3 % (ref 5–12)
NEUTROPHILS NFR BLD AUTO: 74 % (ref 42.7–76)
NEUTROPHILS NFR BLD AUTO: 8.15 10*3/MM3 (ref 1.7–7)
NITRITE UR QL STRIP: NEGATIVE
NRBC BLD AUTO-RTO: 0 /100 WBC (ref 0–0.2)
PH UR STRIP.AUTO: 6 [PH] (ref 5–8)
PLATELET # BLD AUTO: 170 10*3/MM3 (ref 140–450)
PMV BLD AUTO: 10.7 FL (ref 6–12)
POTASSIUM SERPL-SCNC: 4.3 MMOL/L (ref 3.5–5.2)
PROT SERPL-MCNC: 6.1 G/DL (ref 6–8.5)
PROT UR QL STRIP: NEGATIVE
RBC # BLD AUTO: 4.49 10*6/MM3 (ref 4.14–5.8)
SODIUM SERPL-SCNC: 136 MMOL/L (ref 136–145)
SP GR UR STRIP: 1.02 (ref 1–1.03)
UROBILINOGEN UR QL STRIP: ABNORMAL
WBC NRBC COR # BLD: 11.03 10*3/MM3 (ref 3.4–10.8)
WHOLE BLOOD HOLD COAG: NORMAL
WHOLE BLOOD HOLD SPECIMEN: NORMAL

## 2022-08-11 PROCEDURE — 85025 COMPLETE CBC W/AUTO DIFF WBC: CPT | Performed by: EMERGENCY MEDICINE

## 2022-08-11 PROCEDURE — 81003 URINALYSIS AUTO W/O SCOPE: CPT | Performed by: EMERGENCY MEDICINE

## 2022-08-11 PROCEDURE — 25010000002 KETOROLAC TROMETHAMINE PER 15 MG: Performed by: EMERGENCY MEDICINE

## 2022-08-11 PROCEDURE — 82550 ASSAY OF CK (CPK): CPT | Performed by: EMERGENCY MEDICINE

## 2022-08-11 PROCEDURE — 87086 URINE CULTURE/COLONY COUNT: CPT | Performed by: EMERGENCY MEDICINE

## 2022-08-11 PROCEDURE — 25010000002 ORPHENADRINE CITRATE PER 60 MG: Performed by: EMERGENCY MEDICINE

## 2022-08-11 PROCEDURE — 99283 EMERGENCY DEPT VISIT LOW MDM: CPT

## 2022-08-11 PROCEDURE — 36415 COLL VENOUS BLD VENIPUNCTURE: CPT

## 2022-08-11 PROCEDURE — 96372 THER/PROPH/DIAG INJ SC/IM: CPT

## 2022-08-11 PROCEDURE — 83735 ASSAY OF MAGNESIUM: CPT | Performed by: EMERGENCY MEDICINE

## 2022-08-11 PROCEDURE — 80053 COMPREHEN METABOLIC PANEL: CPT | Performed by: EMERGENCY MEDICINE

## 2022-08-11 RX ORDER — OXYCODONE HYDROCHLORIDE AND ACETAMINOPHEN 5; 325 MG/1; MG/1
1 TABLET ORAL EVERY 6 HOURS PRN
Qty: 12 TABLET | Refills: 0 | Status: SHIPPED | OUTPATIENT
Start: 2022-08-11 | End: 2022-08-19 | Stop reason: HOSPADM

## 2022-08-11 RX ORDER — KETOROLAC TROMETHAMINE 30 MG/ML
30 INJECTION, SOLUTION INTRAMUSCULAR; INTRAVENOUS ONCE
Status: COMPLETED | OUTPATIENT
Start: 2022-08-11 | End: 2022-08-11

## 2022-08-11 RX ORDER — KETOROLAC TROMETHAMINE 10 MG/1
10 TABLET, FILM COATED ORAL EVERY 6 HOURS PRN
Qty: 15 TABLET | Refills: 0 | Status: SHIPPED | OUTPATIENT
Start: 2022-08-11 | End: 2022-08-19 | Stop reason: HOSPADM

## 2022-08-11 RX ORDER — SODIUM CHLORIDE 0.9 % (FLUSH) 0.9 %
10 SYRINGE (ML) INJECTION AS NEEDED
Status: DISCONTINUED | OUTPATIENT
Start: 2022-08-11 | End: 2022-08-11 | Stop reason: HOSPADM

## 2022-08-11 RX ORDER — ORPHENADRINE CITRATE 30 MG/ML
60 INJECTION INTRAMUSCULAR; INTRAVENOUS ONCE
Status: COMPLETED | OUTPATIENT
Start: 2022-08-11 | End: 2022-08-11

## 2022-08-11 RX ORDER — OXYCODONE HYDROCHLORIDE AND ACETAMINOPHEN 5; 325 MG/1; MG/1
1 TABLET ORAL ONCE
Status: COMPLETED | OUTPATIENT
Start: 2022-08-11 | End: 2022-08-11

## 2022-08-11 RX ORDER — CYCLOBENZAPRINE HCL 10 MG
10 TABLET ORAL 3 TIMES DAILY
Qty: 20 TABLET | Refills: 0 | Status: ON HOLD | OUTPATIENT
Start: 2022-08-11 | End: 2022-08-19 | Stop reason: SDUPTHER

## 2022-08-11 RX ADMIN — KETOROLAC TROMETHAMINE 30 MG: 30 INJECTION, SOLUTION INTRAMUSCULAR; INTRAVENOUS at 17:01

## 2022-08-11 RX ADMIN — OXYCODONE HYDROCHLORIDE AND ACETAMINOPHEN 1 TABLET: 5; 325 TABLET ORAL at 17:01

## 2022-08-11 RX ADMIN — ORPHENADRINE CITRATE 60 MG: 60 INJECTION INTRAMUSCULAR; INTRAVENOUS at 17:01

## 2022-08-11 NOTE — ED PROVIDER NOTES
Time: 4:19 PM EDT  Arrived by: private car  Chief Complaint: numbness, back pain  History provided by: pt  History is limited by: N/A     History of Present Illness:  Patient is a 67 y.o. year old male who presents to the emergency department with  A chief complaint of numbness and back pain. Pt states he has seen Dr. Veras recently.  Pt reports numbness for 3 days. Pt reports lower back pain. Pt reports generalized weakness. Pt denies loss of bladder control.          History provided by:  Patient  History limited by: N/A.   used: No    Back Pain  Location:  Lumbar spine  Quality:  Aching  Radiates to:  R thigh  Pain severity:  Moderate  Pain is:  Unable to specify  Onset quality:  Gradual  Duration:  3 days  Timing:  Constant  Progression:  Worsening  Relieved by:  Being still  Worsened by:  Movement  Associated symptoms: numbness and weakness    Associated symptoms: no abdominal pain, no bladder incontinence, no chest pain, no dysuria, no fever and no headaches        Similar Symptoms Previously: no  Recently seen: No      Patient Care Team  Primary Care Provider: Christiano Mcbride MD    Past Medical History:     Allergies   Allergen Reactions   • Latex Itching     Past Medical History:   Diagnosis Date   • Arteriovenous malformation 03/09/1980   • Arthritis    • Asthma    • Benign essential hypertension    • Bladder disorder    • Bleeding disorder ([facility])    • Blood disease    • BPH with urinary obstruction 05/30/2014   • Brain concussion 1999   • Cancer ([facility])    • Cervical disc disorder long time   • Cervical radiculopathy 05/01/2015    left   • Cervical spinal stenosis 03/10/2020   • Cervicalgia 12/18/2018   • Chest pain    • CHF (congestive heart failure) ([facility])    • Clotting disorder ([facility]) Elequis   • Colon cancer ([facility]) 12/18/2018   • Condition not found     Hernia   • Condition not found     herniated disc   • COPD (chronic obstructive pulmonary disease) ([facility])    • Coronary artery  disease    • CTS (carpal tunnel syndrome) 08/04/2002   • DDD (degenerative disc disease), thoracic 12/18/2018   • Decreased sensation 05/01/2015    left   • Deep vein thrombosis ([facility])    • Depression    • Diabetes ([facility])    • Diabetes mellitus type 1 with coma, insulin dependent ([facility])     controlled   • Headache    • Heart murmur    • Heart valve disease    • Hematuria, gross 05/30/2014   • Hyperlipemia    • Hyperlipidemia    • Hypertension    • Insomnia    • Leg pain    • Leg pain    • Limb swelling    • Loss of balance 12/18/2018   • Low back pain 03/10/2020   • Lumbar degenerative disc disease 12/18/2018   • Lumbar radiculopathy 03/10/2020   • Lumbosacral disc disease 05/05/1999   • Mid back pain 12/18/2018   • Migraines    • Muscle cramps    • Myocardial infarction ([facility])    • Neurologic disorder    • Pain in back    • Pain in joint    • Pain of cervical spine    • Pain, generalized    • Pulmonary arterial hypertension ([facility]) 1999   • Seasonal allergies    • Seizures ([facility]) 2000   • Shortness of breath    • Sleep apnea    • Stomach disorder      Past Surgical History:   Procedure Laterality Date   • ABDOMINAL SURGERY     • BLADDER SURGERY     • CARPAL TUNNEL RELEASE     • CEREBRAL ANGIOGRAM  2021   • COLON RESECTION     • COLONOSCOPY  01/30/2017    [place]   • CORONARY ARTERY BYPASS GRAFT  12/2019    one vessel   • GALLBLADDER SURGERY      cholecystecomy   • HERNIA REPAIR     • JOINT REPLACEMENT      joint surgery   • MITRAL VALVE REPAIR/REPLACEMENT  02/2019    [facility]   • OTHER SURGICAL HISTORY  05/30/2014    office cystoscopy w/DR SHANICE Young   • SHOULDER SURGERY Bilateral    • VASCULAR SURGERY  2020   • VENTRAL HERNIA REPAIR       Family History   Problem Relation Age of Onset   • Heart attack Father         MI   • Heart failure Father    • Anxiety disorder Father    • Cancer Father    • COPD Father    • Depression Father    • Diabetes Father    • Heart disease Father    • Hypertension Father    • Stroke  Brother    • Developmental Disability Brother    • Hearing loss Brother    • Mental illness Brother    • Vision loss Brother    • Arthritis Mother    • Asthma Mother    • Hyperlipidemia Mother        Home Medications:  Prior to Admission medications    Medication Sig Start Date End Date Taking? Authorizing Provider   albuterol sulfate  (90 Base) MCG/ACT inhaler ProAir HFA 90 mcg/actuation aerosol inhaler    ProviderCarissa MD   ammonium lactate (LAC-HYDRIN) 12 % lotion ammonium lactate 12 % lotion    ProviderCarissa MD   amphetamine-dextroamphetamine XR (ADDERALL XR) 15 MG 24 hr capsule Adderall XR 15 mg oral capsule,extended release 24hr take 1 capsule (15 mg) by oral route once daily in the morning upon awakening   Suspended    Carissa Boyle MD   apixaban (ELIQUIS) 5 MG tablet tablet Take 5 mg by mouth 2 (Two) Times a Day.    Carissa Boyle MD   atorvastatin (LIPITOR) 40 MG tablet Daily.    ProviderCarissa MD   cyclobenzaprine (FLEXERIL) 10 MG tablet Take 1 tablet by mouth 3 (Three) Times a Day As Needed for Muscle Spasms. 3/8/22   Randy Emery DO   Diclofenac Sodium (VOLTAREN) 1 % gel gel Apply 4 g topically to the appropriate area as directed 4 (Four) Times a Day As Needed (PAIN). 2/17/22   Misbah Vick MD   DULoxetine (Cymbalta) 30 MG capsule Take 1 capsule by mouth Daily for 30 days. 7/13/22 8/12/22  Tacos Newby MD   insulin aspart (NovoLOG FlexPen) 100 UNIT/ML solution pen-injector sc pen 1 (One) Time.    ProviderCarissa MD   Insulin Glargine (Lantus SoloStar) 100 UNIT/ML injection pen Daily.    ProviderCarissa MD   ketotifen (ZADITOR) 0.025 % ophthalmic solution Administer  to both eyes. 3/10/21   Carissa Boyle MD   linaclotide (Linzess) 72 MCG capsule capsule Take 1 capsule by mouth Every Morning Before Breakfast for 90 days. 7/14/22 10/12/22  Tarsha Nichols APRN   metoprolol tartrate (LOPRESSOR) 100 MG tablet Take 1 tablet by mouth  "2 (Two) Times a Day. 5/10/22   Viral Alvarez MD   pantoprazole (PROTONIX) 40 MG EC tablet Take 1 tablet by mouth Daily. 7/14/22   Tarsha Nichols APRN   pregabalin (LYRICA) 100 MG capsule Take 100 mg by mouth 2 (Two) Times a Day.    Provider, MD Carissa   traMADol (ULTRAM) 50 MG tablet Take 1 tablet by mouth Every 8 (Eight) Hours As Needed for Moderate Pain  or Severe Pain . 7/7/22   Satish Veras MD        Social History:   Social History     Tobacco Use   • Smoking status: Current Some Day Smoker     Packs/day: 0.50     Years: 50.00     Pack years: 25.00     Types: Cigarettes, Cigarettes     Start date: 1/1/1970   • Smokeless tobacco: Never Used   Vaping Use   • Vaping Use: Never used   Substance Use Topics   • Alcohol use: Never   • Drug use: Never     Recent travel: no     Review of Systems:  Review of Systems   Constitutional: Negative for chills and fever.   HENT: Negative for congestion, rhinorrhea and sore throat.    Eyes: Negative for pain and visual disturbance.   Respiratory: Negative for apnea, cough, chest tightness and shortness of breath.    Cardiovascular: Negative for chest pain and palpitations.   Gastrointestinal: Negative for abdominal pain, diarrhea, nausea and vomiting.   Genitourinary: Negative for bladder incontinence, difficulty urinating and dysuria.   Musculoskeletal: Positive for back pain. Negative for joint swelling and myalgias.   Skin: Negative for color change.   Neurological: Positive for weakness and numbness. Negative for seizures and headaches.   Psychiatric/Behavioral: Negative.    All other systems reviewed and are negative.       Physical Exam:  /54   Pulse 73   Temp 97.7 °F (36.5 °C) (Oral)   Resp 18   Ht 175.3 cm (69\")   Wt 70.2 kg (154 lb 12.2 oz)   SpO2 99%   BMI 22.85 kg/m²     Physical Exam  Vitals and nursing note reviewed.   Constitutional:       General: He is not in acute distress.     Appearance: Normal appearance. He is not " toxic-appearing.   HENT:      Head: Normocephalic and atraumatic.      Jaw: There is normal jaw occlusion.   Eyes:      General: Lids are normal.      Extraocular Movements: Extraocular movements intact.      Conjunctiva/sclera: Conjunctivae normal.      Pupils: Pupils are equal, round, and reactive to light.   Cardiovascular:      Rate and Rhythm: Normal rate and regular rhythm.      Pulses: Normal pulses.      Heart sounds: Normal heart sounds.   Pulmonary:      Effort: Pulmonary effort is normal. No respiratory distress.      Breath sounds: Normal breath sounds. No wheezing or rhonchi.   Abdominal:      General: Abdomen is flat.      Palpations: Abdomen is soft.      Tenderness: There is no abdominal tenderness. There is no guarding or rebound.   Musculoskeletal:         General: Normal range of motion.      Cervical back: Normal range of motion and neck supple.      Lumbar back: Tenderness (Right lumbosacral tenderness) present.      Right lower leg: No edema.      Left lower leg: No edema.   Skin:     General: Skin is warm and dry.   Neurological:      Mental Status: He is alert and oriented to person, place, and time. Mental status is at baseline.   Psychiatric:         Mood and Affect: Mood normal.                Medications in the Emergency Department:  Medications   sodium chloride 0.9 % flush 10 mL (has no administration in time range)   ketorolac (TORADOL) injection 30 mg (30 mg Intramuscular Given 8/11/22 1701)   orphenadrine (NORFLEX) injection 60 mg (60 mg Intramuscular Given 8/11/22 1701)   oxyCODONE-acetaminophen (PERCOCET) 5-325 MG per tablet 1 tablet (1 tablet Oral Given 8/11/22 1701)        Labs  Lab Results (last 24 hours)     Procedure Component Value Units Date/Time    CBC & Differential [[id number]]  (Abnormal) Collected: 08/11/22 1351    Specimen: Blood Updated: 08/11/22 1628    Narrative:      The following orders were created for panel order CBC & Differential.  Procedure                                Abnormality         Status                     ---------                               -----------         ------                     CBC Auto Differential[[id number]]        Abnormal            Final result                 Please view results for these tests on the individual orders.    CK [[id number]]  (Normal) Collected: 08/11/22 1351    Specimen: Blood Updated: 08/11/22 1611     Creatine Kinase 106 U/L     Comprehensive Metabolic Panel [[id number]]  (Abnormal) Collected: 08/11/22 1351    Specimen: Blood Updated: 08/11/22 1611     Glucose 392 mg/dL      BUN 12 mg/dL      Creatinine 1.12 mg/dL      Sodium 136 mmol/L      Potassium 4.3 mmol/L      Comment: Slight hemolysis detected by analyzer. Results may be affected.        Chloride 100 mmol/L      CO2 26.0 mmol/L      Calcium 8.8 mg/dL      Total Protein 6.1 g/dL      Albumin 3.90 g/dL      ALT (SGPT) 11 U/L      AST (SGOT) 16 U/L      Alkaline Phosphatase 84 U/L      Total Bilirubin 0.3 mg/dL      Globulin 2.2 gm/dL      A/G Ratio 1.8 g/dL      BUN/Creatinine Ratio 10.7     Anion Gap 10.0 mmol/L      eGFR 71.6 mL/min/1.73      Comment: National Kidney Foundation and American Society of Nephrology (ASN) Task Force recommended calculation based on the Chronic Kidney Disease Epidemiology Collaboration (CKD-EPI) equation refit without adjustment for race.       Narrative:      GFR Normal >60  Chronic Kidney Disease <60  Kidney Failure <15      Magnesium [[id number]]  (Normal) Collected: 08/11/22 1351    Specimen: Blood Updated: 08/11/22 1611     Magnesium 1.8 mg/dL     CBC Auto Differential [[id number]]  (Abnormal) Collected: 08/11/22 1351    Specimen: Blood Updated: 08/11/22 1628     WBC 11.03 10*3/mm3      RBC 4.49 10*6/mm3      Hemoglobin 13.9 g/dL      Hematocrit 38.8 %      MCV 86.4 fL      MCH 31.0 pg      MCHC 35.8 g/dL      RDW 13.8 %      RDW-SD 43.3 fl      MPV 10.7 fL      Platelets 170 10*3/mm3      Neutrophil % 74.0 %      Lymphocyte % 16.4 %       Monocyte % 7.3 %      Eosinophil % 1.5 %      Basophil % 0.5 %      Immature Grans % 0.3 %      Neutrophils, Absolute 8.15 10*3/mm3      Lymphocytes, Absolute 1.81 10*3/mm3      Monocytes, Absolute 0.81 10*3/mm3      Eosinophils, Absolute 0.17 10*3/mm3      Basophils, Absolute 0.06 10*3/mm3      Immature Grans, Absolute 0.03 10*3/mm3      nRBC 0.0 /100 WBC     Urinalysis With Culture If Indicated - Urine, Clean Catch [[id number]]  (Abnormal) Collected: 08/11/22 1624    Specimen: Urine, Clean Catch Updated: 08/11/22 1637     Color, UA Yellow     Appearance, UA Clear     pH, UA 6.0     Specific Gravity, UA 1.024     Glucose, UA >=1000 mg/dL (3+)     Ketones, UA Trace     Bilirubin, UA Negative     Blood, UA Negative     Protein, UA Negative     Leuk Esterase, UA Negative     Nitrite, UA Negative     Urobilinogen, UA 0.2 E.U./dL    Narrative:      In absence of clinical symptoms, the presence of pyuria, bacteria, and/or nitrites on the urinalysis result does not correlate with infection.  Urine microscopic not indicated.    Urine Culture - Urine, Urine, Clean Catch [[id number]] Collected: 08/11/22 1624    Specimen: Urine, Clean Catch Updated: 08/11/22 1637           Imaging:  No Radiology Exams Resulted Within Past 24 Hours    Procedures:  Procedures    Progress                            Medical Decision Making:  MDM  Number of Diagnoses or Management Options  Acute back pain, unspecified back location, unspecified back pain laterality  Diagnosis management comments: The patient´s symptoms are consistent with musculoskeletal back pain.  The patient´s CBC was reviewed and shows no abnormalities of critical concern. Of note, there is no anemia requiring a blood transfusion and the platelet count is acceptable.  The patient´s CMP was reviewed and shows no abnormalities of critical concern. Of note, the patient´s sodium and potassium are acceptable. The patient´s liver enzymes are unremarkable. The patient´s renal function  (creatinine) is preserved. The patient has a normal anion gap.  Urinalysis is negative for bacteriuria.  The patient is now resting comfortably, feels better, is alert, talkative, interactive and in no distress. The repeat examination is unremarkable and benign. The patient is neurologically intact and is ambulatory in the ED. The patient has no fever, no bowel or bladder incontinence, no saddle anesthesia, and is otherwise alert and well appearing. The history, physical exam, and diagnostics (if any) do not suggest the presence of acute spinal epidural abscess, acute spinal epidural bleed, cauda equina syndrome, abdominal aortic aneurysm, aortic dissection or other process requiring further testing, treatment or consultation in the emergency department. The vital signs have been stable. The patient's condition is stable and appropriate for discharge. The patient will pursue further outpatient evaluation with the primary care physician or other designated for consulting position as indicated in the discharge instructions.       Amount and/or Complexity of Data Reviewed  Clinical lab tests: reviewed    Risk of Complications, Morbidity, and/or Mortality  Presenting problems: moderate  Management options: moderate    Patient Progress  Patient progress: stable       Final diagnoses:   Acute back pain, unspecified back location, unspecified back pain laterality        Disposition:  ED Disposition     ED Disposition   Discharge    Condition   Stable    Comment   --           Documentation assistance provided by Barbara Lopez acting as scribe for Indira Wang MD. Information recorded by the scribe was done at my direction and has been verified and validated by me.     This medical record created using voice recognition software.           Ezekiel Lopez  08/11/22 2881       Inidra Wang MD  08/11/22 [id number]

## 2022-08-12 LAB — BACTERIA SPEC AEROBE CULT: NORMAL

## 2022-08-14 ENCOUNTER — HOSPITAL ENCOUNTER (INPATIENT)
Facility: HOSPITAL | Age: 67
LOS: 4 days | Discharge: NURSING, REHAB OR LONG-TERM CARE | End: 2022-08-19
Attending: EMERGENCY MEDICINE | Admitting: INTERNAL MEDICINE

## 2022-08-14 ENCOUNTER — APPOINTMENT (OUTPATIENT)
Dept: GENERAL RADIOLOGY | Facility: HOSPITAL | Age: 67
End: 2022-08-14

## 2022-08-14 DIAGNOSIS — M54.50 CHRONIC LOW BACK PAIN, UNSPECIFIED BACK PAIN LATERALITY, UNSPECIFIED WHETHER SCIATICA PRESENT: ICD-10-CM

## 2022-08-14 DIAGNOSIS — G89.29 CHRONIC LOW BACK PAIN, UNSPECIFIED BACK PAIN LATERALITY, UNSPECIFIED WHETHER SCIATICA PRESENT: ICD-10-CM

## 2022-08-14 DIAGNOSIS — S72.002A CLOSED FRACTURE OF LEFT HIP, INITIAL ENCOUNTER: Primary | ICD-10-CM

## 2022-08-14 DIAGNOSIS — Z78.9 DECREASED ACTIVITIES OF DAILY LIVING (ADL): ICD-10-CM

## 2022-08-14 DIAGNOSIS — R26.2 DIFFICULTY IN WALKING: ICD-10-CM

## 2022-08-14 LAB
ACETONE BLD QL: NEGATIVE
ALBUMIN SERPL-MCNC: 3.9 G/DL (ref 3.5–5.2)
ALBUMIN/GLOB SERPL: 1.6 G/DL
ALP SERPL-CCNC: 97 U/L (ref 39–117)
ALT SERPL W P-5'-P-CCNC: 19 U/L (ref 1–41)
ANION GAP SERPL CALCULATED.3IONS-SCNC: 8 MMOL/L (ref 5–15)
ARTERIAL PATENCY WRIST A: POSITIVE
AST SERPL-CCNC: 24 U/L (ref 1–40)
BASE EXCESS BLDA CALC-SCNC: -0.3 MMOL/L (ref -2–2)
BASOPHILS # BLD AUTO: 0.05 10*3/MM3 (ref 0–0.2)
BASOPHILS NFR BLD AUTO: 0.5 % (ref 0–1.5)
BDY SITE: ABNORMAL
BILIRUB SERPL-MCNC: 0.7 MG/DL (ref 0–1.2)
BILIRUB UR QL STRIP: NEGATIVE
BUN SERPL-MCNC: 7 MG/DL (ref 8–23)
BUN/CREAT SERPL: 6.7 (ref 7–25)
CALCIUM SPEC-SCNC: 8.8 MG/DL (ref 8.6–10.5)
CHLORIDE SERPL-SCNC: 104 MMOL/L (ref 98–107)
CLARITY UR: CLEAR
CO2 SERPL-SCNC: 25 MMOL/L (ref 22–29)
COHGB MFR BLD: 2.3 % (ref 0–1.5)
COLOR UR: YELLOW
CREAT SERPL-MCNC: 1.04 MG/DL (ref 0.76–1.27)
DEPRECATED RDW RBC AUTO: 43.8 FL (ref 37–54)
EGFRCR SERPLBLD CKD-EPI 2021: 78.2 ML/MIN/1.73
EOSINOPHIL # BLD AUTO: 0.16 10*3/MM3 (ref 0–0.4)
EOSINOPHIL NFR BLD AUTO: 1.5 % (ref 0.3–6.2)
ERYTHROCYTE [DISTWIDTH] IN BLOOD BY AUTOMATED COUNT: 13.9 % (ref 12.3–15.4)
FHHB: 8 % (ref 0–5)
GLOBULIN UR ELPH-MCNC: 2.5 GM/DL
GLUCOSE BLDC GLUCOMTR-MCNC: 242 MG/DL (ref 70–99)
GLUCOSE BLDC GLUCOMTR-MCNC: 255 MG/DL (ref 70–99)
GLUCOSE BLDC GLUCOMTR-MCNC: 315 MG/DL (ref 70–99)
GLUCOSE SERPL-MCNC: 494 MG/DL (ref 65–99)
GLUCOSE UR STRIP-MCNC: ABNORMAL MG/DL
HCO3 BLDA-SCNC: 23.1 MMOL/L (ref 22–26)
HCT VFR BLD AUTO: 40.3 % (ref 37.5–51)
HGB BLD-MCNC: 14.4 G/DL (ref 13–17.7)
HGB BLDA-MCNC: 14.1 G/DL (ref 13.8–16.4)
HGB UR QL STRIP.AUTO: NEGATIVE
HOLD SPECIMEN: NORMAL
HOLD SPECIMEN: NORMAL
IMM GRANULOCYTES # BLD AUTO: 0.06 10*3/MM3 (ref 0–0.05)
IMM GRANULOCYTES NFR BLD AUTO: 0.6 % (ref 0–0.5)
INR PPP: 1.11 (ref 0.86–1.15)
KETONES UR QL STRIP: NEGATIVE
LACTATE BLDA-SCNC: ABNORMAL MMOL/L
LEUKOCYTE ESTERASE UR QL STRIP.AUTO: NEGATIVE
LYMPHOCYTES # BLD AUTO: 1.05 10*3/MM3 (ref 0.7–3.1)
LYMPHOCYTES NFR BLD AUTO: 10.1 % (ref 19.6–45.3)
MAGNESIUM SERPL-MCNC: 2 MG/DL (ref 1.6–2.4)
MCH RBC QN AUTO: 30.6 PG (ref 26.6–33)
MCHC RBC AUTO-ENTMCNC: 35.7 G/DL (ref 31.5–35.7)
MCV RBC AUTO: 85.7 FL (ref 79–97)
METHGB BLD QL: 0.1 % (ref 0–1.5)
MODALITY: ABNORMAL
MONOCYTES # BLD AUTO: 0.53 10*3/MM3 (ref 0.1–0.9)
MONOCYTES NFR BLD AUTO: 5.1 % (ref 5–12)
NEUTROPHILS NFR BLD AUTO: 8.53 10*3/MM3 (ref 1.7–7)
NEUTROPHILS NFR BLD AUTO: 82.2 % (ref 42.7–76)
NITRITE UR QL STRIP: NEGATIVE
NRBC BLD AUTO-RTO: 0 /100 WBC (ref 0–0.2)
OXYHGB MFR BLDV: 89.6 % (ref 94–99)
PCO2 BLDA: 34.1 MM HG (ref 35–45)
PH BLDA: 7.45 PH UNITS (ref 7.35–7.45)
PH UR STRIP.AUTO: 7 [PH] (ref 5–8)
PHOSPHATE SERPL-MCNC: 2.8 MG/DL (ref 2.5–4.5)
PLATELET # BLD AUTO: 165 10*3/MM3 (ref 140–450)
PMV BLD AUTO: 9.7 FL (ref 6–12)
PO2 BLDA: 57.9 MM HG (ref 80–100)
POTASSIUM SERPL-SCNC: 4.2 MMOL/L (ref 3.5–5.2)
PROT SERPL-MCNC: 6.4 G/DL (ref 6–8.5)
PROT UR QL STRIP: NEGATIVE
PROTHROMBIN TIME: 14.4 SECONDS (ref 11.8–14.9)
RBC # BLD AUTO: 4.7 10*6/MM3 (ref 4.14–5.8)
SAO2 % BLDCOA: 91.8 % (ref 95–99)
SODIUM SERPL-SCNC: 137 MMOL/L (ref 136–145)
SP GR UR STRIP: 1.02 (ref 1–1.03)
UROBILINOGEN UR QL STRIP: ABNORMAL
WBC NRBC COR # BLD: 10.38 10*3/MM3 (ref 3.4–10.8)
WHOLE BLOOD HOLD COAG: NORMAL
WHOLE BLOOD HOLD SPECIMEN: NORMAL

## 2022-08-14 PROCEDURE — 82805 BLOOD GASES W/O2 SATURATION: CPT | Performed by: EMERGENCY MEDICINE

## 2022-08-14 PROCEDURE — 81003 URINALYSIS AUTO W/O SCOPE: CPT | Performed by: EMERGENCY MEDICINE

## 2022-08-14 PROCEDURE — 73560 X-RAY EXAM OF KNEE 1 OR 2: CPT

## 2022-08-14 PROCEDURE — 83735 ASSAY OF MAGNESIUM: CPT

## 2022-08-14 PROCEDURE — 71101 X-RAY EXAM UNILAT RIBS/CHEST: CPT

## 2022-08-14 PROCEDURE — 84100 ASSAY OF PHOSPHORUS: CPT

## 2022-08-14 PROCEDURE — 25010000002 HYDROMORPHONE 1 MG/ML SOLUTION: Performed by: EMERGENCY MEDICINE

## 2022-08-14 PROCEDURE — 36600 WITHDRAWAL OF ARTERIAL BLOOD: CPT | Performed by: EMERGENCY MEDICINE

## 2022-08-14 PROCEDURE — 85610 PROTHROMBIN TIME: CPT | Performed by: EMERGENCY MEDICINE

## 2022-08-14 PROCEDURE — 73600 X-RAY EXAM OF ANKLE: CPT

## 2022-08-14 PROCEDURE — 83050 HGB METHEMOGLOBIN QUAN: CPT | Performed by: EMERGENCY MEDICINE

## 2022-08-14 PROCEDURE — 82962 GLUCOSE BLOOD TEST: CPT

## 2022-08-14 PROCEDURE — 99285 EMERGENCY DEPT VISIT HI MDM: CPT

## 2022-08-14 PROCEDURE — 80053 COMPREHEN METABOLIC PANEL: CPT

## 2022-08-14 PROCEDURE — 73030 X-RAY EXAM OF SHOULDER: CPT

## 2022-08-14 PROCEDURE — 63710000001 INSULIN REGULAR HUMAN PER 5 UNITS: Performed by: EMERGENCY MEDICINE

## 2022-08-14 PROCEDURE — 93010 ELECTROCARDIOGRAM REPORT: CPT | Performed by: INTERNAL MEDICINE

## 2022-08-14 PROCEDURE — 82009 KETONE BODYS QUAL: CPT | Performed by: EMERGENCY MEDICINE

## 2022-08-14 PROCEDURE — 82375 ASSAY CARBOXYHB QUANT: CPT | Performed by: EMERGENCY MEDICINE

## 2022-08-14 PROCEDURE — 85025 COMPLETE CBC W/AUTO DIFF WBC: CPT

## 2022-08-14 PROCEDURE — 85730 THROMBOPLASTIN TIME PARTIAL: CPT | Performed by: EMERGENCY MEDICINE

## 2022-08-14 PROCEDURE — 93005 ELECTROCARDIOGRAM TRACING: CPT | Performed by: EMERGENCY MEDICINE

## 2022-08-14 PROCEDURE — 73502 X-RAY EXAM HIP UNI 2-3 VIEWS: CPT

## 2022-08-14 RX ADMIN — HYDROMORPHONE HYDROCHLORIDE 0.5 MG: 1 INJECTION, SOLUTION INTRAMUSCULAR; INTRAVENOUS; SUBCUTANEOUS at 22:40

## 2022-08-14 RX ADMIN — INSULIN HUMAN 8 UNITS: 100 INJECTION, SOLUTION PARENTERAL at 19:30

## 2022-08-14 RX ADMIN — SODIUM CHLORIDE 1000 ML: 9 INJECTION, SOLUTION INTRAVENOUS at 19:30

## 2022-08-14 NOTE — ED NOTES
Patient lying bed, awake, alert and oriented x4.  He answers questions appropriately.  Patient states he fell today due to his blood sugar being out of range.  His spouse is at bedside and states that she is very concerned about him.  He has been depressed recently, has PTSD, isn't eating right, sleeps all the time, and she found he has a gun in the home now.  She states she does not feel safe with him or safe for him at this point.

## 2022-08-15 ENCOUNTER — APPOINTMENT (OUTPATIENT)
Dept: GENERAL RADIOLOGY | Facility: HOSPITAL | Age: 67
End: 2022-08-15

## 2022-08-15 ENCOUNTER — ANESTHESIA EVENT (OUTPATIENT)
Dept: PERIOP | Facility: HOSPITAL | Age: 67
End: 2022-08-15

## 2022-08-15 ENCOUNTER — ANESTHESIA (OUTPATIENT)
Dept: PERIOP | Facility: HOSPITAL | Age: 67
End: 2022-08-15

## 2022-08-15 PROBLEM — R44.3 HALLUCINATIONS: Status: RESOLVED | Noted: 2022-08-15 | Resolved: 2022-08-15

## 2022-08-15 PROBLEM — R30.0 DYSURIA: Status: ACTIVE | Noted: 2022-08-15

## 2022-08-15 PROBLEM — Z79.4 TYPE 2 DIABETES MELLITUS, WITH LONG-TERM CURRENT USE OF INSULIN: Status: ACTIVE | Noted: 2022-08-15

## 2022-08-15 PROBLEM — R44.3 HALLUCINATIONS: Status: ACTIVE | Noted: 2022-08-15

## 2022-08-15 PROBLEM — E11.9 TYPE 2 DIABETES MELLITUS, WITH LONG-TERM CURRENT USE OF INSULIN: Status: ACTIVE | Noted: 2022-08-15

## 2022-08-15 PROBLEM — S72.002A CLOSED FRACTURE OF LEFT HIP, INITIAL ENCOUNTER (HCC): Status: ACTIVE | Noted: 2022-08-15

## 2022-08-15 LAB
ANION GAP SERPL CALCULATED.3IONS-SCNC: 7.7 MMOL/L (ref 5–15)
APTT PPP: 25.3 SECONDS (ref 24.2–34.2)
BASOPHILS # BLD AUTO: 0.04 10*3/MM3 (ref 0–0.2)
BASOPHILS NFR BLD AUTO: 0.3 % (ref 0–1.5)
BUN SERPL-MCNC: 6 MG/DL (ref 8–23)
BUN/CREAT SERPL: 8 (ref 7–25)
CALCIUM SPEC-SCNC: 8.7 MG/DL (ref 8.6–10.5)
CHLORIDE SERPL-SCNC: 108 MMOL/L (ref 98–107)
CO2 SERPL-SCNC: 24.3 MMOL/L (ref 22–29)
CREAT SERPL-MCNC: 0.75 MG/DL (ref 0.76–1.27)
DEPRECATED RDW RBC AUTO: 43.8 FL (ref 37–54)
EGFRCR SERPLBLD CKD-EPI 2021: 98.3 ML/MIN/1.73
EOSINOPHIL # BLD AUTO: 0.14 10*3/MM3 (ref 0–0.4)
EOSINOPHIL NFR BLD AUTO: 1.2 % (ref 0.3–6.2)
ERYTHROCYTE [DISTWIDTH] IN BLOOD BY AUTOMATED COUNT: 14 % (ref 12.3–15.4)
GLUCOSE BLDC GLUCOMTR-MCNC: 200 MG/DL (ref 70–99)
GLUCOSE BLDC GLUCOMTR-MCNC: 209 MG/DL (ref 70–99)
GLUCOSE BLDC GLUCOMTR-MCNC: 233 MG/DL (ref 70–99)
GLUCOSE BLDC GLUCOMTR-MCNC: 246 MG/DL (ref 70–99)
GLUCOSE BLDC GLUCOMTR-MCNC: 316 MG/DL (ref 70–99)
GLUCOSE BLDC GLUCOMTR-MCNC: 345 MG/DL (ref 70–99)
GLUCOSE BLDC GLUCOMTR-MCNC: 462 MG/DL (ref 70–99)
GLUCOSE SERPL-MCNC: 307 MG/DL (ref 65–99)
HCT VFR BLD AUTO: 40.1 % (ref 37.5–51)
HGB BLD-MCNC: 14.1 G/DL (ref 13–17.7)
IMM GRANULOCYTES # BLD AUTO: 0.05 10*3/MM3 (ref 0–0.05)
IMM GRANULOCYTES NFR BLD AUTO: 0.4 % (ref 0–0.5)
LYMPHOCYTES # BLD AUTO: 1.7 10*3/MM3 (ref 0.7–3.1)
LYMPHOCYTES NFR BLD AUTO: 14.8 % (ref 19.6–45.3)
MAGNESIUM SERPL-MCNC: 1.6 MG/DL (ref 1.6–2.4)
MCH RBC QN AUTO: 30.5 PG (ref 26.6–33)
MCHC RBC AUTO-ENTMCNC: 35.2 G/DL (ref 31.5–35.7)
MCV RBC AUTO: 86.6 FL (ref 79–97)
MONOCYTES # BLD AUTO: 0.78 10*3/MM3 (ref 0.1–0.9)
MONOCYTES NFR BLD AUTO: 6.8 % (ref 5–12)
NEUTROPHILS NFR BLD AUTO: 76.5 % (ref 42.7–76)
NEUTROPHILS NFR BLD AUTO: 8.76 10*3/MM3 (ref 1.7–7)
NRBC BLD AUTO-RTO: 0 /100 WBC (ref 0–0.2)
PLATELET # BLD AUTO: 161 10*3/MM3 (ref 140–450)
PMV BLD AUTO: 10.4 FL (ref 6–12)
POTASSIUM SERPL-SCNC: 4 MMOL/L (ref 3.5–5.2)
RBC # BLD AUTO: 4.63 10*6/MM3 (ref 4.14–5.8)
SODIUM SERPL-SCNC: 140 MMOL/L (ref 136–145)
WBC NRBC COR # BLD: 11.47 10*3/MM3 (ref 3.4–10.8)

## 2022-08-15 PROCEDURE — 94799 UNLISTED PULMONARY SVC/PX: CPT

## 2022-08-15 PROCEDURE — 25010000002 HYDROMORPHONE 1 MG/ML SOLUTION: Performed by: HOSPITALIST

## 2022-08-15 PROCEDURE — 73502 X-RAY EXAM HIP UNI 2-3 VIEWS: CPT

## 2022-08-15 PROCEDURE — 25010000002 ROPIVACAINE PER 1 MG: Performed by: ORTHOPAEDIC SURGERY

## 2022-08-15 PROCEDURE — C1776 JOINT DEVICE (IMPLANTABLE): HCPCS | Performed by: ORTHOPAEDIC SURGERY

## 2022-08-15 PROCEDURE — 80048 BASIC METABOLIC PNL TOTAL CA: CPT | Performed by: HOSPITALIST

## 2022-08-15 PROCEDURE — 25010000002 MORPHINE PER 10 MG: Performed by: INTERNAL MEDICINE

## 2022-08-15 PROCEDURE — 25010000002 DEXAMETHASONE PER 1 MG: Performed by: NURSE ANESTHETIST, CERTIFIED REGISTERED

## 2022-08-15 PROCEDURE — 99223 1ST HOSP IP/OBS HIGH 75: CPT | Performed by: HOSPITALIST

## 2022-08-15 PROCEDURE — 63710000001 INSULIN DETEMIR PER 5 UNITS: Performed by: INTERNAL MEDICINE

## 2022-08-15 PROCEDURE — 83735 ASSAY OF MAGNESIUM: CPT | Performed by: HOSPITALIST

## 2022-08-15 PROCEDURE — 27130 TOTAL HIP ARTHROPLASTY: CPT | Performed by: ORTHOPAEDIC SURGERY

## 2022-08-15 PROCEDURE — 25010000002 MORPHINE (PF) 10 MG/ML SOLUTION: Performed by: ORTHOPAEDIC SURGERY

## 2022-08-15 PROCEDURE — 82962 GLUCOSE BLOOD TEST: CPT

## 2022-08-15 PROCEDURE — 25010000002 CEFAZOLIN IN DEXTROSE 2-4 GM/100ML-% SOLUTION: Performed by: ORTHOPAEDIC SURGERY

## 2022-08-15 PROCEDURE — 76000 FLUOROSCOPY <1 HR PHYS/QHP: CPT

## 2022-08-15 PROCEDURE — 85025 COMPLETE CBC W/AUTO DIFF WBC: CPT | Performed by: HOSPITALIST

## 2022-08-15 PROCEDURE — 25010000002 ONDANSETRON PER 1 MG: Performed by: NURSE ANESTHETIST, CERTIFIED REGISTERED

## 2022-08-15 PROCEDURE — 25010000002 CEFAZOLIN PER 500 MG: Performed by: NURSE ANESTHETIST, CERTIFIED REGISTERED

## 2022-08-15 PROCEDURE — 99222 1ST HOSP IP/OBS MODERATE 55: CPT | Performed by: ORTHOPAEDIC SURGERY

## 2022-08-15 PROCEDURE — 0SRB04A REPLACEMENT OF LEFT HIP JOINT WITH CERAMIC ON POLYETHYLENE SYNTHETIC SUBSTITUTE, UNCEMENTED, OPEN APPROACH: ICD-10-PCS | Performed by: ORTHOPAEDIC SURGERY

## 2022-08-15 PROCEDURE — 25010000002 PROPOFOL 10 MG/ML EMULSION: Performed by: NURSE ANESTHETIST, CERTIFIED REGISTERED

## 2022-08-15 PROCEDURE — 94640 AIRWAY INHALATION TREATMENT: CPT

## 2022-08-15 PROCEDURE — 63710000001 INSULIN LISPRO (HUMAN) PER 5 UNITS: Performed by: ORTHOPAEDIC SURGERY

## 2022-08-15 PROCEDURE — 25010000002 FENTANYL CITRATE (PF) 50 MCG/ML SOLUTION: Performed by: NURSE ANESTHETIST, CERTIFIED REGISTERED

## 2022-08-15 PROCEDURE — 25010000002 EPINEPHRINE 1 MG/ML SOLUTION: Performed by: ORTHOPAEDIC SURGERY

## 2022-08-15 PROCEDURE — 25010000002 KETOROLAC TROMETHAMINE PER 15 MG: Performed by: ORTHOPAEDIC SURGERY

## 2022-08-15 DEVICE — BIOLOX® DELTA, CERAMIC FEMORAL HEAD, S, Ø 36/-3.5, TAPER 12/14
Type: IMPLANTABLE DEVICE | Site: HIP | Status: FUNCTIONAL
Brand: BIOLOX® DELTA

## 2022-08-15 DEVICE — SHLL ACET G7 PPS LTD/HL TI SZF 56MM: Type: IMPLANTABLE DEVICE | Site: HIP | Status: FUNCTIONAL

## 2022-08-15 DEVICE — IMPLANTABLE DEVICE: Type: IMPLANTABLE DEVICE | Site: HIP | Status: FUNCTIONAL

## 2022-08-15 DEVICE — TOTAL HIP PRIMARY: Type: IMPLANTABLE DEVICE | Site: HIP | Status: FUNCTIONAL

## 2022-08-15 DEVICE — LINER ACET G7 VIVACIT/E NTRL HXPE SZF 36MM: Type: IMPLANTABLE DEVICE | Site: HIP | Status: FUNCTIONAL

## 2022-08-15 DEVICE — SCRW ACET CORT TRILOGY S/TAP 6.5X25: Type: IMPLANTABLE DEVICE | Site: HIP | Status: FUNCTIONAL

## 2022-08-15 RX ORDER — SILDENAFIL 100 MG/1
100 TABLET, FILM COATED ORAL DAILY PRN
COMMUNITY
End: 2022-11-08 | Stop reason: ALTCHOICE

## 2022-08-15 RX ORDER — KETOROLAC TROMETHAMINE 10 MG/1
10 TABLET, FILM COATED ORAL EVERY 6 HOURS PRN
Status: DISCONTINUED | OUTPATIENT
Start: 2022-08-15 | End: 2022-08-15

## 2022-08-15 RX ORDER — PROMETHAZINE HYDROCHLORIDE 12.5 MG/1
12.5 SUPPOSITORY RECTAL EVERY 6 HOURS PRN
Status: DISCONTINUED | OUTPATIENT
Start: 2022-08-15 | End: 2022-08-19 | Stop reason: HOSPADM

## 2022-08-15 RX ORDER — SUCCINYLCHOLINE/SOD CL,ISO/PF 100 MG/5ML
SYRINGE (ML) INTRAVENOUS AS NEEDED
Status: DISCONTINUED | OUTPATIENT
Start: 2022-08-15 | End: 2022-08-15 | Stop reason: SURG

## 2022-08-15 RX ORDER — SODIUM CHLORIDE 0.9 % (FLUSH) 0.9 %
10 SYRINGE (ML) INJECTION AS NEEDED
Status: DISCONTINUED | OUTPATIENT
Start: 2022-08-15 | End: 2022-08-19 | Stop reason: HOSPADM

## 2022-08-15 RX ORDER — PROMETHAZINE HYDROCHLORIDE 25 MG/1
25 SUPPOSITORY RECTAL ONCE AS NEEDED
Status: DISCONTINUED | OUTPATIENT
Start: 2022-08-15 | End: 2022-08-15 | Stop reason: HOSPADM

## 2022-08-15 RX ORDER — ALBUTEROL SULFATE 90 UG/1
2 AEROSOL, METERED RESPIRATORY (INHALATION) EVERY 4 HOURS PRN
Status: DISCONTINUED | OUTPATIENT
Start: 2022-08-15 | End: 2022-08-19 | Stop reason: HOSPADM

## 2022-08-15 RX ORDER — PROPOFOL 10 MG/ML
VIAL (ML) INTRAVENOUS AS NEEDED
Status: DISCONTINUED | OUTPATIENT
Start: 2022-08-15 | End: 2022-08-15 | Stop reason: SURG

## 2022-08-15 RX ORDER — IPRATROPIUM BROMIDE AND ALBUTEROL SULFATE 2.5; .5 MG/3ML; MG/3ML
3 SOLUTION RESPIRATORY (INHALATION) ONCE
Status: COMPLETED | OUTPATIENT
Start: 2022-08-15 | End: 2022-08-15

## 2022-08-15 RX ORDER — ONDANSETRON 2 MG/ML
INJECTION INTRAMUSCULAR; INTRAVENOUS AS NEEDED
Status: DISCONTINUED | OUTPATIENT
Start: 2022-08-15 | End: 2022-08-15 | Stop reason: SURG

## 2022-08-15 RX ORDER — DEXAMETHASONE SODIUM PHOSPHATE 4 MG/ML
INJECTION, SOLUTION INTRA-ARTICULAR; INTRALESIONAL; INTRAMUSCULAR; INTRAVENOUS; SOFT TISSUE AS NEEDED
Status: DISCONTINUED | OUTPATIENT
Start: 2022-08-15 | End: 2022-08-15 | Stop reason: SURG

## 2022-08-15 RX ORDER — ONDANSETRON 4 MG/1
4 TABLET, FILM COATED ORAL EVERY 6 HOURS PRN
Status: DISCONTINUED | OUTPATIENT
Start: 2022-08-15 | End: 2022-08-19 | Stop reason: HOSPADM

## 2022-08-15 RX ORDER — ACETAMINOPHEN 325 MG/1
325 TABLET ORAL EVERY 4 HOURS PRN
Status: DISCONTINUED | OUTPATIENT
Start: 2022-08-15 | End: 2022-08-19 | Stop reason: HOSPADM

## 2022-08-15 RX ORDER — MAGNESIUM HYDROXIDE 1200 MG/15ML
LIQUID ORAL AS NEEDED
Status: DISCONTINUED | OUTPATIENT
Start: 2022-08-15 | End: 2022-08-15 | Stop reason: HOSPADM

## 2022-08-15 RX ORDER — PHENYLEPHRINE HCL IN 0.9% NACL 1 MG/10 ML
SYRINGE (ML) INTRAVENOUS AS NEEDED
Status: DISCONTINUED | OUTPATIENT
Start: 2022-08-15 | End: 2022-08-15 | Stop reason: SURG

## 2022-08-15 RX ORDER — CEFAZOLIN SODIUM 2 G/100ML
2 INJECTION, SOLUTION INTRAVENOUS
Status: DISCONTINUED | OUTPATIENT
Start: 2022-08-15 | End: 2022-08-15 | Stop reason: HOSPADM

## 2022-08-15 RX ORDER — KETOTIFEN FUMARATE 0.35 MG/ML
1 SOLUTION/ DROPS OPHTHALMIC 2 TIMES DAILY
Status: DISCONTINUED | OUTPATIENT
Start: 2022-08-15 | End: 2022-08-15

## 2022-08-15 RX ORDER — MEPERIDINE HYDROCHLORIDE 25 MG/ML
12.5 INJECTION INTRAMUSCULAR; INTRAVENOUS; SUBCUTANEOUS
Status: DISCONTINUED | OUTPATIENT
Start: 2022-08-15 | End: 2022-08-15 | Stop reason: HOSPADM

## 2022-08-15 RX ORDER — SODIUM CHLORIDE, SODIUM LACTATE, POTASSIUM CHLORIDE, CALCIUM CHLORIDE 600; 310; 30; 20 MG/100ML; MG/100ML; MG/100ML; MG/100ML
100 INJECTION, SOLUTION INTRAVENOUS CONTINUOUS
Status: DISCONTINUED | OUTPATIENT
Start: 2022-08-15 | End: 2022-08-19 | Stop reason: HOSPADM

## 2022-08-15 RX ORDER — ATORVASTATIN CALCIUM 10 MG/1
10 TABLET, FILM COATED ORAL DAILY
Status: DISCONTINUED | OUTPATIENT
Start: 2022-08-15 | End: 2022-08-15

## 2022-08-15 RX ORDER — DEXTROSE MONOHYDRATE 25 G/50ML
25 INJECTION, SOLUTION INTRAVENOUS
Status: DISCONTINUED | OUTPATIENT
Start: 2022-08-15 | End: 2022-08-19 | Stop reason: HOSPADM

## 2022-08-15 RX ORDER — PANTOPRAZOLE SODIUM 40 MG/1
40 TABLET, DELAYED RELEASE ORAL EVERY MORNING
Status: DISCONTINUED | OUTPATIENT
Start: 2022-08-15 | End: 2022-08-19 | Stop reason: HOSPADM

## 2022-08-15 RX ORDER — OXYCODONE HYDROCHLORIDE AND ACETAMINOPHEN 5; 325 MG/1; MG/1
1 TABLET ORAL EVERY 4 HOURS PRN
Status: DISCONTINUED | OUTPATIENT
Start: 2022-08-15 | End: 2022-08-19 | Stop reason: HOSPADM

## 2022-08-15 RX ORDER — AMOXICILLIN 250 MG
2 CAPSULE ORAL 2 TIMES DAILY
Status: DISCONTINUED | OUTPATIENT
Start: 2022-08-15 | End: 2022-08-19 | Stop reason: HOSPADM

## 2022-08-15 RX ORDER — CEFAZOLIN SODIUM 2 G/100ML
2 INJECTION, SOLUTION INTRAVENOUS
Status: DISCONTINUED | OUTPATIENT
Start: 2022-08-16 | End: 2022-08-15

## 2022-08-15 RX ORDER — HYDROCODONE BITARTRATE AND ACETAMINOPHEN 5; 325 MG/1; MG/1
1 TABLET ORAL EVERY 4 HOURS PRN
Status: DISCONTINUED | OUTPATIENT
Start: 2022-08-15 | End: 2022-08-15

## 2022-08-15 RX ORDER — ONDANSETRON 2 MG/ML
4 INJECTION INTRAMUSCULAR; INTRAVENOUS EVERY 6 HOURS PRN
Status: DISCONTINUED | OUTPATIENT
Start: 2022-08-15 | End: 2022-08-15

## 2022-08-15 RX ORDER — LANOLIN ALCOHOL/MO/W.PET/CERES
6 CREAM (GRAM) TOPICAL
COMMUNITY
End: 2022-11-08 | Stop reason: ALTCHOICE

## 2022-08-15 RX ORDER — CYCLOBENZAPRINE HCL 10 MG
10 TABLET ORAL 3 TIMES DAILY
Status: DISCONTINUED | OUTPATIENT
Start: 2022-08-15 | End: 2022-08-15

## 2022-08-15 RX ORDER — DULOXETIN HYDROCHLORIDE 30 MG/1
30 CAPSULE, DELAYED RELEASE ORAL DAILY
Status: DISCONTINUED | OUTPATIENT
Start: 2022-08-15 | End: 2022-08-19 | Stop reason: HOSPADM

## 2022-08-15 RX ORDER — CHOLECALCIFEROL (VITAMIN D3) 125 MCG
5 CAPSULE ORAL NIGHTLY PRN
Status: DISCONTINUED | OUTPATIENT
Start: 2022-08-15 | End: 2022-08-19 | Stop reason: HOSPADM

## 2022-08-15 RX ORDER — METOPROLOL TARTRATE 50 MG/1
100 TABLET, FILM COATED ORAL 2 TIMES DAILY
Status: DISCONTINUED | OUTPATIENT
Start: 2022-08-15 | End: 2022-08-16

## 2022-08-15 RX ORDER — NALOXONE HCL 0.4 MG/ML
0.4 VIAL (ML) INJECTION
Status: DISCONTINUED | OUTPATIENT
Start: 2022-08-15 | End: 2022-08-15

## 2022-08-15 RX ORDER — NICOTINE POLACRILEX 4 MG
15 LOZENGE BUCCAL
Status: DISCONTINUED | OUTPATIENT
Start: 2022-08-15 | End: 2022-08-19 | Stop reason: HOSPADM

## 2022-08-15 RX ORDER — BISACODYL 5 MG/1
5 TABLET, DELAYED RELEASE ORAL DAILY PRN
Status: DISCONTINUED | OUTPATIENT
Start: 2022-08-15 | End: 2022-08-19 | Stop reason: HOSPADM

## 2022-08-15 RX ORDER — BISACODYL 10 MG
10 SUPPOSITORY, RECTAL RECTAL DAILY PRN
Status: DISCONTINUED | OUTPATIENT
Start: 2022-08-15 | End: 2022-08-19 | Stop reason: HOSPADM

## 2022-08-15 RX ORDER — KETOROLAC TROMETHAMINE 30 MG/ML
15 INJECTION, SOLUTION INTRAMUSCULAR; INTRAVENOUS EVERY 6 HOURS
Status: COMPLETED | OUTPATIENT
Start: 2022-08-15 | End: 2022-08-16

## 2022-08-15 RX ORDER — FAMOTIDINE 40 MG/1
40 TABLET, FILM COATED ORAL NIGHTLY
COMMUNITY
End: 2022-11-08 | Stop reason: ALTCHOICE

## 2022-08-15 RX ORDER — HYDROCODONE BITARTRATE AND ACETAMINOPHEN 10; 325 MG/1; MG/1
1 TABLET ORAL EVERY 4 HOURS PRN
Status: DISCONTINUED | OUTPATIENT
Start: 2022-08-15 | End: 2022-08-15

## 2022-08-15 RX ORDER — OXYCODONE HYDROCHLORIDE 5 MG/1
5 TABLET ORAL
Status: DISCONTINUED | OUTPATIENT
Start: 2022-08-15 | End: 2022-08-15 | Stop reason: HOSPADM

## 2022-08-15 RX ORDER — ONDANSETRON 4 MG/1
4 TABLET, FILM COATED ORAL EVERY 6 HOURS PRN
Status: DISCONTINUED | OUTPATIENT
Start: 2022-08-15 | End: 2022-08-15

## 2022-08-15 RX ORDER — CEFAZOLIN SODIUM 2 G/100ML
2 INJECTION, SOLUTION INTRAVENOUS EVERY 8 HOURS
Status: COMPLETED | OUTPATIENT
Start: 2022-08-15 | End: 2022-08-16

## 2022-08-15 RX ORDER — INSULIN LISPRO 100 [IU]/ML
0-14 INJECTION, SOLUTION INTRAVENOUS; SUBCUTANEOUS
Status: DISCONTINUED | OUTPATIENT
Start: 2022-08-15 | End: 2022-08-19 | Stop reason: HOSPADM

## 2022-08-15 RX ORDER — SODIUM CHLORIDE 9 MG/ML
40 INJECTION, SOLUTION INTRAVENOUS AS NEEDED
Status: DISCONTINUED | OUTPATIENT
Start: 2022-08-15 | End: 2022-08-19 | Stop reason: HOSPADM

## 2022-08-15 RX ORDER — PROMETHAZINE HYDROCHLORIDE 12.5 MG/1
12.5 TABLET ORAL EVERY 6 HOURS PRN
Status: DISCONTINUED | OUTPATIENT
Start: 2022-08-15 | End: 2022-08-19 | Stop reason: HOSPADM

## 2022-08-15 RX ORDER — LIDOCAINE HYDROCHLORIDE 20 MG/ML
INJECTION, SOLUTION EPIDURAL; INFILTRATION; INTRACAUDAL; PERINEURAL AS NEEDED
Status: DISCONTINUED | OUTPATIENT
Start: 2022-08-15 | End: 2022-08-15 | Stop reason: SURG

## 2022-08-15 RX ORDER — PREGABALIN 100 MG/1
100 CAPSULE ORAL EVERY 12 HOURS SCHEDULED
Status: DISCONTINUED | OUTPATIENT
Start: 2022-08-15 | End: 2022-08-15

## 2022-08-15 RX ORDER — DOCUSATE SODIUM 100 MG/1
100 CAPSULE, LIQUID FILLED ORAL 2 TIMES DAILY PRN
Status: DISCONTINUED | OUTPATIENT
Start: 2022-08-15 | End: 2022-08-19 | Stop reason: HOSPADM

## 2022-08-15 RX ORDER — OXYCODONE HYDROCHLORIDE AND ACETAMINOPHEN 5; 325 MG/1; MG/1
1 TABLET ORAL EVERY 6 HOURS PRN
Status: DISCONTINUED | OUTPATIENT
Start: 2022-08-15 | End: 2022-08-17

## 2022-08-15 RX ORDER — ONDANSETRON 2 MG/ML
4 INJECTION INTRAMUSCULAR; INTRAVENOUS EVERY 6 HOURS PRN
Status: DISCONTINUED | OUTPATIENT
Start: 2022-08-15 | End: 2022-08-19 | Stop reason: HOSPADM

## 2022-08-15 RX ORDER — ACETAMINOPHEN 325 MG/1
650 TABLET ORAL EVERY 4 HOURS PRN
Status: DISCONTINUED | OUTPATIENT
Start: 2022-08-15 | End: 2022-08-19 | Stop reason: HOSPADM

## 2022-08-15 RX ORDER — ACETAMINOPHEN 500 MG
1000 TABLET ORAL EVERY 8 HOURS
Status: DISPENSED | OUTPATIENT
Start: 2022-08-15 | End: 2022-08-17

## 2022-08-15 RX ORDER — PROMETHAZINE HYDROCHLORIDE 12.5 MG/1
25 TABLET ORAL ONCE AS NEEDED
Status: DISCONTINUED | OUTPATIENT
Start: 2022-08-15 | End: 2022-08-15 | Stop reason: HOSPADM

## 2022-08-15 RX ORDER — FENTANYL CITRATE 50 UG/ML
INJECTION, SOLUTION INTRAMUSCULAR; INTRAVENOUS AS NEEDED
Status: DISCONTINUED | OUTPATIENT
Start: 2022-08-15 | End: 2022-08-15 | Stop reason: SURG

## 2022-08-15 RX ORDER — CEFAZOLIN SODIUM 1 G/3ML
INJECTION, POWDER, FOR SOLUTION INTRAMUSCULAR; INTRAVENOUS AS NEEDED
Status: DISCONTINUED | OUTPATIENT
Start: 2022-08-15 | End: 2022-08-15 | Stop reason: SURG

## 2022-08-15 RX ORDER — ROCURONIUM BROMIDE 10 MG/ML
INJECTION, SOLUTION INTRAVENOUS AS NEEDED
Status: DISCONTINUED | OUTPATIENT
Start: 2022-08-15 | End: 2022-08-15 | Stop reason: SURG

## 2022-08-15 RX ORDER — SODIUM CHLORIDE, SODIUM LACTATE, POTASSIUM CHLORIDE, CALCIUM CHLORIDE 600; 310; 30; 20 MG/100ML; MG/100ML; MG/100ML; MG/100ML
INJECTION, SOLUTION INTRAVENOUS CONTINUOUS PRN
Status: DISCONTINUED | OUTPATIENT
Start: 2022-08-15 | End: 2022-08-15 | Stop reason: SURG

## 2022-08-15 RX ORDER — RIMEGEPANT SULFATE 75 MG/75MG
75 TABLET, ORALLY DISINTEGRATING ORAL EVERY OTHER DAY
COMMUNITY
End: 2022-11-08 | Stop reason: ALTCHOICE

## 2022-08-15 RX ORDER — ONDANSETRON 2 MG/ML
4 INJECTION INTRAMUSCULAR; INTRAVENOUS ONCE AS NEEDED
Status: DISCONTINUED | OUTPATIENT
Start: 2022-08-15 | End: 2022-08-15 | Stop reason: HOSPADM

## 2022-08-15 RX ORDER — TRAMADOL HYDROCHLORIDE 50 MG/1
50 TABLET ORAL EVERY 8 HOURS PRN
Status: DISCONTINUED | OUTPATIENT
Start: 2022-08-15 | End: 2022-08-17

## 2022-08-15 RX ORDER — POLYETHYLENE GLYCOL 3350 17 G/17G
17 POWDER, FOR SOLUTION ORAL DAILY PRN
Status: DISCONTINUED | OUTPATIENT
Start: 2022-08-15 | End: 2022-08-19 | Stop reason: HOSPADM

## 2022-08-15 RX ORDER — SODIUM CHLORIDE 0.9 % (FLUSH) 0.9 %
10 SYRINGE (ML) INJECTION EVERY 12 HOURS SCHEDULED
Status: DISCONTINUED | OUTPATIENT
Start: 2022-08-15 | End: 2022-08-19 | Stop reason: HOSPADM

## 2022-08-15 RX ADMIN — SENNOSIDES AND DOCUSATE SODIUM 2 TABLET: 8.6; 5 TABLET ORAL at 21:33

## 2022-08-15 RX ADMIN — KETOROLAC TROMETHAMINE 15 MG: 30 INJECTION, SOLUTION INTRAMUSCULAR; INTRAVENOUS at 19:21

## 2022-08-15 RX ADMIN — LIDOCAINE HYDROCHLORIDE 100 MG: 20 INJECTION, SOLUTION EPIDURAL; INFILTRATION; INTRACAUDAL; PERINEURAL at 15:20

## 2022-08-15 RX ADMIN — ROCURONIUM BROMIDE 30 MG: 10 INJECTION INTRAVENOUS at 16:10

## 2022-08-15 RX ADMIN — SUGAMMADEX 200 MG: 100 INJECTION, SOLUTION INTRAVENOUS at 16:38

## 2022-08-15 RX ADMIN — ROCURONIUM BROMIDE 5 MG: 10 INJECTION INTRAVENOUS at 15:20

## 2022-08-15 RX ADMIN — Medication 80 MG: at 15:20

## 2022-08-15 RX ADMIN — PROPOFOL 150 MG: 10 INJECTION, EMULSION INTRAVENOUS at 15:20

## 2022-08-15 RX ADMIN — ROCURONIUM BROMIDE 45 MG: 10 INJECTION INTRAVENOUS at 15:34

## 2022-08-15 RX ADMIN — KETOROLAC TROMETHAMINE 10 MG: 10 TABLET, FILM COATED ORAL at 06:13

## 2022-08-15 RX ADMIN — Medication 100 MCG: at 15:26

## 2022-08-15 RX ADMIN — PANTOPRAZOLE SODIUM 40 MG: 40 TABLET, DELAYED RELEASE ORAL at 06:13

## 2022-08-15 RX ADMIN — INSULIN DETEMIR 20 UNITS: 100 INJECTION, SOLUTION SUBCUTANEOUS at 09:53

## 2022-08-15 RX ADMIN — INSULIN LISPRO 5 UNITS: 100 INJECTION, SOLUTION INTRAVENOUS; SUBCUTANEOUS at 19:21

## 2022-08-15 RX ADMIN — SODIUM CHLORIDE, POTASSIUM CHLORIDE, SODIUM LACTATE AND CALCIUM CHLORIDE 100 ML/HR: 600; 310; 30; 20 INJECTION, SOLUTION INTRAVENOUS at 19:22

## 2022-08-15 RX ADMIN — DEXAMETHASONE SODIUM PHOSPHATE 4 MG: 4 INJECTION, SOLUTION INTRA-ARTICULAR; INTRALESIONAL; INTRAMUSCULAR; INTRAVENOUS; SOFT TISSUE at 15:15

## 2022-08-15 RX ADMIN — IPRATROPIUM BROMIDE AND ALBUTEROL SULFATE 3 ML: .5; 3 SOLUTION RESPIRATORY (INHALATION) at 14:15

## 2022-08-15 RX ADMIN — HYDROMORPHONE HYDROCHLORIDE 1 MG: 1 INJECTION, SOLUTION INTRAMUSCULAR; INTRAVENOUS; SUBCUTANEOUS at 02:11

## 2022-08-15 RX ADMIN — MORPHINE SULFATE 4 MG: 4 INJECTION, SOLUTION INTRAMUSCULAR; INTRAVENOUS at 13:51

## 2022-08-15 RX ADMIN — SODIUM CHLORIDE, POTASSIUM CHLORIDE, SODIUM LACTATE AND CALCIUM CHLORIDE: 600; 310; 30; 20 INJECTION, SOLUTION INTRAVENOUS at 15:14

## 2022-08-15 RX ADMIN — ACETAMINOPHEN 1000 MG: 500 TABLET ORAL at 19:22

## 2022-08-15 RX ADMIN — OXYCODONE HYDROCHLORIDE AND ACETAMINOPHEN 1 TABLET: 5; 325 TABLET ORAL at 03:37

## 2022-08-15 RX ADMIN — CEFAZOLIN SODIUM 2 G: 1 INJECTION, POWDER, FOR SOLUTION INTRAMUSCULAR; INTRAVENOUS at 15:14

## 2022-08-15 RX ADMIN — CEFAZOLIN SODIUM 2 G: 2 INJECTION, SOLUTION INTRAVENOUS at 23:25

## 2022-08-15 RX ADMIN — Medication 10 ML: at 02:12

## 2022-08-15 RX ADMIN — ONDANSETRON 4 MG: 2 INJECTION INTRAMUSCULAR; INTRAVENOUS at 16:38

## 2022-08-15 RX ADMIN — FENTANYL CITRATE 50 MCG: 50 INJECTION, SOLUTION INTRAMUSCULAR; INTRAVENOUS at 15:43

## 2022-08-15 RX ADMIN — Medication 200 MCG: at 16:27

## 2022-08-15 RX ADMIN — Medication 100 MCG: at 16:03

## 2022-08-15 RX ADMIN — FENTANYL CITRATE 50 MCG: 50 INJECTION, SOLUTION INTRAMUSCULAR; INTRAVENOUS at 15:53

## 2022-08-15 RX ADMIN — Medication 100 MCG: at 15:39

## 2022-08-15 NOTE — ANESTHESIA POSTPROCEDURE EVALUATION
Patient: Dhaval Nieto    Procedure Summary     Date: 08/15/22 Room / Location: [facility] OR 03 / [facility] MAIN OR    Anesthesia Start: 1514 Anesthesia Stop: 1647    Procedure: TOTAL HIP ARTHROPLASTY ANTERIOR (Left Hip) Diagnosis:       Closed fracture of left hip, initial encounter ([facility])      (Closed fracture of left hip, initial encounter ([facility]) [S72.002A])    Surgeons: Kaden Green MD Provider: Isiah Arreguin MD    Anesthesia Type: general ASA Status: 3          Anesthesia Type: general    Vitals  Vitals Value Taken Time   BP 93/51 08/15/22 1720   Temp 37.6 °C (99.7 °F) 08/15/22 1650   Pulse 93 08/15/22 1721   Resp 15 08/15/22 1705   SpO2 96 % 08/15/22 1721   Vitals shown include unvalidated device data.        Post Anesthesia Care and Evaluation    Patient location during evaluation: bedside  Patient participation: complete - patient participated  Level of consciousness: awake  Pain management: adequate    Airway patency: patent  Anesthetic complications: No anesthetic complications  PONV Status: none  Cardiovascular status: acceptable and stable  Respiratory status: acceptable  Hydration status: acceptable    Comments: An Anesthesiologist personally participated in the most demanding procedures (including induction and emergence if applicable) in the anesthesia plan, monitored the course of anesthesia administration at frequent intervals and remained physically present and available for immediate diagnosis and treatment of emergencies.

## 2022-08-15 NOTE — ED PROVIDER NOTES
Time: 10:13 PM EDT  Arrived by: private car  Chief Complaint: Fall  History provided by: Patient, Wife  History is limited by: Pt condition     History of Present Illness:  Patient is a 67 y.o. year old male with a hx of TIA and PTSD who presents to the emergency department with his wife after a fall that occurred today. Pt's wife states that she found pt after a fall tonight. Pt has a hx of falls and falls frequently s/p TIA 2 years ago. Pt's wife reports pt has hallucinations and is currently hallucinating about a man wit a gun trying to hurt him. Per wife report,  Pt had recent weight loss, sleep disturbances, and loss of appetite. Pt fell on his L side today and reports pain on the L side of his body in his L shoulder, L arm, L knee, L hip, and L ankle. Pt denies LOC, head injury, or SI.    HPI    Similar Symptoms Previously: Yes  Recently seen: N/A      Patient Care Team  Primary Care Provider: Christiano Mcbride MD    Past Medical History:     Allergies   Allergen Reactions   • Latex Itching     Past Medical History:   Diagnosis Date   • Arteriovenous malformation 03/09/1980   • Arthritis    • Asthma    • Benign essential hypertension    • Bladder disorder    • Bleeding disorder ([facility])    • Blood disease    • BPH with urinary obstruction 05/30/2014   • Brain concussion 1999   • Cancer ([facility])    • Cervical disc disorder long time   • Cervical radiculopathy 05/01/2015    left   • Cervical spinal stenosis 03/10/2020   • Cervicalgia 12/18/2018   • Chest pain    • CHF (congestive heart failure) ([facility])    • Clotting disorder ([facility]) Elequis   • Colon cancer ([facility]) 12/18/2018   • Condition not found     Hernia   • Condition not found     herniated disc   • COPD (chronic obstructive pulmonary disease) ([facility])    • Coronary artery disease    • CTS (carpal tunnel syndrome) 08/04/2002   • DDD (degenerative disc disease), thoracic 12/18/2018   • Decreased sensation 05/01/2015    left   • Deep vein thrombosis ([facility])    •  Depression    • Diabetes ([facility])    • Diabetes mellitus type 1 with coma, insulin dependent ([facility])     controlled   • Headache    • Heart murmur    • Heart valve disease    • Hematuria, gross 05/30/2014   • Hyperlipemia    • Hyperlipidemia    • Hypertension    • Insomnia    • Leg pain    • Leg pain    • Limb swelling    • Loss of balance 12/18/2018   • Low back pain 03/10/2020   • Lumbar degenerative disc disease 12/18/2018   • Lumbar radiculopathy 03/10/2020   • Lumbosacral disc disease 05/05/1999   • Mid back pain 12/18/2018   • Migraines    • Muscle cramps    • Myocardial infarction ([facility])    • Neurologic disorder    • Pain in back    • Pain in joint    • Pain of cervical spine    • Pain, generalized    • Pulmonary arterial hypertension ([facility]) 1999   • Seasonal allergies    • Seizures ([facility]) 2000   • Shortness of breath    • Sleep apnea    • Stomach disorder      Past Surgical History:   Procedure Laterality Date   • ABDOMINAL SURGERY     • BLADDER SURGERY     • CARPAL TUNNEL RELEASE     • CEREBRAL ANGIOGRAM  2021   • COLON RESECTION     • COLONOSCOPY  01/30/2017    [place]   • CORONARY ARTERY BYPASS GRAFT  12/2019    one vessel   • GALLBLADDER SURGERY      cholecystecomy   • HERNIA REPAIR     • JOINT REPLACEMENT      joint surgery   • MITRAL VALVE REPAIR/REPLACEMENT  02/2019    [facility]   • OTHER SURGICAL HISTORY  05/30/2014    office cystoscopy w/DR SHANICE Young   • SHOULDER SURGERY Bilateral    • VASCULAR SURGERY  2020   • VENTRAL HERNIA REPAIR       Family History   Problem Relation Age of Onset   • Heart attack Father         MI   • Heart failure Father    • Anxiety disorder Father    • Cancer Father    • COPD Father    • Depression Father    • Diabetes Father    • Heart disease Father    • Hypertension Father    • Stroke Brother    • Developmental Disability Brother    • Hearing loss Brother    • Mental illness Brother    • Vision loss Brother    • Arthritis Mother    • Asthma Mother    • Hyperlipidemia  Mother        Home Medications:  Prior to Admission medications    Medication Sig Start Date End Date Taking? Authorizing Provider   albuterol sulfate  (90 Base) MCG/ACT inhaler ProAir HFA 90 mcg/actuation aerosol inhaler    Carissa Boyle MD   ammonium lactate (LAC-HYDRIN) 12 % lotion ammonium lactate 12 % lotion    Carissa Boyle MD   amphetamine-dextroamphetamine XR (ADDERALL XR) 15 MG 24 hr capsule Adderall XR 15 mg oral capsule,extended release 24hr take 1 capsule (15 mg) by oral route once daily in the morning upon awakening   Suspended    Carissa Boyle MD   apixaban (ELIQUIS) 5 MG tablet tablet Take 5 mg by mouth 2 (Two) Times a Day.    Carissa Boyle MD   atorvastatin (LIPITOR) 40 MG tablet Daily.    Carissa Boyle MD   cyclobenzaprine (FLEXERIL) 10 MG tablet Take 1 tablet by mouth 3 (Three) Times a Day. 8/11/22   Indira Wang MD   Diclofenac Sodium (VOLTAREN) 1 % gel gel Apply 4 g topically to the appropriate area as directed 4 (Four) Times a Day As Needed (PAIN). 2/17/22   Misbah Vick MD   DULoxetine (Cymbalta) 30 MG capsule Take 1 capsule by mouth Daily for 30 days. 7/13/22 8/12/22  Tacos Newby MD   insulin aspart (NovoLOG FlexPen) 100 UNIT/ML solution pen-injector sc pen 1 (One) Time.    Carissa Boyle MD   Insulin Glargine (Lantus SoloStar) 100 UNIT/ML injection pen Daily.    Carissa Boyle MD   ketorolac (TORADOL) 10 MG tablet Take 1 tablet by mouth Every 6 (Six) Hours As Needed for Moderate Pain . 8/11/22   Indira Wang MD   ketotifen (ZADITOR) 0.025 % ophthalmic solution Administer  to both eyes. 3/10/21   Carissa Boyle MD   linaclotide (Linzess) 72 MCG capsule capsule Take 1 capsule by mouth Every Morning Before Breakfast for 90 days. 7/14/22 10/12/22  Tarsha Nichols APRN   metoprolol tartrate (LOPRESSOR) 100 MG tablet Take 1 tablet by mouth 2 (Two) Times a Day. 5/10/22   Viral Alvarez MD  "  oxyCODONE-acetaminophen (PERCOCET) 5-325 MG per tablet Take 1 tablet by mouth Every 6 (Six) Hours As Needed for Severe Pain . 8/11/22   Indira Wang MD   pantoprazole (PROTONIX) 40 MG EC tablet Take 1 tablet by mouth Daily. 7/14/22   Tarsha Nichols APRN   pregabalin (LYRICA) 100 MG capsule Take 100 mg by mouth 2 (Two) Times a Day.    Provider, MD Carissa   traMADol (ULTRAM) 50 MG tablet Take 1 tablet by mouth Every 8 (Eight) Hours As Needed for Moderate Pain  or Severe Pain . 7/7/22   Satish Veras MD        Social History:   Social History     Tobacco Use   • Smoking status: Current Some Day Smoker     Packs/day: 0.50     Years: 50.00     Pack years: 25.00     Types: Cigarettes, Cigarettes     Start date: 1/1/1970   • Smokeless tobacco: Never Used   Vaping Use   • Vaping Use: Never used   Substance Use Topics   • Alcohol use: Never   • Drug use: Never     Recent travel: not applicable     Review of Systems:  Review of Systems   Constitutional: Positive for appetite change and unexpected weight change. Negative for chills and fever.   HENT: Negative for sore throat.    Eyes: Negative for photophobia.   Respiratory: Negative for shortness of breath.    Cardiovascular: Negative for chest pain.   Gastrointestinal: Negative for abdominal pain, diarrhea, nausea and vomiting.   Genitourinary: Negative for dysuria.   Musculoskeletal: Positive for arthralgias (L shoulder, knee, ankle, hip) and myalgias. Negative for neck pain.   Skin: Negative for wound.   Neurological: Positive for speech difficulty. Negative for syncope and headaches.   Psychiatric/Behavioral: Positive for hallucinations and sleep disturbance. Negative for suicidal ideas.   All other systems reviewed and are negative.       Physical Exam:  /52   Pulse 86   Temp 98.6 °F (37 °C)   Resp 20   Ht 175.3 cm (69\")   Wt 68.4 kg (150 lb 12.7 oz)   SpO2 99%   BMI 22.27 kg/m²     Physical Exam  Vitals and nursing note reviewed. "   Constitutional:       General: He is not in acute distress.  HENT:      Head: Normocephalic and atraumatic.   Eyes:      Extraocular Movements: Extraocular movements intact.   Cardiovascular:      Rate and Rhythm: Normal rate and regular rhythm.   Pulmonary:      Effort: Pulmonary effort is normal. No respiratory distress.      Breath sounds: Normal breath sounds.   Abdominal:      General: Abdomen is flat.      Palpations: Abdomen is soft.      Tenderness: There is no abdominal tenderness.   Musculoskeletal:         General: Normal range of motion.      Right shoulder: Normal.      Left shoulder: Tenderness present. No swelling.      Right upper arm: Normal.      Left upper arm: Tenderness present. No swelling.      Right elbow: Normal.      Left elbow: No swelling. Tenderness present.      Cervical back: Normal, normal range of motion and neck supple. No swelling or tenderness.      Right hip: Tenderness (with flexion) present.      Left hip: Tenderness (with flexion) present.      Right knee: Normal.      Left knee: No swelling. Tenderness present.      Right ankle: Normal.      Left ankle: No swelling. Tenderness present.   Skin:     General: Skin is warm and dry.      Capillary Refill: Capillary refill takes less than 2 seconds.   Neurological:      Mental Status: He is alert and oriented to person, place, and time. Mental status is at baseline.                Medications in the Emergency Department:  Medications   albuterol sulfate HFA (PROVENTIL HFA;VENTOLIN HFA;PROAIR HFA) inhaler 2 puff (has no administration in time range)   DULoxetine (CYMBALTA) DR capsule 30 mg ( Oral Not Given 8/15/22 0941)   linaclotide (LINZESS) capsule 72 mcg (72 mcg Oral Not Given 8/15/22 1800)   metoprolol tartrate (LOPRESSOR) tablet 100 mg (100 mg Oral Not Given 8/15/22 6024)   oxyCODONE-acetaminophen (PERCOCET) 5-325 MG per tablet 1 tablet (1 tablet Oral Given 8/15/22 9402)   pantoprazole (PROTONIX) EC tablet 40 mg (40 mg Oral  Given 8/15/22 0613)   traMADol (ULTRAM) tablet 50 mg (has no administration in time range)   sodium chloride 0.9 % flush 10 mL (has no administration in time range)   sodium chloride 0.9 % flush 10 mL (10 mL Intravenous Not Given 8/15/22 2125)   sodium chloride 0.9 % infusion 40 mL (has no administration in time range)   acetaminophen (TYLENOL) tablet 650 mg (has no administration in time range)   melatonin tablet 5 mg (has no administration in time range)   sennosides-docusate (PERICOLACE) 8.6-50 MG per tablet 2 tablet (2 tablets Oral Given 8/15/22 2133)     And   polyethylene glycol (MIRALAX) packet 17 g (has no administration in time range)     And   bisacodyl (DULCOLAX) EC tablet 5 mg (has no administration in time range)     And   bisacodyl (DULCOLAX) suppository 10 mg (has no administration in time range)   insulin detemir (LEVEMIR) injection 20 Units ( Subcutaneous MAR Unhold 8/15/22 1659)   morphine injection 4 mg ( Intravenous MAR Unhold 8/15/22 1659)   dextrose (GLUTOSE) oral gel 15 g (has no administration in time range)   dextrose (D50W) (25 g/50 mL) IV injection 25 g (has no administration in time range)   glucagon (human recombinant) (GLUCAGEN DIAGNOSTIC) injection 1 mg (has no administration in time range)   Insulin Lispro (humaLOG) injection 0-14 Units (5 Units Subcutaneous Given 8/15/22 1921)   lactated ringers infusion (100 mL/hr Intravenous New Bag 8/15/22 1922)   acetaminophen (TYLENOL) tablet 1,000 mg (1,000 mg Oral Given 8/15/22 1922)   oxyCODONE-acetaminophen (PERCOCET) 5-325 MG per tablet 1 tablet (has no administration in time range)   ketorolac (TORADOL) injection 15 mg (15 mg Intravenous Given 8/15/22 1921)   ceFAZolin in dextrose (ANCEF) IVPB solution 2 g (has no administration in time range)   ondansetron (ZOFRAN) tablet 4 mg (has no administration in time range)     Or   ondansetron (ZOFRAN) injection 4 mg (has no administration in time range)   promethazine (PHENERGAN) tablet 12.5 mg  (has no administration in time range)     Or   promethazine (PHENERGAN) suppository 12.5 mg (has no administration in time range)   docusate sodium (COLACE) capsule 100 mg (has no administration in time range)   magnesium hydroxide (MILK OF MAGNESIA) suspension 10 mL (has no administration in time range)   acetaminophen (TYLENOL) tablet 325 mg (has no administration in time range)   apixaban (ELIQUIS) tablet 2.5 mg (has no administration in time range)   sodium chloride 0.9 % bolus 1,000 mL (0 mL Intravenous Stopped 8/14/22 1945)   insulin regular (humuLIN R,novoLIN R) injection 8 Units (8 Units Intravenous Given 8/14/22 1930)   HYDROmorphone (DILAUDID) injection 0.5 mg (0.5 mg Intravenous Given 8/14/22 2240)   ipratropium-albuterol (DUO-NEB) nebulizer solution 3 mL (3 mL Nebulization Given 8/15/22 1415)   Tranexamic Acid 2,000 mg in sodium chloride 0.9 % 100 mL (2,000 mg Topical New Bag 8/15/22 1623)        Labs  Lab Results (last 24 hours)     Procedure Component Value Units Date/Time    POC Glucose Once [[id number]]  (Abnormal) Collected: 08/14/22 2330    Specimen: Blood Updated: 08/14/22 2332     Glucose 242 mg/dL      Comment: Serial Number: [id number]:  [id number]       aPTT [[id number]]  (Normal) Collected: 08/14/22 2353    Specimen: Blood Updated: 08/15/22 0010     PTT 25.3 seconds     Basic Metabolic Panel [[id number]]  (Abnormal) Collected: 08/15/22 0431    Specimen: Blood Updated: 08/15/22 0602     Glucose 307 mg/dL      BUN 6 mg/dL      Creatinine 0.75 mg/dL      Sodium 140 mmol/L      Potassium 4.0 mmol/L      Chloride 108 mmol/L      CO2 24.3 mmol/L      Calcium 8.7 mg/dL      BUN/Creatinine Ratio 8.0     Anion Gap 7.7 mmol/L      eGFR 98.3 mL/min/1.73      Comment: National Kidney Foundation and American Society of Nephrology (ASN) Task Force recommended calculation based on the Chronic Kidney Disease Epidemiology Collaboration (CKD-EPI) equation refit without adjustment for race.       Narrative:       GFR Normal >60  Chronic Kidney Disease <60  Kidney Failure <15      CBC Auto Differential [[id number]]  (Abnormal) Collected: 08/15/22 0431    Specimen: Blood Updated: 08/15/22 0530     WBC 11.47 10*3/mm3      RBC 4.63 10*6/mm3      Hemoglobin 14.1 g/dL      Hematocrit 40.1 %      MCV 86.6 fL      MCH 30.5 pg      MCHC 35.2 g/dL      RDW 14.0 %      RDW-SD 43.8 fl      MPV 10.4 fL      Platelets 161 10*3/mm3      Neutrophil % 76.5 %      Lymphocyte % 14.8 %      Monocyte % 6.8 %      Eosinophil % 1.2 %      Basophil % 0.3 %      Immature Grans % 0.4 %      Neutrophils, Absolute 8.76 10*3/mm3      Lymphocytes, Absolute 1.70 10*3/mm3      Monocytes, Absolute 0.78 10*3/mm3      Eosinophils, Absolute 0.14 10*3/mm3      Basophils, Absolute 0.04 10*3/mm3      Immature Grans, Absolute 0.05 10*3/mm3      nRBC 0.0 /100 WBC     Magnesium [[id number]]  (Normal) Collected: 08/15/22 0431    Specimen: Blood Updated: 08/15/22 0602     Magnesium 1.6 mg/dL     POC Glucose Once [[id number]]  (Abnormal) Collected: 08/15/22 0745    Specimen: Blood Updated: 08/15/22 0747     Glucose 345 mg/dL      Comment: Serial Number: [id number]:  [id number]       POC Glucose Once [[id number]]  (Abnormal) Collected: 08/15/22 0944    Specimen: Blood Updated: 08/15/22 1003     Glucose 316 mg/dL      Comment: Serial Number: [id number]:  [id number]       POC Glucose Once [[id number]]  (Abnormal) Collected: 08/15/22 1400    Specimen: Blood Updated: 08/15/22 1401     Glucose 233 mg/dL      Comment: Serial Number: [id number]:  [id number]       POC Glucose Once [[id number]]  (Abnormal) Collected: 08/15/22 1717    Specimen: Blood Updated: 08/15/22 1752     Glucose 209 mg/dL      Comment: Serial Number: [id number]:  [id number]       POC Glucose Once [[id number]]  (Abnormal) Collected: 08/15/22 1841    Specimen: Blood Updated: 08/15/22 1842     Glucose 200 mg/dL      Comment: Serial Number: [id number]:  [id number]       POC Glucose  Once [[id number]]  (Abnormal) Collected: 08/15/22 2136    Specimen: Blood Updated: 08/15/22 2141     Glucose 246 mg/dL      Comment: Serial Number: [id number]:  [id number]              Imaging:  FL < 1 Hour    Result Date: 8/15/2022  This procedure was auto-finalized with no dictation required.    XR Hip With or Without Pelvis 2 - 3 View Left    Result Date: 8/15/2022  PROCEDURE: XR HIP W OR WO PELVIS 2-3 VIEW LEFT  COMPARISON: [facility], CR, XR HIP W OR WO PELVIS 2-3 VIEW LEFT, 8/15/2022, 15:58.  INDICATIONS: Post-Op LEFT  Hip Arthroplasty  FINDINGS:  A left hip prosthesis appears well aligned.  No acute bony abnormality.  Soft tissue air and skin staples in the lateral soft tissues of the upper thigh are present indicative of recent surgery.       Left hip prosthesis appears well aligned on this single view.  No acute bony abnormality.  There are expected postsurgical changes in the soft tissues.      KEARA MATAMOROS DO       Electronically Signed and Approved By: KEARA MATAMOROS DO on 8/15/2022 at 17:33             XR Hip With or Without Pelvis 2 - 3 View Left    Result Date: 8/15/2022  PROCEDURE: XR HIP W OR WO PELVIS 2-3 VIEW LEFT  COMPARISON: [facility], CR, XR HIP W OR WO PELVIS 2-3 VIEW LEFT, 8/14/2022, 22:04.  INDICATIONS: LEFT HIP REPLACEMENT. FLUORO TIME 17 SECS. 2 IMAGES. 2.218 mGy  FINDINGS:  Fluoroscopic imaging performed during left total hip arthroplasty.  Hardware in expected position with expected postoperative changes.        1. Fluoroscopic images status post left total hip arthroplasty.  Hardware is in expected position with expected postoperative changes.      FLO LIPSCOMB MD       Electronically Signed and Approved By: FLO LIPSCOMB MD on 8/15/2022 at 17:09               Procedures:  Procedures    Progress                            Medical Decision Making:  MDM  Number of Diagnoses or Management Options  Diagnosis management comments: 23:12 EDT Updated  patient on results of hip fracture and plan for admission. Patient expressed understanding and agreement. All questions answered at this time.      67-year-old male patient who fell at home presents with left hip pain.  Patient's x-ray shows evidence for acute left hip fracture.  Patient also was hyperglycemic and his blood sugar was improved with IV fluids and insulin.  Patient was discussed with Dr. Green, orthopedic surgery, who will consult repair of the hip fracture.  Patient is admitted to the hospital service.        Final diagnoses:   Closed fracture of left hip, initial encounter ([facility])        Disposition:  ED Disposition     ED Disposition   Decision to Admit    Condition   --    Comment   Level of Care: Med/Surg [1]   Diagnosis: Closed fracture of left hip, initial encounter ([facility]) [[id number]]   Certification: I Certify That Inpatient Hospital Services Are Medically Necessary For Greater Than 2 Midnights               This medical record created using voice recognition software.           Des Lawton Jr.  08/14/22 2230       Jesse Medina  08/14/22 2317       Andrea Loco DO  08/15/22 8117

## 2022-08-15 NOTE — PROGRESS NOTES
[facility]   Hospitalist Progress Note  Date: 8/15/2022  Patient Name: Dhaval Nieto  : 1955  MRN: [id number]  Date of admission: 2022      Subjective   Subjective     Chief Complaint: Follow-up for left hip fracture    Summary: 66 y/o M with T2DM, COPD, HTN, CAD, DJD who presented following a fall with left hip pain.  Found to have a displaced left femoral neck fracture.  Ortho on board.  Underwent repair 8/15.    Interval Followup: No acute events overnight.  No fevers.  Blood pressure well controlled.  Resting in bed this morning.  Alert and conversant.  Feeling fine.  Denies uncontrolled pain in the left hip or leg.  No acute complaints.  N.p.o. for surgery today.    Review of Systems  Constitutional:  No Fever, No Chills  Cardiovascular:  No Chest Pain, No Edema, No Palpitations  Respiratory:  No Cough, No Dyspnea  Gastrointestinal:  No Nausea, No Vomiting  Genitourinary:  No Dysuria, No Hesitancy  Musculoskeletal:  + Left hip pain/dull/constant/mild.  No myalgia  Neuro:  +Weakness, No Numbness, no confusion  Psych: No visual or auditory hallucination    Objective   Objective     Vitals:   Temp:  [97.6 °F (36.4 °C)-98.7 °F (37.1 °C)] 98.5 °F (36.9 °C)  Heart Rate:  [70-85] 83  Resp:  [14-22] 14  BP: (114-162)/() 126/56  Physical Exam    Constitutional: Elderly male, up in bed, conversant, NAD   Eyes: Pupils equal and reactive, no conjunctival injection   HENT: NCAT, moist mucous membranes   Neck: Supple, trachea midline   Respiratory: Clear to auscultation bilaterally, nonlabored respirations    Cardiovascular: RRR, no murmurs, no pedal edema   Gastrointestinal: Positive bowel sounds, soft, nontender, nondistended   Musculoskeletal: No gross deformities, no clubbing or cyanosis to extremities   Neurologic: Oriented x 3, Cranial Nerves grossly intact speech clear   Skin: Warm and dry, no rashes     Result Review    Result Review:  I have personally reviewed the results from  the time of this admission to 8/15/2022 16:43 EDT and agree with these findings:  [x]  Laboratory   CBC    CBC 8/11/22 8/14/22 8/15/22   WBC 11.03 (A) 10.38 11.47 (A)   RBC 4.49 4.70 4.63   Hemoglobin 13.9 14.4 14.1   Hematocrit 38.8 40.3 40.1   MCV 86.4 85.7 86.6   MCH 31.0 30.6 30.5   MCHC 35.8 (A) 35.7 35.2   RDW 13.8 13.9 14.0   Platelets 170 165 161   (A) Abnormal value            BMP    BMP 8/11/22 8/14/22 8/15/22   BUN 12 7 (A) 6 (A)   Creatinine 1.12 1.04 0.75 (A)   Sodium 136 137 140   Potassium 4.3 4.2 4.0   Chloride 100 104 108 (A)   CO2 26.0 25.0 24.3   Calcium 8.8 8.8 8.7   (A) Abnormal value       Comments are available for some flowsheets but are not being displayed.             []  Microbiology  [x]  Radiology  XR Ribs Left With PA Chest    Result Date: 8/14/2022  PROCEDURE: XR RIBS LEFT W PA CHEST  COMPARISON: [facility], CR, XR CHEST 1 VW, 3/20/2022, 4:15.  INDICATIONS: FELL COMPLAINS OF LEFT SIDED CHEST PAIN  FINDINGS:  Stable findings of prior median sternotomy and heart valve replacement. There is no pneumothorax, pleural effusion or focal airspace consolidation. The heart size and pulmonary vasculature appear within normal limits. There are no acute osseous abnormalities.  No displaced left rib fracture.        Stable postoperative findings without acute cardiopulmonary or left rib abnormality.     JUAN MIGUEL RAINEY MD       Electronically Signed and Approved By: JUAN MIGUEL RAINEY MD on 8/14/2022 at 22:54             XR Shoulder 2+ View Left    Result Date: 8/14/2022  PROCEDURE: XR SHOULDER 2+ VW LEFT  COMPARISON: None  INDICATIONS: FELL COMPLAINS OF LEFT SHOULDER PAIN  FINDINGS:  The acromioclavicular joint is unremarkable. Glenohumeral joint appears normal. There is no acute fracture or dislocation. No erosions. Soft tissues are unremarkable. The acromiohumeral and coracoclavicular distances appear well-maintained. The adjacent lung and ribs appear normal.        No acute osseous  abnormality or significant degenerative change.       JUAN MIGUEL RAINEY MD       Electronically Signed and Approved By: JUAN MIGUEL RAINEY MD on 8/14/2022 at 22:53             XR Knee 1 or 2 View Left    Result Date: 8/14/2022  PROCEDURE: XR KNEE 1 OR 2 VW LEFT  COMPARISON: [facility], CR, XR KNEE 3 VW LEFT, 1/15/2022, 13:33.  INDICATIONS: FELL COMPLAINS OF LEFT KNEE PAIN  FINDINGS:  There is no acute fracture or dislocation. Joint spaces appear normal. No erosions.  There are vascular calcifications.  No effusion.       No acute osseous abnormality.      JUAN MIGUEL RAINEY MD       Electronically Signed and Approved By: JUAN MIGUEL RAINEY MD on 8/14/2022 at 22:56             XR Ankle 2 View Left    Result Date: 8/14/2022  PROCEDURE: XR ANKLE 2 VW LEFT  COMPARISON: None  INDICATIONS: FELL COMPLAINS OF LEFT ANKLE PAIN  FINDINGS:  There is no acute fracture or dislocation. Joint spaces appear normal. The ankle mortise is symmetric and the talar dome appears intact. There are no erosions. Soft tissues are unremarkable.        No acute osseous abnormality or significant degenerative change.       JUAN MIGUEL RAINEY MD       Electronically Signed and Approved By: JUAN MIGUEL RAINEY MD on 8/14/2022 at 22:56             FL < 1 Hour    Result Date: 8/15/2022  This procedure was auto-finalized with no dictation required.    XR Hip With or Without Pelvis 2 - 3 View Left    Result Date: 8/14/2022  PROCEDURE: XR HIP W OR WO PELVIS 2-3 VIEW LEFT  COMPARISON: [facility], CR, XR HIP W OR WO PELVIS 2-3 VIEW LEFT, 3/08/2022, 10:34.  INDICATIONS: FELL COMPLAINS OF LEFT HIP PAIN  FINDINGS:  There is a displaced simple extra-articular left femoral neck fracture.  There is mild osteoarthritis at the left hip.  There is minimal osteophyte formation at the right hip.  Bony pelvis appears intact.  Pubic symphysis and obturator rings appear normal and the sacroiliac joints appear symmetric.       Acute displaced left femoral neck  fracture.      JUAN MIGUEL RAINEY MD       Electronically Signed and Approved By: JUAN MIGUEL RAINEY MD on 8/14/2022 at 22:55             []  EKG/Telemetry   []  Cardiology/Vascular   []  Pathology  []  Old records  []  Other:    Assessment & Plan   Assessment / Plan     Assessment:  Active Hospital Problems    Diagnosis  POA   • **Closed fracture of left hip, initial encounter ([facility]) [S72.002A]  Yes   • Dysuria [R30.0]  Yes   • Type 2 diabetes mellitus, with long-term current use of insulin ([facility]) [E11.9, Z79.4]  Not Applicable   • Arthritis [M19.90]  Yes   • Hyperlipidemia [E78.5]  Yes   • Hypertension [I10]  Yes   • Low back pain [M54.50]  Yes        Plan:    N.p.o. for surgery today.  Appreciate orthopedic surgery assistance  Postsurgical orders including analgesic regimen, IVF, DVT prophylaxis per surgery  Discontinue scheduled Flexeril  Start Levemir 20 units daily + medium/high dose SSI per protocol  Blood pressure well controlled.  Continue p.o. Lopressor 100 mg twice daily  Continue Cymbalta 30 mg daily  H/H, BMP in a.m.    Discussed plan with RN.    DVT prophylaxis:  Mechanical DVT prophylaxis orders are present.    CODE STATUS:   Level Of Support Discussed With: Patient  Code Status (Patient has no pulse and is not breathing): CPR (Attempt to Resuscitate)  Medical Interventions (Patient has pulse or is breathing): Full Support  Release to patient: Routine Release        Electronically signed by Amish Ybarra DO, 08/15/22, 4:43 PM EDT.

## 2022-08-15 NOTE — ED NOTES
Provider at the bedside speaking to patient about depression and hallucinations.  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  He also states he has a psychiatrist he sees.

## 2022-08-15 NOTE — SIGNIFICANT NOTE
08/15/22 1200   Plan   Plan Met with patient and spouse at bedside.  Spouse reports patient will need rehab and has no preference.  Spouse reports having surgery herself and is unable to assist patient at home.  No reference on rehab facilities.  Patient is agreeable to rehab.  Patient uses [facility] pharmacy.  Facesheet verified.  Referrals sent.

## 2022-08-15 NOTE — CONSULTS
[facility]   Consult Note    Patient Name: Dhaval Nieto  : 1955  MRN: [id number]  Primary Care Physician:  Christiano Mcbride MD  Referring Physician: Andrea Loco DO  Date of admission: 2022    Subjective   Subjective     Reason for Consult/ Chief Complaint: Left hip fracture    HPI:  Dhaval Nieto is a 67 y.o. male who sustained a mechanical fall and injured the left hip.  The patient was unable to ambulate.  He was brought to [facility] ED where x-rays revealed a displaced left femoral neck fracture.  The patient was admitted to the hospitalist for further evaluation and treatment.  He reports pain in the left hip.  He denies any other complaints.    Review of Systems   All systems were reviewed and negative except for those mentioned in HPI    Personal History     Past Medical History:   Diagnosis Date   • Arteriovenous malformation 1980   • Arthritis    • Asthma    • Benign essential hypertension    • Bladder disorder    • Bleeding disorder ([facility])    • Blood disease    • BPH with urinary obstruction 2014   • Brain concussion    • Cancer ([facility])    • Cervical disc disorder long time   • Cervical radiculopathy 2015    left   • Cervical spinal stenosis 03/10/2020   • Cervicalgia 2018   • Chest pain    • CHF (congestive heart failure) ([facility])    • Clotting disorder ([facility]) Elequis   • Colon cancer ([facility]) 2018   • Condition not found     Hernia   • Condition not found     herniated disc   • COPD (chronic obstructive pulmonary disease) ([facility])    • Coronary artery disease    • CTS (carpal tunnel syndrome) 2002   • DDD (degenerative disc disease), thoracic 2018   • Decreased sensation 2015    left   • Deep vein thrombosis ([facility])    • Depression    • Diabetes ([facility])    • Diabetes mellitus type 1 with coma, insulin dependent ([facility])     controlled   • Headache    • Heart murmur    • Heart valve disease    • Hematuria, gross  05/30/2014   • Hyperlipemia    • Hyperlipidemia    • Hypertension    • Insomnia    • Leg pain    • Leg pain    • Limb swelling    • Loss of balance 12/18/2018   • Low back pain 03/10/2020   • Lumbar degenerative disc disease 12/18/2018   • Lumbar radiculopathy 03/10/2020   • Lumbosacral disc disease 05/05/1999   • Mid back pain 12/18/2018   • Migraines    • Muscle cramps    • Myocardial infarction ([facility])    • Neurologic disorder    • Pain in back    • Pain in joint    • Pain of cervical spine    • Pain, generalized    • Pulmonary arterial hypertension ([facility]) 1999   • Seasonal allergies    • Seizures ([facility]) 2000   • Shortness of breath    • Sleep apnea    • Stomach disorder        Past Surgical History:   Procedure Laterality Date   • ABDOMINAL SURGERY     • BLADDER SURGERY     • CARPAL TUNNEL RELEASE     • CEREBRAL ANGIOGRAM  2021   • COLON RESECTION     • COLONOSCOPY  01/30/2017    [place]   • CORONARY ARTERY BYPASS GRAFT  12/2019    one vessel   • GALLBLADDER SURGERY      cholecystecomy   • HERNIA REPAIR     • JOINT REPLACEMENT      joint surgery   • MITRAL VALVE REPAIR/REPLACEMENT  02/2019    [facility]   • OTHER SURGICAL HISTORY  05/30/2014    office cystoscopy w/DR SHANICE Young   • SHOULDER SURGERY Bilateral    • VASCULAR SURGERY  2020   • VENTRAL HERNIA REPAIR         Family History: family history includes Anxiety disorder in his father; Arthritis in his mother; Asthma in his mother; COPD in his father; Cancer in his father; Depression in his father; Developmental Disability in his brother; Diabetes in his father; Hearing loss in his brother; Heart attack in his father; Heart disease in his father; Heart failure in his father; Hyperlipidemia in his mother; Hypertension in his father; Mental illness in his brother; Stroke in his brother; Vision loss in his brother. Otherwise pertinent FHx was reviewed and not pertinent to current issue.    Social History:  reports that he has been smoking cigarettes and  cigarettes. He started smoking about 52 years ago. He has a 25.00 pack-year smoking history. He has never used smokeless tobacco. He reports that he does not drink alcohol and does not use drugs.    Home Medications:  DULoxetine, Diclofenac Sodium, Insulin Glargine, albuterol sulfate HFA, ammonium lactate, amphetamine-dextroamphetamine XR, apixaban, atorvastatin, cyclobenzaprine, insulin aspart, ketorolac, ketotifen, linaclotide, metoprolol tartrate, oxyCODONE-acetaminophen, pantoprazole, pregabalin, and traMADol    Allergies:  Allergies   Allergen Reactions   • Latex Itching       Objective    Objective     Vitals:   Temp:  [97.6 °F (36.4 °C)-98.7 °F (37.1 °C)] 97.6 °F (36.4 °C)  Heart Rate:  [74-85] 77  Resp:  [14-22] 16  BP: (114-162)/() 125/77    Physical Exam:   Constitutional: Awake, alert   Eyes: PERRLA, sclerae anicteric, no conjunctival injection   HENT: NCAT, mucous membranes moist   Neck: Supple, no thyromegaly, no lymphadenopathy, trachea midline   Respiratory: Clear to auscultation bilaterally, nonlabored respirations    Cardiovascular: RRR, no murmurs, rubs, or gallops, palpable pedal pulses bilaterally   Gastrointestinal: Positive bowel sounds, soft, nontender, nondistended   Musculoskeletal: The left hip is examined.  It is shortened and externally rotated.  Positive pulses.  Sensation intact to light touch L5 nerves the foot.  Negative Linda.  Pain with range of motion or logroll.   Psychiatric: Appropriate affect, cooperative   Neurologic: Oriented x 3, strength symmetric in all extremities, Cranial Nerves grossly intact to confrontation, speech clear   Skin: No rashes     Result Review    Result Review:  I have personally reviewed the results from the time of this admission to 8/15/2022 07:18 EDT and agree with these findings:  [x]  Laboratory  []  Microbiology  [x]  Radiology  []  EKG/Telemetry   []  Cardiology/Vascular   []  Pathology  []  Old records  []  Other:    Most notable findings  include: Left displaced femoral neck fracture    Assessment & Plan   Assessment / Plan     Brief Patient Summary:  Dhaval Nieto is a 67 y.o. male who has left displaced femoral neck fracture    Active Hospital Problems:  Active Hospital Problems    Diagnosis    • **Closed fracture of left hip, initial encounter ([facility])    • Dysuria    • Hallucinations    • Arthritis    • Hyperlipidemia    • Hypertension    • Diabetes ([facility])    • Low back pain        Plan: We discussed treatment options with the patient.  We discussed operative versus nonoperative treatment.  The patient wishes to proceed with operative treatment.  Risk and benefits of surgery were discussed and informed consent was obtained.  Plan to proceed with left total hip replacement.  Thank you for the consultation.        Electronically signed by Kaden Green MD, 08/15/22, 7:18 AM EDT.

## 2022-08-15 NOTE — OP NOTE
TOTAL HIP ARTHROPLASTY ANTERIOR  Procedure Report    Patient Name:  Dhaval Nieto  YOB: 1955    Date of Surgery:  8/15/2022     Indications: The patient is a 67-year-old male who fell and sustained a left displaced femoral neck fracture.  We discussed treatment options with the patient.  We discussed operative versus nonoperative treatment.  Informed consent was obtained and the patient wished to proceed with operative treatment.  Risk of bleeding, infection, damage to neurovascular structures, heart attack, stroke, DVT/PE, anesthesia complications including death, leg length discrepancy, periprosthetic fracture, instability, among others were discussed.  Informed consent was obtained and he wished to proceed.    Pre-op Diagnosis:   Closed fracture of left hip, initial encounter ([facility]) [S72.002A]       Post-Op Diagnosis Codes:     * Closed fracture of left hip, initial encounter ([facility]) [S72.002A]    Procedure/CPT® Codes:      Procedure(s):  Left TOTAL HIP ARTHROPLASTY ANTERIOR    Staff:  Surgeon(s):  Kaden Green MD    Assistant: Martir Chamberlain RN    Anesthesia: General    Estimated Blood Loss: 200 mL    Implants:    Implant Name Type Inv. Item Serial No.  Lot No. LRB No. Used Action   SHLL ACET G7 PPS LTD/HL SZF 56MM - [id number] Implant SHLL ACET G7 PPS LTD/HL SZF 56MM  [employer] [id number] Left 1 Implanted   SCRW ACET TARA TRILOGY S/TAP 6.5X25 - [id number] Implant SCRW ACET TARA TRILOGY S/TAP 6.5X25  [employer] [id number] Left 1 Implanted   LINER ACET G7 VIVACIT/E NTRL HXPE SZF 36MM - [id number] Implant LINER ACET G7 VIVACIT/E NTRL HXPE SZF 36MM  [employer] [id number] Left 1 Implanted   STEM FEM/HIP AVENIRCOMPLETE COLAR HA SZ6 - [id number] Implant STEM FEM/HIP AVENIRCOMPLETE COLAR HA SZ6  [employer] [id number] Left 1 Implanted   HD FEM/HIP BIOLOX/DELTA CERAM 12/08Z32DR MIN3.5MM - [id number] Implant HD FEM/HIP BIOLOX/DELTA CERAM 12/[id number] MIN3.5MM  [employer] [id number] Left 1 Implanted       Specimen:          None        Findings: Displaced femoral neck fracture    Complications: None    Description of Procedure: Operative site was marked in the preoperative holding area.  The patient was brought to the operating room and placed supine on the [place] operative table.  General anesthesia was given.  All bony prominences were padded. Both legs were placed into padded traction boots. A padded peroneal post was placed. Preoperative antibiotic and tranexamic acid was given. Formal time-out was held.   Incision was made over the anterior hip just lateral and distal to the ASIS. The fascia was sharply divided and the TFL muscle was retracted laterally. The circumflex vessels were treated with the bipolar sealer. The capsule was incised in an inverted T capsulotomy and tagged with nonabsorbable suture.  A displaced femoral neck fracture was noted.  The femoral neck osteotomy was made and the femoral head and fracture fragment was removed. The labrum was excised. Acetabular reaming was performed with fluoroscopic guidance. An appropriately sized acetabular component was inserted in the appropriate position. A screw was inserted, and the polyethylene liner was placed.  At this point the appropriate femoral releases were performed and the femur was exposed. We began preparing the femur with the broach. Sequential broaching was performed until an appropriately sized broach was placed in neutral anteversion and the hip was trialed. Fluoroscopic views of the hip were taken and confirmed adequate size and appropriate leg length and offset. The hip was dislocated, and the trials removed. The femoral implant was placed and was axially and rotationally stable. The hip was trialed a final time, and the appropriate femoral head was inserted. The hip was reduced and final images were taken. The hip was irrigated with irrisept solution and bacitracin saline. Local anesthetic was injected  around the capsule, and the capsule was closed with #1 vicryl. The fascia was closed with #1 vicryl and the subcutaneous tissues with 2-0 vicryl. The skin was closed with staples and an aquacel dressing was placed. The patient awoke form anesthesia in stable condition. All counts were correct. There were no complications.    Assistant: Martir Chamberlain RN  was responsible for performing the following activities: Retraction, Suction, Irrigation and Placing Dressing and their skilled assistance was necessary for the success of this case.    Kaden Green MD     Date: 8/15/2022  Time: 16:47 EDT

## 2022-08-15 NOTE — H&P
[facility] HISTORY AND PHYSICAL  Date: 8/15/2022   Patient Name: Dhaval Nieto  : 1955  MRN: [id number]  Primary Care Physician:  Christiano Mcbride MD  Date of admission: 2022    Subjective   Subjective     Chief Complaint: Left hip pain status post fall    HPI:    Dhaval Nieto is a 67 y.o. male with medical history significant for arthritis, primary hypertension, BPH, chronic back pain, COPD, type 1 diabetes; presents with left hip pain status post fall.  Patient reports that he was at his computer.  Patient states that his wife was in the room and later left.  Patient states that he went to follow her and ended up having loss of sensation and function of his left side.  Patient states he ended up falling to his left side and had immediate pain in his left hip.  Wife reports that patient has been having frequent falls since he was diagnosed with TIAs 2 years ago.  Patient states that there have been times where he has fallen and he has been unaware as to how he got to a specific location.  Wife reports that patient has been hallucinating lately and currently thinks that there is a man with a gun trying to get him.  Patient states that he has been told that he is hallucinating, but he is unaware if he is.  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  Patient states that all his memories from that have been bad and he states that he has seen a therapist.  Patient states he has not had much of an appetite lately.  Patient has noted having dysuria when urinating.  Patient states he also has constipation and diarrhea.  Patient states he tends to go between both secondary to taking medications for constipation that leads him to have diarrhea.  Patient attributes his constipation to the pain medications that he is on.  Patient does note having chronic back pain and neck pain.  Patient also  reports having joint pains and muscle aches.  Patient states that he has had confusion.  Wife reports that patient has been having difficulties being able to sleep at night.  No fever, no chills, no cough, no shortness of breath, no chest pain, no dizziness.      Personal History     Past Medical History:  Past Medical History:   Diagnosis Date   • Arteriovenous malformation 03/09/1980   • Arthritis    • Asthma    • Benign essential hypertension    • Bladder disorder    • Bleeding disorder ([facility])    • Blood disease    • BPH with urinary obstruction 05/30/2014   • Brain concussion 1999   • Cancer ([facility])    • Cervical disc disorder long time   • Cervical radiculopathy 05/01/2015    left   • Cervical spinal stenosis 03/10/2020   • Cervicalgia 12/18/2018   • Chest pain    • CHF (congestive heart failure) ([facility])    • Clotting disorder ([facility]) Elequis   • Colon cancer ([facility]) 12/18/2018   • Condition not found     Hernia   • Condition not found     herniated disc   • COPD (chronic obstructive pulmonary disease) ([facility])    • Coronary artery disease    • CTS (carpal tunnel syndrome) 08/04/2002   • DDD (degenerative disc disease), thoracic 12/18/2018   • Decreased sensation 05/01/2015    left   • Deep vein thrombosis ([facility])    • Depression    • Diabetes ([facility])    • Diabetes mellitus type 1 with coma, insulin dependent ([facility])     controlled   • Headache    • Heart murmur    • Heart valve disease    • Hematuria, gross 05/30/2014   • Hyperlipemia    • Hyperlipidemia    • Hypertension    • Insomnia    • Leg pain    • Leg pain    • Limb swelling    • Loss of balance 12/18/2018   • Low back pain 03/10/2020   • Lumbar degenerative disc disease 12/18/2018   • Lumbar radiculopathy 03/10/2020   • Lumbosacral disc disease 05/05/1999   • Mid back pain 12/18/2018   • Migraines    • Muscle cramps    • Myocardial infarction ([facility])    • Neurologic disorder    • Pain in back    • Pain in joint    • Pain of cervical spine    • Pain, generalized    •  Pulmonary arterial hypertension (HCC) 1999   • Seasonal allergies    • Seizures ([facility]) 2000   • Shortness of breath    • Sleep apnea    • Stomach disorder         Past Surgical History:  Past Surgical History:   Procedure Laterality Date   • ABDOMINAL SURGERY     • BLADDER SURGERY     • CARPAL TUNNEL RELEASE     • CEREBRAL ANGIOGRAM  2021   • COLON RESECTION     • COLONOSCOPY  01/30/2017    [place]   • CORONARY ARTERY BYPASS GRAFT  12/2019    one vessel   • GALLBLADDER SURGERY      cholecystecomy   • HERNIA REPAIR     • JOINT REPLACEMENT      joint surgery   • MITRAL VALVE REPAIR/REPLACEMENT  02/2019    [facility]   • OTHER SURGICAL HISTORY  05/30/2014    office cystoscopy w/DR SHANICE Young   • SHOULDER SURGERY Bilateral    • VASCULAR SURGERY  2020   • VENTRAL HERNIA REPAIR          Family History:   Family History   Problem Relation Age of Onset   • Heart attack Father         MI   • Heart failure Father    • Anxiety disorder Father    • Cancer Father    • COPD Father    • Depression Father    • Diabetes Father    • Heart disease Father    • Hypertension Father    • Stroke Brother    • Developmental Disability Brother    • Hearing loss Brother    • Mental illness Brother    • Vision loss Brother    • Arthritis Mother    • Asthma Mother    • Hyperlipidemia Mother         Social History:   Social History     Socioeconomic History   • Marital status:    Tobacco Use   • Smoking status: Current Some Day Smoker     Packs/day: 0.50     Years: 50.00     Pack years: 25.00     Types: Cigarettes, Cigarettes     Start date: 1/1/1970   • Smokeless tobacco: Never Used   Vaping Use   • Vaping Use: Never used   Substance and Sexual Activity   • Alcohol use: Never   • Drug use: Never   • Sexual activity: Not Currently        Home Medications:  Prior to Admission medications    Medication Sig Start Date End Date Taking? Authorizing Provider   albuterol sulfate  (90 Base) MCG/ACT inhaler ProAir HFA 90 mcg/actuation  aerosol inhaler    Carissa Boyle MD   ammonium lactate (LAC-HYDRIN) 12 % lotion ammonium lactate 12 % lotion    Carissa Boyle MD   amphetamine-dextroamphetamine XR (ADDERALL XR) 15 MG 24 hr capsule Adderall XR 15 mg oral capsule,extended release 24hr take 1 capsule (15 mg) by oral route once daily in the morning upon awakening   Suspended    Carissa Boyle MD   apixaban (ELIQUIS) 5 MG tablet tablet Take 5 mg by mouth 2 (Two) Times a Day.    Carissa Boyle MD   atorvastatin (LIPITOR) 40 MG tablet Daily.    Carissa Boyle MD   cyclobenzaprine (FLEXERIL) 10 MG tablet Take 1 tablet by mouth 3 (Three) Times a Day. 8/11/22   Indira Wang MD   Diclofenac Sodium (VOLTAREN) 1 % gel gel Apply 4 g topically to the appropriate area as directed 4 (Four) Times a Day As Needed (PAIN). 2/17/22   Misbah Vick MD   DULoxetine (Cymbalta) 30 MG capsule Take 1 capsule by mouth Daily for 30 days. 7/13/22 8/12/22  Tacos Newby MD   insulin aspart (NovoLOG FlexPen) 100 UNIT/ML solution pen-injector sc pen 1 (One) Time.    Carissa Boyle MD   Insulin Glargine (Lantus SoloStar) 100 UNIT/ML injection pen Daily.    Carissa Boyle MD   ketorolac (TORADOL) 10 MG tablet Take 1 tablet by mouth Every 6 (Six) Hours As Needed for Moderate Pain . 8/11/22   Indira Wang MD   ketotifen (ZADITOR) 0.025 % ophthalmic solution Administer  to both eyes. 3/10/21   Carissa Boyle MD   linaclotide (Linzess) 72 MCG capsule capsule Take 1 capsule by mouth Every Morning Before Breakfast for 90 days. 7/14/22 10/12/22  Tarsha Nichols APRN   metoprolol tartrate (LOPRESSOR) 100 MG tablet Take 1 tablet by mouth 2 (Two) Times a Day. 5/10/22   Viral Alvarez MD   oxyCODONE-acetaminophen (PERCOCET) 5-325 MG per tablet Take 1 tablet by mouth Every 6 (Six) Hours As Needed for Severe Pain . 8/11/22   Indira Wang MD   pantoprazole (PROTONIX) 40 MG EC tablet Take 1 tablet by  mouth Daily. 7/14/22   Tarsha Nichols APRN   pregabalin (LYRICA) 100 MG capsule Take 100 mg by mouth 2 (Two) Times a Day.    Provider, MD Carissa   traMADol (ULTRAM) 50 MG tablet Take 1 tablet by mouth Every 8 (Eight) Hours As Needed for Moderate Pain  or Severe Pain . 7/7/22   Satish Veras MD        Allergies:  Allergies   Allergen Reactions   • Latex Itching       Review of Systems  Review of Systems   Constitutional: Positive for appetite change. Negative for activity change, chills, fatigue and fever.   HENT: Positive for postnasal drip, rhinorrhea and sinus pressure. Negative for congestion, ear discharge, sinus pain, sneezing and sore throat.    Eyes: Negative for itching.   Respiratory: Negative for cough, shortness of breath and wheezing.    Cardiovascular: Negative for chest pain and leg swelling.   Gastrointestinal: Positive for constipation, diarrhea and nausea. Negative for abdominal pain.   Endocrine: Negative for polydipsia and polyuria.   Genitourinary: Positive for dysuria. Negative for decreased urine volume, difficulty urinating, flank pain, frequency and urgency.   Musculoskeletal: Positive for arthralgias, back pain, gait problem, myalgias and neck pain.   Neurological: Positive for weakness and headaches. Negative for dizziness.   Psychiatric/Behavioral: Positive for confusion, hallucinations and sleep disturbance. Negative for agitation.           Objective   Objective     Vitals:   Temp:  [98.7 °F (37.1 °C)] 98.7 °F (37.1 °C)  Heart Rate:  [74-85] 74  Resp:  [14-22] 16  BP: (114-162)/() 114/81    Physical Exam   Physical Exam  Constitutional:       General: He is not in acute distress.     Appearance: Normal appearance. He is not ill-appearing.   HENT:      Head: Normocephalic and atraumatic.      Right Ear: External ear normal.      Left Ear: External ear normal.      Nose: Nose normal.      Mouth/Throat:      Pharynx: Oropharynx is clear.   Eyes:      Extraocular  Movements: Extraocular movements intact.      Pupils: Pupils are equal, round, and reactive to light.   Cardiovascular:      Rate and Rhythm: Normal rate and regular rhythm.      Pulses: Normal pulses.      Heart sounds: Normal heart sounds. No murmur heard.    No friction rub. No gallop.   Pulmonary:      Effort: Pulmonary effort is normal. No respiratory distress.      Breath sounds: Normal breath sounds. No wheezing, rhonchi or rales.   Abdominal:      General: Bowel sounds are normal. There is no distension.      Tenderness: There is no abdominal tenderness.   Musculoskeletal:         General: No swelling. Normal range of motion.      Cervical back: Normal range of motion and neck supple.   Skin:     General: Skin is warm.   Neurological:      General: No focal deficit present.      Mental Status: He is alert.          Result Review    Result Review:  Glucose   Date Value Ref Range Status   08/14/2022 494 (C) 65 - 99 mg/dL Final     BUN   Date Value Ref Range Status   08/14/2022 7 (L) 8 - 23 mg/dL Final     Creatinine   Date Value Ref Range Status   08/14/2022 1.04 0.76 - 1.27 mg/dL Final     Sodium   Date Value Ref Range Status   08/14/2022 137 136 - 145 mmol/L Final     Potassium   Date Value Ref Range Status   08/14/2022 4.2 3.5 - 5.2 mmol/L Final     Comment:     Slight hemolysis detected by analyzer. Results may be affected.     Chloride   Date Value Ref Range Status   08/14/2022 104 98 - 107 mmol/L Final     CO2   Date Value Ref Range Status   08/14/2022 25.0 22.0 - 29.0 mmol/L Final     Calcium   Date Value Ref Range Status   08/14/2022 8.8 8.6 - 10.5 mg/dL Final     Total Protein   Date Value Ref Range Status   08/14/2022 6.4 6.0 - 8.5 g/dL Final     Albumin   Date Value Ref Range Status   08/14/2022 3.90 3.50 - 5.20 g/dL Final     ALT (SGPT)   Date Value Ref Range Status   08/14/2022 19 1 - 41 U/L Final     AST (SGOT)   Date Value Ref Range Status   08/14/2022 24 1 - 40 U/L Final     Comment:     Slight  hemolysis detected by analyzer. Results may be affected.     Alkaline Phosphatase   Date Value Ref Range Status   08/14/2022 97 39 - 117 U/L Final     Total Bilirubin   Date Value Ref Range Status   08/14/2022 0.7 0.0 - 1.2 mg/dL Final     BUN/Creatinine Ratio   Date Value Ref Range Status   08/14/2022 6.7 (L) 7.0 - 25.0 Final     Anion Gap   Date Value Ref Range Status   08/14/2022 8.0 5.0 - 15.0 mmol/L Final      WBC   Date Value Ref Range Status   08/14/2022 10.38 3.40 - 10.80 10*3/mm3 Final     RBC   Date Value Ref Range Status   08/14/2022 4.70 4.14 - 5.80 10*6/mm3 Final     Hemoglobin   Date Value Ref Range Status   08/14/2022 14.4 13.0 - 17.7 g/dL Final     Hematocrit   Date Value Ref Range Status   08/14/2022 40.3 37.5 - 51.0 % Final     MCV   Date Value Ref Range Status   08/14/2022 85.7 79.0 - 97.0 fL Final     MCH   Date Value Ref Range Status   08/14/2022 30.6 26.6 - 33.0 pg Final     MCHC   Date Value Ref Range Status   08/14/2022 35.7 31.5 - 35.7 g/dL Final     RDW   Date Value Ref Range Status   08/14/2022 13.9 12.3 - 15.4 % Final     RDW-SD   Date Value Ref Range Status   08/14/2022 43.8 37.0 - 54.0 fl Final     MPV   Date Value Ref Range Status   08/14/2022 9.7 6.0 - 12.0 fL Final     Platelets   Date Value Ref Range Status   08/14/2022 165 140 - 450 10*3/mm3 Final     Neutrophil %   Date Value Ref Range Status   08/14/2022 82.2 (H) 42.7 - 76.0 % Final     Lymphocyte %   Date Value Ref Range Status   08/14/2022 10.1 (L) 19.6 - 45.3 % Final     Monocyte %   Date Value Ref Range Status   08/14/2022 5.1 5.0 - 12.0 % Final     Eosinophil %   Date Value Ref Range Status   08/14/2022 1.5 0.3 - 6.2 % Final     Basophil %   Date Value Ref Range Status   08/14/2022 0.5 0.0 - 1.5 % Final     Immature Grans %   Date Value Ref Range Status   08/14/2022 0.6 (H) 0.0 - 0.5 % Final     Neutrophils, Absolute   Date Value Ref Range Status   08/14/2022 8.53 (H) 1.70 - 7.00 10*3/mm3 Final     Lymphocytes, Absolute    Date Value Ref Range Status   08/14/2022 1.05 0.70 - 3.10 10*3/mm3 Final     Monocytes, Absolute   Date Value Ref Range Status   08/14/2022 0.53 0.10 - 0.90 10*3/mm3 Final     Eosinophils, Absolute   Date Value Ref Range Status   08/14/2022 0.16 0.00 - 0.40 10*3/mm3 Final     Basophils, Absolute   Date Value Ref Range Status   08/14/2022 0.05 0.00 - 0.20 10*3/mm3 Final     Immature Grans, Absolute   Date Value Ref Range Status   08/14/2022 0.06 (H) 0.00 - 0.05 10*3/mm3 Final     nRBC   Date Value Ref Range Status   08/14/2022 0.0 0.0 - 0.2 /100 WBC Final      UA    Urinalysis 3/20/22 3/20/22 8/11/22 8/14/22    0440 0440     Squamous Epithelial Cells, UA  0-2     Specific Gravity, UA 1.010  1.024 1.025   Ketones, UA Negative  Trace (A) Negative   Blood, UA Trace (A)  Negative Negative   Leukocytes, UA Negative  Negative Negative   Nitrite, UA Negative  Negative Negative   RBC, UA  0-2 (A)     WBC, UA  None Seen     Bacteria, UA  None Seen     (A) Abnormal value               Imaging:  XR Ribs Left With PA Chest    Result Date: 8/14/2022  PROCEDURE: XR RIBS LEFT W PA CHEST  COMPARISON: [facility], CR, XR CHEST 1 VW, 3/20/2022, 4:15.  INDICATIONS: FELL COMPLAINS OF LEFT SIDED CHEST PAIN  FINDINGS:  Stable findings of prior median sternotomy and heart valve replacement. There is no pneumothorax, pleural effusion or focal airspace consolidation. The heart size and pulmonary vasculature appear within normal limits. There are no acute osseous abnormalities.  No displaced left rib fracture.        Stable postoperative findings without acute cardiopulmonary or left rib abnormality.     JUAN MIGUEL RAINEY MD       Electronically Signed and Approved By: JUAN MIGUEL RAINEY MD on 8/14/2022 at 22:54             XR Shoulder 2+ View Left    Result Date: 8/14/2022  PROCEDURE: XR SHOULDER 2+ VW LEFT  COMPARISON: None  INDICATIONS: FELL COMPLAINS OF LEFT SHOULDER PAIN  FINDINGS:  The acromioclavicular joint is unremarkable.  Glenohumeral joint appears normal. There is no acute fracture or dislocation. No erosions. Soft tissues are unremarkable. The acromiohumeral and coracoclavicular distances appear well-maintained. The adjacent lung and ribs appear normal.        No acute osseous abnormality or significant degenerative change.       JUAN MIGUEL RAINEY MD       Electronically Signed and Approved By: JUAN MIGUEL RAINEY MD on 8/14/2022 at 22:53             XR Knee 1 or 2 View Left    Result Date: 8/14/2022  PROCEDURE: XR KNEE 1 OR 2 VW LEFT  COMPARISON: [facility], CR, XR KNEE 3 VW LEFT, 1/15/2022, 13:33.  INDICATIONS: FELL COMPLAINS OF LEFT KNEE PAIN  FINDINGS:  There is no acute fracture or dislocation. Joint spaces appear normal. No erosions.  There are vascular calcifications.  No effusion.       No acute osseous abnormality.      JUAN MIGUEL RAINEY MD       Electronically Signed and Approved By: JUAN MIGUEL RAINEY MD on 8/14/2022 at 22:56             XR Ankle 2 View Left    Result Date: 8/14/2022  PROCEDURE: XR ANKLE 2 VW LEFT  COMPARISON: None  INDICATIONS: FELL COMPLAINS OF LEFT ANKLE PAIN  FINDINGS:  There is no acute fracture or dislocation. Joint spaces appear normal. The ankle mortise is symmetric and the talar dome appears intact. There are no erosions. Soft tissues are unremarkable.        No acute osseous abnormality or significant degenerative change.       JUAN MIGUEL RAINEY MD       Electronically Signed and Approved By: JUAN MIGUEL RAINEY MD on 8/14/2022 at 22:56             XR Hip With or Without Pelvis 2 - 3 View Left    Result Date: 8/14/2022  PROCEDURE: XR HIP W OR WO PELVIS 2-3 VIEW LEFT  COMPARISON: [facility], , XR HIP W OR WO PELVIS 2-3 VIEW LEFT, 3/08/2022, 10:34.  INDICATIONS: FELL COMPLAINS OF LEFT HIP PAIN  FINDINGS:  There is a displaced simple extra-articular left femoral neck fracture.  There is mild osteoarthritis at the left hip.  There is minimal osteophyte formation at the right hip.  Bony  pelvis appears intact.  Pubic symphysis and obturator rings appear normal and the sacroiliac joints appear symmetric.       Acute displaced left femoral neck fracture.      JUAN MIGUEL RAINEY MD       Electronically Signed and Approved By: JUAN MIGUEL RAINEY MD on 8/14/2022 at 22:55                  Assessment & Plan   Assessment / Plan     Active Hospital Problems    Diagnosis  POA   • **Closed fracture of left hip, initial encounter ([facility]) [S72.002A]  Yes     Priority: High   • Dysuria [R30.0]  Yes     Priority: Medium   • Low back pain [M54.50]  Yes     Priority: Medium   • Arthritis [M19.90]  Yes     Priority: Low   • Hyperlipidemia [E78.5]  Yes     Priority: Low   • Hypertension [I10]  Yes     Priority: Low   • Diabetes ([facility]) [E11.9]  Yes     Priority: Low   • BPH with urinary obstruction [N40.1, N13.8]  Yes     Priority: Low        Assessment/Plan:   1. Closed fracture of left hip-patient presents with left hip pain status post fall.  Patient states that he felt his whole left side go out on him which is what caused him to fall.  Patient states that he immediately had pain in his left hip.  Patient had imaging done here at our facility was found to have a left hip fracture.  Orthopedic surgery was called and patient is to be made n.p.o. after midnight.  Patient will receive adequate pain control.  Patient will most likely get repair of his left hip in the morning.  Patient denies having any chest pain.  Patient did complain of dysuria.  UA done in the ED shows no signs of infection at this time.  Patient will be considered moderate risk for surgical intervention.  2. Dysuria- patient reports having burning while urinating.  Patient's UA shows no signs of infection at this time.  We will hold off on giving any antibiotics.  3. Low back pain-patient reports having chronic low back pain.  Patient is on several medications for pain control.  Patient will be continued on Flexeril, Lyrica and tramadol.  Patient currently on  Dilaudid for his left hip fracture.  4. Hallucinations- wife reports that patient has been hallucinating and thinking that there is a man trying to shoot him.  Patient states that he is not aware of his hallucinations, but is aware of his wife stating that he may be hallucinating.  Patient notes that he does have posttraumatic stress syndrome secondary to being in a war in [place].  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  Patient may benefit in seeing a therapist as outpatient.  Patient states he has seen a therapist in the past.  5. Arthritis-patient with history of arthritis.  Patient will be continued on his home Lyrica and Flexeril.  6. Primary hypertension-patient with history of hypertension.  Patient will be resumed on his home blood pressure medications.  7. Hyperlipidemia- patient with history of hyperlipidemia.  Patient be continued on his home statin.  8. Type 1 diabetes-patient with history of diabetes.  We will need to obtain a many units patient is on for his long-acting insulin.  Patient placed on a insulin sliding scale.  Accu-Cheks as scheduled.      DVT prophylaxis:  Mechanical DVT prophylaxis orders are present.    CODE STATUS:    Level Of Support Discussed With: Patient  Code Status (Patient has no pulse and is not breathing): CPR (Attempt to Resuscitate)  Medical Interventions (Patient has pulse or is breathing): Full Support  Release to patient: Routine Release      Admission Status:  I believe this patient meets inpatient status.    Electronically signed by Desiree Hope MD, 08/15/22, 12:29 AM EDT.

## 2022-08-15 NOTE — H&P (VIEW-ONLY)
[facility]   Consult Note    Patient Name: Dhaval Nieto  : 1955  MRN: [id number]  Primary Care Physician:  Christiano Mcbride MD  Referring Physician: Andrea Loco DO  Date of admission: 2022    Subjective   Subjective     Reason for Consult/ Chief Complaint: Left hip fracture    HPI:  Dhaval Nieto is a 67 y.o. male who sustained a mechanical fall and injured the left hip.  The patient was unable to ambulate.  He was brought to [facility] ED where x-rays revealed a displaced left femoral neck fracture.  The patient was admitted to the hospitalist for further evaluation and treatment.  He reports pain in the left hip.  He denies any other complaints.    Review of Systems   All systems were reviewed and negative except for those mentioned in HPI    Personal History     Past Medical History:   Diagnosis Date   • Arteriovenous malformation 1980   • Arthritis    • Asthma    • Benign essential hypertension    • Bladder disorder    • Bleeding disorder ([facility])    • Blood disease    • BPH with urinary obstruction 2014   • Brain concussion    • Cancer ([facility])    • Cervical disc disorder long time   • Cervical radiculopathy 2015    left   • Cervical spinal stenosis 03/10/2020   • Cervicalgia 2018   • Chest pain    • CHF (congestive heart failure) ([facility])    • Clotting disorder ([facility]) Elequis   • Colon cancer ([facility]) 2018   • Condition not found     Hernia   • Condition not found     herniated disc   • COPD (chronic obstructive pulmonary disease) ([facility])    • Coronary artery disease    • CTS (carpal tunnel syndrome) 2002   • DDD (degenerative disc disease), thoracic 2018   • Decreased sensation 2015    left   • Deep vein thrombosis ([facility])    • Depression    • Diabetes ([facility])    • Diabetes mellitus type 1 with coma, insulin dependent ([facility])     controlled   • Headache    • Heart murmur    • Heart valve disease    • Hematuria, gross  05/30/2014   • Hyperlipemia    • Hyperlipidemia    • Hypertension    • Insomnia    • Leg pain    • Leg pain    • Limb swelling    • Loss of balance 12/18/2018   • Low back pain 03/10/2020   • Lumbar degenerative disc disease 12/18/2018   • Lumbar radiculopathy 03/10/2020   • Lumbosacral disc disease 05/05/1999   • Mid back pain 12/18/2018   • Migraines    • Muscle cramps    • Myocardial infarction ([facility])    • Neurologic disorder    • Pain in back    • Pain in joint    • Pain of cervical spine    • Pain, generalized    • Pulmonary arterial hypertension ([facility]) 1999   • Seasonal allergies    • Seizures ([facility]) 2000   • Shortness of breath    • Sleep apnea    • Stomach disorder        Past Surgical History:   Procedure Laterality Date   • ABDOMINAL SURGERY     • BLADDER SURGERY     • CARPAL TUNNEL RELEASE     • CEREBRAL ANGIOGRAM  2021   • COLON RESECTION     • COLONOSCOPY  01/30/2017    [place]   • CORONARY ARTERY BYPASS GRAFT  12/2019    one vessel   • GALLBLADDER SURGERY      cholecystecomy   • HERNIA REPAIR     • JOINT REPLACEMENT      joint surgery   • MITRAL VALVE REPAIR/REPLACEMENT  02/2019    [facility]   • OTHER SURGICAL HISTORY  05/30/2014    office cystoscopy w/DR SHANICE Young   • SHOULDER SURGERY Bilateral    • VASCULAR SURGERY  2020   • VENTRAL HERNIA REPAIR         Family History: family history includes Anxiety disorder in his father; Arthritis in his mother; Asthma in his mother; COPD in his father; Cancer in his father; Depression in his father; Developmental Disability in his brother; Diabetes in his father; Hearing loss in his brother; Heart attack in his father; Heart disease in his father; Heart failure in his father; Hyperlipidemia in his mother; Hypertension in his father; Mental illness in his brother; Stroke in his brother; Vision loss in his brother. Otherwise pertinent FHx was reviewed and not pertinent to current issue.    Social History:  reports that he has been smoking cigarettes and  cigarettes. He started smoking about 52 years ago. He has a 25.00 pack-year smoking history. He has never used smokeless tobacco. He reports that he does not drink alcohol and does not use drugs.    Home Medications:  DULoxetine, Diclofenac Sodium, Insulin Glargine, albuterol sulfate HFA, ammonium lactate, amphetamine-dextroamphetamine XR, apixaban, atorvastatin, cyclobenzaprine, insulin aspart, ketorolac, ketotifen, linaclotide, metoprolol tartrate, oxyCODONE-acetaminophen, pantoprazole, pregabalin, and traMADol    Allergies:  Allergies   Allergen Reactions   • Latex Itching       Objective    Objective     Vitals:   Temp:  [97.6 °F (36.4 °C)-98.7 °F (37.1 °C)] 97.6 °F (36.4 °C)  Heart Rate:  [74-85] 77  Resp:  [14-22] 16  BP: (114-162)/() 125/77    Physical Exam:   Constitutional: Awake, alert   Eyes: PERRLA, sclerae anicteric, no conjunctival injection   HENT: NCAT, mucous membranes moist   Neck: Supple, no thyromegaly, no lymphadenopathy, trachea midline   Respiratory: Clear to auscultation bilaterally, nonlabored respirations    Cardiovascular: RRR, no murmurs, rubs, or gallops, palpable pedal pulses bilaterally   Gastrointestinal: Positive bowel sounds, soft, nontender, nondistended   Musculoskeletal: The left hip is examined.  It is shortened and externally rotated.  Positive pulses.  Sensation intact to light touch L5 nerves the foot.  Negative Linda.  Pain with range of motion or logroll.   Psychiatric: Appropriate affect, cooperative   Neurologic: Oriented x 3, strength symmetric in all extremities, Cranial Nerves grossly intact to confrontation, speech clear   Skin: No rashes     Result Review    Result Review:  I have personally reviewed the results from the time of this admission to 8/15/2022 07:18 EDT and agree with these findings:  [x]  Laboratory  []  Microbiology  [x]  Radiology  []  EKG/Telemetry   []  Cardiology/Vascular   []  Pathology  []  Old records  []  Other:    Most notable findings  include: Left displaced femoral neck fracture    Assessment & Plan   Assessment / Plan     Brief Patient Summary:  Dhaval Nieto is a 67 y.o. male who has left displaced femoral neck fracture    Active Hospital Problems:  Active Hospital Problems    Diagnosis    • **Closed fracture of left hip, initial encounter ([facility])    • Dysuria    • Hallucinations    • Arthritis    • Hyperlipidemia    • Hypertension    • Diabetes ([facility])    • Low back pain        Plan: We discussed treatment options with the patient.  We discussed operative versus nonoperative treatment.  The patient wishes to proceed with operative treatment.  Risk and benefits of surgery were discussed and informed consent was obtained.  Plan to proceed with left total hip replacement.  Thank you for the consultation.        Electronically signed by Kaden Green MD, 08/15/22, 7:18 AM EDT.

## 2022-08-15 NOTE — PLAN OF CARE
Goal Outcome Evaluation:    Pt stable since arrival from surgery this evening. Pt was transferred from 5th floor to surgery and then was brought to . Pt had left total hip replacement, aquacel to left hip dry and intact. Pulses good. Pt has voided following surgery. Pain is well controlled at this time

## 2022-08-15 NOTE — ANESTHESIA PREPROCEDURE EVALUATION
Anesthesia Evaluation     Patient summary reviewed and Nursing notes reviewed   no history of anesthetic complications:  NPO Solid Status: > 8 hours  NPO Liquid Status: > 2 hours           Airway   Mallampati: II  TM distance: >3 FB  Neck ROM: full  No difficulty expected  Dental    (+) poor dentition and upper dentures        Pulmonary    (+) COPD mild, asthma,shortness of breath, sleep apnea, decreased breath sounds,   Cardiovascular - normal exam  Exercise tolerance: poor (<4 METS)    Rhythm: regular  Rate: normal    (+) hypertension well controlled, valvular problems/murmurs murmur, past MI  >12 months, CAD, CABG, CHF , DVT, hyperlipidemia,       Neuro/Psych  (+) seizures well controlled, headaches,    GI/Hepatic/Renal/Endo    (+)   diabetes mellitus type 1 poorly controlled using insulin,     Musculoskeletal     (+) neck pain,   Abdominal    Substance History - negative use     OB/GYN negative ob/gyn ROS         Other   arthritis, blood dyscrasia,   history of cancer (colon)                  Anesthesia Plan    ASA 3     general     (eliquis last more than 5 days ago)  intravenous induction     Anesthetic plan, risks, benefits, and alternatives have been provided, discussed and informed consent has been obtained with: patient.    Use of blood products discussed with patient .   Plan discussed with CRNA.        CODE STATUS:    Level Of Support Discussed With: Patient  Code Status (Patient has no pulse and is not breathing): CPR (Attempt to Resuscitate)  Medical Interventions (Patient has pulse or is breathing): Full Support  Release to patient: Routine Release

## 2022-08-15 NOTE — PLAN OF CARE
Goal Outcome Evaluation:           Progress: no change  Outcome Evaluation: Alert and oriented x4. Able to move in the bed without assistance, orders for bedrest. L hip fx, plan for surgery today. Pain medication given as needed. Pulses present in LLE. No discoloration or edema.

## 2022-08-16 LAB
ANION GAP SERPL CALCULATED.3IONS-SCNC: 8.9 MMOL/L (ref 5–15)
BUN SERPL-MCNC: 10 MG/DL (ref 8–23)
BUN/CREAT SERPL: 11.8 (ref 7–25)
CALCIUM SPEC-SCNC: 8.2 MG/DL (ref 8.6–10.5)
CHLORIDE SERPL-SCNC: 105 MMOL/L (ref 98–107)
CO2 SERPL-SCNC: 23.1 MMOL/L (ref 22–29)
CREAT SERPL-MCNC: 0.85 MG/DL (ref 0.76–1.27)
DEPRECATED RDW RBC AUTO: 44.6 FL (ref 37–54)
EGFRCR SERPLBLD CKD-EPI 2021: 94.6 ML/MIN/1.73
ERYTHROCYTE [DISTWIDTH] IN BLOOD BY AUTOMATED COUNT: 14.2 % (ref 12.3–15.4)
GLUCOSE BLDC GLUCOMTR-MCNC: 184 MG/DL (ref 70–99)
GLUCOSE BLDC GLUCOMTR-MCNC: 216 MG/DL (ref 70–99)
GLUCOSE BLDC GLUCOMTR-MCNC: 78 MG/DL (ref 70–99)
GLUCOSE SERPL-MCNC: 192 MG/DL (ref 65–99)
HCT VFR BLD AUTO: 33.3 % (ref 37.5–51)
HGB BLD-MCNC: 11.6 G/DL (ref 13–17.7)
MCH RBC QN AUTO: 30.1 PG (ref 26.6–33)
MCHC RBC AUTO-ENTMCNC: 34.8 G/DL (ref 31.5–35.7)
MCV RBC AUTO: 86.3 FL (ref 79–97)
PLATELET # BLD AUTO: 130 10*3/MM3 (ref 140–450)
PMV BLD AUTO: 10.2 FL (ref 6–12)
POTASSIUM SERPL-SCNC: 3.7 MMOL/L (ref 3.5–5.2)
RBC # BLD AUTO: 3.86 10*6/MM3 (ref 4.14–5.8)
SODIUM SERPL-SCNC: 137 MMOL/L (ref 136–145)
WBC NRBC COR # BLD: 14.13 10*3/MM3 (ref 3.4–10.8)

## 2022-08-16 PROCEDURE — 85027 COMPLETE CBC AUTOMATED: CPT | Performed by: ORTHOPAEDIC SURGERY

## 2022-08-16 PROCEDURE — 25010000002 MORPHINE PER 10 MG: Performed by: ORTHOPAEDIC SURGERY

## 2022-08-16 PROCEDURE — 97161 PT EVAL LOW COMPLEX 20 MIN: CPT

## 2022-08-16 PROCEDURE — 25010000002 CEFAZOLIN IN DEXTROSE 2-4 GM/100ML-% SOLUTION: Performed by: ORTHOPAEDIC SURGERY

## 2022-08-16 PROCEDURE — 63710000001 INSULIN LISPRO (HUMAN) PER 5 UNITS: Performed by: ORTHOPAEDIC SURGERY

## 2022-08-16 PROCEDURE — 99233 SBSQ HOSP IP/OBS HIGH 50: CPT | Performed by: INTERNAL MEDICINE

## 2022-08-16 PROCEDURE — 97165 OT EVAL LOW COMPLEX 30 MIN: CPT

## 2022-08-16 PROCEDURE — 82962 GLUCOSE BLOOD TEST: CPT

## 2022-08-16 PROCEDURE — 25010000002 KETOROLAC TROMETHAMINE PER 15 MG: Performed by: ORTHOPAEDIC SURGERY

## 2022-08-16 PROCEDURE — 80048 BASIC METABOLIC PNL TOTAL CA: CPT | Performed by: ORTHOPAEDIC SURGERY

## 2022-08-16 PROCEDURE — 63710000001 INSULIN DETEMIR PER 5 UNITS: Performed by: ORTHOPAEDIC SURGERY

## 2022-08-16 RX ORDER — METOPROLOL TARTRATE 50 MG/1
50 TABLET, FILM COATED ORAL 2 TIMES DAILY
Status: DISCONTINUED | OUTPATIENT
Start: 2022-08-16 | End: 2022-08-17

## 2022-08-16 RX ADMIN — MORPHINE SULFATE 4 MG: 4 INJECTION, SOLUTION INTRAMUSCULAR; INTRAVENOUS at 20:19

## 2022-08-16 RX ADMIN — ACETAMINOPHEN 1000 MG: 500 TABLET ORAL at 17:32

## 2022-08-16 RX ADMIN — KETOROLAC TROMETHAMINE 15 MG: 30 INJECTION, SOLUTION INTRAMUSCULAR; INTRAVENOUS at 05:10

## 2022-08-16 RX ADMIN — APIXABAN 2.5 MG: 2.5 TABLET, FILM COATED ORAL at 20:19

## 2022-08-16 RX ADMIN — OXYCODONE HYDROCHLORIDE AND ACETAMINOPHEN 1 TABLET: 5; 325 TABLET ORAL at 08:26

## 2022-08-16 RX ADMIN — SENNOSIDES AND DOCUSATE SODIUM 2 TABLET: 8.6; 5 TABLET ORAL at 08:25

## 2022-08-16 RX ADMIN — Medication 5 MG: at 20:19

## 2022-08-16 RX ADMIN — CEFAZOLIN SODIUM 2 G: 2 INJECTION, SOLUTION INTRAVENOUS at 06:10

## 2022-08-16 RX ADMIN — KETOROLAC TROMETHAMINE 15 MG: 30 INJECTION, SOLUTION INTRAMUSCULAR; INTRAVENOUS at 00:37

## 2022-08-16 RX ADMIN — LINACLOTIDE 72 MCG: 72 CAPSULE, GELATIN COATED ORAL at 09:22

## 2022-08-16 RX ADMIN — SODIUM CHLORIDE, POTASSIUM CHLORIDE, SODIUM LACTATE AND CALCIUM CHLORIDE 100 ML/HR: 600; 310; 30; 20 INJECTION, SOLUTION INTRAVENOUS at 00:38

## 2022-08-16 RX ADMIN — DULOXETINE 30 MG: 30 CAPSULE, DELAYED RELEASE ORAL at 08:25

## 2022-08-16 RX ADMIN — Medication 10 ML: at 08:31

## 2022-08-16 RX ADMIN — KETOROLAC TROMETHAMINE 15 MG: 30 INJECTION, SOLUTION INTRAMUSCULAR; INTRAVENOUS at 11:38

## 2022-08-16 RX ADMIN — MORPHINE SULFATE 4 MG: 4 INJECTION, SOLUTION INTRAMUSCULAR; INTRAVENOUS at 02:38

## 2022-08-16 RX ADMIN — APIXABAN 2.5 MG: 2.5 TABLET, FILM COATED ORAL at 08:26

## 2022-08-16 RX ADMIN — PANTOPRAZOLE SODIUM 40 MG: 40 TABLET, DELAYED RELEASE ORAL at 06:10

## 2022-08-16 RX ADMIN — SODIUM CHLORIDE, POTASSIUM CHLORIDE, SODIUM LACTATE AND CALCIUM CHLORIDE 1000 ML: 600; 310; 30; 20 INJECTION, SOLUTION INTRAVENOUS at 11:38

## 2022-08-16 RX ADMIN — METOPROLOL TARTRATE 100 MG: 50 TABLET, FILM COATED ORAL at 08:26

## 2022-08-16 RX ADMIN — INSULIN DETEMIR 20 UNITS: 100 INJECTION, SOLUTION SUBCUTANEOUS at 08:25

## 2022-08-16 RX ADMIN — INSULIN LISPRO 2 UNITS: 100 INJECTION, SOLUTION INTRAVENOUS; SUBCUTANEOUS at 11:41

## 2022-08-16 RX ADMIN — INSULIN LISPRO 5 UNITS: 100 INJECTION, SOLUTION INTRAVENOUS; SUBCUTANEOUS at 08:25

## 2022-08-16 NOTE — PLAN OF CARE
Goal Outcome Evaluation:  Plan of Care Reviewed With: patient        Progress: no change (initial evaluation encounter)  Outcome Evaluation: Patient has experienced decline in function from baseline status, presenting w/ deficits related to functional balance, safety awareness, transfers and mobility that impede patient independence with activities of daily living.  Patient would benefit from skilled Occupational Therapy intervention to maxamize patient safety, and promote return to baseline independence.

## 2022-08-16 NOTE — PLAN OF CARE
Goal Outcome Evaluation:  Plan of Care Reviewed With: patient        Progress: no change  Outcome Evaluation: pt. medicated with scheduled and prn pain medication with relief noted.  pt. repositions self in bed. pt.  is a heavy one person with walker.

## 2022-08-16 NOTE — THERAPY EVALUATION
Patient Name: Dhaval Nieto  : 1955    MRN: [id number]                              Today's Date: 2022       Admit Date: 2022    Visit Dx:     ICD-10-CM ICD-9-CM   1. Closed fracture of left hip, initial encounter ([facility])  S72.002A 820.8   2. Difficulty in walking  R26.2 719.7   3. Decreased activities of daily living (ADL)  Z78.9 V49.89     Patient Active Problem List   Diagnosis   • Chronic bronchitis, unspecified chronic bronchitis type ([facility])   • Right wrist pain   • Pain disorder with psychological factors   • Bladder disorder   • Abdominal hernia   • Stomach disorder   • Arthritis   • Asthma   • BPH with urinary obstruction   • Chest pain   • Decreased sensation   • Depression   • History of colon cancer   • Hyperlipemia   • Hyperlipidemia   • Hypertension   • Insomnia   • Limb swelling   • Loss of balance   • Low back pain   • Thoracic back pain   • Lumbar degenerative disc disease   • Degeneration of thoracic or thoracolumbar intervertebral disc   • Lumbar radiculopathy   • Migraines   • Muscle cramps   • Neck pain   • Neurologic disorder   • Headache   • Pain, generalized   • Shortness of breath   • Cervical radiculopathy   • Cervical stenosis of spinal canal   • Seasonal allergic rhinitis   • Leg pain   • Cancer of sigmoid colon ([facility])   • Closed fracture of left hip, initial encounter ([facility])   • Dysuria   • Type 2 diabetes mellitus, with long-term current use of insulin ([facility])     Past Medical History:   Diagnosis Date   • Arteriovenous malformation 1980   • Arthritis    • Asthma    • Benign essential hypertension    • Bladder disorder    • Bleeding disorder ([facility])    • Blood disease    • BPH with urinary obstruction 2014   • Brain concussion    • Cancer ([facility])    • Cervical disc disorder long time   • Cervical radiculopathy 2015    left   • Cervical spinal stenosis 03/10/2020   • Cervicalgia 2018   • Chest pain    • CHF (congestive heart failure) ([facility])     • Clotting disorder ([facility]) [place]   • Colon cancer ([facility]) 12/18/2018   • Condition not found     Hernia   • Condition not found     herniated disc   • COPD (chronic obstructive pulmonary disease) ([facility])    • Coronary artery disease    • CTS (carpal tunnel syndrome) 08/04/2002   • DDD (degenerative disc disease), thoracic 12/18/2018   • Decreased sensation 05/01/2015    left   • Deep vein thrombosis ([facility])    • Depression    • Diabetes ([facility])    • Diabetes mellitus type 1 with coma, insulin dependent ([facility])     controlled   • Headache    • Heart murmur    • Heart valve disease    • Hematuria, gross 05/30/2014   • Hyperlipemia    • Hyperlipidemia    • Hypertension    • Insomnia    • Leg pain    • Leg pain    • Limb swelling    • Loss of balance 12/18/2018   • Low back pain 03/10/2020   • Lumbar degenerative disc disease 12/18/2018   • Lumbar radiculopathy 03/10/2020   • Lumbosacral disc disease 05/05/1999   • Mid back pain 12/18/2018   • Migraines    • Muscle cramps    • Myocardial infarction ([facility])    • Neurologic disorder    • Pain in back    • Pain in joint    • Pain of cervical spine    • Pain, generalized    • Pulmonary arterial hypertension ([facility]) 1999   • Seasonal allergies    • Seizures ([facility]) 2000   • Shortness of breath    • Sleep apnea    • Stomach disorder      Past Surgical History:   Procedure Laterality Date   • ABDOMINAL SURGERY     • BLADDER SURGERY     • CARPAL TUNNEL RELEASE     • CEREBRAL ANGIOGRAM  2021   • COLON RESECTION     • COLONOSCOPY  01/30/2017    [place]   • CORONARY ARTERY BYPASS GRAFT  12/2019    one vessel   • GALLBLADDER SURGERY      cholecystecomy   • HERNIA REPAIR     • JOINT REPLACEMENT      joint surgery   • MITRAL VALVE REPAIR/REPLACEMENT  02/2019    [facility]   • OTHER SURGICAL HISTORY  05/30/2014    office cystoscopy w/DR SHANICE Young   • SHOULDER SURGERY Bilateral    • TOTAL HIP ARTHROPLASTY Left 8/15/2022    Procedure: TOTAL HIP ARTHROPLASTY ANTERIOR;  Surgeon: Kaden Green  MD DAGOBERTO;  Location: [facility] MAIN OR;  Service: Orthopedics;  Laterality: Left;   • VASCULAR SURGERY  2020   • VENTRAL HERNIA REPAIR        General Information     Row Name 08/16/22 1546          OT Time and Intention    Document Type evaluation  -ES     Mode of Treatment individual therapy;occupational therapy  -ES     Row Name 08/16/22 1546          General Information    Patient Profile Reviewed yes  -ES     Prior Level of Function independent:;ADL's;all household mobility;community mobility  Patient independent with B and IADLs at baseline.  Cane for functional mobility.  Walk-in shower, standing shower completion.  Grooming and stance.  Patient reports 8 falls in the last 3 months.  -ES     Existing Precautions/Restrictions fall;weight bearing  -ES     Barriers to Rehab none identified  -ES     Row Name 08/16/22 1546          Occupational Profile    Reason for Services/Referral (Occupational Profile) Patient is 67 yr old male admitted to [facility] on 8/14/2022 after mechanical fall at home resulting in displaced left femoral neck fracture.  Patient is postop day 1 left total hip replacement, weightbearing as tolerated left lower extremity. OT evaluation and treatment ordered d/t recent decline in ADLs/transfer ability. No previous OT services for current condition.  -ES     Row Name 08/16/22 1546          Living Environment    People in Home spouse  -ES     Row Name 08/16/22 1546          Home Main Entrance    Number of Stairs, Main Entrance none  -ES     Row Name 08/16/22 1546          Stairs Within Home, Primary    Stairs, Within Home, Primary Nonessential set of stairs to basement.  All needs on main level  -ES     Row Name 08/16/22 1546          Cognition    Orientation Status (Cognition) oriented to;person;place  Patient cooperative and agreeable to therapy evaluation.  Patient presents with times of intermittent confusion throughout evaluation demonstrating decreased insight to deficits and need  for assistance.  -     Row Name 08/16/22 1546          Safety Issues, Functional Mobility    Safety Issues Affecting Function (Mobility) insight into deficits/self-awareness;awareness of need for assistance;at risk behavior observed  -ES     Impairments Affecting Function (Mobility) balance;pain  -ES           User Key  (r) = Recorded By, (t) = Taken By, (c) = Cosigned By    Initials Name Provider Type    ES Daria Apple OT Occupational Therapist                 Mobility/ADL's     Row Name 08/16/22 1550          Bed Mobility    Comment, (Bed Mobility) patient met seated in recliner at therapy arrival to room  -[facility] Name 08/16/22 1550          Transfers    Transfers sit-stand transfer;stand-sit transfer  -ES     Sit-Stand [place] (Transfers) contact guard;1 person assist  -ES     Stand-Sit [place] (Transfers) contact guard;1 person assist  -[facility] Name 08/16/22 1550          Sit-Stand Transfer    Assistive Device (Sit-Stand Transfers) walker, front-wheeled  -ES     [facility] Name 08/16/22 1550          Stand-Sit Transfer    Assistive Device (Stand-Sit Transfers) walker, 4-wheeled  -ES     [facility] Name 08/16/22 1550          Functional Mobility    Functional Mobility- Ind. Level contact guard assist;1 person;verbal cues required  -ES     Functional Mobility- Device walker, front-wheeled  -ES     Functional Mobility- Comment patient performs functional mobility from recliner to sinkside and back in preperation for standing ADL engagement at sink. Patient CGA with mobility, requires cueing to maintain all 4 walker legs on ground for increased patient safety.  -     Row Name 08/16/22 1550          Activities of Daily Living    BADL Assessment/Intervention bathing;upper body dressing;lower body dressing;grooming;feeding;toileting  -     Row Name 08/16/22 1550          Mobility    Extremity Weight-bearing Status left lower extremity  -ES     Left Lower Extremity (Weight-bearing Status) weight-bearing as  tolerated (WBAT)  -ES     [facility] Name 08/16/22 1550          Bathing Assessment/Intervention    [place] Level (Bathing) bathing skills;minimum assist (75% patient effort)  -ES     [facility] Name 08/16/22 1550          Upper Body Dressing Assessment/Training    [place] Level (Upper Body Dressing) upper body dressing skills;set up  -ES     [facility] Name 08/16/22 1550          Lower Body Dressing Assessment/Training    [place] Level (Lower Body Dressing) lower body dressing skills;moderate assist (50% patient effort)  -ES     [facility] Name 08/16/22 1550          Grooming Assessment/Training    [place] Level (Grooming) grooming skills;set up  -ES     [facility] Name 08/16/22 1550          Self-Feeding Assessment/Training    [place] Level (Feeding) feeding skills;set up  -ES     [facility] Name 08/16/22 1550          Toileting Assessment/Training    [place] Level (Toileting) toileting skills;minimum assist (75% patient effort)  -ES           User Key  (r) = Recorded By, (t) = Taken By, (c) = Cosigned By    Initials Name Provider Type    ES Daria Apple OT Occupational Therapist               Obj/Interventions     [facility] Name 08/16/22 1552          Sensory Assessment (Somatosensory)    Sensory Assessment (Somatosensory) bilateral UE  -ES     Bilateral UE Sensory Assessment general sensation;intact  -ES     Row Name 08/16/22 1552          Vision Assessment/Intervention    Visual Impairment/Limitations WFL  -ES     Row Name 08/16/22 1552          Range of Motion Comprehensive    General Range of Motion no range of motion deficits identified;bilateral upper extremity ROM WNL  -ES     Row Name 08/16/22 1552          Strength Comprehensive (MMT)    General Manual Muscle Testing (MMT) Assessment no strength deficits identified  -ES     Comment, General Manual Muscle Testing (MMT) Assessment bilateral upper extremities assessed 4+/5 with no strength deficits noted at time of evaluation  -[facility] Name 08/16/22 1442           Balance    Balance Assessment sitting dynamic balance;standing dynamic balance  -ES     Dynamic Sitting Balance independent  -ES     Position, Sitting Balance unsupported;sitting in chair  -ES     Dynamic Standing Balance minimal assist;1-person assist  -ES     Position/Device Used, Standing Balance supported;walker, rolling  -ES     Comment, Balance Patient demonstrates decreased standing balance at sinkside for ADLs, presents with strong posterior lean requiring min A to maintain erect standing posture. Patient is unable to balance self at sink without utilizing sink of RWX for increased base of support in stance.  -ES           User Key  (r) = Recorded By, (t) = Taken By, (c) = Cosigned By    Initials Name Provider Type    ES Daria Apple, OT Occupational Therapist               Goals/Plan     Row Name 08/16/22 1558          Transfer Goal 1 (OT)    Activity/Assistive Device (Transfer Goal 1, OT) transfers, all  -ES     [place] Level/Cues Needed (Transfer Goal 1, OT) modified independence  -ES     Time Frame (Transfer Goal 1, OT) long term goal (LTG);10 days  -ES     Row Name 08/16/22 1558          Bathing Goal 1 (OT)    Activity/Device (Bathing Goal 1, OT) bathing skills, all  -ES     [place] Level/Cues Needed (Bathing Goal 1, OT) modified independence  -ES     Time Frame (Bathing Goal 1, OT) long term goal (LTG);10 days  -ES     Row Name 08/16/22 1558          Dressing Goal 1 (OT)    Activity/Device (Dressing Goal 1, OT) dressing skills, all  -ES     [place]/Cues Needed (Dressing Goal 1, OT) modified independence  -ES     Time Frame (Dressing Goal 1, OT) long term goal (LTG);10 days  -ES     Row Name 08/16/22 1558          Toileting Goal 1 (OT)    Activity/Device (Toileting Goal 1, OT) toileting skills, all  -ES     [place] Level/Cues Needed (Toileting Goal 1, OT) modified independence  -ES     Time Frame (Toileting Goal 1, OT) long term goal (LTG);10 days  -ES     Row Name 08/16/22 1558           Grooming Goal 1 (OT)    Activity/Device (Grooming Goal 1, OT) grooming skills, all  -ES     [place] (Grooming Goal 1, OT) independent  -ES     Time Frame (Grooming Goal 1, OT) long term goal (LTG);10 days  -ES     Row Name 08/16/22 1555          Problem Specific Goal 1 (OT)    Problem Specific Goal 1 (OT) Patient will complete 8 min dynamic standing task demonstrating zero losses of balance required for independent and safe ADL routine completion at time of discharge  -ES     Time Frame (Problem Specific Goal 1, OT) long term goal (LTG);10 days  -ES     Row Name 08/16/22 1551          Therapy Assessment/Plan (OT)    Planned Therapy Interventions (OT) activity tolerance training;BADL retraining;functional balance retraining;occupation/activity based interventions;patient/caregiver education/training;transfer/mobility retraining  -ES           User Key  (r) = Recorded By, (t) = Taken By, (c) = Cosigned By    Initials Name Provider Type    ES Daria Apple OT Occupational Therapist               Clinical Impression     Row Name 08/16/22 5505          Plan of Care Review    Plan of Care Reviewed With patient  -ES     Progress no change  initial evaluation encounter  -ES     Outcome Evaluation Patient has experienced decline in function from baseline status, presenting w/ deficits related to functional balance, safety awareness, transfers and mobility that impede patient independence with activities of daily living.  Patient would benefit from skilled Occupational Therapy intervention to maxamize patient safety, and promote return to baseline independence.  -ES     Row Name 08/16/22 1555          Therapy Assessment/Plan (OT)    Rehab Potential (OT) good, to achieve stated therapy goals  -ES     Criteria for Skilled Therapeutic Interventions Met (OT) yes;meets criteria;skilled treatment is necessary  -ES     Therapy Frequency (OT) 5 times/wk  -ES     Row Name 08/16/22 2458          Therapy Plan  Review/Discharge Plan (OT)    Anticipated Discharge Disposition (OT) skilled nursing facility;sub acute care setting  -ES     Row Name 08/16/22 1554          Vital Signs    O2 Delivery Pre Treatment room air  -ES     O2 Delivery Intra Treatment room air  -ES     O2 Delivery Post Treatment room air  -ES           User Key  (r) = Recorded By, (t) = Taken By, (c) = Cosigned By    Initials Name Provider Type    ES Daria Apple OT Occupational Therapist               Outcome Measures     Row Name 08/16/22 1559          How much help from another is currently needed...    Putting on and taking off regular lower body clothing? 2  -ES     Bathing (including washing, rinsing, and drying) 3  -ES     Toileting (which includes using toilet bed pan or urinal) 3  -ES     Putting on and taking off regular upper body clothing 3  -ES     Taking care of personal grooming (such as brushing teeth) 3  -ES     Eating meals 4  -ES     AM-PAC 6 Clicks Score (OT) 18  -ES     Row Name 08/16/22 1300 08/16/22 0735       How much help from another person do you currently need...    Turning from your back to your side while in flat bed without using bedrails? 3  -MEGAN 3  -BB    Moving from lying on back to sitting on the side of a flat bed without bedrails? 3  -MEGAN 2  -BB    Moving to and from a bed to a chair (including a wheelchair)? 3  -MEGAN 2  -BB    Standing up from a chair using your arms (e.g., wheelchair, bedside chair)? 3  -MEGAN 2  -BB    Climbing 3-5 steps with a railing? 3  -MEGAN 2  -BB    To walk in hospital room? 3  -MEGAN 3  -BB    AM-PAC 6 Clicks Score (PT) 18  -MEGAN 14  -BB    Highest level of mobility 6 --> Walked 10 steps or more  -MEGAN 4 --> Transferred to chair/commode  -BB    Row Name 08/16/22 1559 08/16/22 1300       Functional Assessment    Outcome Measure Options AM-PAC 6 Clicks Daily Activity (OT);Optimal Instrument  -ES AM-PAC 6 Clicks Basic Mobility (PT)  -MEGAN    Row Name 08/16/22 1559          Optimal Instrument    Optimal  Instrument Optimal - 3  -ES     Bending/Stooping 3  -ES     Standing 2  -ES     Reaching 1  -ES     From the list, choose the 3 activities you would most like to be able to do without any difficulty Bending/stooping;Standing;Reaching  -ES     Total Score Optimal - 3 6  -ES           User Key  (r) = Recorded By, (t) = Taken By, (c) = Cosigned By    Initials Name Provider Type    Maddie Cameron, RN Registered Nurse    Rory Rios, PT Physical Therapist    Daria Hwang OT Occupational Therapist                Occupational Therapy Education                 Title: PT OT SLP Therapies (Done)     Topic: Occupational Therapy (Done)     Point: ADL training (Done)     Description:   Instruct learner(s) on proper safety adaptation and remediation techniques during self care or transfers.   Instruct in proper use of assistive devices.              Learning Progress Summary           Patient Acceptance, E,TB, VU by [facility] at 8/16/2022 1600                   Point: Home exercise program (Done)     Description:   Instruct learner(s) on appropriate technique for monitoring, assisting and/or progressing therapeutic exercises/activities.              Learning Progress Summary           Patient Acceptance, E,TB, VU by  at 8/16/2022 1600                   Point: Precautions (Done)     Description:   Instruct learner(s) on prescribed precautions during self-care and functional transfers.              Learning Progress Summary           Patient Acceptance, E,TB, VU by  at 8/16/2022 1600                   Point: Body mechanics (Done)     Description:   Instruct learner(s) on proper positioning and spine alignment during self-care, functional mobility activities and/or exercises.              Learning Progress Summary           Patient Acceptance, E,TB, VU by [facility] at 8/16/2022 1600                               User Key     Initials Effective Dates Name Provider Type Discipline     09/13/21 -  Daria Apple, OT  Occupational Therapist OT              OT Recommendation and Plan  Planned Therapy Interventions (OT): activity tolerance training, BADL retraining, functional balance retraining, occupation/activity based interventions, patient/caregiver education/training, transfer/mobility retraining  Therapy Frequency (OT): 5 times/wk  Plan of Care Review  Plan of Care Reviewed With: patient  Progress: no change (initial evaluation encounter)  Outcome Evaluation: Patient has experienced decline in function from baseline status, presenting w/ deficits related to functional balance, safety awareness, transfers and mobility that impede patient independence with activities of daily living.  Patient would benefit from skilled Occupational Therapy intervention to maxamize patient safety, and promote return to baseline independence.     Time Calculation:    Time Calculation- OT     Row Name 08/16/22 1600             Time Calculation- OT    OT Received On 08/16/22  -ES      OT Goal Re-Cert Due Date 08/25/22  -ES              Untimed Charges    OT Eval/Re-eval Minutes 32  -ES              Total Minutes    Untimed Charges Total Minutes 32  -ES       Total Minutes 32  -ES            User Key  (r) = Recorded By, (t) = Taken By, (c) = Cosigned By    Initials Name Provider Type    ES Daria Apple OT Occupational Therapist              Therapy Charges for Today     Code Description Service Date Service Provider Modifiers Qty    [id number] HC OT EVAL LOW COMPLEXITY 3 8/16/2022 Daria Apple OT GO 1               Daria Apple OT  8/16/2022

## 2022-08-16 NOTE — PROGRESS NOTES
[facility]   Hospitalist Progress Note  Date: 2022  Patient Name: Dhaval Nieto  : 1955  MRN: [id number]  Date of admission: 2022      Subjective   Subjective     Chief Complaint: Follow-up for left hip fracture    Summary: 68 y/o M with T2DM, COPD, HTN, CAD, DJD who presented following a fall with left hip pain.  Found to have a displaced left femoral neck fracture.  Ortho on board.  Underwent repair 8/15.    Interval Followup: No acute events overnight.  Pain has been fairly well controlled.  He had some difficulty working with physical therapy earlier normals fell backwards multiple times.  He states he will be willing to go to rehab if indicated.  This morning he had low blood pressure in the 80s/40s but was asymptomatic.  Otherwise no new complaints.    Review of Systems  All systems reviewed and negative unless otherwise stated under interval follow-up    Objective   Objective     Vitals:   Temp:  [97.7 °F (36.5 °C)-99.9 °F (37.7 °C)] 98.6 °F (37 °C)  Heart Rate:  [70-96] 70  Resp:  [15-20] 16  BP: ()/(37-98) 90/50  Flow (L/min):  [4-10] 4  Physical Exam    Constitutional: Elderly male, sitting up in bedside chair, NAD   Eyes: Pupils equal and reactive, no conjunctival injection   HENT: NCAT, moist mucous membranes   Neck: Supple, trachea midline   Respiratory: Clear to auscultation bilaterally, nonlabored respirations    Cardiovascular: RRR, no murmurs, no pedal edema   Gastrointestinal: Positive bowel sounds, soft, nontender, nondistended   Musculoskeletal: Left hip with dressing that is  C/d/i, strict postop ROM, no gross deformities, no clubbing or cyanosis to extremities   Neurologic: Oriented x 3, Cranial Nerves grossly intact speech clear   Skin: Warm and dry, no rashes     Result Review    Result Review:  I have personally reviewed the results from the time of this admission to 2022 15:21 EDT and agree with these findings:  [x]  Laboratory   CBC    CBC  8/14/22 8/15/22 8/16/22   WBC 10.38 11.47 (A) 14.13 (A)   RBC 4.70 4.63 3.86 (A)   Hemoglobin 14.4 14.1 11.6 (A)   Hematocrit 40.3 40.1 33.3 (A)   MCV 85.7 86.6 86.3   MCH 30.6 30.5 30.1   MCHC 35.7 35.2 34.8   RDW 13.9 14.0 14.2   Platelets 165 161 130 (A)   (A) Abnormal value            BMP    BMP 8/14/22 8/15/22 8/16/22   BUN 7 (A) 6 (A) 10   Creatinine 1.04 0.75 (A) 0.85   Sodium 137 140 137   Potassium 4.2 4.0 3.7   Chloride 104 108 (A) 105   CO2 25.0 24.3 23.1   Calcium 8.8 8.7 8.2 (A)   (A) Abnormal value       Comments are available for some flowsheets but are not being displayed.             []  Microbiology  [x]  Radiology  []  EKG/Telemetry   []  Cardiology/Vascular   []  Pathology  []  Old records  []  Other:    Assessment & Plan   Assessment / Plan     Assessment:  Closed fracture of left hip  Dysuria  Hypotension  Type 2 diabetes mellitus  Osteoarthritis  Hyperlipidemia  History of hypertension  Lower back pain    Orthopedic surgery following, appreciate assistance  Postop day 1 from left total hip arthroplasty  Bolus 1 L LR and monitor BP closely, decrease Toprol to 50 mg twice daily  DVT prophylaxis ordered per orthopedic surgery  Continue Levemir 20 units daily + medium/high dose SSI per protocol  Continue Cymbalta 30 mg daily  PT/OT consulted and recommended rehab.  Social work aware  Trend renal function and electrolytes with a.m. BMP  Trend Hgb and WBC with a.m. CBC    Discussed plan with RN, orthopedic surgery    DVT prophylaxis:  Medical and mechanical DVT prophylaxis orders are present.    CODE STATUS:   Level Of Support Discussed With: Patient  Code Status (Patient has no pulse and is not breathing): CPR (Attempt to Resuscitate)  Medical Interventions (Patient has pulse or is breathing): Full Support  Release to patient: Routine Release

## 2022-08-16 NOTE — THERAPY EVALUATION
Acute Care - Physical Therapy Initial Evaluation   [facility]     Patient Name: Dhaval Nieto  : 1955  MRN: [id number]  Today's Date: 2022     Admit date: 2022     Referring Physician: Jerad Hickman MD     Surgery Date:2022 - 8/15/2022   Procedure(s) (LRB):  TOTAL HIP ARTHROPLASTY ANTERIOR (Left)            Visit Dx:     ICD-10-CM ICD-9-CM   1. Closed fracture of left hip, initial encounter ([facility])  S72.002A 820.8   2. Difficulty in walking  R26.2 719.7     Patient Active Problem List   Diagnosis   • Chronic bronchitis, unspecified chronic bronchitis type ([facility])   • Right wrist pain   • Pain disorder with psychological factors   • Bladder disorder   • Abdominal hernia   • Stomach disorder   • Arthritis   • Asthma   • BPH with urinary obstruction   • Chest pain   • Decreased sensation   • Depression   • History of colon cancer   • Hyperlipemia   • Hyperlipidemia   • Hypertension   • Insomnia   • Limb swelling   • Loss of balance   • Low back pain   • Thoracic back pain   • Lumbar degenerative disc disease   • Degeneration of thoracic or thoracolumbar intervertebral disc   • Lumbar radiculopathy   • Migraines   • Muscle cramps   • Neck pain   • Neurologic disorder   • Headache   • Pain, generalized   • Shortness of breath   • Cervical radiculopathy   • Cervical stenosis of spinal canal   • Seasonal allergic rhinitis   • Leg pain   • Cancer of sigmoid colon ([facility])   • Closed fracture of left hip, initial encounter ([facility])   • Dysuria   • Type 2 diabetes mellitus, with long-term current use of insulin ([facility])     Past Medical History:   Diagnosis Date   • Arteriovenous malformation 1980   • Arthritis    • Asthma    • Benign essential hypertension    • Bladder disorder    • Bleeding disorder ([facility])    • Blood disease    • BPH with urinary obstruction 2014   • Brain concussion    • Cancer ([facility])    • Cervical disc disorder long time   • Cervical radiculopathy 2015     left   • Cervical spinal stenosis 03/10/2020   • Cervicalgia 12/18/2018   • Chest pain    • CHF (congestive heart failure) ([facility])    • Clotting disorder ([facility]) [place]   • Colon cancer ([facility]) 12/18/2018   • Condition not found     Hernia   • Condition not found     herniated disc   • COPD (chronic obstructive pulmonary disease) ([facility])    • Coronary artery disease    • CTS (carpal tunnel syndrome) 08/04/2002   • DDD (degenerative disc disease), thoracic 12/18/2018   • Decreased sensation 05/01/2015    left   • Deep vein thrombosis ([facility])    • Depression    • Diabetes ([facility])    • Diabetes mellitus type 1 with coma, insulin dependent ([facility])     controlled   • Headache    • Heart murmur    • Heart valve disease    • Hematuria, gross 05/30/2014   • Hyperlipemia    • Hyperlipidemia    • Hypertension    • Insomnia    • Leg pain    • Leg pain    • Limb swelling    • Loss of balance 12/18/2018   • Low back pain 03/10/2020   • Lumbar degenerative disc disease 12/18/2018   • Lumbar radiculopathy 03/10/2020   • Lumbosacral disc disease 05/05/1999   • Mid back pain 12/18/2018   • Migraines    • Muscle cramps    • Myocardial infarction ([facility])    • Neurologic disorder    • Pain in back    • Pain in joint    • Pain of cervical spine    • Pain, generalized    • Pulmonary arterial hypertension ([facility]) 1999   • Seasonal allergies    • Seizures ([facility]) 2000   • Shortness of breath    • Sleep apnea    • Stomach disorder      Past Surgical History:   Procedure Laterality Date   • ABDOMINAL SURGERY     • BLADDER SURGERY     • CARPAL TUNNEL RELEASE     • CEREBRAL ANGIOGRAM  2021   • COLON RESECTION     • COLONOSCOPY  01/30/2017    [place]   • CORONARY ARTERY BYPASS GRAFT  12/2019    one vessel   • GALLBLADDER SURGERY      cholecystecomy   • HERNIA REPAIR     • JOINT REPLACEMENT      joint surgery   • MITRAL VALVE REPAIR/REPLACEMENT  02/2019    [facility]   • OTHER SURGICAL HISTORY  05/30/2014    office cystoscopy w/DR SHANICE Young   • SHOULDER  SURGERY Bilateral    • TOTAL HIP ARTHROPLASTY Left 8/15/2022    Procedure: TOTAL HIP ARTHROPLASTY ANTERIOR;  Surgeon: Kaden Green MD;  Location: [facility] MA[facility] OR;  Service: Orthopedics;  Laterality: Left;   • VASCULAR SURGERY  2020   • VENTRAL HERNIA REPAIR       PT Assessment (last 12 hours)     PT Evaluation and Treatment     Row Name 08/16/22 1300          Physical Therapy Time and Intention    Subjective Information no complaints  -MEGAN     Document Type evaluation  -MEGAN     Mode of Treatment individual therapy;physical therapy  -MEGAN     Patient Effort good  -MEGAN     Row Name 08/16/22 1300          General Information    Patient Observations alert;cooperative;agree to therapy  -MEGAN     Prior Level of Function independent:;all household mobility;community mobility  -MEGAN     Equipment Currently Used at Home none  -MEGAN     Existing Precautions/Restrictions fall;weight bearing  -MEGAN     Barriers to Rehab none identified  -MEGAN     Row Name 08/16/22 1300          Living Environment    Current Living Arrangements home  -MEGAN     People in Home spouse  -MEGAN     Row Name 08/16/22 1300          Range of Motion (ROM)    Range of Motion ROM is WFL  -MEGAN     Row Name 08/16/22 1300          Strength (Manual Muscle Testing)    Strength (Manual Muscle Testing) left lower extremity  4-/5  -MEGAN     Row Name 08/16/22 1300          Mobility    Extremity Weight-bearing Status left lower extremity  -MEGAN     Left Lower Extremity (Weight-bearing Status) weight-bearing as tolerated (WBAT)  -MEGAN     Row Name 08/16/22 1300          Bed Mobility    Bed Mobility bed mobility (all) activities;supine-sit  -MEGAN     All Activities, [place] (Bed Mobility) contact guard  -MEGAN     Supine-Sit [place] (Bed Mobility) contact guard  -MEGAN     Row Name 08/16/22 1300          Transfers    Transfers bed-chair transfer;sit-stand transfer  -MEGAN     Bed-Chair [place] (Transfers) contact guard  -MEGAN     Assistive Device (Bed-Chair Transfers) walker,  front-wheeled  -MEGAN     Sit-Stand [place] (Transfers) contact guard  -MEGAN     Row Name 08/16/22 1300          Sit-Stand Transfer    Assistive Device (Sit-Stand Transfers) walker, front-wheeled  -MEGAN     Row Name 08/16/22 1300          Gait/Stairs (Locomotion)    Gait/Stairs Locomotion gait/ambulation assistive device  -MEGAN     [place] Level (Gait) contact guard  -MEGAN     Assistive Device (Gait) walker, front-wheeled  -MEGAN     Distance in Feet (Gait) 100  x2  -MEGAN     Row Name 08/16/22 1300          Balance    Balance Assessment standing dynamic balance  -MEGAN     Dynamic Standing Balance contact guard  -MEGAN     Position/Device Used, Standing Balance walker, rolling  -MEGAN     Row Name             Wound Left anterior hip Incision    Wound - Properties Group Side: Left  -LF Orientation: anterior  -LF Location: hip  -LF Primary Wound Type: Incision  -LF     Retired Wound - Properties Group Side: Left  -LF Orientation: anterior  -LF Location: hip  -LF Primary Wound Type: Incision  -LF     Retired Wound - Properties Group Side: Left  -LF Location: hip  -LF Primary Wound Type: Incision  -LF     Row Name 08/16/22 1300          Plan of Care Review    Plan of Care Reviewed With patient  -MEGAN     Outcome Evaluation Patient presents with decreased strength, transfers and ambulation.  Skilled physical therapy services will be required to address these mobility deficits.  Recommend subacute nursing placement or home with 24 hr assistance upon discharge from the hospital.  -MEGAN     Row Name 08/16/22 1300          Therapy Assessment/Plan (PT)    Rehab Potential (PT) good, to achieve stated therapy goals  -MEGAN     Therapy Frequency (PT) daily  -MEGAN     Problem List (PT) problems related to;balance;mobility;strength;pain  -MEGAN     Activity Limitations Related to Problem List (PT) unable to ambulate safely;unable to transfer safely  -MEGAN     Row Name 08/16/22 1300          PT Evaluation Complexity    History, PT Evaluation Complexity no  personal factors and/or comorbidities  -MEGAN     Examination of Body Systems (PT Eval Complexity) total of 4 or more elements  -MEGAN     Clinical Presentation (PT Evaluation Complexity) stable  -MEGAN     Clinical Decision Making (PT Evaluation Complexity) low complexity  -MEGAN     Overall Complexity (PT Evaluation Complexity) low complexity  -MEGAN     Row Name 08/16/22 1300          Therapy Plan Review/Discharge Plan (PT)    Therapy Plan Review (PT) evaluation/treatment results reviewed;participants voiced agreement with care plan;participants included;patient  -MEGAN     Row Name 08/16/22 1300          Physical Therapy Goals    Transfer Goal Selection (PT) transfer, PT goal 1  -MEGAN     Gait Training Goal Selection (PT) gait training, PT goal 1  -MEGAN     Row Name 08/16/22 1300          Transfer Goal 1 (PT)    Activity/Assistive Device (Transfer Goal 1, PT) transfers, all  -MEGAN     [place] Level/Cues Needed (Transfer Goal 1, PT) independent  -MEGAN     Time Frame (Transfer Goal 1, PT) long term goal (LTG);10 days  -MEGAN     Row Name 08/16/22 1300          Gait Training Goal 1 (PT)    Activity/Assistive Device (Gait Training Goal 1, PT) gait (walking locomotion);assistive device use;walker, rolling  -MEGAN     Distance (Gait Training Goal 1, PT) 300  -MEGAN     Time Frame (Gait Training Goal 1, PT) long term goal (LTG);10 days  -MEGAN           User Key  (r) = Recorded By, (t) = Taken By, (c) = Cosigned By    Initials Name Provider Type    MEGAN Rory Sibley, PT Physical Therapist    Giovanna Romo RN Registered Nurse                Physical Therapy Education                 Title: PT OT SLP Therapies (Done)     Topic: Physical Therapy (Done)     Point: Mobility training (Done)     Learning Progress Summary           Patient Acceptance, E,TB, VU by MEGAN at 8/16/2022 1307                   Point: Precautions (Done)     Learning Progress Summary           Patient Acceptance, E,TB, VU by MEGAN at 8/16/2022 1307                               User  Key     Initials Effective Dates Name Provider Type Discipline    MEGAN 06/03/21 -  Rory Sibley PT Physical Therapist PT              PT Recommendation and Plan  Anticipated Discharge Disposition (PT): skilled nursing facility, sub acute care setting (or home wit h24 hr assistance)  Planned Therapy Interventions (PT): balance training, bed mobility training, gait training, home exercise program, transfer training, strengthening, stair training  Therapy Frequency (PT): daily  Plan of Care Reviewed With: patient  Outcome Evaluation: Patient presents with decreased strength, transfers and ambulation.  Skilled physical therapy services will be required to address these mobility deficits.  Recommend subacute nursing placement or home with 24 hr assistance upon discharge from the hospital.   Outcome Measures     Row Name 08/16/22 1300             How much help from another person do you currently need...    Turning from your back to your side while in flat bed without using bedrails? 3  -MEGAN      Moving from lying on back to sitting on the side of a flat bed without bedrails? 3  -MEGAN      Moving to and from a bed to a chair (including a wheelchair)? 3  -MEGAN      Standing up from a chair using your arms (e.g., wheelchair, bedside chair)? 3  -MEGAN      Climbing 3-5 steps with a railing? 3  -MEGAN      To walk in hospital room? 3  -MEGAN      AM-PAC 6 Clicks Score (PT) 18  -MEGAN              Functional Assessment    Outcome Measure Options AM-PAC 6 Clicks Basic Mobility (PT)  -MEGAN            User Key  (r) = Recorded By, (t) = Taken By, (c) = Cosigned By    Initials Name Provider Type    MEGAN Rory Sibley PT Physical Therapist                 Time Calculation:    PT Charges     Row Name 08/16/22 1259             Time Calculation    PT Received On 08/16/22  -MEGAN      PT Goal Re-Cert Due Date 08/25/22  -MEGAN              Untimed Charges    PT Eval/Re-eval Minutes 35  -MEGAN              Total Minutes    Untimed Charges Total Minutes 35  -MEGAN        Total Minutes 35  -MEGAN            User Key  (r) = Recorded By, (t) = Taken By, (c) = Cosigned By    Initials Name Provider Type    Rory Rios, PT Physical Therapist              Therapy Charges for Today     Code Description Service Date Service Provider Modifiers Qty    91285791275 HC PT EVAL LOW COMPLEXITY 3 8/16/2022 Rory Sibley, PT GP 1          PT G-Codes  Outcome Measure Options: AM-PAC 6 Clicks Basic Mobility (PT)  AM-PAC 6 Clicks Score (PT): 18    Rory Sibley, PT  8/16/2022

## 2022-08-16 NOTE — PLAN OF CARE
Goal Outcome Evaluation:  Plan of Care Reviewed With: patient           Outcome Evaluation: Patient presents with decreased strength, transfers and ambulation.  Skilled physical therapy services will be required to address these mobility deficits.  Recommend subacute nursing placement or home with 24 hr assistance upon discharge from the hospital.

## 2022-08-16 NOTE — PLAN OF CARE
Goal Outcome Evaluation:      Pain well controlled today. Vital signs stable. Patient ambulating with therapy and assistance. Patient hypotensive this afternoon and MD notified. 1000 ml bolus given per MD order. Patient family at bedside and updated. Patient awaiting rehab placement.

## 2022-08-16 NOTE — CONSULTS
Nutrition Services    Patient Name: Dhaval Nieto  YOB: 1955  MRN: [id number]  Admission date: 8/14/2022      CLINICAL NUTRITION ASSESSMENT      Reason for Assessment  MST score 2+     H&P:    Past Medical History:   Diagnosis Date   • Arteriovenous malformation 03/09/1980   • Arthritis    • Asthma    • Benign essential hypertension    • Bladder disorder    • Bleeding disorder ([facility])    • Blood disease    • BPH with urinary obstruction 05/30/2014   • Brain concussion 1999   • Cancer ([facility])    • Cervical disc disorder long time   • Cervical radiculopathy 05/01/2015    left   • Cervical spinal stenosis 03/10/2020   • Cervicalgia 12/18/2018   • Chest pain    • CHF (congestive heart failure) ([facility])    • Clotting disorder ([facility] [place]   • Colon cancer ([facility]) 12/18/2018   • Condition not found     Hernia   • Condition not found     herniated disc   • COPD (chronic obstructive pulmonary disease) ([facility])    • Coronary artery disease    • CTS (carpal tunnel syndrome) 08/04/2002   • DDD (degenerative disc disease), thoracic 12/18/2018   • Decreased sensation 05/01/2015    left   • Deep vein thrombosis ([facility])    • Depression    • Diabetes ([facility])    • Diabetes mellitus type 1 with coma, insulin dependent ([facility])     controlled   • Headache    • Heart murmur    • Heart valve disease    • Hematuria, gross 05/30/2014   • Hyperlipemia    • Hyperlipidemia    • Hypertension    • Insomnia    • Leg pain    • Leg pain    • Limb swelling    • Loss of balance 12/18/2018   • Low back pain 03/10/2020   • Lumbar degenerative disc disease 12/18/2018   • Lumbar radiculopathy 03/10/2020   • Lumbosacral disc disease 05/05/1999   • Mid back pain 12/18/2018   • Migraines    • Muscle cramps    • Myocardial infarction ([facility])    • Neurologic disorder    • Pain in back    • Pain in joint    • Pain of cervical spine    • Pain, generalized    • Pulmonary arterial hypertension ([facility]) 1999   • Seasonal allergies    • Seizures ([facility]) 2000  "  • Shortness of breath    • Sleep apnea    • Stomach disorder         Current Problems:   Active Hospital Problems    Diagnosis    • **Closed fracture of left hip, initial encounter ([facility])    • Dysuria    • Type 2 diabetes mellitus, with long-term current use of insulin ([facility])    • Arthritis    • Hyperlipidemia    • Hypertension    • Low back pain         Nutrition/Diet History         Narrative     Patient reported recent weight loss and decreased appetite on admission.  Chart review reveals 13.8% wt loss x 6 months.  Patient reports appetite is off and on.  Sometimes is able to eat a meal and other times does not feel hungry.  Has used Boost in the past and is agreeable to adding this admission.  Would like to continue on discharge as well to prevent unintentional weight loss.      Anthropometrics        Current Height, Weight Height: 175.3 cm (69\")  Weight: 68.4 kg (150 lb 12.7 oz)   Current BMI Body mass index is 22.27 kg/m².       Weight Hx  Wt Readings from Last 30 Encounters:   08/15/22 0156 68.4 kg (150 lb 12.7 oz)   08/14/22 1818 71.1 kg (156 lb 12 oz)   08/11/22 1346 70.2 kg (154 lb 12.2 oz)   07/14/22 1428 65.8 kg (145 lb)   07/13/22 1359 68.3 kg (150 lb 9.2 oz)   07/07/22 1111 70.6 kg (155 lb 9.6 oz)   05/10/22 1301 72.6 kg (160 lb)   04/19/22 1409 76.7 kg (169 lb)   03/20/22 0351 76.7 kg (169 lb 1.5 oz)   03/17/22 1328 75.8 kg (167 lb)   03/08/22 0935 75.8 kg (167 lb 1.7 oz)   02/17/22 1331 78.9 kg (174 lb)   01/25/22 0927 78.9 kg (174 lb)   01/15/22 1240 79 kg (174 lb 2.6 oz)   01/13/22 1321 76 kg (167 lb 8.8 oz)   11/26/21 0912 76.3 kg (168 lb 3.4 oz)   09/07/21 1343 78 kg (172 lb)   08/30/21 1512 78.9 kg (174 lb)   08/25/21 1356 79.7 kg (175 lb 12.8 oz)   07/13/21 1254 78.9 kg (173 lb 15.1 oz)   06/15/21 1400 78.6 kg (173 lb 3.2 oz)   05/13/21 0000 80.3 kg (177 lb)   04/27/21 0000 78.2 kg (172 lb 8 oz)   04/15/21 0000 79.4 kg (175 lb)   03/12/21 0000 79.4 kg (175 lb)   01/26/21 0000 78 kg (172 lb) "   01/11/21 1451 81 kg (178 lb 9.2 oz)   09/10/20 0000 77.1 kg (170 lb)   07/06/20 0000 74.8 kg (165 lb)   06/30/20 1116 75.9 kg (167 lb 5.3 oz)   03/17/20 0000 79.4 kg (175 lb)            Wt Change Observation -13.8% x 6 months      Estimated/Assessed Needs       Energy Requirements    EST Needs (kcal/day) 9281-4580       Protein Requirements    EST Daily Needs (g/day)        Fluid Requirements     Estimated Needs (mL/day) 2052     Labs/Medications         Pertinent Labs Reviewed.   Results from last 7 days   Lab Units 08/16/22  0428 08/15/22  0431 08/14/22  1824 08/11/22  1351   SODIUM mmol/L 137 140 137 136   POTASSIUM mmol/L 3.7 4.0 4.2 4.3   CHLORIDE mmol/L 105 108* 104 100   CO2 mmol/L 23.1 24.3 25.0 26.0   BUN mg/dL 10 6* 7* 12   CREATININE mg/dL 0.85 0.75* 1.04 1.12   CALCIUM mg/dL 8.2* 8.7 8.8 8.8   BILIRUBIN mg/dL  --   --  0.7 0.3   ALK PHOS U/L  --   --  97 84   ALT (SGPT) U/L  --   --  19 11   AST (SGOT) U/L  --   --  24 16   GLUCOSE mg/dL 192* 307* 494* 392*     Results from last 7 days   Lab Units 08/16/22  0428 08/15/22  0431 08/14/22  1824 08/11/22  1351   MAGNESIUM mg/dL  --  1.6 2.0 1.8   PHOSPHORUS mg/dL  --   --  2.8  --    HEMOGLOBIN g/dL 11.6* 14.1 14.4 13.9   HEMATOCRIT % 33.3* 40.1 40.3 38.8     COVID19   Date Value Ref Range Status   03/20/2022 Not Detected Not Detected - Ref. Range Final     Lab Results   Component Value Date    HGBA1C 9.8 (H) 07/08/2019         Pertinent Medications Reviewed.     Current Nutrition Orders & Evaluation of Intake       Oral Nutrition     Current PO Diet Diet Regular   Supplement No active supplement orders       Malnutrition Severity Assessment      Patient meets criteria for : Severe Malnutrition  Malnutrition Type (last 8 hours)     Malnutrition Severity Assessment     Row Name 08/16/22 1611       Malnutrition Severity Assessment    Malnutrition Type Chronic Disease - Related Malnutrition    Row Name 08/16/22 1611       Insufficient Energy Intake      Insufficient Energy Intake Findings Severe    Insufficient Energy Intake  <75% of est. energy requirement for > or equal to 1 month    Row Name 08/16/22 1611       Unintentional Weight Loss     Unintentional Weight Loss Findings Severe    Unintentional Weight Loss  Weight loss greater than 10% in six months    Row Name 08/16/22 1611       Muscle Loss    Loss of Muscle Mass Findings Severe    [place] Region Moderate - slight depression    Clavicle Bone Region Severe - protruding prominent bone    Acromion Bone Region Severe - squared shoulders, bones, and acromion process protrusion prominent    Dorsal Hand Region Moderate - slight depression    Patellar Region Moderate - patella more prominent, less muscle definition around patella    Anterior Thigh Region Moderate - mild depression on inner thigh    Posterior Calf Region Moderate - some roundness, slight firmness    Row Name 08/16/22 1611       Fat Loss    Subcutaneous Fat Loss Findings Moderate    Orbital Region  Moderate -  somewhat hollowness, slightly dark circles    Upper Arm Region Moderate - some fat tissue, not ample    Row Name 08/16/22 1611       Criteria Met (Must meet criteria for severity in at least 2 of these categories: M Wasting, Fat Loss, Fluid, Secondary Signs, Wt. Status, Intake)    Patient meets criteria for  Severe Malnutrition                   Nutrition Diagnosis         Nutrition Dx Problem 1 Severe malnutrition related to decreased ability to consume sufficient energy as evidenced by inadequate energy intake., decreased appetite., unintended wt change. and body composition changes.     Nutrition Intervention         Boost Plus TID   1080 kcal/day and 48 g pro per day     Medical Nutrition Therapy/Nutrition Education          Learner     Readiness Patient and Significant Other  Acceptance     Method     Response Explanation  Verbalizes understanding     Monitor/Evaluation        Monitor Per protocol, PO intake, Supplement intake, Weight        Nutrition Discharge Plan         To be determined       Electronically signed by:  Rosa Escalante, ANGE  08/16/22 16:14 EDT

## 2022-08-16 NOTE — PROGRESS NOTES
[facility]     Progress Note    Patient Name: Dhaval Nieto  : 1955  MRN: [id number]  Primary Care Physician:  Christiano Mcbride MD  Date of admission: 2022    Subjective   Subjective     HPI:  Patient Reports doing well this morning.  His pain is controlled.  He denies any chest pain or shortness of air.    Review of Systems   See HPI    Objective   Objective     Vitals:   Temp:  [97.7 °F (36.5 °C)-99.9 °F (37.7 °C)] 98.4 °F (36.9 °C)  Heart Rate:  [77-96] 77  Resp:  [14-20] 18  BP: ()/(37-98) 110/57  Flow (L/min):  [4-10] 4  Physical Exam    General: Alert, no acute distress   Chest: Unlabored breathing, cardiovascular: Regular heart rate   Musculoskeletal: Neurovascular intact to extremity.  Positive pulses.  Dressing intact.  Equal leg lengths.    Result Review      WBC   Date Value Ref Range Status   2022 14.13 (H) 3.40 - 10.80 10*3/mm3 Final     RBC   Date Value Ref Range Status   2022 3.86 (L) 4.14 - 5.80 10*6/mm3 Final     Hemoglobin   Date Value Ref Range Status   2022 11.6 (L) 13.0 - 17.7 g/dL Final     Hematocrit   Date Value Ref Range Status   2022 33.3 (L) 37.5 - 51.0 % Final     MCV   Date Value Ref Range Status   2022 86.3 79.0 - 97.0 fL Final     MCH   Date Value Ref Range Status   2022 30.1 26.6 - 33.0 pg Final     MCHC   Date Value Ref Range Status   2022 34.8 31.5 - 35.7 g/dL Final     RDW   Date Value Ref Range Status   2022 14.2 12.3 - 15.4 % Final     RDW-SD   Date Value Ref Range Status   2022 44.6 37.0 - 54.0 fl Final     MPV   Date Value Ref Range Status   2022 10.2 6.0 - 12.0 fL Final     Platelets   Date Value Ref Range Status   2022 130 (L) 140 - 450 10*3/mm3 Final     Neutrophil %   Date Value Ref Range Status   08/15/2022 76.5 (H) 42.7 - 76.0 % Final     Lymphocyte %   Date Value Ref Range Status   08/15/2022 14.8 (L) 19.6 - 45.3 % Final     Monocyte %   Date Value Ref Range Status    08/15/2022 6.8 5.0 - 12.0 % Final     Eosinophil %   Date Value Ref Range Status   08/15/2022 1.2 0.3 - 6.2 % Final     Basophil %   Date Value Ref Range Status   08/15/2022 0.3 0.0 - 1.5 % Final     Immature Grans %   Date Value Ref Range Status   08/15/2022 0.4 0.0 - 0.5 % Final     Neutrophils, Absolute   Date Value Ref Range Status   08/15/2022 8.76 (H) 1.70 - 7.00 10*3/mm3 Final     Lymphocytes, Absolute   Date Value Ref Range Status   08/15/2022 1.70 0.70 - 3.10 10*3/mm3 Final     Monocytes, Absolute   Date Value Ref Range Status   08/15/2022 0.78 0.10 - 0.90 10*3/mm3 Final     Eosinophils, Absolute   Date Value Ref Range Status   08/15/2022 0.14 0.00 - 0.40 10*3/mm3 Final     Basophils, Absolute   Date Value Ref Range Status   08/15/2022 0.04 0.00 - 0.20 10*3/mm3 Final     Immature Grans, Absolute   Date Value Ref Range Status   08/15/2022 0.05 0.00 - 0.05 10*3/mm3 Final     nRBC   Date Value Ref Range Status   08/15/2022 0.0 0.0 - 0.2 /100 WBC Final        Result Review:  I have personally reviewed the results from the time of this admission to 8/16/2022 07:55 EDT and agree with these findings:  [x]  Laboratory  []  Microbiology  [x]  Radiology  []  EKG/Telemetry   []  Cardiology/Vascular   []  Pathology  []  Old records  []  Other:      Assessment & Plan   Assessment / Plan     Brief Patient Summary:  Dhaval Nieto is a 67 y.o. male who is postoperative day #1 status post left total hip replacement    Active Hospital Problems:  Active Hospital Problems    Diagnosis    • **Closed fracture of left hip, initial encounter ([facility])    • Dysuria    • Type 2 diabetes mellitus, with long-term current use of insulin ([facility])    • Arthritis    • Hyperlipidemia    • Hypertension    • Low back pain      Plan: Weightbearing as tolerated with walker  Physical and Occupational Therapy  Pain control  DVT prophylaxis  Discharge planning: Plan for discharge when medically able.  Home health services versus inpatient  rehab       DVT prophylaxis:  Medical and mechanical DVT prophylaxis orders are present.    CODE STATUS:   Level Of Support Discussed With: Patient  Code Status (Patient has no pulse and is not breathing): CPR (Attempt to Resuscitate)  Medical Interventions (Patient has pulse or is breathing): Full Support  Release to patient: Routine Release    Disposition:  I expect patient to be discharged when medically able.    Electronically signed by Kaden Green MD, 08/16/22, 7:55 AM EDT.

## 2022-08-17 LAB
ANION GAP SERPL CALCULATED.3IONS-SCNC: 10.1 MMOL/L (ref 5–15)
BASOPHILS # BLD AUTO: 0.04 10*3/MM3 (ref 0–0.2)
BASOPHILS NFR BLD AUTO: 0.4 % (ref 0–1.5)
BUN SERPL-MCNC: 10 MG/DL (ref 8–23)
BUN/CREAT SERPL: 12 (ref 7–25)
CALCIUM SPEC-SCNC: 8.1 MG/DL (ref 8.6–10.5)
CHLORIDE SERPL-SCNC: 106 MMOL/L (ref 98–107)
CO2 SERPL-SCNC: 19.9 MMOL/L (ref 22–29)
CREAT SERPL-MCNC: 0.83 MG/DL (ref 0.76–1.27)
DEPRECATED RDW RBC AUTO: 43.6 FL (ref 37–54)
EGFRCR SERPLBLD CKD-EPI 2021: 95.3 ML/MIN/1.73
EOSINOPHIL # BLD AUTO: 0.31 10*3/MM3 (ref 0–0.4)
EOSINOPHIL NFR BLD AUTO: 2.8 % (ref 0.3–6.2)
ERYTHROCYTE [DISTWIDTH] IN BLOOD BY AUTOMATED COUNT: 14 % (ref 12.3–15.4)
GLUCOSE BLDC GLUCOMTR-MCNC: 138 MG/DL (ref 70–99)
GLUCOSE BLDC GLUCOMTR-MCNC: 142 MG/DL (ref 70–99)
GLUCOSE BLDC GLUCOMTR-MCNC: 84 MG/DL (ref 70–99)
GLUCOSE BLDC GLUCOMTR-MCNC: 87 MG/DL (ref 70–99)
GLUCOSE BLDC GLUCOMTR-MCNC: 95 MG/DL (ref 70–99)
GLUCOSE SERPL-MCNC: 121 MG/DL (ref 65–99)
HCT VFR BLD AUTO: 29.3 % (ref 37.5–51)
HGB BLD-MCNC: 10.5 G/DL (ref 13–17.7)
IMM GRANULOCYTES # BLD AUTO: 0.03 10*3/MM3 (ref 0–0.05)
IMM GRANULOCYTES NFR BLD AUTO: 0.3 % (ref 0–0.5)
LYMPHOCYTES # BLD AUTO: 1.18 10*3/MM3 (ref 0.7–3.1)
LYMPHOCYTES NFR BLD AUTO: 10.6 % (ref 19.6–45.3)
MAGNESIUM SERPL-MCNC: 1.6 MG/DL (ref 1.6–2.4)
MCH RBC QN AUTO: 30.5 PG (ref 26.6–33)
MCHC RBC AUTO-ENTMCNC: 35.8 G/DL (ref 31.5–35.7)
MCV RBC AUTO: 85.2 FL (ref 79–97)
MONOCYTES # BLD AUTO: 0.77 10*3/MM3 (ref 0.1–0.9)
MONOCYTES NFR BLD AUTO: 6.9 % (ref 5–12)
NEUTROPHILS NFR BLD AUTO: 79 % (ref 42.7–76)
NEUTROPHILS NFR BLD AUTO: 8.8 10*3/MM3 (ref 1.7–7)
NRBC BLD AUTO-RTO: 0 /100 WBC (ref 0–0.2)
PHOSPHATE SERPL-MCNC: 2.7 MG/DL (ref 2.5–4.5)
PLATELET # BLD AUTO: 117 10*3/MM3 (ref 140–450)
PMV BLD AUTO: 9.9 FL (ref 6–12)
POTASSIUM SERPL-SCNC: 3.4 MMOL/L (ref 3.5–5.2)
RBC # BLD AUTO: 3.44 10*6/MM3 (ref 4.14–5.8)
SODIUM SERPL-SCNC: 136 MMOL/L (ref 136–145)
WBC NRBC COR # BLD: 11.13 10*3/MM3 (ref 3.4–10.8)

## 2022-08-17 PROCEDURE — 97116 GAIT TRAINING THERAPY: CPT

## 2022-08-17 PROCEDURE — 82962 GLUCOSE BLOOD TEST: CPT

## 2022-08-17 PROCEDURE — 63710000001 INSULIN DETEMIR PER 5 UNITS: Performed by: ORTHOPAEDIC SURGERY

## 2022-08-17 PROCEDURE — 85025 COMPLETE CBC W/AUTO DIFF WBC: CPT | Performed by: INTERNAL MEDICINE

## 2022-08-17 PROCEDURE — 25010000002 MORPHINE PER 10 MG: Performed by: INTERNAL MEDICINE

## 2022-08-17 PROCEDURE — 83735 ASSAY OF MAGNESIUM: CPT | Performed by: INTERNAL MEDICINE

## 2022-08-17 PROCEDURE — 84100 ASSAY OF PHOSPHORUS: CPT | Performed by: INTERNAL MEDICINE

## 2022-08-17 PROCEDURE — 99233 SBSQ HOSP IP/OBS HIGH 50: CPT | Performed by: INTERNAL MEDICINE

## 2022-08-17 PROCEDURE — 97110 THERAPEUTIC EXERCISES: CPT

## 2022-08-17 PROCEDURE — 80048 BASIC METABOLIC PNL TOTAL CA: CPT | Performed by: INTERNAL MEDICINE

## 2022-08-17 RX ORDER — POTASSIUM CHLORIDE 750 MG/1
40 CAPSULE, EXTENDED RELEASE ORAL ONCE
Status: COMPLETED | OUTPATIENT
Start: 2022-08-17 | End: 2022-08-17

## 2022-08-17 RX ORDER — ZOLPIDEM TARTRATE 5 MG/1
5 TABLET ORAL NIGHTLY
Status: DISCONTINUED | OUTPATIENT
Start: 2022-08-17 | End: 2022-08-19 | Stop reason: HOSPADM

## 2022-08-17 RX ORDER — OXYCODONE HYDROCHLORIDE AND ACETAMINOPHEN 5; 325 MG/1; MG/1
2 TABLET ORAL EVERY 6 HOURS PRN
Status: DISCONTINUED | OUTPATIENT
Start: 2022-08-17 | End: 2022-08-19 | Stop reason: HOSPADM

## 2022-08-17 RX ORDER — PRAZOSIN HYDROCHLORIDE 1 MG/1
1 CAPSULE ORAL NIGHTLY
Status: DISCONTINUED | OUTPATIENT
Start: 2022-08-17 | End: 2022-08-19 | Stop reason: HOSPADM

## 2022-08-17 RX ORDER — MORPHINE SULFATE 2 MG/ML
2 INJECTION, SOLUTION INTRAMUSCULAR; INTRAVENOUS
Status: DISCONTINUED | OUTPATIENT
Start: 2022-08-17 | End: 2022-08-19 | Stop reason: HOSPADM

## 2022-08-17 RX ADMIN — INSULIN DETEMIR 20 UNITS: 100 INJECTION, SOLUTION SUBCUTANEOUS at 08:05

## 2022-08-17 RX ADMIN — DULOXETINE 30 MG: 30 CAPSULE, DELAYED RELEASE ORAL at 08:06

## 2022-08-17 RX ADMIN — ZOLPIDEM TARTRATE 5 MG: 5 TABLET ORAL at 22:14

## 2022-08-17 RX ADMIN — MORPHINE SULFATE 2 MG: 2 INJECTION, SOLUTION INTRAMUSCULAR; INTRAVENOUS at 21:02

## 2022-08-17 RX ADMIN — APIXABAN 2.5 MG: 2.5 TABLET, FILM COATED ORAL at 21:02

## 2022-08-17 RX ADMIN — SODIUM CHLORIDE, POTASSIUM CHLORIDE, SODIUM LACTATE AND CALCIUM CHLORIDE 100 ML/HR: 600; 310; 30; 20 INJECTION, SOLUTION INTRAVENOUS at 00:20

## 2022-08-17 RX ADMIN — OXYCODONE HYDROCHLORIDE AND ACETAMINOPHEN 1 TABLET: 5; 325 TABLET ORAL at 13:06

## 2022-08-17 RX ADMIN — APIXABAN 2.5 MG: 2.5 TABLET, FILM COATED ORAL at 08:07

## 2022-08-17 RX ADMIN — ACETAMINOPHEN 1000 MG: 500 TABLET ORAL at 01:45

## 2022-08-17 RX ADMIN — ACETAMINOPHEN 1000 MG: 500 TABLET ORAL at 10:25

## 2022-08-17 RX ADMIN — OXYCODONE HYDROCHLORIDE AND ACETAMINOPHEN 2 TABLET: 5; 325 TABLET ORAL at 17:34

## 2022-08-17 RX ADMIN — PANTOPRAZOLE SODIUM 40 MG: 40 TABLET, DELAYED RELEASE ORAL at 08:05

## 2022-08-17 RX ADMIN — OXYCODONE HYDROCHLORIDE AND ACETAMINOPHEN 1 TABLET: 5; 325 TABLET ORAL at 08:06

## 2022-08-17 RX ADMIN — PRAZOSIN HYDROCHLORIDE 1 MG: 1 CAPSULE ORAL at 21:07

## 2022-08-17 RX ADMIN — Medication 10 ML: at 10:25

## 2022-08-17 RX ADMIN — Medication 10 ML: at 21:02

## 2022-08-17 RX ADMIN — POTASSIUM CHLORIDE 40 MEQ: 750 CAPSULE, EXTENDED RELEASE ORAL at 10:24

## 2022-08-17 NOTE — PLAN OF CARE
Goal Outcome Evaluation:   Pt has been intermittently confused throughout shift, disoriented to time, situation, and place. Pt was also experiencing hallucinations and talking to people that were not actually present. Pt frequently attempts out of bed unassisted due to thinking he is at home and needs to leave. Pt was re-educated several times about using call light for assistance and was reoriented to place as needed. Pt was medicated for pain x1. No further concerns.

## 2022-08-17 NOTE — THERAPY TREATMENT NOTE
Acute Care - Physical Therapy Progress Note   [facility]     Patient Name: Dhaval Nieto  : 1955  MRN: [id number]  Today's Date: 2022      Visit Dx:     ICD-10-CM ICD-9-CM   1. Closed fracture of left hip, initial encounter ([facility])  S72.002A 820.8   2. Difficulty in walking  R26.2 719.7   3. Decreased activities of daily living (ADL)  Z78.9 V49.89     Patient Active Problem List   Diagnosis   • Chronic bronchitis, unspecified chronic bronchitis type ([facility])   • Right wrist pain   • Pain disorder with psychological factors   • Bladder disorder   • Abdominal hernia   • Stomach disorder   • Arthritis   • Asthma   • BPH with urinary obstruction   • Chest pain   • Decreased sensation   • Depression   • History of colon cancer   • Hyperlipemia   • Hyperlipidemia   • Hypertension   • Insomnia   • Limb swelling   • Loss of balance   • Low back pain   • Thoracic back pain   • Lumbar degenerative disc disease   • Degeneration of thoracic or thoracolumbar intervertebral disc   • Lumbar radiculopathy   • Migraines   • Muscle cramps   • Neck pain   • Neurologic disorder   • Headache   • Pain, generalized   • Shortness of breath   • Cervical radiculopathy   • Cervical stenosis of spinal canal   • Seasonal allergic rhinitis   • Leg pain   • Cancer of sigmoid colon ([facility])   • Closed fracture of left hip, initial encounter ([facility])   • Dysuria   • Type 2 diabetes mellitus, with long-term current use of insulin ([facility])     Past Medical History:   Diagnosis Date   • Arteriovenous malformation 1980   • Arthritis    • Asthma    • Benign essential hypertension    • Bladder disorder    • Bleeding disorder ([facility])    • Blood disease    • BPH with urinary obstruction 2014   • Brain concussion    • Cancer ([facility])    • Cervical disc disorder long time   • Cervical radiculopathy 2015    left   • Cervical spinal stenosis 03/10/2020   • Cervicalgia 2018   • Chest pain    • CHF (congestive heart failure)  ([facility])    • Clotting disorder ([facility]) [place]   • Colon cancer ([facility]) 12/18/2018   • Condition not found     Hernia   • Condition not found     herniated disc   • COPD (chronic obstructive pulmonary disease) ([facility])    • Coronary artery disease    • CTS (carpal tunnel syndrome) 08/04/2002   • DDD (degenerative disc disease), thoracic 12/18/2018   • Decreased sensation 05/01/2015    left   • Deep vein thrombosis ([facility])    • Depression    • Diabetes ([facility])    • Diabetes mellitus type 1 with coma, insulin dependent ([facility])     controlled   • Headache    • Heart murmur    • Heart valve disease    • Hematuria, gross 05/30/2014   • Hyperlipemia    • Hyperlipidemia    • Hypertension    • Insomnia    • Leg pain    • Leg pain    • Limb swelling    • Loss of balance 12/18/2018   • Low back pain 03/10/2020   • Lumbar degenerative disc disease 12/18/2018   • Lumbar radiculopathy 03/10/2020   • Lumbosacral disc disease 05/05/1999   • Mid back pain 12/18/2018   • Migraines    • Muscle cramps    • Myocardial infarction ([facility])    • Neurologic disorder    • Pain in back    • Pain in joint    • Pain of cervical spine    • Pain, generalized    • Pulmonary arterial hypertension ([facility]) 1999   • Seasonal allergies    • Seizures ([facility]) 2000   • Shortness of breath    • Sleep apnea    • Stomach disorder      Past Surgical History:   Procedure Laterality Date   • ABDOMINAL SURGERY     • BLADDER SURGERY     • CARPAL TUNNEL RELEASE     • CEREBRAL ANGIOGRAM  2021   • COLON RESECTION     • COLONOSCOPY  01/30/2017    [place]   • CORONARY ARTERY BYPASS GRAFT  12/2019    one vessel   • GALLBLADDER SURGERY      cholecystecomy   • HERNIA REPAIR     • JOINT REPLACEMENT      joint surgery   • MITRAL VALVE REPAIR/REPLACEMENT  02/2019    [facility]   • OTHER SURGICAL HISTORY  05/30/2014    office cystoscopy w/DR SHANICE Young   • SHOULDER SURGERY Bilateral    • TOTAL HIP ARTHROPLASTY Left 8/15/2022    Procedure: TOTAL HIP ARTHROPLASTY ANTERIOR;  Surgeon:  Kaden Green MD;  Location: [facility] MAIN OR;  Service: Orthopedics;  Laterality: Left;   • VASCULAR SURGERY  2020   • VENTRAL HERNIA REPAIR       PT Assessment (last 12 hours)     PT Evaluation and Treatment     Row Name 08/17/22 1700          Physical Therapy Time and Intention    Subjective Information no complaints  -CS     Document Type therapy note (daily note)  -CS     Mode of Treatment individual therapy;physical therapy  -CS     Patient Effort good  -CS     Symptoms Noted During/After Treatment none  -CS     Row Name 08/17/22 1700          Pain    Pretreatment Pain Rating 0/10 - no pain  -CS     Posttreatment Pain Rating 0/10 - no pain  -CS     Row Name 08/17/22 1700          Bed Mobility    Supine-Sit [place] (Bed Mobility) verbal cues;contact guard;1 person assist  -CS     Bed Mobility, Safety Issues decreased use of legs for bridging/pushing  -CS     Row Name 08/17/22 1700          Transfers    Sit-Stand [place] (Transfers) verbal cues;contact guard;1 person assist  -CS     Stand-Sit [place] (Transfers) verbal cues;contact guard;1 person assist  -CS     Row Name 08/17/22 1700          Sit-Stand Transfer    Assistive Device (Sit-Stand Transfers) walker, front-wheeled  -CS     Row Name 08/17/22 1700          Stand-Sit Transfer    Assistive Device (Stand-Sit Transfers) walker, front-wheeled  -CS     Row Name 08/17/22 1700          Gait/Stairs (Locomotion)    [place] Level (Gait) verbal cues;contact guard;1 person assist  -CS     Assistive Device (Gait) walker, front-wheeled  -CS     Distance in Feet (Gait) 115  x 2 reps with 15'' standing rest break between reps  -CS     Pattern (Gait) 4-point;step-through  -CS     Deviations/Abnormal Patterns (Gait) analisa decreased;gait speed decreased;stride length decreased  -CS     Bilateral Gait Deviations forward flexed posture  -CS     Gait Assessment/Intervention Verbal cues provided for posture corrections during gait training and for  proper Rw use  -     Row Name 08/17/22 1700          Motor Skills    Therapeutic Exercise hip;knee;ankle  -CS     Row Name 08/17/22 1700          Hip (Therapeutic Exercise)    Hip (Therapeutic Exercise) AAROM (active assistive range of motion)  -     Hip AAROM (Therapeutic Exercise) left;sitting;10 repetitions;2 sets  marches, hip abd/add, hip IR/ER  -CS     Row Name 08/17/22 1700          Knee (Therapeutic Exercise)    Knee (Therapeutic Exercise) AROM (active range of motion)  -     Knee AROM (Therapeutic Exercise) left;LAQ (long arc quad);sitting;20 repititions  -CS     Row Name 08/17/22 1700          Ankle (Therapeutic Exercise)    Ankle (Therapeutic Exercise) AROM (active range of motion)  -     Ankle AROM (Therapeutic Exercise) left;sitting;dorsiflexion;plantarflexion;20 repititions  -     Row Name             Wound Left anterior hip Incision    Wound - Properties Group Side: Left  -LF Orientation: anterior  -LF Location: hip  -LF Primary Wound Type: Incision  -LF     Retired Wound - Properties Group Side: Left  -LF Orientation: anterior  -LF Location: hip  -LF Primary Wound Type: Incision  -LF     Retired Wound - Properties Group Side: Left  -LF Location: hip  -LF Primary Wound Type: Incision  -LF     Row Name 08/17/22 1700          Positioning and Restraints    Pre-Treatment Position in bed  -CS     Post Treatment Position chair  -CS     In Chair sitting;call light within reach;encouraged to call for assist;exit alarm on;with family/caregiver  -     Row Name 08/17/22 1700          Progress Summary (PT)    Progress Toward Functional Goals (PT) progress toward functional goals is good  -           User Key  (r) = Recorded By, (t) = Taken By, (c) = Cosigned By    Initials Name Provider Type    CS Mamadou Ryan PTA Physical Therapist Assistant    LF Giovanna Parra, RN Registered Nurse                Physical Therapy Education                 Title: PT OT SLP Therapies (Done)     Topic: Physical  Therapy (Done)     Point: Mobility training (Done)     Learning Progress Summary           Patient Acceptance, E,TB, VU by MEGAN at 8/16/2022 1307                   Point: Precautions (Done)     Learning Progress Summary           Patient Acceptance, E,TB, VU by MEGAN at 8/16/2022 1307                               User Key     Initials Effective Dates Name Provider Type Discipline    MEGAN 06/03/21 -  Rory Sibley, PT Physical Therapist PT              PT Recommendation and Plan     Progress Summary (PT)  Progress Toward Functional Goals (PT): progress toward functional goals is good   Outcome Measures     Row Name 08/17/22 1700 08/16/22 1300          How much help from another person do you currently need...    Turning from your back to your side while in flat bed without using bedrails? 4  -CS 3  -MEGAN     Moving from lying on back to sitting on the side of a flat bed without bedrails? 4  -CS 3  -MEGAN     Moving to and from a bed to a chair (including a wheelchair)? 3  -CS 3  -MEGAN     Standing up from a chair using your arms (e.g., wheelchair, bedside chair)? 3  -CS 3  -MEGAN     Climbing 3-5 steps with a railing? 3  -CS 3  -MEGAN     To walk in hospital room? 3  -CS 3  -MEGAN     AM-PAC 6 Clicks Score (PT) 20  -CS 18  -MEGAN            Functional Assessment    Outcome Measure Options AM-PAC 6 Clicks Basic Mobility (PT)  -CS AM-PAC 6 Clicks Basic Mobility (PT)  -MEGAN           User Key  (r) = Recorded By, (t) = Taken By, (c) = Cosigned By    Initials Name Provider Type    Rory Rios, PT Physical Therapist    Mamadou Sampson PTA Physical Therapist Assistant                 Time Calculation:    PT Charges     Row Name 08/17/22 1745             Time Calculation    Start Time 1440  -CS      PT Received On 08/17/22  -CS              Timed Charges    27082 - PT Therapeutic Exercise Minutes 10  -CS      66900 - Gait Training Minutes  12  -CS      21569 - PT Therapeutic Activity Minutes 2  -CS              Total Minutes    Timed  Charges Total Minutes 24  -CS       Total Minutes 24  -CS            User Key  (r) = Recorded By, (t) = Taken By, (c) = Cosigned By    Initials Name Provider Type    CS Mamadou Ryan PTA Physical Therapist Assistant              Therapy Charges for Today     Code Description Service Date Service Provider Modifiers Qty    [id number]  PT THER PROC EA 15 MIN 8/17/2022 Mamadou Ryan PTA GP 1    [id number]  GAIT TRAINING EA 15 MIN 8/17/2022 Mamadou Ryan PTA GP 1          PT G-Codes  Outcome Measure Options: AM-PAC 6 Clicks Basic Mobility (PT)  AM-PAC 6 Clicks Score (PT): 20  AM-PAC 6 Clicks Score (OT): 18    Mamadou Ryan PTA  8/17/2022

## 2022-08-17 NOTE — PROGRESS NOTES
[facility]   Hospitalist Progress Note  Date: 2022  Patient Name: Dhaval Nieto  : 1955  MRN: [id number]  Date of admission: 2022      Subjective   Subjective     Chief Complaint: Follow-up for left hip fracture    Summary: 68 y/o M with T2DM, COPD, HTN, CAD, DJD who presented following a fall with left hip pain.  Found to have a displaced left femoral neck fracture.  Ortho on board.  Underwent repair 8/15.    Interval Followup: Patient had some issues with confusion and hallucination throughout the night.  He is a war  apparently has had severe PTSD.  Per his family, he has had hallucinations at night for quite a while.  This morning, he is back to his baseline mentation.  Working well with physical therapy, pain well controlled.    Review of Systems  All systems reviewed and negative unless otherwise stated under interval follow-up    Objective   Objective     Vitals:   Temp:  [97.8 °F (36.6 °C)-99.5 °F (37.5 °C)] 98.6 °F (37 °C)  Heart Rate:  [83-94] 87  Resp:  [14-18] 18  BP: ()/(45-56) 101/53  Physical Exam    Constitutional: Elderly male, resting in bed, NAD   Eyes: Pupils equal and reactive, no conjunctival injection   HENT: NCAT, moist mucous membranes   Neck: Supple, trachea midline   Respiratory: Clear to auscultation bilaterally, nonlabored respirations    Cardiovascular: RRR, no murmurs, no pedal edema   Gastrointestinal: Positive bowel sounds, soft, nontender, nondistended   Musculoskeletal: Left hip with dressing that is  c/d/i, no gross deformities, no clubbing or cyanosis to extremities   Neurologic: Oriented x 3, Cranial Nerves grossly intact speech clear   Skin: Warm and dry, no rashes     Result Review    Result Review:  I have personally reviewed the results from the time of this admission to 2022 17:18 EDT and agree with these findings:  [x]  Laboratory   CBC    CBC 8/15/22 8/16/22 8/17/22   WBC 11.47 (A) 14.13 (A) 11.13 (A)   RBC 4.63 3.86  (A) 3.44 (A)   Hemoglobin 14.1 11.6 (A) 10.5 (A)   Hematocrit 40.1 33.3 (A) 29.3 (A)   MCV 86.6 86.3 85.2   MCH 30.5 30.1 30.5   MCHC 35.2 34.8 35.8 (A)   RDW 14.0 14.2 14.0   Platelets 161 130 (A) 117 (A)   (A) Abnormal value            BMP    BMP 8/15/22 8/16/22 8/17/22   BUN 6 (A) 10 10   Creatinine 0.75 (A) 0.85 0.83   Sodium 140 137 136   Potassium 4.0 3.7 3.4 (A)   Chloride 108 (A) 105 106   CO2 24.3 23.1 19.9 (A)   Calcium 8.7 8.2 (A) 8.1 (A)   (A) Abnormal value              []  Microbiology  [x]  Radiology  []  EKG/Telemetry   []  Cardiology/Vascular   []  Pathology  []  Old records  []  Other:    Assessment & Plan   Assessment / Plan     Assessment:  PTSD with hallucinations  Closed fracture of left hip  Dysuria  Hypotension  Type 2 diabetes mellitus  Osteoarthritis  Hyperlipidemia  Hypokalemia  History of hypertension  Lower back pain    Orthopedic surgery following, appreciate assistance  Postop day 2 from left total hip arthroplasty  Decrease Toprol to 25 mg twice daily  Will start prazosin nightly for PTSD with nightmares  Will start Ambien at night to help with sleep  Consider psychiatry consult tomorrow  DVT prophylaxis ordered per orthopedic surgery  Continue Levemir 20 units daily + medium/high dose SSI per protocol  Continue Cymbalta 30 mg daily   Replace potassium  PT/OT consulted and recommended rehab.  Social work aware  Trend renal function and electrolytes with a.m. BMP  Trend Hgb and WBC with a.m. CBC    Discussed plan with RN, orthopedic surgery    DVT prophylaxis:  Medical and mechanical DVT prophylaxis orders are present.    CODE STATUS:   Level Of Support Discussed With: Patient  Code Status (Patient has no pulse and is not breathing): CPR (Attempt to Resuscitate)  Medical Interventions (Patient has pulse or is breathing): Full Support  Release to patient: Routine Release

## 2022-08-17 NOTE — PROGRESS NOTES
[facility]     Progress Note    Patient Name: Dhaval Nieto  : 1955  MRN: [id number]  Primary Care Physician:  Christiano Mcbride MD  Date of admission: 2022    Subjective   Subjective     HPI:  Patient Reports doing well this morning.  His pain is controlled.  There is some soreness to the hip.  He denies any chest pain or shortness of air.    Review of Systems   See HPI    Objective   Objective     Vitals:   Temp:  [97.9 °F (36.6 °C)-99.5 °F (37.5 °C)] 98.8 °F (37.1 °C)  Heart Rate:  [70-94] 84  Resp:  [16] 16  BP: ()/(46-61) 103/56  Physical Exam    General: Alert, no acute distress   Chest: Unlabored breathing, cardiovascular: Regular heart rate   Musculoskeletal: Neurovascular intact to extremity.  Dressing intact.  Positive pulses.  Negative Linda.    Result Review      WBC   Date Value Ref Range Status   2022 11.13 (H) 3.40 - 10.80 10*3/mm3 Final     RBC   Date Value Ref Range Status   2022 3.44 (L) 4.14 - 5.80 10*6/mm3 Final     Hemoglobin   Date Value Ref Range Status   2022 10.5 (L) 13.0 - 17.7 g/dL Final     Hematocrit   Date Value Ref Range Status   2022 29.3 (L) 37.5 - 51.0 % Final     MCV   Date Value Ref Range Status   2022 85.2 79.0 - 97.0 fL Final     MCH   Date Value Ref Range Status   2022 30.5 26.6 - 33.0 pg Final     MCHC   Date Value Ref Range Status   2022 35.8 (H) 31.5 - 35.7 g/dL Final     RDW   Date Value Ref Range Status   2022 14.0 12.3 - 15.4 % Final     RDW-SD   Date Value Ref Range Status   2022 43.6 37.0 - 54.0 fl Final     MPV   Date Value Ref Range Status   2022 9.9 6.0 - 12.0 fL Final     Platelets   Date Value Ref Range Status   2022 117 (L) 140 - 450 10*3/mm3 Final     Neutrophil %   Date Value Ref Range Status   2022 79.0 (H) 42.7 - 76.0 % Final     Lymphocyte %   Date Value Ref Range Status   2022 10.6 (L) 19.6 - 45.3 % Final     Monocyte %   Date Value Ref Range  Status   08/17/2022 6.9 5.0 - 12.0 % Final     Eosinophil %   Date Value Ref Range Status   08/17/2022 2.8 0.3 - 6.2 % Final     Basophil %   Date Value Ref Range Status   08/17/2022 0.4 0.0 - 1.5 % Final     Immature Grans %   Date Value Ref Range Status   08/17/2022 0.3 0.0 - 0.5 % Final     Neutrophils, Absolute   Date Value Ref Range Status   08/17/2022 8.80 (H) 1.70 - 7.00 10*3/mm3 Final     Lymphocytes, Absolute   Date Value Ref Range Status   08/17/2022 1.18 0.70 - 3.10 10*3/mm3 Final     Monocytes, Absolute   Date Value Ref Range Status   08/17/2022 0.77 0.10 - 0.90 10*3/mm3 Final     Eosinophils, Absolute   Date Value Ref Range Status   08/17/2022 0.31 0.00 - 0.40 10*3/mm3 Final     Basophils, Absolute   Date Value Ref Range Status   08/17/2022 0.04 0.00 - 0.20 10*3/mm3 Final     Immature Grans, Absolute   Date Value Ref Range Status   08/17/2022 0.03 0.00 - 0.05 10*3/mm3 Final     nRBC   Date Value Ref Range Status   08/17/2022 0.0 0.0 - 0.2 /100 WBC Final        Result Review:  I have personally reviewed the results from the time of this admission to 8/17/2022 07:04 EDT and agree with these findings:  [x]  Laboratory  []  Microbiology  [x]  Radiology  []  EKG/Telemetry   []  Cardiology/Vascular   []  Pathology  []  Old records  []  Other:      Assessment & Plan   Assessment / Plan     Brief Patient Summary:  Dhaval Nieto is a 67 y.o. male who is postoperative day #2 status post left total hip replacement    Active Hospital Problems:  Active Hospital Problems    Diagnosis    • **Closed fracture of left hip, initial encounter ([facility])    • Dysuria    • Type 2 diabetes mellitus, with long-term current use of insulin ([facility])    • Arthritis    • Hyperlipidemia    • Hypertension    • Low back pain      Plan: Weightbearing as tolerated with walker  Physical and Occupational Therapy  Pain control  DVT prophylaxis  Discharge planning: Plan for discharge to inpatient rehab when bed available       DVT  prophylaxis:  Medical and mechanical DVT prophylaxis orders are present.    CODE STATUS:   Level Of Support Discussed With: Patient  Code Status (Patient has no pulse and is not breathing): CPR (Attempt to Resuscitate)  Medical Interventions (Patient has pulse or is breathing): Full Support  Release to patient: Routine Release    Disposition:  I expect patient to be discharged to inpatient rehab when able.    Electronically signed by Kaden Green MD, 08/17/22, 7:04 AM EDT.

## 2022-08-17 NOTE — PLAN OF CARE
Goal Outcome Evaluation:      Pt stable throughout shift, voiding without difficulty, ambulated in hallway and in room 1 assist with walker. Oral pain medication adjusted for better pain control. Dressing dry and intact.

## 2022-08-18 ENCOUNTER — APPOINTMENT (OUTPATIENT)
Dept: GENERAL RADIOLOGY | Facility: HOSPITAL | Age: 67
End: 2022-08-18

## 2022-08-18 ENCOUNTER — APPOINTMENT (OUTPATIENT)
Dept: MRI IMAGING | Facility: HOSPITAL | Age: 67
End: 2022-08-18

## 2022-08-18 PROBLEM — E43 SEVERE MALNUTRITION (HCC): Status: ACTIVE | Noted: 2022-08-18

## 2022-08-18 LAB
GLUCOSE BLDC GLUCOMTR-MCNC: 128 MG/DL (ref 70–99)
GLUCOSE BLDC GLUCOMTR-MCNC: 131 MG/DL (ref 70–99)
GLUCOSE BLDC GLUCOMTR-MCNC: 143 MG/DL (ref 70–99)
GLUCOSE BLDC GLUCOMTR-MCNC: 160 MG/DL (ref 70–99)
SARS-COV-2 RNA PNL SPEC NAA+PROBE: NOT DETECTED

## 2022-08-18 PROCEDURE — 82962 GLUCOSE BLOOD TEST: CPT

## 2022-08-18 PROCEDURE — U0005 INFEC AGEN DETEC AMPLI PROBE: HCPCS | Performed by: INTERNAL MEDICINE

## 2022-08-18 PROCEDURE — 63710000001 INSULIN DETEMIR PER 5 UNITS: Performed by: ORTHOPAEDIC SURGERY

## 2022-08-18 PROCEDURE — 99221 1ST HOSP IP/OBS SF/LOW 40: CPT | Performed by: PSYCHIATRY & NEUROLOGY

## 2022-08-18 PROCEDURE — 99233 SBSQ HOSP IP/OBS HIGH 50: CPT | Performed by: INTERNAL MEDICINE

## 2022-08-18 PROCEDURE — 25010000002 MORPHINE PER 10 MG: Performed by: INTERNAL MEDICINE

## 2022-08-18 PROCEDURE — 63710000001 INSULIN LISPRO (HUMAN) PER 5 UNITS: Performed by: ORTHOPAEDIC SURGERY

## 2022-08-18 PROCEDURE — 70551 MRI BRAIN STEM W/O DYE: CPT

## 2022-08-18 PROCEDURE — 71045 X-RAY EXAM CHEST 1 VIEW: CPT

## 2022-08-18 PROCEDURE — U0004 COV-19 TEST NON-CDC HGH THRU: HCPCS | Performed by: INTERNAL MEDICINE

## 2022-08-18 RX ADMIN — MORPHINE SULFATE 2 MG: 2 INJECTION, SOLUTION INTRAMUSCULAR; INTRAVENOUS at 19:33

## 2022-08-18 RX ADMIN — MORPHINE SULFATE 2 MG: 2 INJECTION, SOLUTION INTRAMUSCULAR; INTRAVENOUS at 22:50

## 2022-08-18 RX ADMIN — APIXABAN 2.5 MG: 2.5 TABLET, FILM COATED ORAL at 08:01

## 2022-08-18 RX ADMIN — ACETAMINOPHEN 325MG 325 MG: 325 TABLET ORAL at 08:06

## 2022-08-18 RX ADMIN — MORPHINE SULFATE 2 MG: 2 INJECTION, SOLUTION INTRAMUSCULAR; INTRAVENOUS at 15:57

## 2022-08-18 RX ADMIN — Medication 10 ML: at 08:06

## 2022-08-18 RX ADMIN — SODIUM CHLORIDE, POTASSIUM CHLORIDE, SODIUM LACTATE AND CALCIUM CHLORIDE 100 ML/HR: 600; 310; 30; 20 INJECTION, SOLUTION INTRAVENOUS at 15:57

## 2022-08-18 RX ADMIN — OXYCODONE HYDROCHLORIDE AND ACETAMINOPHEN 2 TABLET: 5; 325 TABLET ORAL at 14:37

## 2022-08-18 RX ADMIN — APIXABAN 2.5 MG: 2.5 TABLET, FILM COATED ORAL at 21:01

## 2022-08-18 RX ADMIN — Medication 10 ML: at 21:01

## 2022-08-18 RX ADMIN — DULOXETINE 30 MG: 30 CAPSULE, DELAYED RELEASE ORAL at 08:01

## 2022-08-18 RX ADMIN — ZOLPIDEM TARTRATE 5 MG: 5 TABLET ORAL at 21:01

## 2022-08-18 RX ADMIN — SENNOSIDES AND DOCUSATE SODIUM 2 TABLET: 8.6; 5 TABLET ORAL at 08:01

## 2022-08-18 RX ADMIN — SENNOSIDES AND DOCUSATE SODIUM 2 TABLET: 8.6; 5 TABLET ORAL at 21:01

## 2022-08-18 RX ADMIN — PRAZOSIN HYDROCHLORIDE 1 MG: 1 CAPSULE ORAL at 21:55

## 2022-08-18 RX ADMIN — PANTOPRAZOLE SODIUM 40 MG: 40 TABLET, DELAYED RELEASE ORAL at 08:00

## 2022-08-18 RX ADMIN — INSULIN DETEMIR 20 UNITS: 100 INJECTION, SOLUTION SUBCUTANEOUS at 08:01

## 2022-08-18 RX ADMIN — INSULIN LISPRO 3 UNITS: 100 INJECTION, SOLUTION INTRAVENOUS; SUBCUTANEOUS at 12:08

## 2022-08-18 RX ADMIN — LINACLOTIDE 72 MCG: 72 CAPSULE, GELATIN COATED ORAL at 08:01

## 2022-08-18 RX ADMIN — METOPROLOL TARTRATE 25 MG: 25 TABLET, FILM COATED ORAL at 08:01

## 2022-08-18 NOTE — PLAN OF CARE
Goal Outcome Evaluation:  Plan of Care Reviewed With: patient        Progress: no change  Outcome Evaluation: pt. medicated x 1 for pain with relieif noted.  no significant changes noted this shift.  vital signs stable.

## 2022-08-18 NOTE — PROGRESS NOTES
Psychiatric   Hospitalist Progress Note  Date: 2022  Patient Name: Dhaval Nieto  : 1955  MRN: [id number]  Date of admission: 2022      Subjective   Subjective     Chief Complaint: Follow-up for left hip fracture    Summary: 68 y/o M with T2DM, COPD, HTN, CAD, DJD who presented following a fall with left hip pain.  Found to have a displaced left femoral neck fracture.  Ortho on board.  Underwent repair 8/15.  Did have issues with PTSD and nightmares during his hospital stay.  In discussion with his family, as he has had worsening memory trouble and confusion.  His prognosis is more consistent with new onset dementia.  Psychiatry and neurology are consulted during his hospital stay.    Interval Followup: Patient slept little better last night.  This morning, he states his pain is fairly well controlled.  Has been working well with physical therapy.  I long discussion with his daughter at bedside.  Apparently, for the past year he has had worsening intermittent confusion, memory loss, and hallucinations.  He has not been letting his wife go to his doctors appointments.  He has gotten lost while driving.  Concerned that he is developing dementia.  He also had a low-grade fever this morning.  His wife called the nursing station and stated she just recently tested positive for COVID.    Review of Systems  All systems reviewed and negative unless otherwise stated under interval follow-up    Objective   Objective     Vitals:   Temp:  [98.3 °F (36.8 °C)-100.5 °F (38.1 °C)] 99.6 °F (37.6 °C)  Heart Rate:  [79-98] 79  Resp:  [14-20] 16  BP: (101-137)/(52-60) 119/59  Physical Exam    Constitutional: Elderly male, lying in bed, NAD   Eyes: Pupils equal and reactive, no conjunctival injection   HENT: NCAT, moist mucous membranes   Neck: Supple, trachea midline   Respiratory: Clear to auscultation bilaterally, nonlabored respirations    Cardiovascular: RRR, no murmurs, no pedal  edema   Gastrointestinal: Positive bowel sounds, soft, nontender, nondistended   Musculoskeletal: Left hip with dressing that is c/d/i, no gross deformities, no clubbing or cyanosis to extremities   Neurologic: Oriented x 3, Cranial Nerves grossly intact speech clear   Skin: Warm and dry, no rashes     Result Review    Result Review:  I have personally reviewed the results from the time of this admission to 8/18/2022 13:45 EDT and agree with these findings:  [x]  Laboratory   CBC    CBC 8/15/22 8/16/22 8/17/22   WBC 11.47 (A) 14.13 (A) 11.13 (A)   RBC 4.63 3.86 (A) 3.44 (A)   Hemoglobin 14.1 11.6 (A) 10.5 (A)   Hematocrit 40.1 33.3 (A) 29.3 (A)   MCV 86.6 86.3 85.2   MCH 30.5 30.1 30.5   MCHC 35.2 34.8 35.8 (A)   RDW 14.0 14.2 14.0   Platelets 161 130 (A) 117 (A)   (A) Abnormal value            BMP    BMP 8/15/22 8/16/22 8/17/22   BUN 6 (A) 10 10   Creatinine 0.75 (A) 0.85 0.83   Sodium 140 137 136   Potassium 4.0 3.7 3.4 (A)   Chloride 108 (A) 105 106   CO2 24.3 23.1 19.9 (A)   Calcium 8.7 8.2 (A) 8.1 (A)   (A) Abnormal value              []  Microbiology  [x]  Radiology  []  EKG/Telemetry   []  Cardiology/Vascular   []  Pathology  []  Old records  []  Other:    Assessment & Plan   Assessment / Plan     Assessment:  PTSD with hallucinations  Concern for developing dementia  COVID-19 rule out  Closed fracture of left hip  Dysuria  Hypotension  Type 2 diabetes mellitus  Osteoarthritis  Hyperlipidemia  Hypokalemia  History of hypertension  Lower back pain    Orthopedic surgery following, appreciate assistance  Postop day 3 from left total hip arthroplasty  Due to concern for developing dementia with behavioral disturbance and hallucinations, will consult psychiatry and neurology  Obtain MRI brain  Obtain chest x-ray  Obtain COVID swab, placed in enhanced about isolation until result returns  Continue Toprol to 25 mg twice daily  Continue prazosin nightly for PTSD with nightmares  Continue Ambien at night to help with  sleep  DVT prophylaxis ordered per orthopedic surgery  Continue Levemir 20 units daily + medium/high dose SSI per protocol  PT/OT consulted and recommended rehab.  Social work aware  Trend renal function and electrolytes with a.m. BMP  Trend Hgb and WBC with a.m. CBC    Discussed plan with RN, orthopedic surgery, neurology, psychiatry    DVT prophylaxis:  Medical and mechanical DVT prophylaxis orders are present.    CODE STATUS:   Level Of Support Discussed With: Patient  Code Status (Patient has no pulse and is not breathing): CPR (Attempt to Resuscitate)  Medical Interventions (Patient has pulse or is breathing): Full Support  Release to patient: Routine Release

## 2022-08-18 NOTE — PLAN OF CARE
Goal Outcome Evaluation:    Pt was transferred to 3NT from 2NT due to covid rule out because of mild temp. Awaiting COVID test results. Pt speaks some english, but daughter request physicians use . VSS at this time. Patient has intermittent confusion. Pt does have PTSD and should be awaken easily per daughter. Pt is high falls risk and bed alert is set to zone at this time.

## 2022-08-18 NOTE — PROGRESS NOTES
[facility]     Progress Note    Patient Name: Dhaval Nieto  : 1955  MRN: [id number]  Primary Care Physician:  Christiano Mcbride MD  Date of admission: 2022    Subjective   Subjective     HPI:  Patient Reports pain better controlled since taking 2 Percocets.  No new complaints.  Resting comfortably this morning.    Review of Systems   See HPI    Objective   Objective     Vitals:   Temp:  [97.9 °F (36.6 °C)-100.5 °F (38.1 °C)] 100.3 °F (37.9 °C)  Heart Rate:  [83-98] 91  Resp:  [14-20] 14  BP: (101-137)/(45-60) 132/56  Physical Exam    General: Alert, no acute distress   Chest: Unlabored breathing, cardiovascular: Regular heart rate   Musculoskeletal: Neurovascular intact to extremity.  Dressing intact.    Result Review      WBC   Date Value Ref Range Status   2022 11.13 (H) 3.40 - 10.80 10*3/mm3 Final     RBC   Date Value Ref Range Status   2022 3.44 (L) 4.14 - 5.80 10*6/mm3 Final     Hemoglobin   Date Value Ref Range Status   2022 10.5 (L) 13.0 - 17.7 g/dL Final     Hematocrit   Date Value Ref Range Status   2022 29.3 (L) 37.5 - 51.0 % Final     MCV   Date Value Ref Range Status   2022 85.2 79.0 - 97.0 fL Final     MCH   Date Value Ref Range Status   2022 30.5 26.6 - 33.0 pg Final     MCHC   Date Value Ref Range Status   2022 35.8 (H) 31.5 - 35.7 g/dL Final     RDW   Date Value Ref Range Status   2022 14.0 12.3 - 15.4 % Final     RDW-SD   Date Value Ref Range Status   2022 43.6 37.0 - 54.0 fl Final     MPV   Date Value Ref Range Status   2022 9.9 6.0 - 12.0 fL Final     Platelets   Date Value Ref Range Status   2022 117 (L) 140 - 450 10*3/mm3 Final     Neutrophil %   Date Value Ref Range Status   2022 79.0 (H) 42.7 - 76.0 % Final     Lymphocyte %   Date Value Ref Range Status   2022 10.6 (L) 19.6 - 45.3 % Final     Monocyte %   Date Value Ref Range Status   2022 6.9 5.0 - 12.0 % Final     Eosinophil  %   Date Value Ref Range Status   08/17/2022 2.8 0.3 - 6.2 % Final     Basophil %   Date Value Ref Range Status   08/17/2022 0.4 0.0 - 1.5 % Final     Immature Grans %   Date Value Ref Range Status   08/17/2022 0.3 0.0 - 0.5 % Final     Neutrophils, Absolute   Date Value Ref Range Status   08/17/2022 8.80 (H) 1.70 - 7.00 10*3/mm3 Final     Lymphocytes, Absolute   Date Value Ref Range Status   08/17/2022 1.18 0.70 - 3.10 10*3/mm3 Final     Monocytes, Absolute   Date Value Ref Range Status   08/17/2022 0.77 0.10 - 0.90 10*3/mm3 Final     Eosinophils, Absolute   Date Value Ref Range Status   08/17/2022 0.31 0.00 - 0.40 10*3/mm3 Final     Basophils, Absolute   Date Value Ref Range Status   08/17/2022 0.04 0.00 - 0.20 10*3/mm3 Final     Immature Grans, Absolute   Date Value Ref Range Status   08/17/2022 0.03 0.00 - 0.05 10*3/mm3 Final     nRBC   Date Value Ref Range Status   08/17/2022 0.0 0.0 - 0.2 /100 WBC Final        Result Review:  I have personally reviewed the results from the time of this admission to 8/18/2022 07:48 EDT and agree with these findings:  [x]  Laboratory  []  Microbiology  []  Radiology  []  EKG/Telemetry   []  Cardiology/Vascular   []  Pathology  []  Old records  []  Other:      Assessment & Plan   Assessment / Plan     Brief Patient Summary:  Dhaval Nieto is a 67 y.o. male who is postoperative day #3 status post left hip replacement    Active Hospital Problems:  Active Hospital Problems    Diagnosis    • **Closed fracture of left hip, initial encounter ([facility])    • Dysuria    • Type 2 diabetes mellitus, with long-term current use of insulin ([facility])    • Arthritis    • Hyperlipidemia    • Hypertension    • Low back pain      Plan: Weightbearing as tolerated with walker  Physical and Occupational Therapy  Pain control  DVT prophylaxis  Dressing change today  Discharge planning: Inpatient rehab when medically able       DVT prophylaxis:  Medical and mechanical DVT prophylaxis orders are  present.    CODE STATUS:   Level Of Support Discussed With: Patient  Code Status (Patient has no pulse and is not breathing): CPR (Attempt to Resuscitate)  Medical Interventions (Patient has pulse or is breathing): Full Support  Release to patient: Routine Release    Disposition:  I expect patient to be discharged to rehab when able.    Electronically signed by Kaden Green MD, 08/18/22, 7:48 AM EDT.

## 2022-08-18 NOTE — CONSULTS
" [facility]   PSYCHIATRIC CONSULTATION    Patient Name: Dhaval Nieto  : 1955  MRN: [id number]  Primary Care Physician:  Christiano Mcbride MD  Date of admission: 2022    Referring Provider: Dr. Hickman  Reason for Consultation: PTSD, poor sleep, possible hallucinations    Subjective   Subjective     Patient seen by video/telehealth on secure HIPPA compliant device.  Visual and voice system active.  Patient and provider able to hear and see each other through duration of visit. Consent given by patient.  Provider tested positive and had mild symptoms, alternative coverage not available and COVID protocols necessitated video/telehealth visits.       Chief Complaint: \"I broke my hip\"    HPI:     Dhaval Nieto is a 67 y.o. male admitted after suffering a fall at home and breaking his hip.  Patient is reported to have some hallucinations here in the hospital.  He has had periods of being confused disorganized and possibly hallucinating.  The patient is been having nightmares.    Patient may have been seen by a video.  He has a accident that may understand him difficult.  He was also somewhat inattentive to the exam.  He does participate and is cooperative.  He reports he was having some hallucinations as well as psychiatry was asked to see him.  He denies any current symptoms.  He reports hallucinations started in May or .  He denies being confused or forgetful but family has told staff he is been forgetful.  He said he did okay last night because he received pain medication.    Nursing staff reports that he has been agitated.  Reports he slept well last night with Ambien, that he also received morphine.  He has been cooperative with care and compliant with medications.  He is to undergo an MRI of the brain as well as have a chest x-ray today.  There is concern about the possibility of a dementia.    Patient states he is on medicines from a psychiatrist, but there is none " on his home medication list and it was verified in the emergency room.  His psychiatric history is unclear and unable to obtain collateral today.  He is currently getting duloxetine.    Review of Systems   All systems were reviewed and negative except for: Hip soreness on was none    Personal History     Past Medical History:   Diagnosis Date   • Arteriovenous malformation 03/09/1980   • Arthritis    • Asthma    • Benign essential hypertension    • Bladder disorder    • Bleeding disorder ([facility])    • Blood disease    • BPH with urinary obstruction 05/30/2014   • Brain concussion 1999   • Cancer ([facility])    • Cervical disc disorder long time   • Cervical radiculopathy 05/01/2015    left   • Cervical spinal stenosis 03/10/2020   • Cervicalgia 12/18/2018   • Chest pain    • CHF (congestive heart failure) ([facility])    • Clotting disorder ([facility]) Elequis   • Colon cancer ([facility]) 12/18/2018   • Condition not found     Hernia   • Condition not found     herniated disc   • COPD (chronic obstructive pulmonary disease) ([facility])    • Coronary artery disease    • CTS (carpal tunnel syndrome) 08/04/2002   • DDD (degenerative disc disease), thoracic 12/18/2018   • Decreased sensation 05/01/2015    left   • Deep vein thrombosis ([facility])    • Depression    • Diabetes ([facility])    • Diabetes mellitus type 1 with coma, insulin dependent ([facility])     controlled   • Headache    • Heart murmur    • Heart valve disease    • Hematuria, gross 05/30/2014   • Hyperlipemia    • Hyperlipidemia    • Hypertension    • Insomnia    • Leg pain    • Leg pain    • Limb swelling    • Loss of balance 12/18/2018   • Low back pain 03/10/2020   • Lumbar degenerative disc disease 12/18/2018   • Lumbar radiculopathy 03/10/2020   • Lumbosacral disc disease 05/05/1999   • Mid back pain 12/18/2018   • Migraines    • Muscle cramps    • Myocardial infarction ([facility])    • Neurologic disorder    • Pain in back    • Pain in joint    • Pain of cervical spine    • Pain, generalized    •  Pulmonary arterial hypertension (HCC) 1999   • Seasonal allergies    • Seizures (HCC) 2000   • Shortness of breath    • Sleep apnea    • Stomach disorder        Past Surgical History:   Procedure Laterality Date   • ABDOMINAL SURGERY     • BLADDER SURGERY     • CARPAL TUNNEL RELEASE     • CEREBRAL ANGIOGRAM  2021   • COLON RESECTION     • COLONOSCOPY  01/30/2017    [place]   • CORONARY ARTERY BYPASS GRAFT  12/2019    one vessel   • GALLBLADDER SURGERY      cholecystecomy   • HERNIA REPAIR     • JOINT REPLACEMENT      joint surgery   • MITRAL VALVE REPAIR/REPLACEMENT  02/2019    [facility]   • OTHER SURGICAL HISTORY  05/30/2014    office cystoscopy w/DR SHANICE Young   • SHOULDER SURGERY Bilateral    • TOTAL HIP ARTHROPLASTY Left 8/15/2022    Procedure: TOTAL HIP ARTHROPLASTY ANTERIOR;  Surgeon: Kaden Green MD;  Location: [facility] MAIN OR;  Service: Orthopedics;  Laterality: Left;   • VASCULAR SURGERY  2020   • VENTRAL HERNIA REPAIR         Past Psychiatric History: He reports a history of PTSD and that [facility]  Cannot name his medications    Psychiatric Hospitalizations: Denies    Suicide Attempts: Denies    Prior Treatment and Medications Tried: Unable to name        Family History: family history includes Anxiety disorder in his father; Arthritis in his mother; Asthma in his mother; COPD in his father; Cancer in his father; Depression in his father; Developmental Disability in his brother; Diabetes in his father; Hearing loss in his brother; Heart attack in his father; Heart disease in his father; Heart failure in his father; Hyperlipidemia in his mother; Hypertension in his father; Mental illness in his brother; Stroke in his brother; Vision loss in his brother. Otherwise pertinent FHx was reviewed and not pertinent to current issue.    Social History:     Social History     Socioeconomic History   • Marital status:    Tobacco Use   • Smoking  "status: Current Some Day Smoker     Packs/day: 0.50     Years: 50.00     Pack years: 25.00     Types: Cigarettes, Cigarettes     Start date: 1/1/1970   • Smokeless tobacco: Never Used   Vaping Use   • Vaping Use: Never used   Substance and Sexual Activity   • Alcohol use: Never   • Drug use: Never   • Sexual activity: Not Currently       Substance Abuse History: reports that he has been smoking cigarettes and cigarettes. He started smoking about 52 years ago. He has a 25.00 pack-year smoking history. He has never used smokeless tobacco. He reports that he does not drink alcohol and does not use drugs.    Home Medications:  Rimegepant Sulfate, albuterol sulfate HFA, cyclobenzaprine, famotidine, ketorolac, linaclotide, melatonin, metoprolol tartrate, oxyCODONE-acetaminophen, pantoprazole, sildenafil, and traMADol      Allergies:  Allergies   Allergen Reactions   • Latex Itching       Objective   Objective     Vitals:   Temp:  [98.3 °F (36.8 °C)-100.5 °F (38.1 °C)] 98.9 °F (37.2 °C)  Heart Rate:  [79-98] 93  Resp:  [14-20] 18  BP: (103-137)/(52-70) 124/70  Physical Exam       Mental Status Exam:     Awake, alert, oriented to person.  Somewhat inattentive to the exam.  Unable to obtain a full cognitive screening    Hygiene:   good  Cooperation:  fair, Somewhat inattentive  Eye Contact:  Fair  Psychomotor Behavior:  Appropriate  Mood: \"Okay\"  Affect:  Blunted  Speech:  Normal and Minimal  Language: Appropriate  Thought Process:  Goal directed and Patient had some underlying forgetfulness, not fully reliable  Thought Content:  Normal  Suicidal:  None  Homicidal:  None  Hallucinations:  Auditory, Visual and He denies a family interview, but reports he been having recently  Delusion:  None  Memory:  Deficits  Orientation:  Person, Place and Situation  Reliability:  fair  Insight:  Fair  Judgement:  Impaired  Impulse Control:  Fair    Result Review    Result Review:  I have personally reviewed the results from the time of " this admission to 8/18/2022 19:03 EDT and agree with these findings:  []  Laboratory  []  Microbiology  []  Radiology  []  EKG/Telemetry   []  Cardiology/Vascular   []  Pathology  []  Old records  []  Other:  Most notable findings include: MRI of the brain normal    Assessment & Plan   Assessment / Plan     Brief Patient Summary:  Dhaval Nieto is a 67 y.o. male who admitted with hip fracture having hallucinations and possible cognitive decline.  He has a history of PTSD    Active Hospital Problems:  Active Hospital Problems    Diagnosis    • **Closed fracture of left hip, initial encounter ([facility])    • Severe malnutrition ([facility])    • Dysuria    • Type 2 diabetes mellitus, with long-term current use of insulin ([facility])    • Arthritis    • Hyperlipidemia    • Hypertension    • Low back pain        Plan:   1) patient with no clear hallucinations at this time.  Treatment antipsychotics would not be a viable male given their potential risk, and he does not appear to be acutely agitated and needed mood stabilization.  2) combination of hip fracture, general anesthesia, pain medications general contribute to hallucinations and mental status changes.  We will need to be stable medically and hopefully pain medications before and fully assess cognitive status  3) we will follow make treatment recommendations as indicated  4) does not appear to need inpatient psychiatric care    Part of this note may be an electronic transcription/translation of spoken language to printed text using the Dragon dictation system.  Every attempt has been made to correct errors but some transcription errors may be present in the body of the note.    Electronically signed by Dylan Moy MD, 08/18/22, 7:03 PM EDT.

## 2022-08-18 NOTE — PLAN OF CARE
Goal Outcome Evaluation:    Problem: Fall Injury Risk  Goal: Absence of Fall and Fall-Related Injury  Outcome: Ongoing, Progressing  Intervention: Promote Injury-Free Environment  Recent Flowsheet Documentation  Taken 8/18/2022 1540 by Lisa Pichardo, RN  Safety Promotion/Fall Prevention:   safety round/check completed   fall prevention program maintained

## 2022-08-19 ENCOUNTER — APPOINTMENT (OUTPATIENT)
Dept: NEUROLOGY | Facility: HOSPITAL | Age: 67
End: 2022-08-19

## 2022-08-19 VITALS
BODY MASS INDEX: 22.33 KG/M2 | DIASTOLIC BLOOD PRESSURE: 62 MMHG | RESPIRATION RATE: 18 BRPM | TEMPERATURE: 98.2 F | SYSTOLIC BLOOD PRESSURE: 124 MMHG | HEART RATE: 92 BPM | WEIGHT: 150.79 LBS | OXYGEN SATURATION: 96 % | HEIGHT: 69 IN

## 2022-08-19 LAB
ANION GAP SERPL CALCULATED.3IONS-SCNC: 8.4 MMOL/L (ref 5–15)
BASOPHILS # BLD AUTO: 0.03 10*3/MM3 (ref 0–0.2)
BASOPHILS NFR BLD AUTO: 0.3 % (ref 0–1.5)
BUN SERPL-MCNC: 6 MG/DL (ref 8–23)
BUN/CREAT SERPL: 8.7 (ref 7–25)
CALCIUM SPEC-SCNC: 8 MG/DL (ref 8.6–10.5)
CHLORIDE SERPL-SCNC: 108 MMOL/L (ref 98–107)
CO2 SERPL-SCNC: 21.6 MMOL/L (ref 22–29)
CREAT SERPL-MCNC: 0.69 MG/DL (ref 0.76–1.27)
DEPRECATED RDW RBC AUTO: 43.4 FL (ref 37–54)
EGFRCR SERPLBLD CKD-EPI 2021: 100.8 ML/MIN/1.73
EOSINOPHIL # BLD AUTO: 0.33 10*3/MM3 (ref 0–0.4)
EOSINOPHIL NFR BLD AUTO: 3.7 % (ref 0.3–6.2)
ERYTHROCYTE [DISTWIDTH] IN BLOOD BY AUTOMATED COUNT: 13.9 % (ref 12.3–15.4)
GLUCOSE BLDC GLUCOMTR-MCNC: 177 MG/DL (ref 70–99)
GLUCOSE BLDC GLUCOMTR-MCNC: 229 MG/DL (ref 70–99)
GLUCOSE BLDC GLUCOMTR-MCNC: 81 MG/DL (ref 70–99)
GLUCOSE SERPL-MCNC: 101 MG/DL (ref 65–99)
HCT VFR BLD AUTO: 27.2 % (ref 37.5–51)
HGB BLD-MCNC: 9.6 G/DL (ref 13–17.7)
IMM GRANULOCYTES # BLD AUTO: 0.03 10*3/MM3 (ref 0–0.05)
IMM GRANULOCYTES NFR BLD AUTO: 0.3 % (ref 0–0.5)
LYMPHOCYTES # BLD AUTO: 1.54 10*3/MM3 (ref 0.7–3.1)
LYMPHOCYTES NFR BLD AUTO: 17 % (ref 19.6–45.3)
MAGNESIUM SERPL-MCNC: 1.5 MG/DL (ref 1.6–2.4)
MCH RBC QN AUTO: 30.5 PG (ref 26.6–33)
MCHC RBC AUTO-ENTMCNC: 35.3 G/DL (ref 31.5–35.7)
MCV RBC AUTO: 86.3 FL (ref 79–97)
MONOCYTES # BLD AUTO: 0.93 10*3/MM3 (ref 0.1–0.9)
MONOCYTES NFR BLD AUTO: 10.3 % (ref 5–12)
NEUTROPHILS NFR BLD AUTO: 6.18 10*3/MM3 (ref 1.7–7)
NEUTROPHILS NFR BLD AUTO: 68.4 % (ref 42.7–76)
NRBC BLD AUTO-RTO: 0 /100 WBC (ref 0–0.2)
PLATELET # BLD AUTO: 145 10*3/MM3 (ref 140–450)
PMV BLD AUTO: 10.3 FL (ref 6–12)
POTASSIUM SERPL-SCNC: 3.6 MMOL/L (ref 3.5–5.2)
RBC # BLD AUTO: 3.15 10*6/MM3 (ref 4.14–5.8)
SODIUM SERPL-SCNC: 138 MMOL/L (ref 136–145)
WBC NRBC COR # BLD: 9.04 10*3/MM3 (ref 3.4–10.8)

## 2022-08-19 PROCEDURE — 0 MAGNESIUM SULFATE 4 GM/100ML SOLUTION: Performed by: INTERNAL MEDICINE

## 2022-08-19 PROCEDURE — 95816 EEG AWAKE AND DROWSY: CPT | Performed by: PSYCHIATRY & NEUROLOGY

## 2022-08-19 PROCEDURE — 63710000001 INSULIN DETEMIR PER 5 UNITS: Performed by: ORTHOPAEDIC SURGERY

## 2022-08-19 PROCEDURE — 63710000001 INSULIN LISPRO (HUMAN) PER 5 UNITS: Performed by: ORTHOPAEDIC SURGERY

## 2022-08-19 PROCEDURE — 83735 ASSAY OF MAGNESIUM: CPT | Performed by: INTERNAL MEDICINE

## 2022-08-19 PROCEDURE — 80048 BASIC METABOLIC PNL TOTAL CA: CPT | Performed by: INTERNAL MEDICINE

## 2022-08-19 PROCEDURE — 99239 HOSP IP/OBS DSCHRG MGMT >30: CPT | Performed by: INTERNAL MEDICINE

## 2022-08-19 PROCEDURE — 82962 GLUCOSE BLOOD TEST: CPT

## 2022-08-19 PROCEDURE — 95816 EEG AWAKE AND DROWSY: CPT

## 2022-08-19 PROCEDURE — 85025 COMPLETE CBC W/AUTO DIFF WBC: CPT | Performed by: INTERNAL MEDICINE

## 2022-08-19 RX ORDER — CEFDINIR 300 MG/1
300 CAPSULE ORAL EVERY 12 HOURS SCHEDULED
Status: DISCONTINUED | OUTPATIENT
Start: 2022-08-19 | End: 2022-08-19 | Stop reason: HOSPADM

## 2022-08-19 RX ORDER — POTASSIUM CHLORIDE 750 MG/1
40 CAPSULE, EXTENDED RELEASE ORAL ONCE
Status: COMPLETED | OUTPATIENT
Start: 2022-08-19 | End: 2022-08-19

## 2022-08-19 RX ORDER — CEFDINIR 300 MG/1
300 CAPSULE ORAL EVERY 12 HOURS SCHEDULED
Qty: 9 CAPSULE | Refills: 0
Start: 2022-08-19 | End: 2022-08-24

## 2022-08-19 RX ORDER — PRAZOSIN HYDROCHLORIDE 1 MG/1
1 CAPSULE ORAL NIGHTLY
Start: 2022-08-19 | End: 2022-11-08 | Stop reason: ALTCHOICE

## 2022-08-19 RX ORDER — OXYCODONE HYDROCHLORIDE AND ACETAMINOPHEN 5; 325 MG/1; MG/1
2 TABLET ORAL EVERY 4 HOURS PRN
Refills: 0
Start: 2022-08-19 | End: 2022-11-08 | Stop reason: ALTCHOICE

## 2022-08-19 RX ORDER — PSEUDOEPHEDRINE HCL 30 MG
100 TABLET ORAL 2 TIMES DAILY PRN
Start: 2022-08-19 | End: 2022-11-08 | Stop reason: ALTCHOICE

## 2022-08-19 RX ORDER — CYCLOBENZAPRINE HCL 10 MG
10 TABLET ORAL 3 TIMES DAILY PRN
Qty: 20 TABLET | Refills: 0
Start: 2022-08-19 | End: 2022-11-08 | Stop reason: ALTCHOICE

## 2022-08-19 RX ORDER — MAGNESIUM SULFATE HEPTAHYDRATE 40 MG/ML
4 INJECTION, SOLUTION INTRAVENOUS ONCE
Status: COMPLETED | OUTPATIENT
Start: 2022-08-19 | End: 2022-08-19

## 2022-08-19 RX ADMIN — MAGNESIUM SULFATE 4 G: 4 INJECTION INTRAVENOUS at 08:17

## 2022-08-19 RX ADMIN — PANTOPRAZOLE SODIUM 40 MG: 40 TABLET, DELAYED RELEASE ORAL at 06:26

## 2022-08-19 RX ADMIN — METOPROLOL TARTRATE 25 MG: 25 TABLET, FILM COATED ORAL at 08:17

## 2022-08-19 RX ADMIN — OXYCODONE HYDROCHLORIDE AND ACETAMINOPHEN 2 TABLET: 5; 325 TABLET ORAL at 08:16

## 2022-08-19 RX ADMIN — OXYCODONE HYDROCHLORIDE AND ACETAMINOPHEN 2 TABLET: 5; 325 TABLET ORAL at 01:49

## 2022-08-19 RX ADMIN — DULOXETINE 30 MG: 30 CAPSULE, DELAYED RELEASE ORAL at 08:16

## 2022-08-19 RX ADMIN — INSULIN LISPRO 5 UNITS: 100 INJECTION, SOLUTION INTRAVENOUS; SUBCUTANEOUS at 08:16

## 2022-08-19 RX ADMIN — INSULIN DETEMIR 20 UNITS: 100 INJECTION, SOLUTION SUBCUTANEOUS at 08:15

## 2022-08-19 RX ADMIN — OXYCODONE HYDROCHLORIDE AND ACETAMINOPHEN 1 TABLET: 5; 325 TABLET ORAL at 13:20

## 2022-08-19 RX ADMIN — CEFDINIR 300 MG: 300 CAPSULE ORAL at 08:23

## 2022-08-19 RX ADMIN — APIXABAN 2.5 MG: 2.5 TABLET, FILM COATED ORAL at 08:16

## 2022-08-19 RX ADMIN — SODIUM CHLORIDE, POTASSIUM CHLORIDE, SODIUM LACTATE AND CALCIUM CHLORIDE 100 ML/HR: 600; 310; 30; 20 INJECTION, SOLUTION INTRAVENOUS at 05:04

## 2022-08-19 RX ADMIN — POTASSIUM CHLORIDE 40 MEQ: 750 CAPSULE, EXTENDED RELEASE ORAL at 08:16

## 2022-08-19 NOTE — DISCHARGE SUMMARY
[facility]         HOSPITALIST  DISCHARGE SUMMARY    Patient Name: Dhaval Nieto  : 1955  MRN: [id number]    Date of Admission: 2022  Date of Discharge:  22  Primary Care Physician: Christiano Mcbride MD    Consults     Date and Time Order Name Status Description    2022 10:40 AM Inpatient Neurology Consult General      2022  7:22 AM Inpatient Psychiatrist Consult Completed     2022 11:16 PM Inpatient Hospitalist Consult      2022 11:16 PM Inpatient Orthopedic Surgery Consult Completed           Active and Resolved Hospital Problems:  Active Hospital Problems    Diagnosis POA   • **Closed fracture of left hip, initial encounter ([facility]) [S72.002A] Yes   • Severe malnutrition ([facility]) [E43] Yes   • Dysuria [R30.0] Yes   • Type 2 diabetes mellitus, with long-term current use of insulin ([facility]) [E11.9, Z79.4] Not Applicable   • Arthritis [M19.90] Yes   • Hyperlipidemia [E78.5] Yes   • Hypertension [I10] Yes   • Low back pain [M54.50] Yes      Resolved Hospital Problems    Diagnosis POA   • Hallucinations [R44.3] Yes   PTSD with hallucinations  Concern for developing dementia  Postoperative pneumonia due to unknown bacterial source, presumed gram-negative  Closed fracture of left hip  Dysuria  Hypotension  Type 2 diabetes mellitus  Osteoarthritis  Hyperlipidemia  Hypokalemia  History of hypertension  Lower back pain    Hospital Course     Hospital Course:  Dhaval Nieto is a 67 y.o. male  T2DM, COPD, HTN, CAD, DJD who presented following a fall with left hip pain.  Found to have a displaced left femoral neck fracture.  Ortho consulted.  Underwent repair 8/15.  Tolerated the procedure well and worked well with physical therapy.  Did have issues with PTSD, hallucinations and nightmares during his hospital stay.  In discussion with his family, he has had worsening memory trouble and confusion for the past year.  His prognosis is more consistent  with new onset dementia.  Psychiatry and neurology were consulted.  He was started on prazosin at night.  He will need to undergo formal neuropsychiatric testing on outpatient basis for a formal diagnosis.  Did have low-grade fever, chest x-ray revealed pneumonia.  Unknown bacterial source but he will complete 5 days of cefdinir at discharge.  He was ultimately discharged to [facility] for rehab in stable condition on 8/19/2022.  Recommend follow-up PCP in 1 week, orthopedic surgery 2-3 weeks, psychiatry and neurology within 1 month.      Day of Discharge     Vital Signs:  Temp:  [98 °F (36.7 °C)-99.6 °F (37.6 °C)] 98.5 °F (36.9 °C)  Heart Rate:  [79-99] 87  Resp:  [14-20] 18  BP: ()/(51-70) 121/59  Physical Exam:   Gen: Elderly male, NAD, WDWN  ENT: PERRL, EOMI   CV: RRR no MRG  Pulm: CTAB, no w/r/r  GI: Abd soft, NTND, +bs  Neuro: Moving all extremities spontaneously, expected postop ROM of left leg, dressing intact, CN II-XII grossly intact   Psych: A&O*3, normal mood and affect  Skin: No lesions or rashes noted      Discharge Details        Discharge Medications      New Medications      Instructions Start Date   apixaban 2.5 MG tablet tablet  Commonly known as: ELIQUIS   2.5 mg, Oral, Every 12 Hours Scheduled      cefdinir 300 MG capsule  Commonly known as: OMNICEF   300 mg, Oral, Every 12 Hours Scheduled      docusate sodium 100 MG capsule   100 mg, Oral, 2 Times Daily PRN      prazosin 1 MG capsule  Commonly known as: MINIPRESS   1 mg, Oral, Nightly         Changes to Medications      Instructions Start Date   cyclobenzaprine 10 MG tablet  Commonly known as: FLEXERIL  What changed:   when to take this  reasons to take this   10 mg, Oral, 3 Times Daily PRN      metoprolol tartrate 25 MG tablet  Commonly known as: LOPRESSOR  What changed:   medication strength  how much to take   25 mg, Oral, 2 Times Daily      oxyCODONE-acetaminophen 5-325 MG per tablet  Commonly known as: PERCOCET  What changed:   how  much to take  when to take this   2 tablets, Oral, Every 4 Hours PRN         Continue These Medications      Instructions Start Date   albuterol sulfate  (90 Base) MCG/ACT inhaler  Commonly known as: PROVENTIL HFA;VENTOLIN HFA;PROAIR HFA   2 puffs, Inhalation, Every 4 Hours PRN      famotidine 40 MG tablet  Commonly known as: PEPCID   40 mg, Oral, Nightly      linaclotide 72 MCG capsule capsule  Commonly known as: Linzess   72 mcg, Oral, Every Morning Before Breakfast      melatonin 3 MG tablet   6 mg, Oral, Every Night at Bedtime      Nurtec 75 MG tablet dispersible tablet  Generic drug: Rimegepant Sulfate   75 mg, Oral, Every Other Day      pantoprazole 40 MG EC tablet  Commonly known as: PROTONIX   40 mg, Oral, Daily      sildenafil 100 MG tablet  Commonly known as: VIAGRA   100 mg, Oral, Daily PRN         Stop These Medications    ketorolac 10 MG tablet  Commonly known as: TORADOL     traMADol 50 MG tablet  Commonly known as: ULTRAM            Allergies   Allergen Reactions   • Latex Itching       Discharge Disposition:      Diet:  Hospital:  Diet Order   Procedures   • Diet Regular       Discharge Activity:   Activity Instructions     Activity as Tolerated            CODE STATUS:  Code Status and Medical Interventions:   Ordered at: 08/15/22 0342     Level Of Support Discussed With:    Patient     Code Status (Patient has no pulse and is not breathing):    CPR (Attempt to Resuscitate)     Medical Interventions (Patient has pulse or is breathing):    Full Support     Release to patient:    Routine Release         Future Appointments   Date Time Provider Department Center   8/29/2022  2:15 PM Ron Stoner [place] [facility] [place] [facility]   11/8/2022 12:45 PM Viral Alvarez MD [facility] [place] [facility]   1/11/2023  2:45 PM Tacos Newby MD [facility] [place] E521 [facility]   1/17/2023  2:30 PM Tarsha Nichols, APRN [facility] [place] [facility]       Additional Instructions for the Follow-ups that You Need to Schedule      Discharge Follow-up with PCP   As directed       Currently Documented PCP:    Christiano Mcbride MD    PCP Phone Number:    [phone]     Follow Up Details: 3-5 days         Discharge Follow-up with Specified Provider: Neurology; 1 Month   As directed      To: Neurology    Follow Up: 1 Month         Discharge Follow-up with Specified Provider: Orthopedic surgery; 2 Weeks   As directed      To: Orthopedic surgery    Follow Up: 2 Weeks         Discharge Follow-up with Specified Provider: Psychiatry; 1 Month   As directed      To: Psychiatry    Follow Up: 1 Month               Pertinent  and/or Most Recent Results     PROCEDURES:   Left leg repair    LAB RESULTS:      Lab 08/19/22  0406 08/17/22  0426 08/16/22  0428 08/15/22  0431 08/14/22  2353 08/14/22  1824   WBC 9.04 11.13* 14.13* 11.47*  --  10.38   HEMOGLOBIN 9.6* 10.5* 11.6* 14.1  --  14.4   HEMATOCRIT 27.2* 29.3* 33.3* 40.1  --  40.3   PLATELETS 145 117* 130* 161  --  165   NEUTROS ABS 6.18 8.80*  --  8.76*  --  8.53*   IMMATURE GRANS (ABS) 0.03 0.03  --  0.05  --  0.06*   LYMPHS ABS 1.54 1.18  --  1.70  --  1.05   MONOS ABS 0.93* 0.77  --  0.78  --  0.53   EOS ABS 0.33 0.31  --  0.14  --  0.16   MCV 86.3 85.2 86.3 86.6  --  85.7   PROTIME  --   --   --   --   --  14.4   APTT  --   --   --   --  25.3  --          Lab 08/19/22 0407 08/17/22 0426 08/16/22  0428 08/15/22  0431 08/14/22  1824   SODIUM 138 136 137 140 137   POTASSIUM 3.6 3.4* 3.7 4.0 4.2   CHLORIDE 108* 106 105 108* 104   CO2 21.6* 19.9* 23.1 24.3 25.0   ANION GAP 8.4 10.1 8.9 7.7 8.0   BUN 6* 10 10 6* 7*   CREATININE 0.69* 0.83 0.85 0.75* 1.04   EGFR 100.8 95.3 94.6 98.3 78.2   GLUCOSE 101* 121* 192* 307* 494*   CALCIUM 8.0* 8.1* 8.2* 8.7 8.8   MAGNESIUM 1.5* 1.6  --  1.6 2.0   PHOSPHORUS  --  2.7  --   --  2.8         Lab 08/14/22  1824   TOTAL PROTEIN 6.4   ALBUMIN 3.90   GLOBULIN 2.5   ALT (SGPT) 19   AST (SGOT) 24   BILIRUBIN 0.7   ALK PHOS 97         Lab 08/14/22  1824   PROTIME 14.4    INR 1.11                 Lab 08/14/22 1956   PH, ARTERIAL 7.449   PCO2, ARTERIAL 34.1*   PO2 ART 57.9*   O2 SATURATION ART 91.8*   HCO3 ART 23.1   BASE EXCESS ART -0.3   CARBOXYHEMOGLOBIN 2.3*     Brief Urine Lab Results  (Last result in the past 365 days)      Color   Clarity   Blood   Leuk Est   Nitrite   Protein   CREAT   Urine HCG        08/14/22 1923 Yellow   Clear   Negative   Negative   Negative   Negative               Microbiology Results (last 10 days)     Procedure Component Value - Date/Time    COVID-19,APTIMA PANTHER(CODY),BH JAKY/BH DRAKE, NP/OP SWAB IN UTM/VTM/SALINE TRANSPORT MEDIA,24 HR TAT - Swab, Nasopharynx [[id number]]  (Normal) Collected: 08/18/22 1440    Lab Status: Final result Specimen: Swab from Nasopharynx Updated: 08/18/22 1911     COVID19 Not Detected    Narrative:      Fact sheet for providers: https://www.fda.gov/media/815298/download     Fact sheet for patients: https://www.fda.gov/media/994228/download    Test performed by RT PCR.    Urine Culture - Urine, Urine, Clean Catch [[id number]] Collected: 08/11/22 1624    Lab Status: Final result Specimen: Urine, Clean Catch Updated: 08/12/22 1645     Urine Culture <10,000 CFU/mL Mixed [place] Isolated    Narrative:      Specimen contains mixed organisms of questionable pathogenicity suggestive of contamination. If symptoms persist, suggest recollection.  Colonization of the urinary tract without infection is common. Treatment is discouraged unless the patient is symptomatic, pregnant, or undergoing an invasive urologic procedure.          XR Ribs Left With PA Chest    Result Date: 8/14/2022  Impression:  Stable postoperative findings without acute cardiopulmonary or left rib abnormality.     JUAN MIGUEL RAINEY MD       Electronically Signed and Approved By: JUAN MIGUEL RAINEY MD on 8/14/2022 at 22:54             XR Shoulder 2+ View Left    Result Date: 8/14/2022  Impression:  No acute osseous abnormality or significant degenerative change.       JUAN MIGUEL  MD REDD       Electronically Signed and Approved By: JUAN MIGUEL RAINEY MD on 8/14/2022 at 22:53             XR Knee 1 or 2 View Left    Result Date: 8/14/2022  Impression:  No acute osseous abnormality.      JUAN MIGUEL RAINEY MD       Electronically Signed and Approved By: JUAN MIGUEL RAINEY MD on 8/14/2022 at 22:56             XR Ankle 2 View Left    Result Date: 8/14/2022  Impression:  No acute osseous abnormality or significant degenerative change.       JUAN MIGUEL RAINEY MD       Electronically Signed and Approved By: JUAN MIGUEL RAINEY MD on 8/14/2022 at 22:56             MRI Brain Without Contrast    Result Date: 8/18/2022  Impression:   MR examination of the brain without IV contrast demonstrating no acute intracranial abnormality.      JORDYN MIR MD       Electronically Signed and Approved By: JORDYN MIR MD on 8/18/2022 at 16:18             XR Chest 1 View    Result Date: 8/18/2022  Impression:   1. Perihilar and bibasilar airspace opacity suspected to represent pneumonia. 2. Previous mitral valve replacement       Black Clark M.D.       Electronically Signed and Approved By: Black Clark M.D. on 8/18/2022 at 14:44             XR Hip With or Without Pelvis 2 - 3 View Left    Result Date: 8/15/2022  Impression:  Left hip prosthesis appears well aligned on this single view.  No acute bony abnormality.  There are expected postsurgical changes in the soft tissues.      KEARA MATAMOROS DO       Electronically Signed and Approved By: KEARA MATAMOROS DO on 8/15/2022 at 17:33             XR Hip With or Without Pelvis 2 - 3 View Left    Result Date: 8/15/2022  Impression:   1. Fluoroscopic images status post left total hip arthroplasty.  Hardware is in expected position with expected postoperative changes.      FLO LIPSCOMB MD       Electronically Signed and Approved By: FLO LIPSCOMB MD on 8/15/2022 at 17:09             XR Hip With or Without Pelvis 2 - 3 View Left    Result Date: 8/14/2022  Impression:  Acute  displaced left femoral neck fracture.      JUAN MIGUEL RAINEY MD       Electronically Signed and Approved By: JUAN MIGUEL RAINEY MD on 8/14/2022 at 22:55                 XR Ribs Left With PA Chest    Result Date: 8/14/2022  Narrative: PROCEDURE: XR RIBS LEFT W PA CHEST  COMPARISON: [facility], CR, XR CHEST 1 VW, 3/20/2022, 4:15.  INDICATIONS: FELL COMPLAINS OF LEFT SIDED CHEST PAIN  FINDINGS:  Stable findings of prior median sternotomy and heart valve replacement. There is no pneumothorax, pleural effusion or focal airspace consolidation. The heart size and pulmonary vasculature appear within normal limits. There are no acute osseous abnormalities.  No displaced left rib fracture.       Impression:  Stable postoperative findings without acute cardiopulmonary or left rib abnormality.     JUAN MIGUEL RAINEY MD       Electronically Signed and Approved By: JUAN MIGUEL RAINEY MD on 8/14/2022 at 22:54             XR Shoulder 2+ View Left    Result Date: 8/14/2022  Narrative: PROCEDURE: XR SHOULDER 2+ VW LEFT  COMPARISON: None  INDICATIONS: FELL COMPLAINS OF LEFT SHOULDER PAIN  FINDINGS:  The acromioclavicular joint is unremarkable. Glenohumeral joint appears normal. There is no acute fracture or dislocation. No erosions. Soft tissues are unremarkable. The acromiohumeral and coracoclavicular distances appear well-maintained. The adjacent lung and ribs appear normal.       Impression:  No acute osseous abnormality or significant degenerative change.       JUAN MIGUEL RAINEY MD       Electronically Signed and Approved By: JUAN MIGUEL RAINEY MD on 8/14/2022 at 22:53             XR Knee 1 or 2 View Left    Result Date: 8/14/2022  Narrative: PROCEDURE: XR KNEE 1 OR 2 VW LEFT  COMPARISON: [facility], CR, XR KNEE 3 VW LEFT, 1/15/2022, 13:33.  INDICATIONS: FELL COMPLAINS OF LEFT KNEE PAIN  FINDINGS:  There is no acute fracture or dislocation. Joint spaces appear normal. No erosions.  There are vascular calcifications.  No  effusion.      Impression:  No acute osseous abnormality.      JUAN MIGUEL RAINEY MD       Electronically Signed and Approved By: JUAN MIGUEL RAINEY MD on 8/14/2022 at 22:56             XR Ankle 2 View Left    Result Date: 8/14/2022  Narrative: PROCEDURE: XR ANKLE 2 VW LEFT  COMPARISON: None  INDICATIONS: FELL COMPLAINS OF LEFT ANKLE PAIN  FINDINGS:  There is no acute fracture or dislocation. Joint spaces appear normal. The ankle mortise is symmetric and the talar dome appears intact. There are no erosions. Soft tissues are unremarkable.       Impression:  No acute osseous abnormality or significant degenerative change.       JUAN MIGUEL RAINEY MD       Electronically Signed and Approved By: JUAN MIGUEL RAINEY MD on 8/14/2022 at 22:56             MRI Brain Without Contrast    Result Date: 8/18/2022  Narrative: PROCEDURE: MRI BRAIN WO CONTRAST  COMPARISON: [facility], CT, CT HEAD WO CONTRAST, 3/20/2022, 5:30.  INDICATIONS: Delirium  TECHNIQUE: Multiplanar images of the brain were obtained in a high field strength magnet.  FINDINGS:  The ventricles are normal in size, position, and configuration.  Sulci are not abnormally prominent.  Scattered tiny foci of nonspecific T2 bright signal in cerebral white matter consistent with the patient's age.  No abnormal bright signal is seen on diffusion weighted images.  No restricted diffusion is evident.  No abnormality of the pituitary or orbits is evident.  2 cm polyp in the inferior right maxillary antrum is stable.  No abnormal mastoid signal is seen.      Impression:   MR examination of the brain without IV contrast demonstrating no acute intracranial abnormality.      JORDYN MIR MD       Electronically Signed and Approved By: JORDYN MIR MD on 8/18/2022 at 16:18             FL < 1 Hour    Result Date: 8/15/2022  Narrative: This procedure was auto-finalized with no dictation required.    XR Chest 1 View    Result Date: 8/18/2022  Narrative: PROCEDURE: XR CHEST 1 VW   COMPARISON: [facility], CT, CT CHEST W CONTRAST DIAGNOSTIC, 7/12/2022, 11:13.  [facility], CR, XR RIBS LEFT W PA CHEST, 8/14/2022, 22:04.  INDICATIONS: FEVER TODAY  FINDINGS:  Mild bandlike opacity in the right lung base is new in the interval.  There is mild irregular density in the mid and lower left lung field.  The cardiac and mediastinal silhouettes appear normal.  A mitral valve replacement has been performed.      Impression:   1. Perihilar and bibasilar airspace opacity suspected to represent pneumonia. 2. Previous mitral valve replacement       Black Clark M.D.       Electronically Signed and Approved By: Black Clark M.D. on 8/18/2022 at 14:44             XR Hip With or Without Pelvis 2 - 3 View Left    Result Date: 8/15/2022  Narrative: PROCEDURE: XR HIP W OR WO PELVIS 2-3 VIEW LEFT  COMPARISON: [facility], CR, XR HIP W OR WO PELVIS 2-3 VIEW LEFT, 8/15/2022, 15:58.  INDICATIONS: Post-Op LEFT  Hip Arthroplasty  FINDINGS:  A left hip prosthesis appears well aligned.  No acute bony abnormality.  Soft tissue air and skin staples in the lateral soft tissues of the upper thigh are present indicative of recent surgery.      Impression:  Left hip prosthesis appears well aligned on this single view.  No acute bony abnormality.  There are expected postsurgical changes in the soft tissues.      KEARA MATAMOROS DO       Electronically Signed and Approved By: KEARA MATAMOROS DO on 8/15/2022 at 17:33             XR Hip With or Without Pelvis 2 - 3 View Left    Result Date: 8/15/2022  Narrative: PROCEDURE: XR HIP W OR WO PELVIS 2-3 VIEW LEFT  COMPARISON: [facility], CR, XR HIP W OR WO PELVIS 2-3 VIEW LEFT, 8/14/2022, 22:04.  INDICATIONS: LEFT HIP REPLACEMENT. FLUORO TIME 17 SECS. 2 IMAGES. 2.218 mGy  FINDINGS:  Fluoroscopic imaging performed during left total hip arthroplasty.  Hardware in expected position with expected postoperative changes.      Impression:    1. Fluoroscopic images status post left total hip arthroplasty.  Hardware is in expected position with expected postoperative changes.      FLO LIPSCOMB MD       Electronically Signed and Approved By: FLO LIPSCOMB MD on 8/15/2022 at 17:09             XR Hip With or Without Pelvis 2 - 3 View Left    Result Date: 8/14/2022  Narrative: PROCEDURE: XR HIP W OR WO PELVIS 2-3 VIEW LEFT  COMPARISON: [facility], CR, XR HIP W OR WO PELVIS 2-3 VIEW LEFT, 3/08/2022, 10:34.  INDICATIONS: FELL COMPLAINS OF LEFT HIP PAIN  FINDINGS:  There is a displaced simple extra-articular left femoral neck fracture.  There is mild osteoarthritis at the left hip.  There is minimal osteophyte formation at the right hip.  Bony pelvis appears intact.  Pubic symphysis and obturator rings appear normal and the sacroiliac joints appear symmetric.      Impression:  Acute displaced left femoral neck fracture.      JUAN MIGUEL RAINEY MD       Electronically Signed and Approved By: JUAN MIGUEL RAINEY MD on 8/14/2022 at 22:55                   Results for orders placed in visit on 08/30/21    Adult Transthoracic Echo Complete W/ Cont if Necessary Per Protocol    Interpretation Summary  Normal left ventricular systolic function.  Bioprosthetic mitral valve functioning normally.  Mild aortic stenosis.  Trace aortic regurgitation.  Trace tricuspid regurgitation.  Mild left-ventricular hypertrophy noted.      Labs Pending at Discharge:        Time spent on Discharge including face to face service:  34 minutes    Electronically signed by Jerad Fox MD, 08/19/22, 10:28 AM EDT.

## 2022-08-19 NOTE — PLAN OF CARE
Goal Outcome Evaluation:      Pt discharging to [facility] today. Pt has been stable throughout the shift, vitals have been WNL. Pts pain has been well controlled with oral pain medications. Family at bedside to transport pt to rehab facility.     Dressing changed to left hip yesterday. Dry and intact at time of discharge. Rehab facility made aware of dressing change instructions.

## 2022-08-19 NOTE — PROGRESS NOTES
[facility]     Progress Note    Patient Name: Dhaval Nieto  : 1955  MRN: [id number]  Primary Care Physician:  Christiano Mcbride MD  Date of admission: 2022    Subjective   Subjective     HPI:  Patient Reports doing well this morning.  He was briefly transferred to [address] [place] [address] yesterday however his COVID test was negative.  The patient denies any new symptoms this morning.  He is still having some pain and soreness to the hip that is partially controlled by medication.    Review of Systems   See HPI    Objective   Objective     Vitals:   Temp:  [98 °F (36.7 °C)-100.3 °F (37.9 °C)] 98.2 °F (36.8 °C)  Heart Rate:  [79-99] 99  Resp:  [14-20] 20  BP: ()/(51-70) 132/59  Physical Exam    General: Alert, no acute distress   Chest: Unlabored breathing, cardiovascular: Regular heart rate   Musculoskeletal: Neurovascular intact.  Positive pulses.  Dressing intact.    Result Review      WBC   Date Value Ref Range Status   2022 9.04 3.40 - 10.80 10*3/mm3 Final     RBC   Date Value Ref Range Status   2022 3.15 (L) 4.14 - 5.80 10*6/mm3 Final     Hemoglobin   Date Value Ref Range Status   2022 9.6 (L) 13.0 - 17.7 g/dL Final     Hematocrit   Date Value Ref Range Status   2022 27.2 (L) 37.5 - 51.0 % Final     MCV   Date Value Ref Range Status   2022 86.3 79.0 - 97.0 fL Final     MCH   Date Value Ref Range Status   2022 30.5 26.6 - 33.0 pg Final     MCHC   Date Value Ref Range Status   2022 35.3 31.5 - 35.7 g/dL Final     RDW   Date Value Ref Range Status   2022 13.9 12.3 - 15.4 % Final     RDW-SD   Date Value Ref Range Status   2022 43.4 37.0 - 54.0 fl Final     MPV   Date Value Ref Range Status   2022 10.3 6.0 - 12.0 fL Final     Platelets   Date Value Ref Range Status   2022 145 140 - 450 10*3/mm3 Final     Neutrophil %   Date Value Ref Range Status   2022 68.4 42.7 - 76.0 % Final     Lymphocyte %   Date Value Ref Range  Status   08/19/2022 17.0 (L) 19.6 - 45.3 % Final     Monocyte %   Date Value Ref Range Status   08/19/2022 10.3 5.0 - 12.0 % Final     Eosinophil %   Date Value Ref Range Status   08/19/2022 3.7 0.3 - 6.2 % Final     Basophil %   Date Value Ref Range Status   08/19/2022 0.3 0.0 - 1.5 % Final     Immature Grans %   Date Value Ref Range Status   08/19/2022 0.3 0.0 - 0.5 % Final     Neutrophils, Absolute   Date Value Ref Range Status   08/19/2022 6.18 1.70 - 7.00 10*3/mm3 Final     Lymphocytes, Absolute   Date Value Ref Range Status   08/19/2022 1.54 0.70 - 3.10 10*3/mm3 Final     Monocytes, Absolute   Date Value Ref Range Status   08/19/2022 0.93 (H) 0.10 - 0.90 10*3/mm3 Final     Eosinophils, Absolute   Date Value Ref Range Status   08/19/2022 0.33 0.00 - 0.40 10*3/mm3 Final     Basophils, Absolute   Date Value Ref Range Status   08/19/2022 0.03 0.00 - 0.20 10*3/mm3 Final     Immature Grans, Absolute   Date Value Ref Range Status   08/19/2022 0.03 0.00 - 0.05 10*3/mm3 Final     nRBC   Date Value Ref Range Status   08/19/2022 0.0 0.0 - 0.2 /100 WBC Final        Result Review:  I have personally reviewed the results from the time of this admission to 8/19/2022 06:40 EDT and agree with these findings:  [x]  Laboratory  []  Microbiology  [x]  Radiology  []  EKG/Telemetry   []  Cardiology/Vascular   []  Pathology  []  Old records  []  Other:      Assessment & Plan   Assessment / Plan     Brief Patient Summary:  Dhaval Nieto is a 67 y.o. male who is postoperative day #4 status post left total hip replacement    Active Hospital Problems:  Active Hospital Problems    Diagnosis    • **Closed fracture of left hip, initial encounter ([facility])    • Severe malnutrition ([facility])    • Dysuria    • Type 2 diabetes mellitus, with long-term current use of insulin ([facility])    • Arthritis    • Hyperlipidemia    • Hypertension    • Low back pain      Plan: Weightbearing as tolerated with walker  Physical and Occupational  Therapy  Pain control  DVT prophylaxis  Discharge planning: Inpatient rehab when bed available       DVT prophylaxis:  Medical and mechanical DVT prophylaxis orders are present.    CODE STATUS:   Level Of Support Discussed With: Patient  Code Status (Patient has no pulse and is not breathing): CPR (Attempt to Resuscitate)  Medical Interventions (Patient has pulse or is breathing): Full Support  Release to patient: Routine Release    Disposition:  I expect patient to be discharged when medically able.    Electronically signed by Kaden Green MD, 08/19/22, 6:40 AM EDT.

## 2022-08-19 NOTE — DISCHARGE INSTRUCTIONS
Leave current dressing (Aquacel) in place until 8/21, remove and clean with alcohol swabs. Then begin daily dry dressing changes as instructed at discharge until follow up with Ortho MD.     Please see handout provided at discharge for additional instructions.

## 2022-08-19 NOTE — DISCHARGE INSTR - APPOINTMENTS
Pt will need follow up in one month with neurology and psych. Will ensure rehab facility is made aware so appointments can be made after pt is discharged

## 2022-08-19 NOTE — PLAN OF CARE
Goal Outcome Evaluation:              Outcome Evaluation: Pt stable throughout shift. Intermittent confusion noted. VSS. Medicated for pain x2 with relief noted.

## 2022-08-19 NOTE — CONSULTS
[facility]   Neurology Consult Note    Patient Name: Dhaavl Nieto  : 1955  MRN: [id number]  Primary Care Physician:  Christiano Mcbride MD  Referring Physician: Andrea Loco DO  Date of admission: 2022    Subjective   Subjective     Reason for Consult/ Chief Complaint: Evaluation for dementia    HPI:  Dhaval Nieto is a 67 y.o. male evaluated for dementia.  [facility].  He is independent with all activities of daily living.  He tells me that he has PTSD and a few months ago he had hallucinations secondary to medications.  He had an MRI of the brain which is normal.      Personal History     Past Medical History:   Diagnosis Date   • Arteriovenous malformation 1980   • Arthritis    • Asthma    • Benign essential hypertension    • Bladder disorder    • Bleeding disorder ([facility])    • Blood disease    • BPH with urinary obstruction 2014   • Brain concussion    • Cancer ([facility])    • Cervical disc disorder long time   • Cervical radiculopathy 2015    left   • Cervical spinal stenosis 03/10/2020   • Cervicalgia 2018   • Chest pain    • CHF (congestive heart failure) ([facility])    • Clotting disorder ([facility]) [place]   • Colon cancer ([facility]) 2018   • Condition not found     Hernia   • Condition not found     herniated disc   • COPD (chronic obstructive pulmonary disease) ([facility])    • Coronary artery disease    • CTS (carpal tunnel syndrome) 2002   • DDD (degenerative disc disease), thoracic 2018   • Decreased sensation 2015    left   • Deep vein thrombosis ([facility])    • Depression    • Diabetes ([facility])    • Diabetes mellitus type 1 with coma, insulin dependent ([facility])     controlled   • Headache    • Heart murmur    • Heart valve disease    • Hematuria, gross 2014   • Hyperlipemia    • Hyperlipidemia    • Hypertension    • Insomnia    • Leg pain    • Leg pain    • Limb swelling    • Loss of balance  12/18/2018   • Low back pain 03/10/2020   • Lumbar degenerative disc disease 12/18/2018   • Lumbar radiculopathy 03/10/2020   • Lumbosacral disc disease 05/05/1999   • Mid back pain 12/18/2018   • Migraines    • Muscle cramps    • Myocardial infarction ([facility])    • Neurologic disorder    • Pain in back    • Pain in joint    • Pain of cervical spine    • Pain, generalized    • Pulmonary arterial hypertension ([facility]) 1999   • Seasonal allergies    • Seizures ([facility]) 2000   • Shortness of breath    • Sleep apnea    • Stomach disorder        Past Surgical History:   Procedure Laterality Date   • ABDOMINAL SURGERY     • BLADDER SURGERY     • CARPAL TUNNEL RELEASE     • CEREBRAL ANGIOGRAM  2021   • COLON RESECTION     • COLONOSCOPY  01/30/2017    [place]   • CORONARY ARTERY BYPASS GRAFT  12/2019    one vessel   • GALLBLADDER SURGERY      cholecystecomy   • HERNIA REPAIR     • JOINT REPLACEMENT      joint surgery   • MITRAL VALVE REPAIR/REPLACEMENT  02/2019    [facility]   • OTHER SURGICAL HISTORY  05/30/2014    office cystoscopy w/DR SHANICE Young   • SHOULDER SURGERY Bilateral    • TOTAL HIP ARTHROPLASTY Left 8/15/2022    Procedure: TOTAL HIP ARTHROPLASTY ANTERIOR;  Surgeon: Kaden Green MD;  Location: [facility] MAIN OR;  Service: Orthopedics;  Laterality: Left;   • VASCULAR SURGERY  2020   • VENTRAL HERNIA REPAIR         Family History: family history includes Anxiety disorder in his father; Arthritis in his mother; Asthma in his mother; COPD in his father; Cancer in his father; Depression in his father; Developmental Disability in his brother; Diabetes in his father; Hearing loss in his brother; Heart attack in his father; Heart disease in his father; Heart failure in his father; Hyperlipidemia in his mother; Hypertension in his father; Mental illness in his brother; Stroke in his brother; Vision loss in his brother. Otherwise pertinent FHx was reviewed and not pertinent to current issue.    Social History:  reports  that he has been smoking cigarettes and cigarettes. He started smoking about 52 years ago. He has a 25.00 pack-year smoking history. He has never used smokeless tobacco. He reports that he does not drink alcohol and does not use drugs.    Home Medications:  Rimegepant Sulfate, albuterol sulfate HFA, apixaban, cyclobenzaprine, docusate sodium, famotidine, linaclotide, melatonin, metoprolol tartrate, oxyCODONE-acetaminophen, pantoprazole, prazosin, and sildenafil    Allergies:  Allergies   Allergen Reactions   • Latex Itching       Objective    Objective     Vitals:        Physical Exam: He is alert, fluent, phasic, follows commands well.  He is fully oriented to time and place.  Immediate memory is 3 out of 3, recent memory is 2 out of 3.  He is able to spell world backwards.  Naming is intact.  Following three-step commands is normal.  Repetition is normal.  He is able to give me general knowledge questions without any difficulty.  He can tell me the name of his children and where they live and their careers.  He tells me that he was in the .  Visual fields full confrontation, EOMs full all directions gaze, facial strength is full, soft palate elevation and tongue normal.  There is no weakness of the upper or lower extremities.  Fine finger movements are intact.      Result Review    Result Review:  I have personally reviewed the results from the time of this admission to 8/26/2022 17:53 EDT and agree with these findings:  [x]  Laboratory  []  Microbiology  [x]  Radiology  []  EKG/Telemetry   []  Cardiology/Vascular   []  Pathology  []  Old records  [x]  Other: eeg      Assessment & Plan   Assessment / Plan   Active Hospital Problems:  Active Hospital Problems    Diagnosis    • **Closed fracture of left hip, initial encounter ([facility])    • Memory loss    • Severe malnutrition ([facility])    • Dysuria    • Type 2 diabetes mellitus, with long-term current use of insulin ([facility])    • Arthritis    • Hyperlipidemia    •  Hypertension    • Low back pain          Plan: My initial impression is that he does not have dementia.  He can follow-up in our clinic after discharge for further testing to see  Mala ZEPEDA.    Total time spent with the patient and coordinating patient care was 35 minutes.    electronically signed by Mando Villa MD, 08/18/22, 9:57 PM EDT.

## 2022-08-24 ENCOUNTER — TELEPHONE (OUTPATIENT)
Dept: ORTHOPEDIC SURGERY | Facility: CLINIC | Age: 67
End: 2022-08-24

## 2022-08-24 NOTE — TELEPHONE ENCOUNTER
RECEIVED A CALL FROM ERICH RODRIGUEZ AT [facility] ABOUT PATIENT BEING IN EXTREME PAIN AND NOT ABLE TO DO PHYSICAL THERAPY.  DR ESPINAL ADVISES THAT PATIENT NEEDS TO BE SEEN IN THE ER.  MICHAEL VOICES UNDERSTANDING.

## 2022-08-26 PROBLEM — R41.3 MEMORY LOSS: Status: ACTIVE | Noted: 2022-08-26

## 2022-08-28 LAB — QT INTERVAL: 443 MS

## 2022-08-29 ENCOUNTER — OFFICE VISIT (OUTPATIENT)
Dept: ORTHOPEDIC SURGERY | Facility: CLINIC | Age: 67
End: 2022-08-29

## 2022-08-29 VITALS — HEIGHT: 69 IN | BODY MASS INDEX: 21.98 KG/M2 | HEART RATE: 88 BPM | OXYGEN SATURATION: 99 % | WEIGHT: 148.4 LBS

## 2022-08-29 DIAGNOSIS — Z47.89 AFTERCARE FOLLOWING SURGERY OF THE MUSCULOSKELETAL SYSTEM: Primary | ICD-10-CM

## 2022-08-29 PROCEDURE — 99024 POSTOP FOLLOW-UP VISIT: CPT | Performed by: PHYSICIAN ASSISTANT

## 2022-08-29 NOTE — PROGRESS NOTES
"Chief Complaint  Pain and Follow-up of the Left Hip and Suture / Staple Removal    Subjective          Dhaval Nieto is a 67 y.o. male  presents to [facility] for   History of Present Illness      Patient presents with his wife Laure for 2-week postoperative evaluation of left total hip arthroplasty, 8/15/2022.  Patient states that he has been in pain he is taking pain medication he is at [facility].  Patient wife states that her  told her before [facility] told her that he had a fall on 8/24/2022.  They did x-rays, she states no fracture was found.  He is doing physical therapy at [facility].  He has 2 more weeks of staying there.  Patient is using a walker for ambulation.  Staples were removed today.  They brought x-ray disc with them.  Patient will start therapy at [facility] in 2 weeks.  He denies calf pain, denies swelling drainage at the incision site.  Steri-Strips were placed today.      Allergies   Allergen Reactions   • Latex Itching        Social History     Socioeconomic History   • Marital status:    Tobacco Use   • Smoking status: Current Some Day Smoker     Packs/day: 0.50     Years: 50.00     Pack years: 25.00     Types: Cigarettes, Cigarettes     Start date: 1/1/1970   • Smokeless tobacco: Never Used   Vaping Use   • Vaping Use: Never used   Substance and Sexual Activity   • Alcohol use: Never   • Drug use: Never   • Sexual activity: Not Currently        REVIEW OF SYSTEMS    Constitutional: Denies fevers, chills, weight loss  Cardiovascular: Denies chest pain, shortness of breath  Skin: Denies rashes, acute skin changes  Neurologic: Denies headache, loss of consciousness  MSK: Left hip pain      Objective   Vital Signs:   Pulse 88   Ht 175.3 cm (69\")   Wt 67.3 kg (148 lb 6.4 oz)   SpO2 99%   BMI 21.91 kg/m²     Body mass index is 21.91 kg/m².    Physical Exam    Left hip: Incision is healing well, staples were removed today, " Steri-Strips were placed.  Mild erythema at staple insertion sites, no generalized erythema or swelling or drainage, no fluctuance or effusion, no signs of infection.  Range of motion appropriate, neurovascular intact.    Procedures    Imaging Results (Most Recent)     None      X-rays reviewed that were taken at [facility], no report was given but x-ray reveals intact appearing left total hip arthroplasty, no signs of hardware failure or loosening, no signs of periprosthetic fracture or subsidence, staples were still in place on date of the x-ray which was 8/24/2022, this was the day of the patient fall per him and his wife.    Result Review :   The following data was reviewed by: PREM Bowens on 08/29/2022:               Assessment and Plan    Diagnoses and all orders for this visit:    1. Aftercare following surgery of left total hip arthroplasty, 8/15/2022 (Primary)  -     Ambulatory Referral to Physical Therapy Evaluate and treat (2-3X/WEEKS FOR 6-8 WEEKS), POST OP        Reviewed x-ray disc which was brought in, patient and wife were advised that patient should continue care/physical therapy with [facility], we discussed incision care, Steri-Strips were placed today, he will continue pain medication from signature that is being administered, once finished at signature he was given order to start physical therapy, follow-up in 4 weeks with x-rays.    Call or return if worsening symptoms.    Follow Up   Return in about 4 weeks (around 9/26/2022) for Recheck.  Patient was given instructions and counseling regarding his condition or for health maintenance advice. Please see specific information pulled into the AVS if appropriate.

## 2022-09-10 ENCOUNTER — HOSPITAL ENCOUNTER (EMERGENCY)
Facility: HOSPITAL | Age: 67
Discharge: HOME | End: 2022-09-10
Attending: EMERGENCY MEDICINE | Admitting: EMERGENCY MEDICINE

## 2022-09-10 VITALS
WEIGHT: 140.87 LBS | RESPIRATION RATE: 16 BRPM | DIASTOLIC BLOOD PRESSURE: 64 MMHG | HEIGHT: 69 IN | OXYGEN SATURATION: 99 % | HEART RATE: 83 BPM | SYSTOLIC BLOOD PRESSURE: 133 MMHG | TEMPERATURE: 98.3 F | BODY MASS INDEX: 20.87 KG/M2

## 2022-09-10 DIAGNOSIS — R73.9 HYPERGLYCEMIA: Primary | ICD-10-CM

## 2022-09-10 LAB
ALBUMIN SERPL-MCNC: 3.7 G/DL (ref 3.5–5.2)
ALBUMIN/GLOB SERPL: 1.4 G/DL
ALP SERPL-CCNC: 152 U/L (ref 39–117)
ALT SERPL W P-5'-P-CCNC: 6 U/L (ref 1–41)
ANION GAP SERPL CALCULATED.3IONS-SCNC: 8.8 MMOL/L (ref 5–15)
AST SERPL-CCNC: 9 U/L (ref 1–40)
BASOPHILS # BLD AUTO: 0.09 10*3/MM3 (ref 0–0.2)
BASOPHILS NFR BLD AUTO: 0.9 % (ref 0–1.5)
BILIRUB SERPL-MCNC: 0.5 MG/DL (ref 0–1.2)
BILIRUB UR QL STRIP: NEGATIVE
BUN SERPL-MCNC: 17 MG/DL (ref 8–23)
BUN/CREAT SERPL: 17.3 (ref 7–25)
CALCIUM SPEC-SCNC: 8.2 MG/DL (ref 8.6–10.5)
CHLORIDE SERPL-SCNC: 105 MMOL/L (ref 98–107)
CLARITY UR: CLEAR
CO2 SERPL-SCNC: 24.2 MMOL/L (ref 22–29)
COLOR UR: YELLOW
CREAT SERPL-MCNC: 0.98 MG/DL (ref 0.76–1.27)
DEPRECATED RDW RBC AUTO: 45.9 FL (ref 37–54)
EGFRCR SERPLBLD CKD-EPI 2021: 84 ML/MIN/1.73
EOSINOPHIL # BLD AUTO: 0.49 10*3/MM3 (ref 0–0.4)
EOSINOPHIL NFR BLD AUTO: 4.9 % (ref 0.3–6.2)
ERYTHROCYTE [DISTWIDTH] IN BLOOD BY AUTOMATED COUNT: 14.5 % (ref 12.3–15.4)
GLOBULIN UR ELPH-MCNC: 2.7 GM/DL
GLUCOSE BLDC GLUCOMTR-MCNC: 249 MG/DL (ref 70–99)
GLUCOSE BLDC GLUCOMTR-MCNC: 346 MG/DL (ref 70–99)
GLUCOSE SERPL-MCNC: 274 MG/DL (ref 65–99)
GLUCOSE UR STRIP-MCNC: ABNORMAL MG/DL
HCT VFR BLD AUTO: 36 % (ref 37.5–51)
HGB BLD-MCNC: 12.2 G/DL (ref 13–17.7)
HGB UR QL STRIP.AUTO: NEGATIVE
IMM GRANULOCYTES # BLD AUTO: 0.02 10*3/MM3 (ref 0–0.05)
IMM GRANULOCYTES NFR BLD AUTO: 0.2 % (ref 0–0.5)
KETONES UR QL STRIP: NEGATIVE
LEUKOCYTE ESTERASE UR QL STRIP.AUTO: NEGATIVE
LYMPHOCYTES # BLD AUTO: 2.59 10*3/MM3 (ref 0.7–3.1)
LYMPHOCYTES NFR BLD AUTO: 26 % (ref 19.6–45.3)
MCH RBC QN AUTO: 29.5 PG (ref 26.6–33)
MCHC RBC AUTO-ENTMCNC: 33.9 G/DL (ref 31.5–35.7)
MCV RBC AUTO: 87.2 FL (ref 79–97)
MONOCYTES # BLD AUTO: 0.63 10*3/MM3 (ref 0.1–0.9)
MONOCYTES NFR BLD AUTO: 6.3 % (ref 5–12)
NEUTROPHILS NFR BLD AUTO: 6.13 10*3/MM3 (ref 1.7–7)
NEUTROPHILS NFR BLD AUTO: 61.7 % (ref 42.7–76)
NITRITE UR QL STRIP: NEGATIVE
NRBC BLD AUTO-RTO: 0 /100 WBC (ref 0–0.2)
PH UR STRIP.AUTO: 5.5 [PH] (ref 5–8)
PLATELET # BLD AUTO: 253 10*3/MM3 (ref 140–450)
PMV BLD AUTO: 9.8 FL (ref 6–12)
POTASSIUM SERPL-SCNC: 3.8 MMOL/L (ref 3.5–5.2)
PROT SERPL-MCNC: 6.4 G/DL (ref 6–8.5)
PROT UR QL STRIP: NEGATIVE
RBC # BLD AUTO: 4.13 10*6/MM3 (ref 4.14–5.8)
SODIUM SERPL-SCNC: 138 MMOL/L (ref 136–145)
SP GR UR STRIP: 1.02 (ref 1–1.03)
UROBILINOGEN UR QL STRIP: ABNORMAL
WBC NRBC COR # BLD: 9.95 10*3/MM3 (ref 3.4–10.8)

## 2022-09-10 PROCEDURE — 99284 EMERGENCY DEPT VISIT MOD MDM: CPT

## 2022-09-10 PROCEDURE — 81003 URINALYSIS AUTO W/O SCOPE: CPT | Performed by: EMERGENCY MEDICINE

## 2022-09-10 PROCEDURE — 82962 GLUCOSE BLOOD TEST: CPT

## 2022-09-10 PROCEDURE — 85025 COMPLETE CBC W/AUTO DIFF WBC: CPT | Performed by: EMERGENCY MEDICINE

## 2022-09-10 PROCEDURE — 80053 COMPREHEN METABOLIC PANEL: CPT | Performed by: EMERGENCY MEDICINE

## 2022-09-10 RX ORDER — SODIUM CHLORIDE 0.9 % (FLUSH) 0.9 %
10 SYRINGE (ML) INJECTION AS NEEDED
Status: DISCONTINUED | OUTPATIENT
Start: 2022-09-10 | End: 2022-09-10 | Stop reason: HOSPADM

## 2022-09-10 RX ADMIN — SODIUM CHLORIDE 1000 ML: 9 INJECTION, SOLUTION INTRAVENOUS at 02:51

## 2022-09-10 NOTE — ED PROVIDER NOTES
"Time: 1:51 AM EDT    Chief Complaint: hyperglycemia, altered mental status    History of Present Illness:  Patient is a 67 y.o. year old male that presents to the emergency department with hyperglycemia and altered mental status. Wife reports the pt's BG has been high today. She states she has been checking the pt's BG using the dexcom priscilla, and she has been getting readings all over the place. She states she was concerned about the pt's BG tonight due to getting readings that just read \"high.\" She also notes the pt's PCP switched him to Tresiva from Novalog and Lantis on Thursday. Wife reports the pt has had no other issues or complaints today. Wife reports pt has a hx of dementia.        History provided by:  Spouse  History limited by:  Dementia   used: No        Similar Symptoms Previously: no  Recently seen: yes, 8/29/22      Patient Care Team  Primary Care Provider: Christiano Mcbride MD    Past Medical History:     Allergies   Allergen Reactions   • Latex Itching     Past Medical History:   Diagnosis Date   • Arteriovenous malformation 03/09/1980   • Arthritis    • Asthma    • Benign essential hypertension    • Bladder disorder    • Bleeding disorder ([facility])    • Blood disease    • BPH with urinary obstruction 05/30/2014   • Brain concussion 1999   • Cancer ([facility])    • Cervical disc disorder long time   • Cervical radiculopathy 05/01/2015    left   • Cervical spinal stenosis 03/10/2020   • Cervicalgia 12/18/2018   • Chest pain    • CHF (congestive heart failure) ([facility])    • Clotting disorder ([facility]) Elequis   • Colon cancer ([facility]) 12/18/2018   • Condition not found     Hernia   • Condition not found     herniated disc   • COPD (chronic obstructive pulmonary disease) ([facility])    • Coronary artery disease    • CTS (carpal tunnel syndrome) 08/04/2002   • DDD (degenerative disc disease), thoracic 12/18/2018   • Decreased sensation 05/01/2015    left   • Deep vein thrombosis ([facility])    • Depression    • " Diabetes ([facility])    • Diabetes mellitus type 1 with coma, insulin dependent ([facility])     controlled   • Headache    • Heart murmur    • Heart valve disease    • Hematuria, gross 05/30/2014   • Hyperlipemia    • Hyperlipidemia    • Hypertension    • Insomnia    • Leg pain    • Leg pain    • Limb swelling    • Loss of balance 12/18/2018   • Low back pain 03/10/2020   • Lumbar degenerative disc disease 12/18/2018   • Lumbar radiculopathy 03/10/2020   • Lumbosacral disc disease 05/05/1999   • Mid back pain 12/18/2018   • Migraines    • Muscle cramps    • Myocardial infarction ([facility])    • Neurologic disorder    • Pain in back    • Pain in joint    • Pain of cervical spine    • Pain, generalized    • Pulmonary arterial hypertension ([facility]) 1999   • Seasonal allergies    • Seizures ([facility]) 2000   • Shortness of breath    • Sleep apnea    • Stomach disorder      Past Surgical History:   Procedure Laterality Date   • ABDOMINAL SURGERY     • BLADDER SURGERY     • CARPAL TUNNEL RELEASE     • CEREBRAL ANGIOGRAM  2021   • COLON RESECTION     • COLONOSCOPY  01/30/2017    [facility]   • CORONARY ARTERY BYPASS GRAFT  12/2019    one vessel   • GALLBLADDER SURGERY      cholecystecomy   • HERNIA REPAIR     • JOINT REPLACEMENT      joint surgery   • MITRAL VALVE REPAIR/REPLACEMENT  02/2019    [facility]   • OTHER SURGICAL HISTORY  05/30/2014    office cystoscopy w/DR SHANICE Young   • SHOULDER SURGERY Bilateral    • TOTAL HIP ARTHROPLASTY Left 8/15/2022    Procedure: TOTAL HIP ARTHROPLASTY ANTERIOR;  Surgeon: Kaden Green MD;  Location: [facility];  Service: Orthopedics;  Laterality: Left;   • VASCULAR SURGERY  2020   • VENTRAL HERNIA REPAIR       Family History   Problem Relation Age of Onset   • Heart attack Father         MI   • Heart failure Father    • Anxiety disorder Father    • Cancer Father    • COPD Father    • Depression Father    • Diabetes Father    • Heart disease Father    • Hypertension Father    • Stroke Brother    •  Developmental Disability Brother    • Hearing loss Brother    • Mental illness Brother    • Vision loss Brother    • Arthritis Mother    • Asthma Mother    • Hyperlipidemia Mother        Home Medications:  Prior to Admission medications    Medication Sig Start Date End Date Taking? Authorizing Provider   albuterol sulfate  (90 Base) MCG/ACT inhaler Inhale 2 puffs Every 4 (Four) Hours As Needed.    ProviderCarissa MD   apixaban (ELIQUIS) 2.5 MG tablet tablet Take 1 tablet by mouth Every 12 (Twelve) Hours. Indications: Prophylaxis of Venous Thromboembolism 8/19/22   Jerad Hickman MD   cyclobenzaprine (FLEXERIL) 10 MG tablet Take 1 tablet by mouth 3 (Three) Times a Day As Needed for Muscle Spasms. 8/19/22   Jerad Hickman MD   docusate sodium 100 MG capsule Take 1 capsule by mouth 2 (Two) Times a Day As Needed for Constipation. 8/19/22   Jerad Hickman MD   famotidine (PEPCID) 40 MG tablet Take 40 mg by mouth Every Night.    ProviderCarissa MD   linaclotide (Linzess) 72 MCG capsule capsule Take 1 capsule by mouth Every Morning Before Breakfast for 90 days. 7/14/22 10/12/22  Tarsha Nichols APRN   melatonin 3 MG tablet Take 6 mg by mouth every night at bedtime.    ProviderCarissa MD   metoprolol tartrate (LOPRESSOR) 25 MG tablet Take 1 tablet by mouth 2 (Two) Times a Day. 8/19/22   Jerad Hickman MD   oxyCODONE-acetaminophen (PERCOCET) 5-325 MG per tablet Take 2 tablets by mouth Every 4 (Four) Hours As Needed for Severe Pain . 8/19/22   Jerad Hickman MD   pantoprazole (PROTONIX) 40 MG EC tablet Take 1 tablet by mouth Daily. 7/14/22   Tarsha Nichols APRN   prazosin (MINIPRESS) 1 MG capsule Take 1 capsule by mouth Every Night. 8/19/22   Jerad Hickman MD   Rimegepant Sulfate (Nurtec) 75 MG tablet dispersible tablet Take 75 mg by mouth Every Other Day.    ProviderCarissa MD   sildenafil (VIAGRA) 100 MG tablet Take 100 mg by mouth Daily As Needed  "for Erectile Dysfunction.    Provider, Carissa, MD        Social History:   Social History     Tobacco Use   • Smoking status: Current Some Day Smoker     Packs/day: 0.50     Years: 50.00     Pack years: 25.00     Types: Cigarettes, Cigarettes     Start date: 1/1/1970   • Smokeless tobacco: Never Used   Vaping Use   • Vaping Use: Never used   Substance Use Topics   • Alcohol use: Never   • Drug use: Never       Record Review:  I have reviewed the patient's records in [facility].     Review of Systems:  Review of Systems   Unable to perform ROS: Dementia        Physical Exam:  /64   Pulse 83   Temp 98.3 °F (36.8 °C) (Oral)   Resp 16   Ht 175.3 cm (69\")   Wt 63.9 kg (140 lb 14 oz)   SpO2 99%   BMI 20.80 kg/m²     Physical Exam  Vitals and nursing note reviewed.   Constitutional:       General: He is not in acute distress.     Appearance: Normal appearance. He is not toxic-appearing.      Comments: sleeping   HENT:      Head: Normocephalic and atraumatic.      Jaw: There is normal jaw occlusion.   Eyes:      General: Lids are normal.      Extraocular Movements: Extraocular movements intact.      Conjunctiva/sclera: Conjunctivae normal.      Pupils: Pupils are equal, round, and reactive to light.   Cardiovascular:      Rate and Rhythm: Normal rate and regular rhythm.      Pulses: Normal pulses.      Heart sounds: Normal heart sounds.   Pulmonary:      Effort: Pulmonary effort is normal. No respiratory distress.      Breath sounds: Normal breath sounds. No wheezing or rhonchi.   Abdominal:      General: Abdomen is flat.      Palpations: Abdomen is soft.      Tenderness: There is no abdominal tenderness. There is no guarding or rebound.   Musculoskeletal:         General: Normal range of motion.      Cervical back: Normal range of motion and neck supple.      Right lower leg: No edema.      Left lower leg: No edema.   Skin:     General: Skin is warm and dry.   Neurological:      Mental Status: He is alert and " oriented to person, place, and time. Mental status is at baseline.      Comments: Non-focal neuro exam   Psychiatric:         Mood and Affect: Mood normal.                  Medications in the Emergency Department:  Medications   sodium chloride 0.9 % bolus 1,000 mL (0 mL Intravenous Stopped 9/10/22 0403)        Labs  Lab Results (last 24 hours)     ** No results found for the last 24 hours. **           Imaging:  No Radiology Exams Resulted Within Past 24 Hours    Procedures:  Procedures    Progress                            Medical Decision Making:  MDM     Patient's blood sugar improved with IV fluid hydration and IV insulin in the emergency department.  Patient's wife at bedside feels comfortable plan for discharge home and continue management of the patient's blood sugar at home.  We discussed return precautions including worsening symptoms or any additional concerns.    Final diagnoses:   Hyperglycemia        Disposition:  ED Disposition     ED Disposition   Discharge    Condition   Stable    Comment   --             Dictated Utilizing Dragon Dictation    Documentation assistance provided by Jesse Reyes acting as scribe for Mando Mendez MD. Information recorded by the scribe was done at my direction and has been verified and validated by me.        Jesse Reyes  09/10/22 0203       Mando Mendez MD  09/11/22 [id number]

## 2022-09-20 ENCOUNTER — APPOINTMENT (OUTPATIENT)
Dept: GENERAL RADIOLOGY | Facility: HOSPITAL | Age: 67
End: 2022-09-20

## 2022-09-20 ENCOUNTER — HOSPITAL ENCOUNTER (EMERGENCY)
Facility: HOSPITAL | Age: 67
Discharge: HOME | End: 2022-09-21
Attending: EMERGENCY MEDICINE | Admitting: EMERGENCY MEDICINE

## 2022-09-20 DIAGNOSIS — S46.911A STRAIN OF RIGHT SHOULDER, INITIAL ENCOUNTER: ICD-10-CM

## 2022-09-20 DIAGNOSIS — R73.9 HYPERGLYCEMIA: ICD-10-CM
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
## 2022-09-20 PROCEDURE — 73552 X-RAY EXAM OF FEMUR 2/>: CPT

## 2022-09-20 PROCEDURE — 73030 X-RAY EXAM OF SHOULDER: CPT

## 2022-09-20 PROCEDURE — 99283 EMERGENCY DEPT VISIT LOW MDM: CPT

## 2022-09-20 PROCEDURE — 80053 COMPREHEN METABOLIC PANEL: CPT | Performed by: EMERGENCY MEDICINE

## 2022-09-20 PROCEDURE — 85025 COMPLETE CBC W/AUTO DIFF WBC: CPT | Performed by: EMERGENCY MEDICINE

## 2022-09-20 RX ADMIN — SODIUM CHLORIDE 1000 ML: 9 INJECTION, SOLUTION INTRAVENOUS at 23:56

## 2022-09-21 VITALS
HEART RATE: 96 BPM | OXYGEN SATURATION: 96 % | TEMPERATURE: 98.4 F | DIASTOLIC BLOOD PRESSURE: 72 MMHG | BODY MASS INDEX: 21.68 KG/M2 | SYSTOLIC BLOOD PRESSURE: 115 MMHG | WEIGHT: 146.39 LBS | HEIGHT: 69 IN | RESPIRATION RATE: 16 BRPM

## 2022-09-21 LAB
ALBUMIN SERPL-MCNC: 3.9 G/DL (ref 3.5–5.2)
ALBUMIN/GLOB SERPL: 1.3 G/DL
ALP SERPL-CCNC: 128 U/L (ref 39–117)
ALT SERPL W P-5'-P-CCNC: 8 U/L (ref 1–41)
ANION GAP SERPL CALCULATED.3IONS-SCNC: 10 MMOL/L (ref 5–15)
AST SERPL-CCNC: 18 U/L (ref 1–40)
BASOPHILS # BLD AUTO: 0.08 10*3/MM3 (ref 0–0.2)
BASOPHILS NFR BLD AUTO: 0.7 % (ref 0–1.5)
BILIRUB SERPL-MCNC: 0.8 MG/DL (ref 0–1.2)
BUN SERPL-MCNC: 17 MG/DL (ref 8–23)
BUN/CREAT SERPL: 19.5 (ref 7–25)
CALCIUM SPEC-SCNC: 9.2 MG/DL (ref 8.6–10.5)
CHLORIDE SERPL-SCNC: 100 MMOL/L (ref 98–107)
CO2 SERPL-SCNC: 21 MMOL/L (ref 22–29)
CREAT SERPL-MCNC: 0.87 MG/DL (ref 0.76–1.27)
DEPRECATED RDW RBC AUTO: 47.3 FL (ref 37–54)
EGFRCR SERPLBLD CKD-EPI 2021: 94 ML/MIN/1.73
EOSINOPHIL # BLD AUTO: 0.2 10*3/MM3 (ref 0–0.4)
EOSINOPHIL NFR BLD AUTO: 1.7 % (ref 0.3–6.2)
ERYTHROCYTE [DISTWIDTH] IN BLOOD BY AUTOMATED COUNT: 14.9 % (ref 12.3–15.4)
GLOBULIN UR ELPH-MCNC: 2.9 GM/DL
GLUCOSE BLDC GLUCOMTR-MCNC: 241 MG/DL (ref 70–99)
GLUCOSE SERPL-MCNC: 245 MG/DL (ref 65–99)
HCT VFR BLD AUTO: 37.3 % (ref 37.5–51)
HGB BLD-MCNC: 13.1 G/DL (ref 13–17.7)
IMM GRANULOCYTES # BLD AUTO: 0.03 10*3/MM3 (ref 0–0.05)
IMM GRANULOCYTES NFR BLD AUTO: 0.2 % (ref 0–0.5)
LYMPHOCYTES # BLD AUTO: 1.99 10*3/MM3 (ref 0.7–3.1)
LYMPHOCYTES NFR BLD AUTO: 16.4 % (ref 19.6–45.3)
MCH RBC QN AUTO: 30.5 PG (ref 26.6–33)
MCHC RBC AUTO-ENTMCNC: 35.1 G/DL (ref 31.5–35.7)
MCV RBC AUTO: 86.9 FL (ref 79–97)
MONOCYTES # BLD AUTO: 0.91 10*3/MM3 (ref 0.1–0.9)
MONOCYTES NFR BLD AUTO: 7.5 % (ref 5–12)
NEUTROPHILS NFR BLD AUTO: 73.5 % (ref 42.7–76)
NEUTROPHILS NFR BLD AUTO: 8.89 10*3/MM3 (ref 1.7–7)
NRBC BLD AUTO-RTO: 0 /100 WBC (ref 0–0.2)
PLATELET # BLD AUTO: 201 10*3/MM3 (ref 140–450)
PMV BLD AUTO: 9.2 FL (ref 6–12)
POTASSIUM SERPL-SCNC: 5.5 MMOL/L (ref 3.5–5.2)
PROT SERPL-MCNC: 6.8 G/DL (ref 6–8.5)
RBC # BLD AUTO: 4.29 10*6/MM3 (ref 4.14–5.8)
SODIUM SERPL-SCNC: 131 MMOL/L (ref 136–145)
WBC NRBC COR # BLD: 12.1 10*3/MM3 (ref 3.4–10.8)

## 2022-09-21 PROCEDURE — 82962 GLUCOSE BLOOD TEST: CPT

## 2022-09-21 NOTE — ED PROVIDER NOTES
Time: 10:51 PM EDT  Arrived by: private car  Chief Complaint:   Chief Complaint   Patient presents with   • Fall     Wife states pt fell this am and is having right shoulder and neck pain. Wife states pt needs inpatient rehab     History provided by: patient and wift  History is limited by: N/A     History of Present Illness:  Patient is a 67 y.o. year old male that presents to the emergency department for evaluation after a fall.    Per patient's wife, the patient has had multiple falls this year and recently broke her femur. She states the patient does not wish to go to inpatient rehab as recommended; patient was discharged due to lack of cooperation. Per patient's wife, the patient has a home health rehab nurse that visits the home. Patient's wife states the patient is frustrated and is unable to complete daily tasks such as hygiene, cooking, or dressing by himself. Patient uses walker while at home, but wife states the patient does not stay calm or follow directions to ask for help and falls when he attempts to perform actions on his own. Patient complains of shoulder and leg pain from recent fall. Per patient's wife, the patient was able to walk before but gait has worsened since recent fall. Patient denies coughing, fever, congestion, shortness of breath, or chest pain.  Pt has PMHx of diabetes and is currently using insulin. Last dose of insulin was at 2:00-3:00 PM today. Per wife home health/physical therapist want him to be evaluated for possible injury before starting therapy.      History provided by:  Patient and spouse   used: No        Similar Symptoms Previously: Yes  Recently seen: 08/14/22 & 01/15/22 for fall      Patient Care Team  Primary Care Provider: Christiano Mcbride MD    Past Medical History:     Allergies   Allergen Reactions   • Latex Itching     Past Medical History:   Diagnosis Date   • Arteriovenous malformation 03/09/1980   • Arthritis    • Asthma    • Benign  essential hypertension    • Bladder disorder    • Bleeding disorder ([facility])    • Blood disease    • BPH with urinary obstruction 05/30/2014   • Brain concussion 1999   • Cancer ([facility])    • Cervical disc disorder long time   • Cervical radiculopathy 05/01/2015    left   • Cervical spinal stenosis 03/10/2020   • Cervicalgia 12/18/2018   • Chest pain    • CHF (congestive heart failure) ([facility])    • Clotting disorder ([facility]) Elequis   • Colon cancer ([facility]) 12/18/2018   • Condition not found     Hernia   • Condition not found     herniated disc   • COPD (chronic obstructive pulmonary disease) ([facility])    • Coronary artery disease    • CTS (carpal tunnel syndrome) 08/04/2002   • DDD (degenerative disc disease), thoracic 12/18/2018   • Decreased sensation 05/01/2015    left   • Deep vein thrombosis ([facility])    • Depression    • Diabetes ([facility])    • Diabetes mellitus type 1 with coma, insulin dependent ([facility])     controlled   • Headache    • Heart murmur    • Heart valve disease    • Hematuria, gross 05/30/2014   • Hyperlipemia    • Hyperlipidemia    • Hypertension    • Insomnia    • Leg pain    • Leg pain    • Limb swelling    • Loss of balance 12/18/2018   • Low back pain 03/10/2020   • Lumbar degenerative disc disease 12/18/2018   • Lumbar radiculopathy 03/10/2020   • Lumbosacral disc disease 05/05/1999   • Mid back pain 12/18/2018   • Migraines    • Muscle cramps    • Myocardial infarction ([facility])    • Neurologic disorder    • Pain in back    • Pain in joint    • Pain of cervical spine    • Pain, generalized    • Pulmonary arterial hypertension ([facility]) 1999   • Seasonal allergies    • Seizures ([facility]) 2000   • Shortness of breath    • Sleep apnea    • Stomach disorder      Past Surgical History:   Procedure Laterality Date   • ABDOMINAL SURGERY     • BLADDER SURGERY     • CARPAL TUNNEL RELEASE     • CEREBRAL ANGIOGRAM  2021   • COLON RESECTION     • COLONOSCOPY  01/30/2017    [facility]   • CORONARY ARTERY BYPASS GRAFT  12/2019    one vessel    • GALLBLADDER SURGERY      cholecystecomy   • HERNIA REPAIR     • JOINT REPLACEMENT      joint surgery   • MITRAL VALVE REPAIR/REPLACEMENT  02/2019    [facility]   • OTHER SURGICAL HISTORY  05/30/2014    office cystoscopy w/DR SHANICE Young   • SHOULDER SURGERY Bilateral    • TOTAL HIP ARTHROPLASTY Left 8/15/2022    Procedure: TOTAL HIP ARTHROPLASTY ANTERIOR;  Surgeon: Kaden Green MD;  Location[facility] MAIN OR;  Service: Orthopedics;  Laterality: Left;   • VASCULAR SURGERY  2020   • VENTRAL HERNIA REPAIR       Family History   Problem Relation Age of Onset   • Heart attack Father         MI   • Heart failure Father    • Anxiety disorder Father    • Cancer Father    • COPD Father    • Depression Father    • Diabetes Father    • Heart disease Father    • Hypertension Father    • Stroke Brother    • Developmental Disability Brother    • Hearing loss Brother    • Mental illness Brother    • Vision loss Brother    • Arthritis Mother    • Asthma Mother    • Hyperlipidemia Mother        Home Medications:  Prior to Admission medications    Medication Sig Start Date End Date Taking? Authorizing Provider   albuterol sulfate  (90 Base) MCG/ACT inhaler Inhale 2 puffs Every 4 (Four) Hours As Needed.    Provider, MD Carissa   apixaban (ELIQUIS) 2.5 MG tablet tablet Take 1 tablet by mouth Every 12 (Twelve) Hours. Indications: Prophylaxis of Venous Thromboembolism 8/19/22   Jerad Hickman MD   cyclobenzaprine (FLEXERIL) 10 MG tablet Take 1 tablet by mouth 3 (Three) Times a Day As Needed for Muscle Spasms. 8/19/22   Jerad Hickman MD   docusate sodium 100 MG capsule Take 1 capsule by mouth 2 (Two) Times a Day As Needed for Constipation. 8/19/22   Jerad Hickman MD   famotidine (PEPCID) 40 MG tablet Take 40 mg by mouth Every Night.    Provider, MD Carissa   linaclotide (Linzess) 72 MCG capsule capsule Take 1 capsule by mouth Every Morning Before Breakfast for 90 days. 7/14/22 10/12/22   Tarsha Nichols APRN   melatonin 3 MG tablet Take 6 mg by mouth every night at bedtime.    ProviderCarissa MD   metoprolol tartrate (LOPRESSOR) 25 MG tablet Take 1 tablet by mouth 2 (Two) Times a Day. 8/19/22   Jerad Hickman MD   oxyCODONE-acetaminophen (PERCOCET) 5-325 MG per tablet Take 2 tablets by mouth Every 4 (Four) Hours As Needed for Severe Pain . 8/19/22   Jerad Hickman MD   pantoprazole (PROTONIX) 40 MG EC tablet Take 1 tablet by mouth Daily. 7/14/22   Tarsha Nichols APRN   prazosin (MINIPRESS) 1 MG capsule Take 1 capsule by mouth Every Night. 8/19/22   Jerad Hickman MD   Rimegepant Sulfate (Nurtec) 75 MG tablet dispersible tablet Take 75 mg by mouth Every Other Day.    ProviderCarissa MD   sildenafil (VIAGRA) 100 MG tablet Take 100 mg by mouth Daily As Needed for Erectile Dysfunction.    ProviderCarissa MD        Social History:   Social History     Tobacco Use   • Smoking status: Current Some Day Smoker     Packs/day: 0.50     Years: 50.00     Pack years: 25.00     Types: Cigarettes, Cigarettes     Start date: 1/1/1970   • Smokeless tobacco: Never Used   Vaping Use   • Vaping Use: Never used   Substance Use Topics   • Alcohol use: Never   • Drug use: Never     Recent travel: not applicable     Review of Systems:  Review of Systems   Constitutional: Negative for chills and fever.   HENT: Negative for congestion, ear pain and sore throat.    Eyes: Negative for pain.   Respiratory: Negative for cough, chest tightness and shortness of breath.    Cardiovascular: Negative for chest pain.   Gastrointestinal: Negative for abdominal pain, diarrhea, nausea and vomiting.   Genitourinary: Negative for flank pain and hematuria.   Musculoskeletal: Positive for gait problem and myalgias (right shoulder). Negative for back pain and neck pain.   Skin: Negative for pallor.   Neurological: Negative for dizziness, seizures, weakness and headaches.        Physical Exam:  BP  "115/72 (BP Location: Left arm, Patient Position: Sitting)   Pulse 96   Temp 98.4 °F (36.9 °C) (Oral)   Resp 16   Ht 175.3 cm (69\")   Wt 66.4 kg (146 lb 6.2 oz)   SpO2 96%   BMI 21.62 kg/m²     Physical Exam  Vitals and nursing note reviewed.   Constitutional:       General: He is not in acute distress.     Appearance: Normal appearance. He is not toxic-appearing.   HENT:      Head: Normocephalic and atraumatic.      Nose: Nose normal.      Mouth/Throat:      Mouth: Mucous membranes are moist.   Eyes:      General: No scleral icterus.     Extraocular Movements: Extraocular movements intact.      Conjunctiva/sclera: Conjunctivae normal.      Pupils: Pupils are equal, round, and reactive to light.   Cardiovascular:      Rate and Rhythm: Normal rate and regular rhythm.      Pulses: Normal pulses.      Heart sounds: Normal heart sounds.   Pulmonary:      Effort: Pulmonary effort is normal. No respiratory distress.      Breath sounds: Normal breath sounds.   Abdominal:      General: Abdomen is flat. There is no distension.      Palpations: Abdomen is soft.      Tenderness: There is no abdominal tenderness.   Musculoskeletal:         General: Tenderness present.      Right shoulder: Tenderness (pain with ROM) present. Decreased range of motion.      Left shoulder: Normal.      Cervical back: Normal range of motion and neck supple.      Right knee: Normal range of motion.      Left knee: Normal range of motion.      Right lower leg: Tenderness (tenderness of bilateral femurs) present.      Left lower leg: Tenderness (tenderness of bilateral femurs) present.      Comments: No C,T,L spine tenderness   Skin:     General: Skin is warm and dry.      Capillary Refill: Capillary refill takes less than 2 seconds.   Neurological:      General: No focal deficit present.      Mental Status: He is alert and oriented to person, place, and time. Mental status is at baseline.   Psychiatric:         Mood and Affect: Mood normal.    "      Behavior: Behavior normal.                Medications in the Emergency Department:  Medications   sodium chloride 0.9 % bolus 1,000 mL (0 mL Intravenous Stopped 9/21/22 0214)        Labs  Lab Results (last 24 hours)     ** No results found for the last 24 hours. **           Imaging:  No Radiology Exams Resulted Within Past 24 Hours    Procedures:  Procedures    Progress                            Medical Decision Making:  MDM  Number of Diagnoses or Management Options  Fall, initial encounter  Hyperglycemia  Strain of right shoulder, initial encounter  Diagnosis management comments: Patient is afebrile and nontoxic-appearing.  Vital signs are stable.  Patient here for evaluation for possible injury from fall.  He reports bilateral pain over upper thighs as well as right shoulder pain.  X-ray showed no acute findings.  Wife report his blood sugar is elevated.  Glucose here is 241.  Labs show no significant abnormality and no signs of DKA.  Discussed all findings with patient and his wife.  Patient will be discharged home.  They feel comfortable going home.  They have home health with physical therapy set up at home.  Discussed return precautions, discharge instructions and answered all their questions.       Amount and/or Complexity of Data Reviewed  Clinical lab tests: ordered and reviewed  Tests in the radiology section of CPT®: ordered and reviewed  Review and summarize past medical records: yes  Independent visualization of images, tracings, or specimens: yes    Risk of Complications, Morbidity, and/or Mortality  Presenting problems: low  Management options: low    Patient Progress  Patient progress: stable       Final diagnoses:   Fall, initial encounter   Strain of right shoulder, initial encounter   Hyperglycemia        Disposition:  ED Disposition     ED Disposition   Discharge    Condition   Stable    Comment   --             This medical record created using voice recognition software and may contain  unintended errors.    Documentation assistance provided by Catia Santana acting as scribe for Osman Nieto MD. Information recorded by the scribe was done at my direction and has been verified and validated by me.        Catia Santana  09/20/22 9274       Osman Nieto MD  09/22/22 3086

## 2022-09-21 NOTE — ED NOTES
Pt has been sent to rehab but refused to participate so rehab dc'd him home and now pt is receiving therapy at home and he will start after today

## 2022-11-06 NOTE — PROGRESS NOTES
[facility]  Cardiology progress Note    Patient Name: Dhaval Nieto  : 1955    CHIEF COMPLAINT  Atrial fibrillation        Subjective   Subjective     HISTORY OF PRESENT ILLNESS    Dhaval Nieto is a 67 y.o. male with history of atrial fibrillation and prosthetic mitral valve.  No palpitations.  No chest pain.    REVIEW OF SYSTEMS    Constitutional:    No fever, no weight loss  Skin:     No rash  Otolaryngeal:    No difficulty swallowing  Cardiovascular: See HPI.  Pulmonary:    No cough, no sputum production    Personal History     Social History:    reports that he has been smoking cigarettes and cigarettes. He started smoking about 52 years ago. He has a 25.00 pack-year smoking history. He has never used smokeless tobacco. He reports that he does not drink alcohol and does not use drugs.    Home Medications:  Current Outpatient Medications on File Prior to Visit   Medication Sig   • insulin glargine (LANTUS, SEMGLEE) 100 UNIT/ML injection Inject  under the skin into the appropriate area as directed Daily.   • [DISCONTINUED] apixaban (ELIQUIS) 2.5 MG tablet tablet Take 1 tablet by mouth Every 12 (Twelve) Hours. Indications: Prophylaxis of Venous Thromboembolism   • [DISCONTINUED] albuterol sulfate  (90 Base) MCG/ACT inhaler Inhale 2 puffs Every 4 (Four) Hours As Needed.   • [DISCONTINUED] cyclobenzaprine (FLEXERIL) 10 MG tablet Take 1 tablet by mouth 3 (Three) Times a Day As Needed for Muscle Spasms.   • [DISCONTINUED] docusate sodium 100 MG capsule Take 1 capsule by mouth 2 (Two) Times a Day As Needed for Constipation.   • [DISCONTINUED] famotidine (PEPCID) 40 MG tablet Take 40 mg by mouth Every Night.   • [DISCONTINUED] melatonin 3 MG tablet Take 6 mg by mouth every night at bedtime.   • [DISCONTINUED] metoprolol tartrate (LOPRESSOR) 25 MG tablet Take 1 tablet by mouth 2 (Two) Times a Day.   • [DISCONTINUED] oxyCODONE-acetaminophen (PERCOCET) 5-325 MG per  tablet Take 2 tablets by mouth Every 4 (Four) Hours As Needed for Severe Pain .   • [DISCONTINUED] pantoprazole (PROTONIX) 40 MG EC tablet Take 1 tablet by mouth Daily.   • [DISCONTINUED] prazosin (MINIPRESS) 1 MG capsule Take 1 capsule by mouth Every Night.   • [DISCONTINUED] Rimegepant Sulfate (Nurtec) 75 MG tablet dispersible tablet Take 75 mg by mouth Every Other Day.   • [DISCONTINUED] sildenafil (VIAGRA) 100 MG tablet Take 100 mg by mouth Daily As Needed for Erectile Dysfunction.     No current facility-administered medications on file prior to visit.       Past Medical History:   Diagnosis Date   • Arteriovenous malformation 03/09/1980   • Arthritis    • Asthma    • Benign essential hypertension    • Bladder disorder    • Bleeding disorder ([facility])    • Blood disease    • BPH with urinary obstruction 05/30/2014   • Brain concussion 1999   • Cancer ([facility])    • Cervical disc disorder long time   • Cervical radiculopathy 05/01/2015    left   • Cervical spinal stenosis 03/10/2020   • Cervicalgia 12/18/2018   • Chest pain    • CHF (congestive heart failure) ([facility])    • Clotting disorder ([facility]) Elequis   • Colon cancer ([facility]) 12/18/2018   • Condition not found     Hernia   • Condition not found     herniated disc   • COPD (chronic obstructive pulmonary disease) ([facility])    • Coronary artery disease    • CTS (carpal tunnel syndrome) 08/04/2002   • DDD (degenerative disc disease), thoracic 12/18/2018   • Decreased sensation 05/01/2015    left   • Deep vein thrombosis ([facility])    • Depression    • Diabetes ([facility])    • Diabetes mellitus type 1 with coma, insulin dependent ([facility])     controlled   • Headache    • Heart murmur    • Heart valve disease    • Hematuria, gross 05/30/2014   • Hyperlipemia    • Hyperlipidemia    • Hypertension    • Insomnia    • Leg pain    • Leg pain    • Limb swelling    • Loss of balance 12/18/2018   • Low back pain 03/10/2020   • Lumbar degenerative disc disease 12/18/2018   • Lumbar radiculopathy  03/10/2020   • Lumbosacral disc disease 05/05/1999   • Mid back pain 12/18/2018   • Migraines    • Muscle cramps    • Myocardial infarction ([facility])    • Neurologic disorder    • Pain in back    • Pain in joint    • Pain of cervical spine    • Pain, generalized    • Pulmonary arterial hypertension ([facility]) 1999   • Seasonal allergies    • Seizures ([facility]) 2000   • Shortness of breath    • Sleep apnea    • Stomach disorder        Allergies:  Allergies   Allergen Reactions   • Latex Itching       Objective    Objective       Vitals:   Heart Rate:  [104] 104  BP: (98)/(56) 98/56  Body mass index is 21.62 kg/m².     PHYSICAL EXAM:    General Appearance:   · well developed  · well nourished  HENT:   · oropharynx moist  · lips not cyanotic  Neck:  · thyroid not enlarged  · supple  Respiratory:  · no respiratory distress  · normal breath sounds  · no rales  Cardiovascular:  · no jugular venous distention  · regular rhythm  · apical impulse normal  · S1 normal, S2 normal  · no S3, no S4   · no murmur  · no rub, no thrill  · carotid pulses normal; no bruit  · pedal pulses normal  · lower extremity edema: none    Skin:   · warm, dry  Psychiatric:  · judgement and insight appropriate  · normal mood and affect        Result Review:  I have personally reviewed the available results from  [x]  Laboratory  [x]  EKG  [x]  Cardiology  [x]  Medications  [x]  Old records  []  Other:     Procedures  Results for orders placed in visit on 08/30/21    Adult Transthoracic Echo Complete W/ Cont if Necessary Per Protocol    Interpretation Summary  Normal left ventricular systolic function.  Bioprosthetic mitral valve functioning normally.  Mild aortic stenosis.  Trace aortic regurgitation.  Trace tricuspid regurgitation.  Mild left-ventricular hypertrophy noted.     Impression/Plan:  1.  Presence of bioprosthetic mitral valve: Functioning normally on echo.  2.  Paroxysmal atrial fibrillation: Continue Eliquis 5 mg twice a day for stroke prevention.   Restart Toprol-XL 25 mg twice a day for rate control.  3.  Hyperlipidemia:   Monitor lipid and hepatic profile.  4.  Essential hypertension controlled: Continue Toprol-XL 25 mg twice a day.           Viral Alvarez MD   11/08/22   13:16 EST

## 2022-11-07 PROBLEM — I48.0 PAROXYSMAL ATRIAL FIBRILLATION: Status: ACTIVE | Noted: 2022-11-07

## 2022-11-07 PROBLEM — E78.2 HYPERLIPEMIA, MIXED: Status: ACTIVE | Noted: 2021-07-09

## 2022-11-08 ENCOUNTER — OFFICE VISIT (OUTPATIENT)
Dept: CARDIOLOGY | Facility: CLINIC | Age: 67
End: 2022-11-08

## 2022-11-08 VITALS
BODY MASS INDEX: 21.62 KG/M2 | SYSTOLIC BLOOD PRESSURE: 98 MMHG | HEART RATE: 104 BPM | DIASTOLIC BLOOD PRESSURE: 56 MMHG | HEIGHT: 69 IN

## 2022-11-08 DIAGNOSIS — I10 HYPERTENSION, ESSENTIAL: ICD-10-CM

## 2022-11-08 DIAGNOSIS — Z95.3 HISTORY OF MITRAL VALVE REPLACEMENT WITH BIOPROSTHETIC VALVE: ICD-10-CM

## 2022-11-08 DIAGNOSIS — I48.0 PAROXYSMAL ATRIAL FIBRILLATION: Primary | ICD-10-CM

## 2022-11-08 DIAGNOSIS — Z72.0 NICOTINE USE: ICD-10-CM

## 2022-11-08 DIAGNOSIS — E78.2 HYPERLIPEMIA, MIXED: ICD-10-CM

## 2022-11-08 PROCEDURE — 99214 OFFICE O/P EST MOD 30 MIN: CPT | Performed by: SPECIALIST

## 2022-11-08 RX ORDER — INSULIN GLARGINE 100 [IU]/ML
20 INJECTION, SOLUTION SUBCUTANEOUS NIGHTLY
COMMUNITY

## 2022-11-09 ENCOUNTER — HOSPITAL ENCOUNTER (EMERGENCY)
Facility: HOSPITAL | Age: 67
Discharge: HOME | End: 2022-11-09
Attending: EMERGENCY MEDICINE | Admitting: EMERGENCY MEDICINE

## 2022-11-09 ENCOUNTER — APPOINTMENT (OUTPATIENT)
Dept: GENERAL RADIOLOGY | Facility: HOSPITAL | Age: 67
End: 2022-11-09

## 2022-11-09 VITALS
OXYGEN SATURATION: 97 % | RESPIRATION RATE: 18 BRPM | HEIGHT: 69 IN | TEMPERATURE: 98.5 F | BODY MASS INDEX: 21.91 KG/M2 | HEART RATE: 90 BPM | SYSTOLIC BLOOD PRESSURE: 134 MMHG | WEIGHT: 147.93 LBS | DIASTOLIC BLOOD PRESSURE: 63 MMHG

## 2022-11-09 DIAGNOSIS — S70.02XA CONTUSION OF LEFT HIP, INITIAL ENCOUNTER: ICD-10-CM

## 2022-11-09 DIAGNOSIS — S80.02XA CONTUSION OF LEFT KNEE, INITIAL ENCOUNTER: ICD-10-CM
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
## 2022-11-09 PROCEDURE — 73562 X-RAY EXAM OF KNEE 3: CPT

## 2022-11-09 PROCEDURE — 73502 X-RAY EXAM HIP UNI 2-3 VIEWS: CPT

## 2022-11-09 PROCEDURE — 99283 EMERGENCY DEPT VISIT LOW MDM: CPT

## 2022-11-09 NOTE — DISCHARGE INSTRUCTIONS
Apply ice to affected area 20 minutes at a time 4 times daily.  Take Tylenol for pain.  Return for worsening symptoms.  Use your walker to ambulate.  Follow-up with your doctor in 2 days if no better.

## 2022-11-09 NOTE — ED PROVIDER NOTES
Time: 9:31 AM EST    Chief Complaint: fall, hip injury, knee injury  History provided by: pt, family  History is limited by: N/A     History of Present Illness:  Patient is a 67 y.o. year old male who presents to the emergency department with fall, hip injury, and knee injury. Pt was maneuvering his walker to open light when he lost balance and hit his left hip and left knee. Denies any head injury, chest pain, neck pain, or back pain. Pt has diabetic neuropathy and Lewy Body dementia.       History provided by:  Relative and patient   used: No        Similar Symptoms Previously: yes  Recently seen: no      Patient Care Team  Primary Care Provider: Christiano Mcbride MD    Past Medical History:     Allergies   Allergen Reactions   • Latex Itching     Past Medical History:   Diagnosis Date   • Arteriovenous malformation 03/09/1980   • Arthritis    • Asthma    • Benign essential hypertension    • Bladder disorder    • Bleeding disorder ([facility])    • Blood disease    • BPH with urinary obstruction 05/30/2014   • Brain concussion 1999   • Cancer ([facility])    • Cervical disc disorder long time   • Cervical radiculopathy 05/01/2015    left   • Cervical spinal stenosis 03/10/2020   • Cervicalgia 12/18/2018   • Chest pain    • CHF (congestive heart failure) ([facility])    • Clotting disorder ([facility]) Elequis   • Colon cancer ([facility]) 12/18/2018   • Condition not found     Hernia   • Condition not found     herniated disc   • COPD (chronic obstructive pulmonary disease) ([facility])    • Coronary artery disease    • CTS (carpal tunnel syndrome) 08/04/2002   • DDD (degenerative disc disease), thoracic 12/18/2018   • Decreased sensation 05/01/2015    left   • Deep vein thrombosis ([facility])    • Depression    • Diabetes ([facility])    • Diabetes mellitus type 1 with coma, insulin dependent ([facility])     controlled   • Headache    • Heart murmur    • Heart valve disease    • Hematuria, gross 05/30/2014   • Hyperlipemia    • Hyperlipidemia    •  Hypertension    • Insomnia    • Leg pain    • Leg pain    • Limb swelling    • Loss of balance 12/18/2018   • Low back pain 03/10/2020   • Lumbar degenerative disc disease 12/18/2018   • Lumbar radiculopathy 03/10/2020   • Lumbosacral disc disease 05/05/1999   • Mid back pain 12/18/2018   • Migraines    • Muscle cramps    • Myocardial infarction ([facility])    • Neurologic disorder    • Pain in back    • Pain in joint    • Pain of cervical spine    • Pain, generalized    • Pulmonary arterial hypertension ([facility]) 1999   • Seasonal allergies    • Seizures ([facility]) 2000   • Shortness of breath    • Sleep apnea    • Stomach disorder      Past Surgical History:   Procedure Laterality Date   • ABDOMINAL SURGERY     • BLADDER SURGERY     • CARPAL TUNNEL RELEASE     • CEREBRAL ANGIOGRAM  2021   • COLON RESECTION     • COLONOSCOPY  01/30/2017    [place]   • CORONARY ARTERY BYPASS GRAFT  12/2019    one vessel   • GALLBLADDER SURGERY      [email or web address]   • HERNIA REPAIR     • JOINT REPLACEMENT      joint surgery   • MITRAL VALVE REPAIR/REPLACEMENT  02/2019    [facility]   • OTHER SURGICAL HISTORY  05/30/2014    office cystoscopy w/DR SHANICE Young   • SHOULDER SURGERY Bilateral    • TOTAL HIP ARTHROPLASTY Left 8/15/2022    Procedure: TOTAL HIP ARTHROPLASTY ANTERIOR;  Surgeon: Kaden Green MD;  Location: [facility] MAIN OR;  Service: Orthopedics;  Laterality: Left;   • VASCULAR SURGERY  2020   • VENTRAL HERNIA REPAIR       Family History   Problem Relation Age of Onset   • Heart attack Father         MI   • Heart failure Father    • Anxiety disorder Father    • Cancer Father    • COPD Father    • Depression Father    • Diabetes Father    • Heart disease Father    • Hypertension Father    • Stroke Brother    • Developmental Disability Brother    • Hearing loss Brother    • Mental illness Brother    • Vision loss Brother    • Arthritis Mother    • Asthma Mother    • Hyperlipidemia Mother        Home Medications:  Prior to Admission  medications    Medication Sig Start Date End Date Taking? Authorizing Provider   apixaban (ELIQUIS) 5 MG tablet tablet Take 1 tablet by mouth Every 12 (Twelve) Hours. Indications: Prophylaxis of Venous Thromboembolism 11/8/22   Viral Alvarez MD   insulin glargine (LANTUS, SEMGLEE) 100 UNIT/ML injection Inject  under the skin into the appropriate area as directed Daily.    Provider, MD Carissa   metoprolol tartrate (LOPRESSOR) 25 MG tablet Take 1 tablet by mouth 2 (Two) Times a Day. 11/8/22   Viral Alvarez MD        Social History:   Social History     Tobacco Use   • Smoking status: Some Days     Packs/day: 0.50     Years: 50.00     Pack years: 25.00     Types: Cigarettes     Start date: 1/1/1970   • Smokeless tobacco: Never   Vaping Use   • Vaping Use: Never used   Substance Use Topics   • Alcohol use: Never   • Drug use: Never         Review of Systems:  Review of Systems   Constitutional: Negative for activity change, chills, fatigue and unexpected weight change.   HENT: Negative for congestion, sinus pressure, sore throat and trouble swallowing.    Eyes: Negative for pain, discharge, redness and visual disturbance.   Respiratory: Negative for cough, chest tightness, shortness of breath and wheezing.    Cardiovascular: Negative for chest pain and palpitations.   Gastrointestinal: Negative for abdominal pain, diarrhea, nausea and vomiting.   Endocrine: Negative for cold intolerance and polydipsia.   Genitourinary: Negative for dysuria, frequency, hematuria and urgency.   Musculoskeletal: Negative for arthralgias, joint swelling, neck pain and neck stiffness.        Left hip and left knee pain   Skin: Negative for color change and rash.   Allergic/Immunologic: Negative for environmental allergies and immunocompromised state.   Neurological: Negative for dizziness, weakness and light-headedness.   Hematological: Does not bruise/bleed easily.   Psychiatric/Behavioral: Negative for agitation,  "confusion, dysphoric mood and suicidal ideas.        Physical Exam:  /63   Pulse 90   Temp 98.5 °F (36.9 °C) (Oral)   Resp 18   Ht 175.3 cm (69\")   Wt 67.1 kg (147 lb 14.9 oz)   SpO2 97%   BMI 21.85 kg/m²     Physical Exam  Vitals and nursing note reviewed.   Constitutional:       General: He is not in acute distress.     Appearance: Normal appearance. He is not toxic-appearing.   HENT:      Head: Normocephalic and atraumatic.      Jaw: There is normal jaw occlusion.   Eyes:      General: Lids are normal.      Extraocular Movements: Extraocular movements intact.      Conjunctiva/sclera: Conjunctivae normal.      Pupils: Pupils are equal, round, and reactive to light.   Cardiovascular:      Rate and Rhythm: Normal rate and regular rhythm.      Pulses: Normal pulses.      Heart sounds: Normal heart sounds.   Pulmonary:      Effort: Pulmonary effort is normal. No respiratory distress.      Breath sounds: Normal breath sounds. No wheezing or rhonchi.   Abdominal:      General: Abdomen is flat.      Palpations: Abdomen is soft.      Tenderness: There is no abdominal tenderness. There is no guarding or rebound.   Musculoskeletal:         General: Normal range of motion.      Cervical back: Normal range of motion and neck supple.      Left hip: Tenderness present. Normal range of motion.      Left knee: Normal range of motion. Tenderness present.      Right lower leg: No edema.      Left lower leg: No edema.   Skin:     General: Skin is warm and dry.   Neurological:      Mental Status: He is alert and oriented to person, place, and time. Mental status is at baseline.   Psychiatric:         Mood and Affect: Mood normal.                Medications in the Emergency Department:  Medications - No data to display     Labs  Lab Results (last 24 hours)     ** No results found for the last 24 hours. **           Imaging:    XR Knee 3 View Left    Result Date: 11/9/2022  Narrative: PROCEDURE: XR KNEE 3 VW LEFT  " COMPARISON: [facility], CR, XR KNEE 1 OR 2 VW LEFT, 8/14/2022, 22:04.  INDICATIONS: FALL/LEFT KNEE PAIN  FINDINGS:  BONES: Mild degenerative changes are present in the medial and patellofemoral compartments.  There is new minimal suprapatellar enthesophyte.  No fractures or acute osseous abnormalities are identified. SOFT TISSUES: Negative.  No visible soft tissue swelling.  EFFUSION: None visible.  OTHER: Vascular calcification is present.      Impression:  Mild degenerative change.  No acute osseous abnormalities are identified.      CARMEN GREGORY MD       Electronically Signed and Approved By: CARMEN GREGORY MD on 11/09/2022 at 9:35             XR Hip With or Without Pelvis 2 - 3 View Left    Result Date: 11/9/2022  Narrative: PROCEDURE: XR HIP W OR WO PELVIS 2-3 VIEW LEFT  COMPARISON: [facility], CR, XR FEMUR 2 VW LEFT, 9/20/2022, 23:35.  [facility], CR, XR HIP W OR WO PELVIS 2-3 VIEW LEFT, 8/15/2022, 16:36.  INDICATIONS: FALL/LEFT HIP PAIN  FINDINGS:   Prior total left hip arthroplasty.  The hardware is intact.  No fractures, dislocations or acute osseous abnormalities are identified.  IMPRESSION: Total left hip arthroplasty.  No acute osseous abnormalities are identified.   CARMEN GREGORY MD       Electronically Signed and Approved By: CARMEN GREGORY MD on 11/09/2022 at 9:38               No Radiology Exams Resulted Within Past 24 Hours    Procedures:  Procedures    Progress                            Medical Decision Making:  MDM  Number of Diagnoses or Management Options     Amount and/or Complexity of Data Reviewed  Tests in the radiology section of CPT®: reviewed         Final diagnoses:   Fall, initial encounter   Contusion of left hip, initial encounter   Contusion of left knee, initial encounter        Disposition:  ED Disposition     ED Disposition   Discharge    Condition   Stable    Comment   --             This medical record created using voice  recognition software.        Documentation assistance provided by Alvina Galvan acting as scribe for No att. providers found. Information recorded by the scribe was done at my direction and has been verified and validated by me.          Alvina Galvan  11/09/22 0936       Alvina Galvan  11/09/22 1008       Carson Hand DO  11/11/22 [id number]

## 2022-12-07 ENCOUNTER — HOSPITAL ENCOUNTER (OUTPATIENT)
Dept: CT IMAGING | Facility: HOSPITAL | Age: 67
Discharge: HOME | End: 2022-12-07
Admitting: INTERNAL MEDICINE

## 2022-12-07 DIAGNOSIS — C18.7 CANCER OF SIGMOID COLON: ICD-10-CM

## 2022-12-07 LAB
CREAT BLDA-MCNC: 1 MG/DL
EGFRCR SERPLBLD CKD-EPI 2021: 82 ML/MIN/1.73

## 2022-12-07 PROCEDURE — 74177 CT ABD & PELVIS W/CONTRAST: CPT

## 2022-12-07 PROCEDURE — 71260 CT THORAX DX C+: CPT

## 2022-12-07 PROCEDURE — 0 IOPAMIDOL PER 1 ML: Performed by: INTERNAL MEDICINE

## 2022-12-07 PROCEDURE — 82565 ASSAY OF CREATININE: CPT

## 2022-12-07 RX ADMIN — IOPAMIDOL 100 ML: 755 INJECTION, SOLUTION INTRAVENOUS at 16:08

## 2023-01-11 ENCOUNTER — OFFICE VISIT (OUTPATIENT)
Dept: ONCOLOGY | Facility: HOSPITAL | Age: 68
End: 2023-01-11
Payer: MEDICARE

## 2023-01-11 VITALS
DIASTOLIC BLOOD PRESSURE: 58 MMHG | RESPIRATION RATE: 18 BRPM | HEART RATE: 103 BPM | OXYGEN SATURATION: 100 % | TEMPERATURE: 98.4 F | SYSTOLIC BLOOD PRESSURE: 97 MMHG

## 2023-01-11 DIAGNOSIS — C18.7 CANCER OF SIGMOID COLON: Primary | ICD-10-CM

## 2023-01-11 PROCEDURE — 99213 OFFICE O/P EST LOW 20 MIN: CPT | Performed by: INTERNAL MEDICINE

## 2023-01-11 PROCEDURE — G0463 HOSPITAL OUTPT CLINIC VISIT: HCPCS

## 2023-01-11 RX ORDER — INSULIN ASPART 100 [IU]/ML
INJECTION, SOLUTION INTRAVENOUS; SUBCUTANEOUS
COMMUNITY

## 2023-01-11 NOTE — PROGRESS NOTES
Chief Complaint  Colon Cancer    Christiano Mcbride MD Houk, Brandon Lyle, MD Subjective Pedro L Laly Nieto presents to [facility] for follow-up of colon cancer.  He status post resection followed by adjuvant chemotherapy completed 10/17.  Patient reports the bowels are working normally.  His family who accompanies him today states that he has been diagnosed with Lewy body dementia.  He is having more trouble with memory, mobility etc.  He denies any issues such as blood productive, pain or melena.  No new masses or adenopathy, no unusual aches or pains.    Oncology/Hematology History Overview Note   2016 Sigmoid colon--moderate to poorly differentiated invasive adenocarcinoma            Clinical Staging      Stage IIIC ([id number])            Treatments      Chemotherapy      4/2017 completed 6C FOLFOX      5/19/17 thru 10/11/17 CAPEOX        Surgeries      October . Colon Resection; left hemicolectomy and transverse colectomy.      Invasive adenocarcinoma moderately to poorly differentiated.  Extensive     lymphovascular invasion.  Margins negative.  8 out of 16 lymph nodes positive.     [id number].         Cancer of sigmoid colon (HCC)   7/13/2021 Initial Diagnosis    Cancer of sigmoid colon (CMS/HCC)     7/13/2021 Cancer Staged    Staging form: Colon And Rectum, AJCC 8th Edition  - Clinical: cT2, cN2, cM0 - Signed by Tacos Newby MD on 7/13/2021         Review of Systems   Constitutional: Positive for fatigue. Negative for appetite change, diaphoresis, fever, unexpected weight gain and unexpected weight loss.   HENT: Negative for hearing loss, mouth sores, sore throat, swollen glands, trouble swallowing and voice change.    Eyes: Negative for blurred vision.   Respiratory: Negative for cough, shortness of breath and wheezing.    Cardiovascular: Negative for chest pain and palpitations.   Gastrointestinal: Positive for abdominal pain and  constipation. Negative for blood in stool, diarrhea, nausea and vomiting.   Endocrine: Negative for cold intolerance and heat intolerance.   Genitourinary: Negative for difficulty urinating, dysuria, frequency, hematuria and urinary incontinence.   Musculoskeletal: Negative for arthralgias, back pain and myalgias.   Skin: Negative for rash, skin lesions and wound.   Neurological: Positive for weakness. Negative for dizziness, seizures, numbness and headache.   Hematological: Does not bruise/bleed easily.   Psychiatric/Behavioral: Negative for depressed mood. The patient is not nervous/anxious.      Current Outpatient Medications on File Prior to Visit   Medication Sig Dispense Refill   • apixaban (ELIQUIS) 5 MG tablet tablet Take 1 tablet by mouth Every 12 (Twelve) Hours. Indications: Prophylaxis of Venous Thromboembolism 180 tablet 3   • Continuous Blood Gluc Sensor (FreeStyle Poncho 2 Sensor) misc CHECK BLOOD GLUCOSE 5 TIMES DAILY, SECONDARY TO HYPERGLYCEMIA AND HYPERTENSION.     • insulin aspart (NovoLOG FlexPen) 100 UNIT/ML solution pen-injector sc pen Novolog FlexPen U-100 Insulin aspart 100 unit/mL (3 mL) subcutaneous     • insulin glargine (LANTUS, SEMGLEE) 100 UNIT/ML injection Inject  under the skin into the appropriate area as directed Daily.     • metoprolol tartrate (LOPRESSOR) 25 MG tablet Take 1 tablet by mouth 2 (Two) Times a Day. 180 tablet 3     No current facility-administered medications on file prior to visit.       Allergies   Allergen Reactions   • Latex Itching     Past Medical History:   Diagnosis Date   • Arteriovenous malformation 03/09/1980   • Arthritis    • Asthma    • Benign essential hypertension    • Bladder disorder    • Bleeding disorder (HCC)    • Blood disease    • BPH with urinary obstruction 05/30/2014   • Brain concussion 1999   • Cancer (HCC)    • Cervical disc disorder long time   • Cervical radiculopathy 05/01/2015    left   • Cervical spinal stenosis 03/10/2020   •  Cervicalgia 12/18/2018   • Chest pain    • CHF (congestive heart failure) ([facility])    • Clotting disorder ([facility]) Elequis   • Colon cancer ([facility]) 12/18/2018   • Condition not found     Hernia   • Condition not found     herniated disc   • COPD (chronic obstructive pulmonary disease) ([facility])    • Coronary artery disease    • CTS (carpal tunnel syndrome) 08/04/2002   • DDD (degenerative disc disease), thoracic 12/18/2018   • Decreased sensation 05/01/2015    left   • Deep vein thrombosis ([facility])    • Depression    • Diabetes ([facility])    • Diabetes mellitus type 1 with coma, insulin dependent ([facility])     controlled   • Headache    • Heart murmur    • Heart valve disease    • Hematuria, gross 05/30/2014   • Hyperlipemia    • Hyperlipidemia    • Hypertension    • Insomnia    • Leg pain    • Lewy body dementia ([facility])    • Limb swelling    • Loss of balance 12/18/2018   • Low back pain 03/10/2020   • Lumbar degenerative disc disease 12/18/2018   • Lumbar radiculopathy 03/10/2020   • Lumbosacral disc disease 05/05/1999   • Mid back pain 12/18/2018   • Migraines    • Muscle cramps    • Myocardial infarction ([facility])    • Neurologic disorder    • Pain in back    • Pain in joint    • Pain of cervical spine    • Pain, generalized    • Pulmonary arterial hypertension ([facility]) 1999   • Seasonal allergies    • Seizures ([facility]) 2000   • Shortness of breath    • Sleep apnea    • Stomach disorder      Past Surgical History:   Procedure Laterality Date   • ABDOMINAL SURGERY     • BLADDER SURGERY     • CARPAL TUNNEL RELEASE     • CEREBRAL ANGIOGRAM  2021   • COLON RESECTION     • COLONOSCOPY  01/30/2017    [facility]   • CORONARY ARTERY BYPASS GRAFT  12/2019    one vessel   • GALLBLADDER SURGERY      cholecystecomy   • HERNIA REPAIR     • JOINT REPLACEMENT      joint surgery   • MITRAL VALVE REPAIR/REPLACEMENT  02/2019    [facility]   • OTHER SURGICAL HISTORY  05/30/2014    office cystoscopy w/DR SHANICE Young   • SHOULDER SURGERY Bilateral    • TOTAL HIP  ARTHROPLASTY Left 8/15/2022    Procedure: TOTAL HIP ARTHROPLASTY ANTERIOR;  Surgeon: Kaden Green MD;  Location: [facility] MAIN OR;  Service: Orthopedics;  Laterality: Left;   • VASCULAR SURGERY  2020   • VENTRAL HERNIA REPAIR       Social History     Socioeconomic History   • Marital status:    Tobacco Use   • Smoking status: Some Days     Packs/day: 0.50     Years: 50.00     Pack years: 25.00     Types: Cigarettes     Start date: 1/1/1970   • Smokeless tobacco: Never   Vaping Use   • Vaping Use: Never used   Substance and Sexual Activity   • Alcohol use: Never   • Drug use: Never   • Sexual activity: Not Currently     Family History   Problem Relation Age of Onset   • Heart attack Father         MI   • Heart failure Father    • Anxiety disorder Father    • Cancer Father    • COPD Father    • Depression Father    • Diabetes Father    • Heart disease Father    • Hypertension Father    • Stroke Brother    • Developmental Disability Brother    • Hearing loss Brother    • Mental illness Brother    • Vision loss Brother    • Arthritis Mother    • Asthma Mother    • Hyperlipidemia Mother        Objective   Physical Exam  Vitals reviewed. Exam conducted with a chaperone present.   Constitutional:       General: He is not in acute distress.     Appearance: Normal appearance.   Cardiovascular:      Rate and Rhythm: Normal rate and regular rhythm.      Heart sounds: Normal heart sounds. No murmur heard.    No gallop.   Pulmonary:      Effort: Pulmonary effort is normal.      Breath sounds: Normal breath sounds.   Abdominal:      General: Abdomen is flat. Bowel sounds are normal.      Palpations: Abdomen is soft.   Musculoskeletal:      Cervical back: Neck supple.      Right lower leg: No edema.      Left lower leg: No edema.   Lymphadenopathy:      Cervical: No cervical adenopathy.   Neurological:      Mental Status: He is alert.   Psychiatric:         Mood and Affect: Mood normal.         Behavior: Behavior  normal.         Vitals:    01/11/23 1344   BP: 97/58   Pulse: 103   Resp: 18   Temp: 98.4 °F (36.9 °C)   TempSrc: Temporal   SpO2: 100%   Weight: Comment: refused to stand up.   PainSc: 0-No pain     ECOG score: 2         PHQ-9 Total Score:                    Result Review :   The following data was reviewed by: Tacos Newby MD on 01/11/2023:  Lab Results   Component Value Date    HGB 13.1 09/20/2022    HCT 37.3 (L) 09/20/2022    MCV 86.9 09/20/2022     09/20/2022    WBC 12.10 (H) 09/20/2022    NEUTROABS 8.89 (H) 09/20/2022    LYMPHSABS 1.99 09/20/2022    MONOSABS 0.91 (H) 09/20/2022    EOSABS 0.20 09/20/2022    BASOSABS 0.08 09/20/2022     Lab Results   Component Value Date    GLUCOSE 245 (H) 09/20/2022    BUN 17 09/20/2022    CREATININE 1.00 12/07/2022     (L) 09/20/2022    K 5.5 (H) 09/20/2022     09/20/2022    CO2 21.0 (L) 09/20/2022    CALCIUM 9.2 09/20/2022    PROTEINTOT 6.8 09/20/2022    ALBUMIN 3.90 09/20/2022    BILITOT 0.8 09/20/2022    ALKPHOS 128 (H) 09/20/2022    AST 18 09/20/2022    ALT 8 09/20/2022     Lab Results   Component Value Date    MG 1.5 (L) 08/19/2022    PHOS 2.7 08/17/2022    TSH 0.598 06/13/2019       Data reviewed: Radiologic studies CT chest, abdomen, pelvis reviewed      Assessment and Plan    Diagnoses and all orders for this visit:    1. Cancer of sigmoid colon (HCC) (Primary)  Assessment & Plan:  Patient is now 5 years out from his resection.  I see no evidence of disease recurrence by his history, physical examination or CT scans.  He is up-to-date on colonoscopy.  Given his other comorbidities, especially the new Lewy body dementia, I will plan to see him back on yearly basis or as needed.            Patient Follow Up: 1 year    Patient was given instructions and counseling regarding his condition or for health maintenance advice. Please see specific information pulled into the AVS if appropriate.     Tacos Newby MD    1/11/2023

## 2023-01-11 NOTE — ASSESSMENT & PLAN NOTE
Patient is now 5 years out from his resection.  I see no evidence of disease recurrence by his history, physical examination or CT scans.  He is up-to-date on colonoscopy.  Given his other comorbidities, especially the new Lewy body dementia, I will plan to see him back on yearly basis or as needed.

## 2023-01-17 ENCOUNTER — OFFICE VISIT (OUTPATIENT)
Dept: GASTROENTEROLOGY | Facility: CLINIC | Age: 68
End: 2023-01-17
Payer: MEDICARE

## 2023-01-17 VITALS
BODY MASS INDEX: 21.77 KG/M2 | HEART RATE: 76 BPM | WEIGHT: 147 LBS | SYSTOLIC BLOOD PRESSURE: 80 MMHG | DIASTOLIC BLOOD PRESSURE: 46 MMHG | HEIGHT: 69 IN | OXYGEN SATURATION: 99 %

## 2023-01-17 DIAGNOSIS — K59.04 CHRONIC IDIOPATHIC CONSTIPATION: ICD-10-CM

## 2023-01-17 DIAGNOSIS — K21.9 GASTROESOPHAGEAL REFLUX DISEASE, UNSPECIFIED WHETHER ESOPHAGITIS PRESENT: Primary | ICD-10-CM

## 2023-01-17 DIAGNOSIS — Z85.038 HISTORY OF COLON CANCER: ICD-10-CM

## 2023-01-17 PROCEDURE — 99214 OFFICE O/P EST MOD 30 MIN: CPT | Performed by: NURSE PRACTITIONER

## 2023-01-17 RX ORDER — FAMOTIDINE 20 MG/1
TABLET, FILM COATED ORAL
COMMUNITY
Start: 2022-09-06 | End: 2023-01-17 | Stop reason: SDUPTHER

## 2023-01-17 RX ORDER — PREGABALIN 300 MG/1
1 CAPSULE ORAL 2 TIMES DAILY
Status: ON HOLD | COMMUNITY
End: 2023-02-22

## 2023-01-17 RX ORDER — FAMOTIDINE 20 MG/1
20 TABLET, FILM COATED ORAL
Qty: 90 TABLET | Refills: 2 | Status: SHIPPED | OUTPATIENT
Start: 2023-01-17

## 2023-01-17 RX ORDER — RIMEGEPANT SULFATE 75 MG/75MG
TABLET, ORALLY DISINTEGRATING ORAL
Status: ON HOLD | COMMUNITY
Start: 2022-08-01 | End: 2023-02-22

## 2023-01-17 RX ORDER — DONEPEZIL HYDROCHLORIDE 10 MG/1
10 TABLET, FILM COATED ORAL DAILY
COMMUNITY

## 2023-01-17 RX ORDER — ASPIRIN 81 MG/1
1 TABLET ORAL DAILY
Status: ON HOLD | COMMUNITY
End: 2023-02-22

## 2023-01-17 RX ORDER — PANTOPRAZOLE SODIUM 40 MG/1
40 TABLET, DELAYED RELEASE ORAL DAILY
Qty: 90 TABLET | Refills: 2 | Status: SHIPPED | OUTPATIENT
Start: 2023-01-17

## 2023-01-17 RX ORDER — KETOROLAC TROMETHAMINE 10 MG/1
TABLET, FILM COATED ORAL
Status: ON HOLD | COMMUNITY
Start: 2022-08-12 | End: 2023-02-22

## 2023-01-17 RX ORDER — ACETAMINOPHEN 325 MG/1
TABLET ORAL EVERY 6 HOURS PRN
COMMUNITY

## 2023-01-17 RX ORDER — PANTOPRAZOLE SODIUM 40 MG/1
1 TABLET, DELAYED RELEASE ORAL DAILY
COMMUNITY
End: 2023-01-17 | Stop reason: SDUPTHER

## 2023-01-17 RX ORDER — ALBUTEROL SULFATE 90 UG/1
AEROSOL, METERED RESPIRATORY (INHALATION)
Status: ON HOLD | COMMUNITY
Start: 2022-09-06 | End: 2023-02-22

## 2023-01-17 RX ORDER — TRAMADOL HYDROCHLORIDE 50 MG/1
TABLET ORAL
Status: ON HOLD | COMMUNITY
Start: 2022-07-27 | End: 2023-02-22

## 2023-01-17 RX ORDER — PRAZOSIN HYDROCHLORIDE 1 MG/1
CAPSULE ORAL
Status: ON HOLD | COMMUNITY
Start: 2022-09-06 | End: 2023-02-22

## 2023-01-17 RX ORDER — LANOLIN ALCOHOL/MO/W.PET/CERES
3 CREAM (GRAM) TOPICAL NIGHTLY
COMMUNITY
Start: 2022-09-06

## 2023-01-17 RX ORDER — PSEUDOEPHEDRINE HCL 30 MG
TABLET ORAL
Status: ON HOLD | COMMUNITY
Start: 2022-09-06 | End: 2023-02-22

## 2023-01-17 NOTE — PATIENT INSTRUCTIONS
Food Choices for Gastroesophageal Reflux Disease, Adult  When you have gastroesophageal reflux disease (GERD), the foods you eat and your eating habits are very important. Choosing the right foods can help ease your discomfort. Think about working with a food expert (dietitian) to help you make good choices.  What are tips for following this plan?  Reading food labels  Look for foods that are low in saturated fat. Foods that may help with your symptoms include:  Foods that have less than 5% of daily value (DV) of fat.  Foods that have 0 grams of trans fat.  Cooking  Do not latham your food.  Cook your food by baking, steaming, grilling, or broiling. These are all methods that do not need a lot of fat for cooking.  To add flavor, try to use herbs that are low in spice and acidity.  Meal planning    Choose healthy foods that are low in fat, such as:  Fruits and vegetables.  Whole grains.  Low-fat dairy products.  Lean meats, fish, and poultry.  Eat small meals often instead of eating 3 large meals each day. Eat your meals slowly in a place where you are relaxed. Avoid bending over or lying down until 2-3 hours after eating.  Limit high-fat foods such as fatty meats or fried foods.  Limit your intake of fatty foods, such as oils, butter, and shortening.  Avoid the following as told by your doctor:  Foods that cause symptoms. These may be different for different people. Keep a food diary to keep track of foods that cause symptoms.  Alcohol.  Drinking a lot of liquid with meals.  Eating meals during the 2-3 hours before bed.  Lifestyle  Stay at a healthy weight. Ask your doctor what weight is healthy for you. If you need to lose weight, work with your doctor to do so safely.  Exercise for at least 30 minutes on 5 or more days each week, or as told by your doctor.  Wear loose-fitting clothes.  Do not smoke or use any products that contain nicotine or tobacco. If you need help quitting, ask your doctor.  Sleep with the head  of your bed higher than your feet. Use a wedge under the mattress or blocks under the bed frame to raise the head of the bed.  Chew sugar-free gum after meals.  What foods should eat?  Eat a healthy, well-balanced diet of fruits, vegetables, whole grains, low-fat dairy products, lean meats, fish, and poultry. Each person is different.  Foods that may cause symptoms in one person may not cause any symptoms in another person. Work with your doctor to find foods that are safe for you.  The items listed above may not be a complete list of what you can eat and drink. Contact a food expert for more options.  What foods should I avoid?  Limiting some of these foods may help in managing the symptoms of GERD. Everyone is different. Talk with a food expert or your doctor to help you find the exact foods to avoid, if any.  Fruits  Any fruits prepared with added fat. Any fruits that cause symptoms. For some people, this may include citrus fruits, such as oranges, grapefruit, pineapple, and gerardo.  Vegetables  Deep-fried vegetables. French fries. Any vegetables prepared with added fat. Any vegetables that cause symptoms. For some people, this may include tomatoes and tomato products, chili peppers, onions and garlic, and horseradish.  Grains  Pastries or quick breads with added fat.  Meats and other proteins  High-fat meats, such as fatty beef or pork, hot dogs, ribs, ham, sausage, salami, and pastrana. Fried meat or protein, including fried fish and fried chicken. Nuts and nut butters, in large amounts.  Dairy  Whole milk and chocolate milk. Sour cream. Cream. Ice cream. Cream cheese. Milkshakes.  Fats and oils  Butter. Margarine. Shortening. Ghee.  Beverages  Coffee and tea, with or without caffeine. Carbonated beverages. Sodas. Energy drinks. Fruit juice made with acidic fruits, such as orange or grapefruit. Tomato juice. Alcoholic drinks.  Sweets and desserts  Chocolate and cocoa. Donuts.  Seasonings and condiments  Pepper.  Peppermint and spearmint. Added salt. Any condiments, herbs, or seasonings that cause symptoms. For some people, this may include teryr, hot sauce, or vinegar-based salad dressings.  The items listed above may not be a complete list of what you should not eat and drink. Contact a food expert for more options.  Questions to ask your doctor  Diet and lifestyle changes are often the first steps that are taken to manage symptoms of GERD. If diet and lifestyle changes do not help, talk with your doctor about taking medicines.  Where to find more information  [employer] Gastrointestinal [employer]: aboutgerd.org  Summary  When you have GERD, food and lifestyle choices are very important in easing your symptoms.  Eat small meals often instead of 3 large meals a day. Eat your meals slowly and in a place where you are relaxed.  Avoid bending over or lying down until 2-3 hours after eating.  Limit high-fat foods such as fatty meats or fried foods.  This information is not intended to replace advice given to you by your health care provider. Make sure you discuss any questions you have with your health care provider.  Document Revised: 06/28/2021 Document Reviewed: 06/28/2021  Elsevier Patient Education © 2022 Elsevier Inc.

## 2023-01-17 NOTE — PROGRESS NOTES
Chief Complaint     Constipation (6 m follow up )    History of Present Illness     Dhaval Nieto is a 67 y.o. male who presents to [facility] GASTRO[facility]OLOGY for follow-up of constipation and, GERD and history of colon cancer.    His wife is present at today's visit.  She reports that he was dx with dementia and parkinson's recenlty.  She is his primary caregiver.  Reports that he wears an adult diaper.  He's only using Linzess PRN.  States that he has a daily bowel movement.  Denies rectal bleeding.      Reflux is controlled with protonix and pepcid.       History      Past Medical History:   Diagnosis Date   • Arteriovenous malformation 03/09/1980   • Arthritis    • Asthma    • Benign essential hypertension    • Bladder disorder    • Bleeding disorder ([facility])    • Blood disease    • BPH with urinary obstruction 05/30/2014   • Brain concussion 1999   • Cancer ([facility])    • Cervical disc disorder long time   • Cervical radiculopathy 05/01/2015    left   • Cervical spinal stenosis 03/10/2020   • Cervicalgia 12/18/2018   • Chest pain    • CHF (congestive heart failure) ([facility])    • Clotting disorder ([facility]) Elequis   • Colon cancer ([facility]) 12/18/2018   • Condition not found     Hernia   • Condition not found     herniated disc   • COPD (chronic obstructive pulmonary disease) ([facility])    • Coronary artery disease    • CTS (carpal tunnel syndrome) 08/04/2002   • DDD (degenerative disc disease), thoracic 12/18/2018   • Decreased sensation 05/01/2015    left   • Deep vein thrombosis ([facility])    • Depression    • Diabetes ([facility])    • Diabetes mellitus type 1 with coma, insulin dependent ([facility])     controlled   • Headache    • Heart murmur    • Heart valve disease    • Hematuria, gross 05/30/2014   • Hyperlipemia    • Hyperlipidemia    • Hypertension    • Insomnia    • Leg pain    • Lewy body dementia ([facility])    • Limb swelling    • Loss of balance 12/18/2018   • Low back pain 03/10/2020   • Lumbar degenerative  disc disease 12/18/2018   • Lumbar radiculopathy 03/10/2020   • Lumbosacral disc disease 05/05/1999   • Mid back pain 12/18/2018   • Migraines    • Muscle cramps    • Myocardial infarction (HCC)    • Neurologic disorder    • Pain in back    • Pain in joint    • Pain of cervical spine    • Pain, generalized    • Pulmonary arterial hypertension ([facility]) 1999   • Seasonal allergies    • Seizures ([facility]) 2000   • Shortness of breath    • Sleep apnea    • Stomach disorder      Past Surgical History:   Procedure Laterality Date   • ABDOMINAL SURGERY     • BLADDER SURGERY     • CARPAL TUNNEL RELEASE     • CEREBRAL ANGIOGRAM  2021   • COLON RESECTION     • COLONOSCOPY  01/30/2017    [facility]   • CORONARY ARTERY BYPASS GRAFT  12/2019    one vessel   • GALLBLADDER SURGERY      cholecystecomy   • HERNIA REPAIR     • JOINT REPLACEMENT      joint surgery   • MITRAL VALVE REPAIR/REPLACEMENT  02/2019    [facility]   • OTHER SURGICAL HISTORY  05/30/2014    office cystoscopy w/DR SHANICE Young   • SHOULDER SURGERY Bilateral    • TOTAL HIP ARTHROPLASTY Left 8/15/2022    Procedure: TOTAL HIP ARTHROPLASTY ANTERIOR;  Surgeon: Kaden Green MD;  Location: [facility] OR;  Service: Orthopedics;  Laterality: Left;   • VASCULAR SURGERY  2020   • VENTRAL HERNIA REPAIR       Family History   Problem Relation Age of Onset   • Heart attack Father         MI   • Heart failure Father    • Anxiety disorder Father    • Cancer Father    • COPD Father    • Depression Father    • Diabetes Father    • Heart disease Father    • Hypertension Father    • Stroke Brother    • Developmental Disability Brother    • Hearing loss Brother    • Mental illness Brother    • Vision loss Brother    • Arthritis Mother    • Asthma Mother    • Hyperlipidemia Mother         Current Medications       Current Outpatient Medications:   •  albuterol sulfate  (90 Base) MCG/ACT inhaler, , Disp: , Rfl:   •  apixaban (ELIQUIS) 5 MG tablet tablet, Take 1 tablet by mouth  Every 12 (Twelve) Hours. Indications: Prophylaxis of Venous Thromboembolism, Disp: 180 tablet, Rfl: 3  •  Continuous Blood Gluc Sensor (FreeStyle Poncho 2 Sensor) misc, CHECK BLOOD GLUCOSE 5 TIMES DAILY, SECONDARY TO HYPERGLYCEMIA AND HYPERTENSION., Disp: , Rfl:   •  donepezil (ARICEPT) 10 MG tablet, Take 10 mg by mouth., Disp: , Rfl:   •  famotidine (PEPCID) 20 MG tablet, Take 1 tablet by mouth every night at bedtime., Disp: 90 tablet, Rfl: 2  •  insulin aspart (NovoLOG FlexPen) 100 UNIT/ML solution pen-injector sc pen, Novolog FlexPen U-100 Insulin aspart 100 unit/mL (3 mL) subcutaneous, Disp: , Rfl:   •  insulin glargine (LANTUS, SEMGLEE) 100 UNIT/ML injection, Inject  under the skin into the appropriate area as directed Daily., Disp: , Rfl:   •  melatonin 3 MG tablet, Take  by mouth., Disp: , Rfl:   •  metoprolol tartrate (LOPRESSOR) 25 MG tablet, Take 1 tablet by mouth 2 (Two) Times a Day., Disp: 180 tablet, Rfl: 3  •  pantoprazole (PROTONIX) 40 MG EC tablet, Take 1 tablet by mouth Daily., Disp: 90 tablet, Rfl: 2  •  acetaminophen (TYLENOL) 325 MG tablet, Take  by mouth Every 6 (Six) Hours As Needed., Disp: , Rfl:   •  aspirin 81 MG EC tablet, Take 1 tablet by mouth Daily., Disp: , Rfl:   •  carbidopa-levodopa (SINEMET)  MG per tablet, Take 1 tablet by mouth 3 (Three) Times a Day., Disp: , Rfl:   •  docusate sodium 100 MG capsule, , Disp: , Rfl:   •  ketorolac (TORADOL) 10 MG tablet, , Disp: , Rfl:   •  prazosin (MINIPRESS) 1 MG capsule, , Disp: , Rfl:   •  pregabalin (LYRICA) 300 MG capsule, Take 1 capsule by mouth 2 (Two) Times a Day., Disp: , Rfl:   •  Rimegepant Sulfate (Nurtec) 75 MG tablet dispersible tablet, , Disp: , Rfl:   •  traMADol (ULTRAM) 50 MG tablet, , Disp: , Rfl:      Allergies     Allergies   Allergen Reactions   • Latex Itching   • Aspirin Unknown - High Severity   • Furosemide Unknown - High Severity   • Silver Unknown - High Severity   • Cefaclor Rash       Social History       Social  "History     Social History Narrative   • Not on file         Objective       BP (!) 80/46 (BP Location: Left arm, Patient Position: Sitting, Cuff Size: Small Adult)   Pulse 76   Ht 175.3 cm (69.02\")   Wt 66.7 kg (147 lb)   SpO2 99%   BMI 21.70 kg/m²       Physical Exam    Results       Result Review :                     Assessment and Plan              Diagnoses and all orders for this visit:    1. Gastroesophageal reflux disease, unspecified whether esophagitis present (Primary)  -     pantoprazole (PROTONIX) 40 MG EC tablet; Take 1 tablet by mouth Daily.  Dispense: 90 tablet; Refill: 2  -     famotidine (PEPCID) 20 MG tablet; Take 1 tablet by mouth every night at bedtime.  Dispense: 90 tablet; Refill: 2    2. History of colon cancer    3. Chronic idiopathic constipation            Follow Up     Follow Up   Return in about 9 months (around 10/17/2023) for GERD.  Patient was given instructions and counseling regarding his condition or for health maintenance advice. Please see specific information pulled into the AVS if appropriate.     "

## 2023-02-11 ENCOUNTER — HOSPITAL ENCOUNTER (EMERGENCY)
Facility: HOSPITAL | Age: 68
Discharge: HOME | End: 2023-02-11
Attending: EMERGENCY MEDICINE | Admitting: EMERGENCY MEDICINE
Payer: MEDICARE

## 2023-02-11 ENCOUNTER — APPOINTMENT (OUTPATIENT)
Dept: GENERAL RADIOLOGY | Facility: HOSPITAL | Age: 68
End: 2023-02-11
Payer: MEDICARE

## 2023-02-11 VITALS
BODY MASS INDEX: 21.88 KG/M2 | DIASTOLIC BLOOD PRESSURE: 57 MMHG | SYSTOLIC BLOOD PRESSURE: 120 MMHG | WEIGHT: 147.71 LBS | OXYGEN SATURATION: 98 % | RESPIRATION RATE: 18 BRPM | HEIGHT: 69 IN | HEART RATE: 92 BPM | TEMPERATURE: 97.9 F

## 2023-02-11 DIAGNOSIS — S90.32XA CONTUSION OF FOOT OR HEEL, LEFT, INITIAL ENCOUNTER: Primary | ICD-10-CM

## 2023-02-11 DIAGNOSIS — L53.9 FOOT ERYTHEMA: ICD-10-CM

## 2023-02-11 LAB
ALBUMIN SERPL-MCNC: 4 G/DL (ref 3.5–5.2)
ALBUMIN/GLOB SERPL: 1.3 G/DL
ALP SERPL-CCNC: 96 U/L (ref 39–117)
ALT SERPL W P-5'-P-CCNC: 7 U/L (ref 1–41)
ANION GAP SERPL CALCULATED.3IONS-SCNC: 13.3 MMOL/L (ref 5–15)
APTT PPP: 36.5 SECONDS (ref 24.2–34.2)
AST SERPL-CCNC: 14 U/L (ref 1–40)
BASOPHILS # BLD AUTO: 0.06 10*3/MM3 (ref 0–0.2)
BASOPHILS NFR BLD AUTO: 0.4 % (ref 0–1.5)
BILIRUB SERPL-MCNC: 0.7 MG/DL (ref 0–1.2)
BUN SERPL-MCNC: 20 MG/DL (ref 8–23)
BUN/CREAT SERPL: 21.3 (ref 7–25)
CALCIUM SPEC-SCNC: 9.7 MG/DL (ref 8.6–10.5)
CHLORIDE SERPL-SCNC: 99 MMOL/L (ref 98–107)
CO2 SERPL-SCNC: 23.7 MMOL/L (ref 22–29)
CREAT SERPL-MCNC: 0.94 MG/DL (ref 0.76–1.27)
DEPRECATED RDW RBC AUTO: 44.8 FL (ref 37–54)
EGFRCR SERPLBLD CKD-EPI 2021: 88.3 ML/MIN/1.73
EOSINOPHIL # BLD AUTO: 0.08 10*3/MM3 (ref 0–0.4)
EOSINOPHIL NFR BLD AUTO: 0.5 % (ref 0.3–6.2)
ERYTHROCYTE [DISTWIDTH] IN BLOOD BY AUTOMATED COUNT: 13.8 % (ref 12.3–15.4)
GLOBULIN UR ELPH-MCNC: 3 GM/DL
GLUCOSE SERPL-MCNC: 263 MG/DL (ref 65–99)
HCT VFR BLD AUTO: 41.9 % (ref 37.5–51)
HGB BLD-MCNC: 14.1 G/DL (ref 13–17.7)
IMM GRANULOCYTES # BLD AUTO: 0.05 10*3/MM3 (ref 0–0.05)
IMM GRANULOCYTES NFR BLD AUTO: 0.3 % (ref 0–0.5)
INR PPP: 1.25 (ref 0.86–1.15)
LYMPHOCYTES # BLD AUTO: 1.45 10*3/MM3 (ref 0.7–3.1)
LYMPHOCYTES NFR BLD AUTO: 9.9 % (ref 19.6–45.3)
MCH RBC QN AUTO: 29.7 PG (ref 26.6–33)
MCHC RBC AUTO-ENTMCNC: 33.7 G/DL (ref 31.5–35.7)
MCV RBC AUTO: 88.4 FL (ref 79–97)
MONOCYTES # BLD AUTO: 0.69 10*3/MM3 (ref 0.1–0.9)
MONOCYTES NFR BLD AUTO: 4.7 % (ref 5–12)
NEUTROPHILS NFR BLD AUTO: 12.38 10*3/MM3 (ref 1.7–7)
NEUTROPHILS NFR BLD AUTO: 84.2 % (ref 42.7–76)
NRBC BLD AUTO-RTO: 0 /100 WBC (ref 0–0.2)
PLATELET # BLD AUTO: 179 10*3/MM3 (ref 140–450)
PMV BLD AUTO: 10.2 FL (ref 6–12)
POTASSIUM SERPL-SCNC: 4.2 MMOL/L (ref 3.5–5.2)
PROT SERPL-MCNC: 7 G/DL (ref 6–8.5)
PROTHROMBIN TIME: 15.8 SECONDS (ref 11.8–14.9)
RBC # BLD AUTO: 4.74 10*6/MM3 (ref 4.14–5.8)
SODIUM SERPL-SCNC: 136 MMOL/L (ref 136–145)
WBC NRBC COR # BLD: 14.71 10*3/MM3 (ref 3.4–10.8)

## 2023-02-11 PROCEDURE — 73630 X-RAY EXAM OF FOOT: CPT

## 2023-02-11 PROCEDURE — 85025 COMPLETE CBC W/AUTO DIFF WBC: CPT | Performed by: PHYSICIAN ASSISTANT

## 2023-02-11 PROCEDURE — 80053 COMPREHEN METABOLIC PANEL: CPT | Performed by: PHYSICIAN ASSISTANT

## 2023-02-11 PROCEDURE — 85610 PROTHROMBIN TIME: CPT | Performed by: PHYSICIAN ASSISTANT

## 2023-02-11 PROCEDURE — 99283 EMERGENCY DEPT VISIT LOW MDM: CPT

## 2023-02-11 PROCEDURE — 85730 THROMBOPLASTIN TIME PARTIAL: CPT | Performed by: PHYSICIAN ASSISTANT

## 2023-02-11 PROCEDURE — 36415 COLL VENOUS BLD VENIPUNCTURE: CPT

## 2023-02-11 RX ORDER — SULFAMETHOXAZOLE AND TRIMETHOPRIM 800; 160 MG/1; MG/1
1 TABLET ORAL 2 TIMES DAILY
Qty: 14 TABLET | Refills: 0 | Status: SHIPPED | OUTPATIENT
Start: 2023-02-11 | End: 2023-02-18

## 2023-02-11 NOTE — DISCHARGE INSTRUCTIONS
I am going to put you on an antibiotic to cover for any infectious cause of the red streaking.  I have provided a foot cushion to help cushion that left heel.  This appears to be more of a bruise however I would like for you to follow-up with podiatry for further evaluation.  Please return to the emergency department with any cold pale numb extremities, redness coming of the legs, or any worsening or concerning symptoms.   None known

## 2023-02-11 NOTE — ED PROVIDER NOTES
Time: 2:13 PM EST  Date of encounter:  2/11/2023  Independent Historian/Clinical History and Information was obtained by: Patient, Family and Chart  Chief Complaint: foot pain  History is limited by: Alzheimer's and Dementia  Room number: 60/60     History of Present Illness:  HPI  Patient is a 67 y.o. year old male who presents to the Emergency Department via private car for evaluation of foot pain. Patient has spouse at bedside on initial evaluation, who helps to report clinical history, which is limited due to the patients current condition. Patient has a medical history of chronic obstructive pulmonary disease (COPD) with asthma, hypertension (HTN), chronic heart failure (CHF), cancer (colon), coronary artery disease (CAD) with myocardial infarction (MI) with CABG, deep vein thrombosis (DVT) on anticoagulant therapy (on Eliquis), type 1 diabetes mellitus (DMT1), hyperlipidemia (HLD), Lewy body dementia, Parkinson's, disease, back pain, pulmonary hypertension, seizures, sleep apnea.. Patient has a surgical history of vascular surgery, bladder surgery, cholecystectomy, colectomy, hernia repair, coronary artery bypass graft (CABG), left hip replacement. Patient current smoker.    Patient started experiencing symptoms of foot pain with erythema and streaking bilaterally with the left foot worse than the right that started approximately less than 24 hours ago when patients wife says she noticed the symptoms putting on his compression socks. Patient also has a wound after striking his heel on the side of a hospital bed railing. Patient states they are in severe pain and rates their pain level 8 out of 10 at rest and with movement or activity. Patient states no modifying factors. Patient denies symptoms of chills, fever, fatigue, rhinorrhea, sore throat, chest tightness, cough, shortness of breath, chest pain, palpitations, abdominal pain, diarrhea, nausea, vomiting, difficulty urinating, flank pain, urinary frequency,  hematuria, urinary urgency, urine decreased, arthralgias, myalgias, skin rash, dizziness, headache, lightheadedness, syncope, weakness, agitation, confusion, nervousness or anxiousness. All other review of systems are negative. All other review of systems are negative.    Patient not tried anything for symptom relief.             Patient Care Team  Primary Care Provider: Christiano Mcbride MD    Past Medical History:    Allergies   Allergen Reactions   • Latex Itching   • Aspirin Unknown - High Severity   • Furosemide Unknown - High Severity   • Silver Unknown - High Severity   • Cefaclor Rash     Past Medical History:   Diagnosis Date   • Arteriovenous malformation 03/09/1980   • Arthritis    • Asthma    • Benign essential hypertension    • Bladder disorder    • Bleeding disorder ([facility])    • Blood disease    • BPH with urinary obstruction 05/30/2014   • Brain concussion 1999   • Cancer ([facility])    • Cervical disc disorder long time   • Cervical radiculopathy 05/01/2015    left   • Cervical spinal stenosis 03/10/2020   • Cervicalgia 12/18/2018   • Chest pain    • CHF (congestive heart failure) ([facility])    • Clotting disorder ([facility]) Elequis   • Colon cancer ([facility]) 12/18/2018   • Condition not found     Hernia   • Condition not found     herniated disc   • COPD (chronic obstructive pulmonary disease) ([facility])    • Coronary artery disease    • CTS (carpal tunnel syndrome) 08/04/2002   • DDD (degenerative disc disease), thoracic 12/18/2018   • Decreased sensation 05/01/2015    left   • Deep vein thrombosis ([facility])    • Depression    • Diabetes ([facility])    • Diabetes mellitus type 1 with coma, insulin dependent ([facility])     controlled   • Headache    • Heart murmur    • Heart valve disease    • Hematuria, gross 05/30/2014   • Hyperlipemia    • Hyperlipidemia    • Hypertension    • Insomnia    • Leg pain    • Lewy body dementia ([facility])    • Limb swelling    • Loss of balance 12/18/2018   • Low back pain 03/10/2020   • Lumbar degenerative disc  disease 12/18/2018   • Lumbar radiculopathy 03/10/2020   • Lumbosacral disc disease 05/05/1999   • Mid back pain 12/18/2018   • Migraines    • Muscle cramps    • Myocardial infarction ([facility])    • Neurologic disorder    • Pain in back    • Pain in joint    • Pain of cervical spine    • Pain, generalized    • Pulmonary arterial hypertension ([facility]) 1999   • Seasonal allergies    • Seizures ([facility]) 2000   • Shortness of breath    • Sleep apnea    • Stomach disorder      Past Surgical History:   Procedure Laterality Date   • ABDOMINAL SURGERY     • BLADDER SURGERY     • CARPAL TUNNEL RELEASE     • CEREBRAL ANGIOGRAM  2021   • COLON RESECTION     • COLONOSCOPY  01/30/2017    [place]   • CORONARY ARTERY BYPASS GRAFT  12/2019    one vessel   • GALLBLADDER SURGERY      cholecystecomy   • HERNIA REPAIR     • JOINT REPLACEMENT      joint surgery   • MITRAL VALVE REPAIR/REPLACEMENT  02/2019    [facility]   • OTHER SURGICAL HISTORY  05/30/2014    office cystoscopy w/DR SHANICE Young   • SHOULDER SURGERY Bilateral    • TOTAL HIP ARTHROPLASTY Left 8/15/2022    Procedure: TOTAL HIP ARTHROPLASTY ANTERIOR;  Surgeon: Kaden Green MD;  Location: [facility] OR;  Service: Orthopedics;  Laterality: Left;   • VASCULAR SURGERY  2020   • VENTRAL HERNIA REPAIR       Family History   Problem Relation Age of Onset   • Heart attack Father         MI   • Heart failure Father    • Anxiety disorder Father    • Cancer Father    • COPD Father    • Depression Father    • Diabetes Father    • Heart disease Father    • Hypertension Father    • Stroke Brother    • Developmental Disability Brother    • Hearing loss Brother    • Mental illness Brother    • Vision loss Brother    • Arthritis Mother    • Asthma Mother    • Hyperlipidemia Mother        Home Medications:  Prior to Admission medications    Medication Sig Start Date End Date Taking? Authorizing Provider   acetaminophen (TYLENOL) 325 MG tablet Take  by mouth Every 6 (Six) Hours As Needed.     Carissa Boyle MD   albuterol sulfate  (90 Base) MCG/ACT inhaler  9/6/22   Carissa Boyle MD   apixaban (ELIQUIS) 5 MG tablet tablet Take 1 tablet by mouth Every 12 (Twelve) Hours. Indications: Prophylaxis of Venous Thromboembolism 12/21/22   Viral Alvarez MD   aspirin 81 MG EC tablet Take 1 tablet by mouth Daily.    Carissa Boyle MD   carbidopa-levodopa (SINEMET)  MG per tablet Take 1 tablet by mouth 3 (Three) Times a Day.    Carissa Boyle MD   Continuous Blood Gluc Sensor (FreeStyle Poncho 2 Sensor) misc CHECK BLOOD GLUCOSE 5 TIMES DAILY, SECONDARY TO HYPERGLYCEMIA AND HYPERTENSION. 11/7/22   Carissa Boyle MD   docusate sodium 100 MG capsule  9/6/22   Carissa Boyle MD   donepezil (ARICEPT) 10 MG tablet Take 10 mg by mouth.    Carissa Boyle MD   famotidine (PEPCID) 20 MG tablet Take 1 tablet by mouth every night at bedtime. 1/17/23   Tarsha Nichols APRN   insulin aspart (NovoLOG FlexPen) 100 UNIT/ML solution pen-injector sc pen Novolog FlexPen U-100 Insulin aspart 100 unit/mL (3 mL) subcutaneous    Carissa Boyle MD   insulin glargine (LANTUS, SEMGLEE) 100 UNIT/ML injection Inject  under the skin into the appropriate area as directed Daily.    Carissa Boyle MD   ketorolac (TORADOL) 10 MG tablet  8/12/22   Carissa Boyle MD   melatonin 3 MG tablet Take  by mouth. 9/6/22   Carissa Boyle MD   metoprolol tartrate (LOPRESSOR) 25 MG tablet Take 1 tablet by mouth 2 (Two) Times a Day. 12/21/22   Viral Alvarez MD   pantoprazole (PROTONIX) 40 MG EC tablet Take 1 tablet by mouth Daily. 1/17/23   Tarsha Nichols APRN   prazosin (MINIPRESS) 1 MG capsule  9/6/22   Carissa Boyle MD   pregabalin (LYRICA) 300 MG capsule Take 1 capsule by mouth 2 (Two) Times a Day.    Carissa Boyle MD   Rimegepant Sulfate (Nurtec) 75 MG tablet dispersible tablet  8/1/22   Carissa Boyle MD   traMADol  "(ULTRAM) 50 MG tablet  7/27/22   Provider, MD Carissa        Social History:  Social History     Tobacco Use   • Smoking status: Some Days     Packs/day: 0.50     Years: 50.00     Pack years: 25.00     Types: Cigarettes     Start date: 1/1/1970   • Smokeless tobacco: Never   Vaping Use   • Vaping Use: Never used   Substance Use Topics   • Alcohol use: Never   • Drug use: Never         Review of Systems:   Review of Systems   Musculoskeletal: Positive for arthralgias (feet bilaterally).   Skin: Positive for color change (erthema to feet bilaterally, left worse than right) and wound.   All other systems reviewed and are negative.       Physical Exam:   /57 (BP Location: Right arm, Patient Position: Sitting)   Pulse 92   Temp 97.9 °F (36.6 °C) (Oral)   Resp 18   Ht 175.3 cm (69\")   Wt 67 kg (147 lb 11.3 oz)   SpO2 98%   BMI 21.81 kg/m²     Physical Exam  Vitals and nursing note reviewed.   Constitutional:       Appearance: Normal appearance. He is not ill-appearing or toxic-appearing.   HENT:      Head: Normocephalic.      Nose: Nose normal.      Mouth/Throat:      Mouth: Mucous membranes are moist.   Eyes:      Conjunctiva/sclera: Conjunctivae normal.   Cardiovascular:      Rate and Rhythm: Normal rate and regular rhythm.   Pulmonary:      Effort: Pulmonary effort is normal.      Breath sounds: Normal breath sounds.   Musculoskeletal:         General: Normal range of motion.      Cervical back: Normal range of motion.   Skin:     General: Skin is warm and dry.      Capillary Refill: Capillary refill takes less than 2 seconds.      Comments: Left lower extremity, foot: dorsal and medial aspect of the foot there are multiple erythematous streaks extending into the ankle. On the heel of the left foot, there is callus formation with a dark spot. Right lower extremity, foot: one erythematous streak noted on the dorsal and medial aspect near the distal end of the first metatarsal.    See image attached to " patient chart for details.     Neurological:      Mental Status: He is alert.                  Procedures:  Procedures      Medical Decision Making:      Comorbidities that affect care:    chronic obstructive pulmonary disease (COPD) with asthma, hypertension (HTN), chronic heart failure (CHF), cancer (colon), coronary artery disease (CAD) with myocardial infarction (MI) with CABG, deep vein thrombosis (DVT) on anticoagulant therapy (on Eliquis), type 1 diabetes mellitus (DMT1), hyperlipidemia (HLD), Lewy body dementia, Parkinson's, disease, back pain, pulmonary hypertension, seizures, sleep apnea, smoking    External Notes reviewed:    Previous Clinic Note: neuro 1/12/23 for atypical parkinson      The following orders were placed and all results were independently analyzed by me:  Orders Placed This Encounter   Procedures   • XR Foot 3+ View Left   • Comprehensive Metabolic Panel   • Protime-INR   • aPTT   • CBC Auto Differential   • CBC & Differential       Medications Given in the Emergency Department:  Medications - No data to display     ED Course:         Labs:    Lab Results (last 24 hours)     Procedure Component Value Units Date/Time    CBC & Differential [[id number]]  (Abnormal) Collected: 02/11/23 1445    Specimen: Blood Updated: 02/11/23 1457    Narrative:      The following orders were created for panel order CBC & Differential.  Procedure                               Abnormality         Status                     ---------                               -----------         ------                     CBC Auto Differential[[id number]]        Abnormal            Final result                 Please view results for these tests on the individual orders.    Comprehensive Metabolic Panel [[id number]]  (Abnormal) Collected: 02/11/23 1445    Specimen: Blood Updated: 02/11/23 1522     Glucose 263 mg/dL      BUN 20 mg/dL      Creatinine 0.94 mg/dL      Sodium 136 mmol/L      Potassium 4.2 mmol/L      Chloride 99  mmol/L      CO2 23.7 mmol/L      Calcium 9.7 mg/dL      Total Protein 7.0 g/dL      Albumin 4.0 g/dL      ALT (SGPT) 7 U/L      AST (SGOT) 14 U/L      Alkaline Phosphatase 96 U/L      Total Bilirubin 0.7 mg/dL      Globulin 3.0 gm/dL      A/G Ratio 1.3 g/dL      BUN/Creatinine Ratio 21.3     Anion Gap 13.3 mmol/L      eGFR 88.3 mL/min/1.73     Narrative:      GFR Normal >60  Chronic Kidney Disease <60  Kidney Failure <15      CBC Auto Differential [[id number]]  (Abnormal) Collected: 02/11/23 1445    Specimen: Blood Updated: 02/11/23 1457     WBC 14.71 10*3/mm3      RBC 4.74 10*6/mm3      Hemoglobin 14.1 g/dL      Hematocrit 41.9 %      MCV 88.4 fL      MCH 29.7 pg      MCHC 33.7 g/dL      RDW 13.8 %      RDW-SD 44.8 fl      MPV 10.2 fL      Platelets 179 10*3/mm3      Neutrophil % 84.2 %      Lymphocyte % 9.9 %      Monocyte % 4.7 %      Eosinophil % 0.5 %      Basophil % 0.4 %      Immature Grans % 0.3 %      Neutrophils, Absolute 12.38 10*3/mm3      Lymphocytes, Absolute 1.45 10*3/mm3      Monocytes, Absolute 0.69 10*3/mm3      Eosinophils, Absolute 0.08 10*3/mm3      Basophils, Absolute 0.06 10*3/mm3      Immature Grans, Absolute 0.05 10*3/mm3      nRBC 0.0 /100 WBC     Protime-INR [[id number]]  (Abnormal) Collected: 02/11/23 1523    Specimen: Blood from Arm, Left Updated: 02/11/23 1653     Protime 15.8 Seconds      INR 1.25    Narrative:      Suggested Therapeutic Ranges For Oral Anticoagulant Therapy:  Level of Therapy                      INR Target Range  Standard Dose                            2.0-3.0  High Dose                                2.5-3.5  Patients not receiving anticoagulant  Therapy Normal Range                     0.86-1.15    aPTT [[id number]]  (Abnormal) Collected: 02/11/23 1523    Specimen: Blood from Arm, Left Updated: 02/11/23 1653     PTT 36.5 seconds            Imaging:    XR Foot 3+ View Left    Result Date: 2/11/2023  PROCEDURE: XR FOOT 3+ VW LEFT  COMPARISON: None  INDICATIONS: heel  injury, foot pain  FINDINGS:  BONES: Normal.  No significant arthropathy or acute abnormality.  SOFT TISSUES: Negative.  No visible soft tissue swelling.  EFFUSION: None visible.  OTHER: Atherosclerotic vascular calcification is present.       No acute osseous abnormalities are identified in the left foot.      CARMEN GREGORY MD       Electronically Signed and Approved By: CARMEN GREGORY MD on 2/11/2023 at 14:50                 Differential Diagnosis and Discussion:    Extremity Pain: Differential diagnosis includes but is not limited to soft tissue sprain, tendonitis, tendon injury, dislocation, fracture, deep vein thrombosis, arterial insufficiency, osteoarthritis, bursitis, and ligamentous damage.    All labs were reviewed and interpreted by me.  All X-rays were independently reviewed by me.    MDM         Patient Care Considerations:    CTA aorta w/ runoff - however DP and PT pulses 2+, cap refill brisk, do not suspect acute arterial occlusion      Consultants/Shared Management Plan:    I have discussed the case with Dr. Glynn, supervising physician who states that the patient can be safely discharged with close follow up. as long as strong pulses and no concern for arterial occlusion, recommendation to cover with keflex for infectious cause and can refer to podiatry vs PCP.    Social Determinants of Health:    Patient has presented with family members who are responsible, reliable and will ensure follow up care.      Disposition and Care Coordination:    Discharged: I considered escalation of care by admitting this patient for observation, however the patient has improved and is suitable and  stable for discharge.    Placed heel cushion on left foot. Appearance of bruising as opposed to wound likely 2/2 hitting on the home hospital bed. XR without acute fracture. Erythema to right foot has more appearance of abrasion likely 2/2 compression hose.  DP and PT pulses 2+ bilaerally, no warmth, on  anticoagulant already. Will rx keflex  To cover infectious phlebitis and refer to podiatry to keep eye on left heel and because of DM status. Advised close f/u with PCP. Return precautions discussed. Discussed with wife, and offered social work consult for home health care but wife reports they have all the resources they need at this time. Allergy to cefaclor. So will rx bactrim.     I have explained the patient´s condition, diagnoses and treatment plan based on the information available to me at this time. I have answered questions and addressed any concerns. The patient has a good  understanding of the patient´s diagnosis, condition, and treatment plan as can be expected at this point. The vital signs have been stable. The patient´s condition is stable and appropriate for discharge from the emergency department.      The patient will pursue further outpatient evaluation with the primary care physician or other designated or consulting physician as outlined in the discharge instructions. They are agreeable to this plan of care and follow-up instructions have been explained in detail. The patient has received these instructions in written format and have expressed an understanding of the discharge instructions. The patient is aware that any significant change in condition or worsening of symptoms should prompt an immediate return to this or the closest emergency department or call to 911.  I have explained discharge medications and the need for follow up with the patient/caretakers. This was also printed in the discharge instructions. Patient was discharged with the following medications and follow up:      Medication List      New Prescriptions    sulfamethoxazole-trimethoprim 800-160 MG per tablet  Commonly known as: BACTRIM DS,SEPTRA DS  Take 1 tablet by mouth 2 (Two) Times a Day for 7 days.           Where to Get Your Medications      These medications were sent to [facility]/pharmacy #[id number]  [place], [place] - [address] - [phone]  - [phone] FX  [address] [place] [address]    Hours: 24-hours Phone: [phone]   · sulfamethoxazole-trimethoprim 800-160 MG per tablet      Isiah Scherer, DPM  [address]  [address]  [place] [id number] [phone]          Christiano Mcbride MD  [address]  [place] [id number] [phone]          [facility] EMERGENCY ROOM  [address] [facility] [place] [address] [phone]    If symptoms worsen       Final diagnoses:   Contusion of foot or heel, left, initial encounter   Foot erythema        ED Disposition     ED Disposition   Discharge    Condition   Stable    Comment   --             This medical record created using voice recognition software.    Documentation assistance provided by Devora Esposito acting as scribe for Zeferino Ashley PA-C. Information recorded by the scribe was done at my direction and has been verified and validated by me.      Devora Esposito  02/11/23 1413       Devora Esposito  02/11/23 1438       Zeferino Ashley PA-C  02/11/23 [id number]

## 2023-02-17 ENCOUNTER — OFFICE VISIT (OUTPATIENT)
Dept: PODIATRY | Facility: CLINIC | Age: 68
End: 2023-02-17
Payer: MEDICARE

## 2023-02-17 VITALS
SYSTOLIC BLOOD PRESSURE: 111 MMHG | WEIGHT: 147 LBS | DIASTOLIC BLOOD PRESSURE: 76 MMHG | HEIGHT: 69 IN | HEART RATE: 123 BPM | BODY MASS INDEX: 21.77 KG/M2 | TEMPERATURE: 97.7 F | OXYGEN SATURATION: 99 %

## 2023-02-17 DIAGNOSIS — E11.649 TYPE 2 DIABETES MELLITUS WITH HYPOGLYCEMIA WITHOUT COMA, WITHOUT LONG-TERM CURRENT USE OF INSULIN: ICD-10-CM

## 2023-02-17 DIAGNOSIS — L97.411 SKIN ULCER OF RIGHT HEEL, LIMITED TO BREAKDOWN OF SKIN: Primary | ICD-10-CM

## 2023-02-17 PROCEDURE — 99203 OFFICE O/P NEW LOW 30 MIN: CPT | Performed by: PODIATRIST

## 2023-02-17 NOTE — PROGRESS NOTES
[facility] - PODIATRY    Today's Date: 02/17/23    Patient Name: Dhaval Nieto  MRN: [id number]  CSN: [id number]  PCP: Christiano Mcbride MD, Last PCP Visit: 11/14/2022  Referring Provider: Referring, Self    SUBJECTIVE     Chief Complaint   Patient presents with   • Left Foot - Diabetes, Establish Care     Bruising of feet   • Right Foot - Diabetes, Establish Care     Bruising of feet  dark spot on right great toe     HPI: Dhaval Nieto, a 67 y.o.male, presents to clinic.    Patient is a 67-year-old diabetic male with Parkinson's presenting with a ulcer to his right heel.  Patient is accompanied by his wife and caretaker.  Says that it for started couple weeks ago.  She was concerned with it and went to the ER yesterday.  States he was wearing compression stockings while laying in bed.  States that he is laying in bed more due to the progression of his Parkinson's.  She does have pillow boots to use on his feet.    Past Medical History:   Diagnosis Date   • Arteriovenous malformation 03/09/1980   • Arthritis    • Asthma    • Benign essential hypertension    • Bladder disorder    • Bleeding disorder ([facility])    • Blood disease    • BPH with urinary obstruction 05/30/2014   • Brain concussion 1999   • Cancer ([facility])    • Cervical disc disorder long time   • Cervical radiculopathy 05/01/2015    left   • Cervical spinal stenosis 03/10/2020   • Cervicalgia 12/18/2018   • Chest pain    • CHF (congestive heart failure) ([facility])    • Clotting disorder ([facility]) Elequis   • Colon cancer ([facility]) 12/18/2018   • Condition not found     Hernia   • Condition not found     herniated disc   • COPD (chronic obstructive pulmonary disease) ([facility])    • Coronary artery disease    • CTS (carpal tunnel syndrome) 08/04/2002   • DDD (degenerative disc disease), thoracic 12/18/2018   • Decreased sensation 05/01/2015    left   • Deep vein thrombosis ([facility])    • Depression    • Diabetes ([facility])    • Diabetes mellitus  type 1 with coma, insulin dependent (HCC)     controlled   • Headache    • Heart murmur    • Heart valve disease    • Hematuria, gross 05/30/2014   • Hyperlipemia    • Hyperlipidemia    • Hypertension    • Insomnia    • Leg pain    • Lewy body dementia (HCC)    • Limb swelling    • Loss of balance 12/18/2018   • Low back pain 03/10/2020   • Lumbar degenerative disc disease 12/18/2018   • Lumbar radiculopathy 03/10/2020   • Lumbosacral disc disease 05/05/1999   • Mid back pain 12/18/2018   • Migraines    • Muscle cramps    • Myocardial infarction ([facility])    • Neurologic disorder    • Pain in back    • Pain in joint    • Pain of cervical spine    • Pain, generalized    • Pulmonary arterial hypertension ([facility]) 1999   • Seasonal allergies    • Seizures ([facility]) 2000   • Shortness of breath    • Sleep apnea    • Stomach disorder      Past Surgical History:   Procedure Laterality Date   • ABDOMINAL SURGERY     • BLADDER SURGERY     • CARPAL TUNNEL RELEASE     • CEREBRAL ANGIOGRAM  2021   • COLON RESECTION     • COLONOSCOPY  01/30/2017    [place]   • CORONARY ARTERY BYPASS GRAFT  12/2019    one vessel   • GALLBLADDER SURGERY      [email or web address]   • HERNIA REPAIR     • JOINT REPLACEMENT      joint surgery   • MITRAL VALVE REPAIR/REPLACEMENT  02/2019    [facility]   • OTHER SURGICAL HISTORY  05/30/2014    office cystoscopy w/DR SHANICE Young   • SHOULDER SURGERY Bilateral    • TOTAL HIP ARTHROPLASTY Left 8/15/2022    Procedure: TOTAL HIP ARTHROPLASTY ANTERIOR;  Surgeon: Kaden Green MD;  Location: [facility];  Service: Orthopedics;  Laterality: Left;   • VASCULAR SURGERY  2020   • VENTRAL HERNIA REPAIR       Family History   Problem Relation Age of Onset   • Heart attack Father         MI   • Heart failure Father    • Anxiety disorder Father    • Cancer Father    • COPD Father    • Depression Father    • Diabetes Father    • Heart disease Father    • Hypertension Father    • Stroke Brother    • Developmental  Disability Brother    • Hearing loss Brother    • Mental illness Brother    • Vision loss Brother    • Arthritis Mother    • Asthma Mother    • Hyperlipidemia Mother      Social History     Socioeconomic History   • Marital status:    Tobacco Use   • Smoking status: Some Days     Packs/day: 0.50     Years: 50.00     Pack years: 25.00     Types: Cigarettes     Start date: 1/1/1970   • Smokeless tobacco: Never   Vaping Use   • Vaping Use: Never used   Substance and Sexual Activity   • Alcohol use: Never   • Drug use: Never   • Sexual activity: Not Currently     Allergies   Allergen Reactions   • Latex Itching   • Aspirin Unknown - High Severity   • Furosemide Unknown - High Severity   • Silver Unknown - High Severity   • Cefaclor Rash     Current Outpatient Medications   Medication Sig Dispense Refill   • acetaminophen (TYLENOL) 325 MG tablet Take  by mouth Every 6 (Six) Hours As Needed.     • albuterol sulfate  (90 Base) MCG/ACT inhaler      • apixaban (ELIQUIS) 5 MG tablet tablet Take 1 tablet by mouth Every 12 (Twelve) Hours. Indications: Prophylaxis of Venous Thromboembolism 180 tablet 3   • aspirin 81 MG EC tablet Take 1 tablet by mouth Daily.     • carbidopa-levodopa (SINEMET)  MG per tablet Take 1 tablet by mouth 3 (Three) Times a Day.     • Continuous Blood Gluc Sensor (FreeStyle Poncho 2 Sensor) misc CHECK BLOOD GLUCOSE 5 TIMES DAILY, SECONDARY TO HYPERGLYCEMIA AND HYPERTENSION.     • docusate sodium 100 MG capsule      • donepezil (ARICEPT) 10 MG tablet Take 10 mg by mouth.     • famotidine (PEPCID) 20 MG tablet Take 1 tablet by mouth every night at bedtime. 90 tablet 2   • insulin aspart (NovoLOG FlexPen) 100 UNIT/ML solution pen-injector sc pen Novolog FlexPen U-100 Insulin aspart 100 unit/mL (3 mL) subcutaneous     • insulin glargine (LANTUS, SEMGLEE) 100 UNIT/ML injection Inject  under the skin into the appropriate area as directed Daily.     • ketorolac (TORADOL) 10 MG tablet      •  melatonin 3 MG tablet Take  by mouth.     • metoprolol tartrate (LOPRESSOR) 25 MG tablet Take 1 tablet by mouth 2 (Two) Times a Day. 180 tablet 3   • pantoprazole (PROTONIX) 40 MG EC tablet Take 1 tablet by mouth Daily. 90 tablet 2   • prazosin (MINIPRESS) 1 MG capsule      • pregabalin (LYRICA) 300 MG capsule Take 1 capsule by mouth 2 (Two) Times a Day.     • Rimegepant Sulfate (Nurtec) 75 MG tablet dispersible tablet      • sulfamethoxazole-trimethoprim (BACTRIM DS,SEPTRA DS) 800-160 MG per tablet Take 1 tablet by mouth 2 (Two) Times a Day for 7 days. 14 tablet 0   • traMADol (ULTRAM) 50 MG tablet        No current facility-administered medications for this visit.     Review of Systems   Musculoskeletal:        Ulcers to feet   All other systems reviewed and are negative.      OBJECTIVE     Vitals:    02/17/23 1350   BP: 111/76   Pulse: (!) 123   Temp: 97.7 °F (36.5 °C)   SpO2: 99%       WBC   Date Value Ref Range Status   02/11/2023 14.71 (H) 3.40 - 10.80 10*3/mm3 Final     RBC   Date Value Ref Range Status   02/11/2023 4.74 4.14 - 5.80 10*6/mm3 Final     Hemoglobin   Date Value Ref Range Status   02/11/2023 14.1 13.0 - 17.7 g/dL Final     Hematocrit   Date Value Ref Range Status   02/11/2023 41.9 37.5 - 51.0 % Final     MCV   Date Value Ref Range Status   02/11/2023 88.4 79.0 - 97.0 fL Final     MCH   Date Value Ref Range Status   02/11/2023 29.7 26.6 - 33.0 pg Final     MCHC   Date Value Ref Range Status   02/11/2023 33.7 31.5 - 35.7 g/dL Final     RDW   Date Value Ref Range Status   02/11/2023 13.8 12.3 - 15.4 % Final     RDW-SD   Date Value Ref Range Status   02/11/2023 44.8 37.0 - 54.0 fl Final     MPV   Date Value Ref Range Status   02/11/2023 10.2 6.0 - 12.0 fL Final     Platelets   Date Value Ref Range Status   02/11/2023 179 140 - 450 10*3/mm3 Final     Neutrophil %   Date Value Ref Range Status   02/11/2023 84.2 (H) 42.7 - 76.0 % Final     Lymphocyte %   Date Value Ref Range Status   02/11/2023 9.9 (L)  19.6 - 45.3 % Final     Monocyte %   Date Value Ref Range Status   02/11/2023 4.7 (L) 5.0 - 12.0 % Final     Eosinophil %   Date Value Ref Range Status   02/11/2023 0.5 0.3 - 6.2 % Final     Basophil %   Date Value Ref Range Status   02/11/2023 0.4 0.0 - 1.5 % Final     Immature Grans %   Date Value Ref Range Status   02/11/2023 0.3 0.0 - 0.5 % Final     Neutrophils, Absolute   Date Value Ref Range Status   02/11/2023 12.38 (H) 1.70 - 7.00 10*3/mm3 Final     Lymphocytes, Absolute   Date Value Ref Range Status   02/11/2023 1.45 0.70 - 3.10 10*3/mm3 Final     Monocytes, Absolute   Date Value Ref Range Status   02/11/2023 0.69 0.10 - 0.90 10*3/mm3 Final     Eosinophils, Absolute   Date Value Ref Range Status   02/11/2023 0.08 0.00 - 0.40 10*3/mm3 Final     Basophils, Absolute   Date Value Ref Range Status   02/11/2023 0.06 0.00 - 0.20 10*3/mm3 Final     Immature Grans, Absolute   Date Value Ref Range Status   02/11/2023 0.05 0.00 - 0.05 10*3/mm3 Final     nRBC   Date Value Ref Range Status   02/11/2023 0.0 0.0 - 0.2 /100 WBC Final         Lab Results   Component Value Date    GLUCOSE 263 (H) 02/11/2023    BUN 20 02/11/2023    CREATININE 0.94 02/11/2023    EGFRIFNONA 65 01/13/2022    EGFRIFAFRI 79 01/13/2022    BCR 21.3 02/11/2023    K 4.2 02/11/2023    CO2 23.7 02/11/2023    CALCIUM 9.7 02/11/2023    ALBUMIN 4.0 02/11/2023    LABIL2 1.6 01/08/2021    AST 14 02/11/2023    ALT 7 02/11/2023       Patient seen in no apparent distress.      PHYSICAL EXAM:     Foot/Ankle Exam:     VASCULAR      Right Foot Vascularity   Normal vascular exam    Dorsalis pedis:  2+  Posterior tibial:  2+  Skin Temperature: warm    Edema Grading:  None  CFT:  < 3 seconds  Pedal Hair Growth:  Present  Varicosities: none       Left Foot Vascularity   Normal vascular exam    Dorsalis pedis:  2+  Posterior tibial:  2+  Skin Temperature: warm    Edema Grading:  None  CFT:  < 3 seconds  Pedal Hair Growth:  Present  Varicosities: none         NEUROLOGIC     Right Foot Neurologic   Light touch sensation:  Diminished  Vibratory sensation:  Diminished  Hot/Cold sensation: diminished       Left Foot Neurologic   Light touch sensation:  Diminished  Vibratory sensation:  Diminished  Hot/cold sensation: diminished       MUSCULOSKELETAL      Right Foot Musculoskeletal   Right foot ecchymosis: To right plantar heel.     DERMATOLOGIC     Right Foot Dermatologic   Skin comment:  Preulcerative area to right heel no erythema no malodor no drainage.  No breakdown of the skin      Right Foot Additional Comments Patient contracted to the left and right lower extremity.          XR Foot 3+ View Left    Result Date: 2/11/2023  Narrative: PROCEDURE: XR FOOT 3+ VW LEFT  COMPARISON: None  INDICATIONS: heel injury, foot pain  FINDINGS:  BONES: Normal.  No significant arthropathy or acute abnormality.  SOFT TISSUES: Negative.  No visible soft tissue swelling.  EFFUSION: None visible.  OTHER: Atherosclerotic vascular calcification is present.      Impression:  No acute osseous abnormalities are identified in the left foot.      CARMEN GREGORY MD       Electronically Signed and Approved By: CARMEN GREGORY MD on 2/11/2023 at 14:50               ASSESSMENT/PLAN     Diagnoses and all orders for this visit:    1. Skin ulcer of right heel, limited to breakdown of skin (HCC) (Primary)    2. Type 2 diabetes mellitus with hypoglycemia without coma, without long-term current use of insulin (HCC)      Discussed offloading with the pillow boots while in bed at all times  Recommend not wearing compression stockings while in bed  Discussed offloading while in the wheelchair to prevent any pressure ulcerations in his foot and ankles.  Daily foot checks to feet bilaterally  Patient to return to clinic as needed or if the area has issues healing  Comprehensive lower extremity examination and evaluation was performed.    Discussed findings and treatment plan including risks, benefits, and  treatment options with patient in detail. Patient agreed with treatment plan.    Medications and allergies reviewed.  Reviewed available lab values along with other pertinent labs.  These were discussed with the patient.    An After Visit Summary was printed and given to the patient at discharge, including (if requested) any available informative/educational handouts regarding diagnosis, treatment, or medications. All questions were answered to patient/family satisfaction. Should symptoms fail to improve or worsen they agree to call or return to clinic or to go to the Emergency Department. Discussed the importance of following up with any needed screening tests/labs/specialist appointments and any requested follow-up recommended by me today. Importance of maintaining follow-up discussed and patient accepts that missed appointments can delay diagnosis and potentially lead to worsening of conditions.    No follow-ups on file., or sooner if acute issues arise.    This document has been electronically signed by Isiah Scherer DPM on February 17, 2023 14:32 EST

## 2023-02-21 ENCOUNTER — HOSPITAL ENCOUNTER (INPATIENT)
Facility: HOSPITAL | Age: 68
LOS: 13 days | Discharge: HOSPICE | DRG: 871 | End: 2023-03-07
Attending: EMERGENCY MEDICINE | Admitting: STUDENT IN AN ORGANIZED HEALTH CARE EDUCATION/TRAINING PROGRAM
Payer: MEDICARE

## 2023-02-21 ENCOUNTER — APPOINTMENT (OUTPATIENT)
Dept: GENERAL RADIOLOGY | Facility: HOSPITAL | Age: 68
DRG: 871 | End: 2023-02-21
Payer: MEDICARE

## 2023-02-21 DIAGNOSIS — E86.0 DEHYDRATION: Primary | ICD-10-CM

## 2023-02-21 DIAGNOSIS — T17.908A ASPIRATION INTO AIRWAY, INITIAL ENCOUNTER: ICD-10-CM

## 2023-02-21 DIAGNOSIS — N17.9 ACUTE KIDNEY INJURY: ICD-10-CM

## 2023-02-21 DIAGNOSIS — D72.829 LEUKOCYTOSIS, UNSPECIFIED TYPE: ICD-10-CM

## 2023-02-21 DIAGNOSIS — R26.2 DIFFICULTY WALKING: ICD-10-CM

## 2023-02-21 DIAGNOSIS — E16.2 HYPOGLYCEMIA: ICD-10-CM

## 2023-02-21 DIAGNOSIS — G20 SEVERE DEMENTIA DUE TO PARKINSON'S DISEASE, WITHOUT BEHAVIORAL DISTURBANCE, PSYCHOTIC DISTURBANCE, MOOD DISTURBANCE, OR ANXIETY: ICD-10-CM

## 2023-02-21 DIAGNOSIS — Z78.9 DECREASED ACTIVITIES OF DAILY LIVING (ADL): ICD-10-CM

## 2023-02-21 DIAGNOSIS — R13.12 OROPHARYNGEAL DYSPHAGIA: ICD-10-CM

## 2023-02-21 DIAGNOSIS — F02.C0 SEVERE DEMENTIA DUE TO PARKINSON'S DISEASE, WITHOUT BEHAVIORAL DISTURBANCE, PSYCHOTIC DISTURBANCE, MOOD DISTURBANCE, OR ANXIETY: ICD-10-CM

## 2023-02-21 LAB
ALBUMIN SERPL-MCNC: 4.1 G/DL (ref 3.5–5.2)
ALBUMIN/GLOB SERPL: 1.4 G/DL
ALP SERPL-CCNC: 91 U/L (ref 39–117)
ALT SERPL W P-5'-P-CCNC: 13 U/L (ref 1–41)
ANION GAP SERPL CALCULATED.3IONS-SCNC: 13 MMOL/L (ref 5–15)
AST SERPL-CCNC: 13 U/L (ref 1–40)
BASOPHILS # BLD AUTO: 0.09 10*3/MM3 (ref 0–0.2)
BASOPHILS NFR BLD AUTO: 0.4 % (ref 0–1.5)
BILIRUB SERPL-MCNC: 0.6 MG/DL (ref 0–1.2)
BUN SERPL-MCNC: 63 MG/DL (ref 8–23)
BUN/CREAT SERPL: 38.2 (ref 7–25)
CALCIUM SPEC-SCNC: 10.6 MG/DL (ref 8.6–10.5)
CHLORIDE SERPL-SCNC: 118 MMOL/L (ref 98–107)
CO2 SERPL-SCNC: 23 MMOL/L (ref 22–29)
CREAT SERPL-MCNC: 1.65 MG/DL (ref 0.76–1.27)
DEPRECATED RDW RBC AUTO: 46.3 FL (ref 37–54)
EGFRCR SERPLBLD CKD-EPI 2021: 45 ML/MIN/1.73
EOSINOPHIL # BLD AUTO: 0 10*3/MM3 (ref 0–0.4)
EOSINOPHIL NFR BLD AUTO: 0 % (ref 0.3–6.2)
ERYTHROCYTE [DISTWIDTH] IN BLOOD BY AUTOMATED COUNT: 14.2 % (ref 12.3–15.4)
GLOBULIN UR ELPH-MCNC: 2.9 GM/DL
GLUCOSE SERPL-MCNC: 59 MG/DL (ref 65–99)
HCT VFR BLD AUTO: 40.4 % (ref 37.5–51)
HGB BLD-MCNC: 14 G/DL (ref 13–17.7)
IMM GRANULOCYTES # BLD AUTO: 0.14 10*3/MM3 (ref 0–0.05)
IMM GRANULOCYTES NFR BLD AUTO: 0.7 % (ref 0–0.5)
LYMPHOCYTES # BLD AUTO: 2.42 10*3/MM3 (ref 0.7–3.1)
LYMPHOCYTES NFR BLD AUTO: 11.7 % (ref 19.6–45.3)
MAGNESIUM SERPL-MCNC: 2.5 MG/DL (ref 1.6–2.4)
MCH RBC QN AUTO: 31.1 PG (ref 26.6–33)
MCHC RBC AUTO-ENTMCNC: 34.7 G/DL (ref 31.5–35.7)
MCV RBC AUTO: 89.8 FL (ref 79–97)
MONOCYTES # BLD AUTO: 1.16 10*3/MM3 (ref 0.1–0.9)
MONOCYTES NFR BLD AUTO: 5.6 % (ref 5–12)
NEUTROPHILS NFR BLD AUTO: 16.92 10*3/MM3 (ref 1.7–7)
NEUTROPHILS NFR BLD AUTO: 81.6 % (ref 42.7–76)
NRBC BLD AUTO-RTO: 0 /100 WBC (ref 0–0.2)
PLATELET # BLD AUTO: 232 10*3/MM3 (ref 140–450)
PMV BLD AUTO: 10.1 FL (ref 6–12)
POTASSIUM SERPL-SCNC: 4.4 MMOL/L (ref 3.5–5.2)
PROT SERPL-MCNC: 7 G/DL (ref 6–8.5)
RBC # BLD AUTO: 4.5 10*6/MM3 (ref 4.14–5.8)
SODIUM SERPL-SCNC: 154 MMOL/L (ref 136–145)
TROPONIN T SERPL HS-MCNC: 43 NG/L
WBC NRBC COR # BLD: 20.73 10*3/MM3 (ref 3.4–10.8)

## 2023-02-21 PROCEDURE — 99285 EMERGENCY DEPT VISIT HI MDM: CPT

## 2023-02-21 PROCEDURE — 85025 COMPLETE CBC W/AUTO DIFF WBC: CPT

## 2023-02-21 PROCEDURE — 84484 ASSAY OF TROPONIN QUANT: CPT

## 2023-02-21 PROCEDURE — 83735 ASSAY OF MAGNESIUM: CPT

## 2023-02-21 PROCEDURE — 93005 ELECTROCARDIOGRAM TRACING: CPT | Performed by: EMERGENCY MEDICINE

## 2023-02-21 PROCEDURE — 80053 COMPREHEN METABOLIC PANEL: CPT

## 2023-02-21 PROCEDURE — 71045 X-RAY EXAM CHEST 1 VIEW: CPT

## 2023-02-21 RX ORDER — SODIUM CHLORIDE 0.9 % (FLUSH) 0.9 %
10 SYRINGE (ML) INJECTION AS NEEDED
Status: DISCONTINUED | OUTPATIENT
Start: 2023-02-21 | End: 2023-03-07 | Stop reason: HOSPADM

## 2023-02-22 ENCOUNTER — APPOINTMENT (OUTPATIENT)
Dept: ULTRASOUND IMAGING | Facility: HOSPITAL | Age: 68
DRG: 871 | End: 2023-02-22
Payer: MEDICARE

## 2023-02-22 ENCOUNTER — APPOINTMENT (OUTPATIENT)
Dept: CT IMAGING | Facility: HOSPITAL | Age: 68
DRG: 871 | End: 2023-02-22
Payer: MEDICARE

## 2023-02-22 PROBLEM — R65.20 SEVERE SEPSIS (HCC): Status: ACTIVE | Noted: 2023-02-22

## 2023-02-22 PROBLEM — A41.9 SEVERE SEPSIS (HCC): Status: ACTIVE | Noted: 2023-02-22

## 2023-02-22 LAB
ALBUMIN SERPL-MCNC: 4 G/DL (ref 3.5–5.2)
ALBUMIN/GLOB SERPL: 1.6 G/DL
ALP SERPL-CCNC: 85 U/L (ref 39–117)
ALT SERPL W P-5'-P-CCNC: 11 U/L (ref 1–41)
ANION GAP SERPL CALCULATED.3IONS-SCNC: 7.4 MMOL/L (ref 5–15)
AST SERPL-CCNC: 14 U/L (ref 1–40)
BACTERIA UR QL AUTO: ABNORMAL /HPF
BASOPHILS # BLD AUTO: 0.05 10*3/MM3 (ref 0–0.2)
BASOPHILS NFR BLD AUTO: 0.2 % (ref 0–1.5)
BILIRUB SERPL-MCNC: 0.8 MG/DL (ref 0–1.2)
BILIRUB UR QL STRIP: NEGATIVE
BUN SERPL-MCNC: 60 MG/DL (ref 8–23)
BUN/CREAT SERPL: 41.4 (ref 7–25)
CALCIUM SPEC-SCNC: 9.8 MG/DL (ref 8.6–10.5)
CHLORIDE SERPL-SCNC: 123 MMOL/L (ref 98–107)
CLARITY UR: CLEAR
CO2 SERPL-SCNC: 24.6 MMOL/L (ref 22–29)
COLOR UR: YELLOW
CREAT SERPL-MCNC: 1.45 MG/DL (ref 0.76–1.27)
D-LACTATE SERPL-SCNC: 1.6 MMOL/L (ref 0.5–2)
D-LACTATE SERPL-SCNC: 3.2 MMOL/L (ref 0.5–2)
DEPRECATED RDW RBC AUTO: 48.1 FL (ref 37–54)
EGFRCR SERPLBLD CKD-EPI 2021: 52.5 ML/MIN/1.73
EOSINOPHIL # BLD AUTO: 0 10*3/MM3 (ref 0–0.4)
EOSINOPHIL NFR BLD AUTO: 0 % (ref 0.3–6.2)
ERYTHROCYTE [DISTWIDTH] IN BLOOD BY AUTOMATED COUNT: 14.4 % (ref 12.3–15.4)
FLUAV AG NPH QL: NEGATIVE
FLUBV AG NPH QL IA: NEGATIVE
GLOBULIN UR ELPH-MCNC: 2.5 GM/DL
GLUCOSE BLDC GLUCOMTR-MCNC: 111 MG/DL (ref 70–99)
GLUCOSE BLDC GLUCOMTR-MCNC: 116 MG/DL (ref 70–99)
GLUCOSE BLDC GLUCOMTR-MCNC: 353 MG/DL (ref 70–99)
GLUCOSE BLDC GLUCOMTR-MCNC: 358 MG/DL (ref 70–99)
GLUCOSE BLDC GLUCOMTR-MCNC: 373 MG/DL (ref 70–99)
GLUCOSE BLDC GLUCOMTR-MCNC: 41 MG/DL (ref 70–99)
GLUCOSE SERPL-MCNC: 136 MG/DL (ref 65–99)
GLUCOSE UR STRIP-MCNC: ABNORMAL MG/DL
HCT VFR BLD AUTO: 36.5 % (ref 37.5–51)
HGB BLD-MCNC: 12.3 G/DL (ref 13–17.7)
HGB UR QL STRIP.AUTO: NEGATIVE
HOLD SPECIMEN: NORMAL
HYALINE CASTS UR QL AUTO: ABNORMAL /LPF
IMM GRANULOCYTES # BLD AUTO: 0.08 10*3/MM3 (ref 0–0.05)
IMM GRANULOCYTES NFR BLD AUTO: 0.4 % (ref 0–0.5)
KETONES UR QL STRIP: NEGATIVE
LEUKOCYTE ESTERASE UR QL STRIP.AUTO: ABNORMAL
LYMPHOCYTES # BLD AUTO: 0.91 10*3/MM3 (ref 0.7–3.1)
LYMPHOCYTES NFR BLD AUTO: 4.3 % (ref 19.6–45.3)
MAGNESIUM SERPL-MCNC: 2.4 MG/DL (ref 1.6–2.4)
MCH RBC QN AUTO: 31 PG (ref 26.6–33)
MCHC RBC AUTO-ENTMCNC: 33.7 G/DL (ref 31.5–35.7)
MCV RBC AUTO: 91.9 FL (ref 79–97)
MONOCYTES # BLD AUTO: 0.51 10*3/MM3 (ref 0.1–0.9)
MONOCYTES NFR BLD AUTO: 2.4 % (ref 5–12)
NEUTROPHILS NFR BLD AUTO: 19.8 10*3/MM3 (ref 1.7–7)
NEUTROPHILS NFR BLD AUTO: 92.7 % (ref 42.7–76)
NITRITE UR QL STRIP: NEGATIVE
NRBC BLD AUTO-RTO: 0 /100 WBC (ref 0–0.2)
PH UR STRIP.AUTO: 5.5 [PH] (ref 5–8)
PHOSPHATE SERPL-MCNC: 3.7 MG/DL (ref 2.5–4.5)
PLATELET # BLD AUTO: 208 10*3/MM3 (ref 140–450)
PMV BLD AUTO: 10.2 FL (ref 6–12)
POTASSIUM SERPL-SCNC: 4.6 MMOL/L (ref 3.5–5.2)
PROT SERPL-MCNC: 6.5 G/DL (ref 6–8.5)
PROT UR QL STRIP: ABNORMAL
QT INTERVAL: 327 MS
RBC # BLD AUTO: 3.97 10*6/MM3 (ref 4.14–5.8)
RBC # UR STRIP: ABNORMAL /HPF
REF LAB TEST METHOD: ABNORMAL
SARS-COV-2 RNA RESP QL NAA+PROBE: NOT DETECTED
SODIUM SERPL-SCNC: 155 MMOL/L (ref 136–145)
SP GR UR STRIP: 1.02 (ref 1–1.03)
SQUAMOUS #/AREA URNS HPF: ABNORMAL /HPF
UROBILINOGEN UR QL STRIP: ABNORMAL
VANCOMYCIN SERPL-MCNC: 12.8 MCG/ML (ref 5–40)
WBC # UR STRIP: ABNORMAL /HPF
WBC NRBC COR # BLD: 21.35 10*3/MM3 (ref 3.4–10.8)
WHOLE BLOOD HOLD COAG: NORMAL
WHOLE BLOOD HOLD SPECIMEN: NORMAL

## 2023-02-22 PROCEDURE — 87040 BLOOD CULTURE FOR BACTERIA: CPT | Performed by: EMERGENCY MEDICINE

## 2023-02-22 PROCEDURE — 99222 1ST HOSP IP/OBS MODERATE 55: CPT | Performed by: STUDENT IN AN ORGANIZED HEALTH CARE EDUCATION/TRAINING PROGRAM

## 2023-02-22 PROCEDURE — 36415 COLL VENOUS BLD VENIPUNCTURE: CPT

## 2023-02-22 PROCEDURE — 76775 US EXAM ABDO BACK WALL LIM: CPT

## 2023-02-22 PROCEDURE — 87150 DNA/RNA AMPLIFIED PROBE: CPT | Performed by: EMERGENCY MEDICINE

## 2023-02-22 PROCEDURE — U0004 COV-19 TEST NON-CDC HGH THRU: HCPCS | Performed by: EMERGENCY MEDICINE

## 2023-02-22 PROCEDURE — U0005 INFEC AGEN DETEC AMPLI PROBE: HCPCS | Performed by: EMERGENCY MEDICINE

## 2023-02-22 PROCEDURE — 70450 CT HEAD/BRAIN W/O DYE: CPT

## 2023-02-22 PROCEDURE — 25010000002 PIPERACILLIN SOD-TAZOBACTAM PER 1 G: Performed by: EMERGENCY MEDICINE

## 2023-02-22 PROCEDURE — 81001 URINALYSIS AUTO W/SCOPE: CPT | Performed by: EMERGENCY MEDICINE

## 2023-02-22 PROCEDURE — 82962 GLUCOSE BLOOD TEST: CPT

## 2023-02-22 PROCEDURE — 25010000002 MORPHINE PER 10 MG: Performed by: STUDENT IN AN ORGANIZED HEALTH CARE EDUCATION/TRAINING PROGRAM

## 2023-02-22 PROCEDURE — 25010000002 VANCOMYCIN 5 G RECONSTITUTED SOLUTION: Performed by: EMERGENCY MEDICINE

## 2023-02-22 PROCEDURE — 25010000002 PIPERACILLIN SOD-TAZOBACTAM PER 1 G: Performed by: STUDENT IN AN ORGANIZED HEALTH CARE EDUCATION/TRAINING PROGRAM

## 2023-02-22 PROCEDURE — 87147 CULTURE TYPE IMMUNOLOGIC: CPT | Performed by: EMERGENCY MEDICINE

## 2023-02-22 PROCEDURE — 87804 INFLUENZA ASSAY W/OPTIC: CPT | Performed by: EMERGENCY MEDICINE

## 2023-02-22 PROCEDURE — 94799 UNLISTED PULMONARY SVC/PX: CPT

## 2023-02-22 PROCEDURE — 94761 N-INVAS EAR/PLS OXIMETRY MLT: CPT

## 2023-02-22 PROCEDURE — 85025 COMPLETE CBC W/AUTO DIFF WBC: CPT | Performed by: PHYSICIAN ASSISTANT

## 2023-02-22 PROCEDURE — 83735 ASSAY OF MAGNESIUM: CPT | Performed by: PHYSICIAN ASSISTANT

## 2023-02-22 PROCEDURE — 63710000001 INSULIN LISPRO (HUMAN) PER 5 UNITS: Performed by: STUDENT IN AN ORGANIZED HEALTH CARE EDUCATION/TRAINING PROGRAM

## 2023-02-22 PROCEDURE — 84100 ASSAY OF PHOSPHORUS: CPT | Performed by: PHYSICIAN ASSISTANT

## 2023-02-22 PROCEDURE — 25010000002 METHYLPREDNISOLONE PER 40 MG: Performed by: STUDENT IN AN ORGANIZED HEALTH CARE EDUCATION/TRAINING PROGRAM

## 2023-02-22 PROCEDURE — 71250 CT THORAX DX C-: CPT

## 2023-02-22 PROCEDURE — 92610 EVALUATE SWALLOWING FUNCTION: CPT

## 2023-02-22 PROCEDURE — 80202 ASSAY OF VANCOMYCIN: CPT | Performed by: STUDENT IN AN ORGANIZED HEALTH CARE EDUCATION/TRAINING PROGRAM

## 2023-02-22 PROCEDURE — 25010000002 HEPARIN (PORCINE) PER 1000 UNITS: Performed by: STUDENT IN AN ORGANIZED HEALTH CARE EDUCATION/TRAINING PROGRAM

## 2023-02-22 PROCEDURE — 80053 COMPREHEN METABOLIC PANEL: CPT | Performed by: PHYSICIAN ASSISTANT

## 2023-02-22 PROCEDURE — 99291 CRITICAL CARE FIRST HOUR: CPT | Performed by: INTERNAL MEDICINE

## 2023-02-22 PROCEDURE — 94640 AIRWAY INHALATION TREATMENT: CPT

## 2023-02-22 PROCEDURE — 83605 ASSAY OF LACTIC ACID: CPT | Performed by: EMERGENCY MEDICINE

## 2023-02-22 RX ORDER — NICOTINE POLACRILEX 4 MG
15 LOZENGE BUCCAL
Status: DISCONTINUED | OUTPATIENT
Start: 2023-02-22 | End: 2023-02-23

## 2023-02-22 RX ORDER — SODIUM CHLORIDE 9 MG/ML
75 INJECTION, SOLUTION INTRAVENOUS CONTINUOUS
Status: DISCONTINUED | OUTPATIENT
Start: 2023-02-22 | End: 2023-02-22

## 2023-02-22 RX ORDER — DEXTROSE MONOHYDRATE 25 G/50ML
25 INJECTION, SOLUTION INTRAVENOUS
Status: DISCONTINUED | OUTPATIENT
Start: 2023-02-22 | End: 2023-02-23

## 2023-02-22 RX ORDER — IBUPROFEN 600 MG/1
1 TABLET ORAL
Status: DISCONTINUED | OUTPATIENT
Start: 2023-02-22 | End: 2023-02-23

## 2023-02-22 RX ORDER — HEPARIN SODIUM 5000 [USP'U]/ML
5000 INJECTION, SOLUTION INTRAVENOUS; SUBCUTANEOUS EVERY 12 HOURS SCHEDULED
Status: DISCONTINUED | OUTPATIENT
Start: 2023-02-22 | End: 2023-03-01

## 2023-02-22 RX ORDER — SODIUM CHLORIDE 9 MG/ML
40 INJECTION, SOLUTION INTRAVENOUS AS NEEDED
Status: DISCONTINUED | OUTPATIENT
Start: 2023-02-22 | End: 2023-03-07 | Stop reason: HOSPADM

## 2023-02-22 RX ORDER — FLUTICASONE PROPIONATE 50 MCG
2 SPRAY, SUSPENSION (ML) NASAL EVERY MORNING
COMMUNITY

## 2023-02-22 RX ORDER — ACETAMINOPHEN 500 MG
1000 TABLET ORAL EVERY 4 HOURS PRN
Status: DISCONTINUED | OUTPATIENT
Start: 2023-02-22 | End: 2023-02-24

## 2023-02-22 RX ORDER — ATORVASTATIN CALCIUM 40 MG/1
40 TABLET, FILM COATED ORAL DAILY
COMMUNITY

## 2023-02-22 RX ORDER — METHYLPREDNISOLONE SODIUM SUCCINATE 40 MG/ML
40 INJECTION, POWDER, LYOPHILIZED, FOR SOLUTION INTRAMUSCULAR; INTRAVENOUS EVERY 8 HOURS
Status: DISCONTINUED | OUTPATIENT
Start: 2023-02-22 | End: 2023-02-23

## 2023-02-22 RX ORDER — INSULIN LISPRO 100 [IU]/ML
2-7 INJECTION, SOLUTION INTRAVENOUS; SUBCUTANEOUS
Status: DISCONTINUED | OUTPATIENT
Start: 2023-02-22 | End: 2023-02-23

## 2023-02-22 RX ORDER — IPRATROPIUM BROMIDE AND ALBUTEROL SULFATE 2.5; .5 MG/3ML; MG/3ML
3 SOLUTION RESPIRATORY (INHALATION)
Status: DISCONTINUED | OUTPATIENT
Start: 2023-02-22 | End: 2023-03-01

## 2023-02-22 RX ORDER — MORPHINE SULFATE 2 MG/ML
2 INJECTION, SOLUTION INTRAMUSCULAR; INTRAVENOUS EVERY 4 HOURS PRN
Status: DISCONTINUED | OUTPATIENT
Start: 2023-02-22 | End: 2023-03-07 | Stop reason: HOSPADM

## 2023-02-22 RX ORDER — SODIUM CHLORIDE, SODIUM LACTATE, POTASSIUM CHLORIDE, CALCIUM CHLORIDE 600; 310; 30; 20 MG/100ML; MG/100ML; MG/100ML; MG/100ML
100 INJECTION, SOLUTION INTRAVENOUS CONTINUOUS
Status: DISCONTINUED | OUTPATIENT
Start: 2023-02-22 | End: 2023-02-23

## 2023-02-22 RX ORDER — SODIUM CHLORIDE 0.9 % (FLUSH) 0.9 %
10 SYRINGE (ML) INJECTION AS NEEDED
Status: DISCONTINUED | OUTPATIENT
Start: 2023-02-22 | End: 2023-03-07 | Stop reason: HOSPADM

## 2023-02-22 RX ORDER — SODIUM CHLORIDE 0.9 % (FLUSH) 0.9 %
10 SYRINGE (ML) INJECTION EVERY 12 HOURS SCHEDULED
Status: DISCONTINUED | OUTPATIENT
Start: 2023-02-22 | End: 2023-03-07 | Stop reason: HOSPADM

## 2023-02-22 RX ORDER — CARBOXYMETHYLCELLULOSE SODIUM 5 MG/ML
1 SOLUTION/ DROPS OPHTHALMIC 3 TIMES DAILY PRN
COMMUNITY

## 2023-02-22 RX ORDER — DEXTROSE MONOHYDRATE 25 G/50ML
25 INJECTION, SOLUTION INTRAVENOUS ONCE
Status: COMPLETED | OUTPATIENT
Start: 2023-02-22 | End: 2023-02-22

## 2023-02-22 RX ADMIN — METHYLPREDNISOLONE SODIUM SUCCINATE 40 MG: 40 INJECTION, POWDER, FOR SOLUTION INTRAMUSCULAR; INTRAVENOUS at 13:43

## 2023-02-22 RX ADMIN — TAZOBACTAM SODIUM AND PIPERACILLIN SODIUM 3.38 G: 375; 3 INJECTION, SOLUTION INTRAVENOUS at 00:34

## 2023-02-22 RX ADMIN — METHYLPREDNISOLONE SODIUM SUCCINATE 40 MG: 40 INJECTION, POWDER, FOR SOLUTION INTRAMUSCULAR; INTRAVENOUS at 05:03

## 2023-02-22 RX ADMIN — METHYLPREDNISOLONE SODIUM SUCCINATE 40 MG: 40 INJECTION, POWDER, FOR SOLUTION INTRAMUSCULAR; INTRAVENOUS at 20:58

## 2023-02-22 RX ADMIN — TAZOBACTAM SODIUM AND PIPERACILLIN SODIUM 3.38 G: 375; 3 INJECTION, SOLUTION INTRAVENOUS at 23:29

## 2023-02-22 RX ADMIN — IPRATROPIUM BROMIDE AND ALBUTEROL SULFATE 3 ML: 2.5; .5 SOLUTION RESPIRATORY (INHALATION) at 18:28

## 2023-02-22 RX ADMIN — VANCOMYCIN HYDROCHLORIDE 1000 MG: 5 INJECTION, POWDER, LYOPHILIZED, FOR SOLUTION INTRAVENOUS at 01:16

## 2023-02-22 RX ADMIN — TAZOBACTAM SODIUM AND PIPERACILLIN SODIUM 3.38 G: 375; 3 INJECTION, SOLUTION INTRAVENOUS at 09:39

## 2023-02-22 RX ADMIN — SODIUM CHLORIDE, POTASSIUM CHLORIDE, SODIUM LACTATE AND CALCIUM CHLORIDE 75 ML/HR: 600; 310; 30; 20 INJECTION, SOLUTION INTRAVENOUS at 13:44

## 2023-02-22 RX ADMIN — TAZOBACTAM SODIUM AND PIPERACILLIN SODIUM 3.38 G: 375; 3 INJECTION, SOLUTION INTRAVENOUS at 17:44

## 2023-02-22 RX ADMIN — IPRATROPIUM BROMIDE AND ALBUTEROL SULFATE 3 ML: 2.5; .5 SOLUTION RESPIRATORY (INHALATION) at 07:21

## 2023-02-22 RX ADMIN — HEPARIN SODIUM 5000 UNITS: 5000 INJECTION INTRAVENOUS; SUBCUTANEOUS at 20:58

## 2023-02-22 RX ADMIN — IPRATROPIUM BROMIDE AND ALBUTEROL SULFATE 3 ML: 2.5; .5 SOLUTION RESPIRATORY (INHALATION) at 12:06

## 2023-02-22 RX ADMIN — DEXTROSE MONOHYDRATE 25 G: 25 INJECTION, SOLUTION INTRAVENOUS at 00:27

## 2023-02-22 RX ADMIN — SODIUM CHLORIDE 1000 ML: 9 INJECTION, SOLUTION INTRAVENOUS at 00:35

## 2023-02-22 RX ADMIN — SODIUM CHLORIDE 75 ML/HR: 9 INJECTION, SOLUTION INTRAVENOUS at 05:01

## 2023-02-22 RX ADMIN — HEPARIN SODIUM 5000 UNITS: 5000 INJECTION INTRAVENOUS; SUBCUTANEOUS at 09:40

## 2023-02-22 RX ADMIN — Medication 10 ML: at 20:59

## 2023-02-22 RX ADMIN — MORPHINE SULFATE 2 MG: 2 INJECTION, SOLUTION INTRAMUSCULAR; INTRAVENOUS at 20:57

## 2023-02-22 RX ADMIN — INSULIN LISPRO 6 UNITS: 100 INJECTION, SOLUTION INTRAVENOUS; SUBCUTANEOUS at 17:46

## 2023-02-22 RX ADMIN — INSULIN LISPRO 6 UNITS: 100 INJECTION, SOLUTION INTRAVENOUS; SUBCUTANEOUS at 13:43

## 2023-02-22 RX ADMIN — INSULIN LISPRO 6 UNITS: 100 INJECTION, SOLUTION INTRAVENOUS; SUBCUTANEOUS at 09:39

## 2023-02-22 RX ADMIN — Medication 10 ML: at 09:40

## 2023-02-23 ENCOUNTER — APPOINTMENT (OUTPATIENT)
Dept: GENERAL RADIOLOGY | Facility: HOSPITAL | Age: 68
DRG: 871 | End: 2023-02-23
Payer: MEDICARE

## 2023-02-23 LAB
ALBUMIN SERPL-MCNC: 3.6 G/DL (ref 3.5–5.2)
ALP SERPL-CCNC: 79 U/L (ref 39–117)
ALT SERPL W P-5'-P-CCNC: 11 U/L (ref 1–41)
ANION GAP SERPL CALCULATED.3IONS-SCNC: 8.8 MMOL/L (ref 5–15)
AST SERPL-CCNC: 8 U/L (ref 1–40)
BACTERIA BLD CULT: ABNORMAL
BASOPHILS # BLD AUTO: 0.02 10*3/MM3 (ref 0–0.2)
BASOPHILS NFR BLD AUTO: 0.1 % (ref 0–1.5)
BILIRUB CONJ SERPL-MCNC: 0.2 MG/DL (ref 0–0.3)
BILIRUB INDIRECT SERPL-MCNC: 0.4 MG/DL
BILIRUB SERPL-MCNC: 0.6 MG/DL (ref 0–1.2)
BUN SERPL-MCNC: 50 MG/DL (ref 8–23)
BUN/CREAT SERPL: 37 (ref 7–25)
CALCIUM SPEC-SCNC: 9.7 MG/DL (ref 8.6–10.5)
CHLORIDE SERPL-SCNC: 125 MMOL/L (ref 98–107)
CO2 SERPL-SCNC: 23.2 MMOL/L (ref 22–29)
CREAT SERPL-MCNC: 1.35 MG/DL (ref 0.76–1.27)
DEPRECATED RDW RBC AUTO: 47.5 FL (ref 37–54)
EGFRCR SERPLBLD CKD-EPI 2021: 57.2 ML/MIN/1.73
EOSINOPHIL # BLD AUTO: 0 10*3/MM3 (ref 0–0.4)
EOSINOPHIL NFR BLD AUTO: 0 % (ref 0.3–6.2)
ERYTHROCYTE [DISTWIDTH] IN BLOOD BY AUTOMATED COUNT: 14.3 % (ref 12.3–15.4)
GLUCOSE BLDC GLUCOMTR-MCNC: 213 MG/DL (ref 70–99)
GLUCOSE BLDC GLUCOMTR-MCNC: 292 MG/DL (ref 70–99)
GLUCOSE BLDC GLUCOMTR-MCNC: 367 MG/DL (ref 70–99)
GLUCOSE BLDC GLUCOMTR-MCNC: 391 MG/DL (ref 70–99)
GLUCOSE BLDC GLUCOMTR-MCNC: 392 MG/DL (ref 70–99)
GLUCOSE SERPL-MCNC: 499 MG/DL (ref 65–99)
HCT VFR BLD AUTO: 34 % (ref 37.5–51)
HGB BLD-MCNC: 11.2 G/DL (ref 13–17.7)
IMM GRANULOCYTES # BLD AUTO: 0.11 10*3/MM3 (ref 0–0.05)
IMM GRANULOCYTES NFR BLD AUTO: 0.6 % (ref 0–0.5)
LYMPHOCYTES # BLD AUTO: 1.15 10*3/MM3 (ref 0.7–3.1)
LYMPHOCYTES NFR BLD AUTO: 6.2 % (ref 19.6–45.3)
MAGNESIUM SERPL-MCNC: 2.2 MG/DL (ref 1.6–2.4)
MCH RBC QN AUTO: 30.1 PG (ref 26.6–33)
MCHC RBC AUTO-ENTMCNC: 32.9 G/DL (ref 31.5–35.7)
MCV RBC AUTO: 91.4 FL (ref 79–97)
MONOCYTES # BLD AUTO: 0.77 10*3/MM3 (ref 0.1–0.9)
MONOCYTES NFR BLD AUTO: 4.1 % (ref 5–12)
MRSA DNA SPEC QL NAA+PROBE: NORMAL
NEUTROPHILS NFR BLD AUTO: 16.57 10*3/MM3 (ref 1.7–7)
NEUTROPHILS NFR BLD AUTO: 89 % (ref 42.7–76)
NRBC BLD AUTO-RTO: 0 /100 WBC (ref 0–0.2)
PHOSPHATE SERPL-MCNC: 4 MG/DL (ref 2.5–4.5)
PLATELET # BLD AUTO: 189 10*3/MM3 (ref 140–450)
PMV BLD AUTO: 10.5 FL (ref 6–12)
POTASSIUM SERPL-SCNC: 4.9 MMOL/L (ref 3.5–5.2)
PROCALCITONIN SERPL-MCNC: 0.03 NG/ML (ref 0–0.25)
PROT SERPL-MCNC: 6 G/DL (ref 6–8.5)
RBC # BLD AUTO: 3.72 10*6/MM3 (ref 4.14–5.8)
SODIUM SERPL-SCNC: 157 MMOL/L (ref 136–145)
WBC NRBC COR # BLD: 18.62 10*3/MM3 (ref 3.4–10.8)

## 2023-02-23 PROCEDURE — 92526 ORAL FUNCTION THERAPY: CPT

## 2023-02-23 PROCEDURE — 83735 ASSAY OF MAGNESIUM: CPT | Performed by: FAMILY MEDICINE

## 2023-02-23 PROCEDURE — 97161 PT EVAL LOW COMPLEX 20 MIN: CPT

## 2023-02-23 PROCEDURE — 87641 MR-STAPH DNA AMP PROBE: CPT | Performed by: INTERNAL MEDICINE

## 2023-02-23 PROCEDURE — 94799 UNLISTED PULMONARY SVC/PX: CPT

## 2023-02-23 PROCEDURE — 84100 ASSAY OF PHOSPHORUS: CPT | Performed by: FAMILY MEDICINE

## 2023-02-23 PROCEDURE — 25010000002 VANCOMYCIN 5 G RECONSTITUTED SOLUTION: Performed by: FAMILY MEDICINE

## 2023-02-23 PROCEDURE — 80076 HEPATIC FUNCTION PANEL: CPT | Performed by: FAMILY MEDICINE

## 2023-02-23 PROCEDURE — 84145 PROCALCITONIN (PCT): CPT | Performed by: INTERNAL MEDICINE

## 2023-02-23 PROCEDURE — 97166 OT EVAL MOD COMPLEX 45 MIN: CPT

## 2023-02-23 PROCEDURE — 94760 N-INVAS EAR/PLS OXIMETRY 1: CPT

## 2023-02-23 PROCEDURE — 74018 RADEX ABDOMEN 1 VIEW: CPT

## 2023-02-23 PROCEDURE — 80048 BASIC METABOLIC PNL TOTAL CA: CPT | Performed by: FAMILY MEDICINE

## 2023-02-23 PROCEDURE — 25010000002 HEPARIN (PORCINE) PER 1000 UNITS: Performed by: STUDENT IN AN ORGANIZED HEALTH CARE EDUCATION/TRAINING PROGRAM

## 2023-02-23 PROCEDURE — 63710000001 INSULIN LISPRO (HUMAN) PER 5 UNITS: Performed by: FAMILY MEDICINE

## 2023-02-23 PROCEDURE — 85025 COMPLETE CBC W/AUTO DIFF WBC: CPT | Performed by: FAMILY MEDICINE

## 2023-02-23 PROCEDURE — 99233 SBSQ HOSP IP/OBS HIGH 50: CPT | Performed by: INTERNAL MEDICINE

## 2023-02-23 PROCEDURE — 63710000001 INSULIN DETEMIR PER 5 UNITS: Performed by: FAMILY MEDICINE

## 2023-02-23 PROCEDURE — 99233 SBSQ HOSP IP/OBS HIGH 50: CPT | Performed by: FAMILY MEDICINE

## 2023-02-23 PROCEDURE — 25010000002 METHYLPREDNISOLONE PER 40 MG: Performed by: STUDENT IN AN ORGANIZED HEALTH CARE EDUCATION/TRAINING PROGRAM

## 2023-02-23 PROCEDURE — 25010000002 PIPERACILLIN SOD-TAZOBACTAM PER 1 G: Performed by: STUDENT IN AN ORGANIZED HEALTH CARE EDUCATION/TRAINING PROGRAM

## 2023-02-23 PROCEDURE — 82962 GLUCOSE BLOOD TEST: CPT

## 2023-02-23 RX ORDER — INSULIN LISPRO 100 [IU]/ML
1-200 INJECTION, SOLUTION INTRAVENOUS; SUBCUTANEOUS
Status: DISCONTINUED | OUTPATIENT
Start: 2023-02-23 | End: 2023-02-24

## 2023-02-23 RX ORDER — GINSENG 100 MG
1 CAPSULE ORAL 2 TIMES DAILY
Status: DISCONTINUED | OUTPATIENT
Start: 2023-02-23 | End: 2023-03-02

## 2023-02-23 RX ORDER — SODIUM CHLORIDE 450 MG/100ML
125 INJECTION, SOLUTION INTRAVENOUS CONTINUOUS
Status: DISCONTINUED | OUTPATIENT
Start: 2023-02-23 | End: 2023-02-24

## 2023-02-23 RX ORDER — DEXTROSE MONOHYDRATE 25 G/50ML
10-50 INJECTION, SOLUTION INTRAVENOUS
Status: DISCONTINUED | OUTPATIENT
Start: 2023-02-23 | End: 2023-02-24

## 2023-02-23 RX ORDER — INSULIN LISPRO 100 [IU]/ML
1-200 INJECTION, SOLUTION INTRAVENOUS; SUBCUTANEOUS AS NEEDED
Status: DISCONTINUED | OUTPATIENT
Start: 2023-02-23 | End: 2023-02-24

## 2023-02-23 RX ORDER — METHYLPREDNISOLONE SODIUM SUCCINATE 40 MG/ML
40 INJECTION, POWDER, LYOPHILIZED, FOR SOLUTION INTRAMUSCULAR; INTRAVENOUS DAILY
Status: DISCONTINUED | OUTPATIENT
Start: 2023-02-24 | End: 2023-02-23

## 2023-02-23 RX ORDER — NICOTINE POLACRILEX 4 MG
15 LOZENGE BUCCAL
Status: DISCONTINUED | OUTPATIENT
Start: 2023-02-23 | End: 2023-02-24

## 2023-02-23 RX ADMIN — BACITRACIN 0.9 G: 500 OINTMENT TOPICAL at 16:58

## 2023-02-23 RX ADMIN — INSULIN LISPRO 10 UNITS: 100 INJECTION, SOLUTION INTRAVENOUS; SUBCUTANEOUS at 09:11

## 2023-02-23 RX ADMIN — SODIUM CHLORIDE 125 ML/HR: 4.5 INJECTION, SOLUTION INTRAVENOUS at 09:10

## 2023-02-23 RX ADMIN — METHYLPREDNISOLONE SODIUM SUCCINATE 40 MG: 40 INJECTION, POWDER, FOR SOLUTION INTRAMUSCULAR; INTRAVENOUS at 04:09

## 2023-02-23 RX ADMIN — IPRATROPIUM BROMIDE AND ALBUTEROL SULFATE 3 ML: 2.5; .5 SOLUTION RESPIRATORY (INHALATION) at 07:55

## 2023-02-23 RX ADMIN — BACITRACIN 0.9 G: 500 OINTMENT TOPICAL at 21:41

## 2023-02-23 RX ADMIN — IPRATROPIUM BROMIDE AND ALBUTEROL SULFATE 3 ML: 2.5; .5 SOLUTION RESPIRATORY (INHALATION) at 18:25

## 2023-02-23 RX ADMIN — SODIUM CHLORIDE 125 ML/HR: 4.5 INJECTION, SOLUTION INTRAVENOUS at 16:58

## 2023-02-23 RX ADMIN — TAZOBACTAM SODIUM AND PIPERACILLIN SODIUM 3.38 G: 375; 3 INJECTION, SOLUTION INTRAVENOUS at 16:58

## 2023-02-23 RX ADMIN — HEPARIN SODIUM 5000 UNITS: 5000 INJECTION INTRAVENOUS; SUBCUTANEOUS at 21:41

## 2023-02-23 RX ADMIN — INSULIN DETEMIR 14 UNITS: 100 INJECTION, SOLUTION SUBCUTANEOUS at 21:42

## 2023-02-23 RX ADMIN — IPRATROPIUM BROMIDE AND ALBUTEROL SULFATE 3 ML: 2.5; .5 SOLUTION RESPIRATORY (INHALATION) at 01:04

## 2023-02-23 RX ADMIN — VANCOMYCIN HYDROCHLORIDE 750 MG: 5 INJECTION, POWDER, LYOPHILIZED, FOR SOLUTION INTRAVENOUS at 04:09

## 2023-02-23 RX ADMIN — HEPARIN SODIUM 5000 UNITS: 5000 INJECTION INTRAVENOUS; SUBCUTANEOUS at 09:11

## 2023-02-23 RX ADMIN — SODIUM CHLORIDE, POTASSIUM CHLORIDE, SODIUM LACTATE AND CALCIUM CHLORIDE 75 ML/HR: 600; 310; 30; 20 INJECTION, SOLUTION INTRAVENOUS at 02:34

## 2023-02-23 RX ADMIN — INSULIN LISPRO 12 UNITS: 100 INJECTION, SOLUTION INTRAVENOUS; SUBCUTANEOUS at 13:10

## 2023-02-23 RX ADMIN — INSULIN LISPRO 1 UNITS: 100 INJECTION, SOLUTION INTRAVENOUS; SUBCUTANEOUS at 21:41

## 2023-02-23 RX ADMIN — IPRATROPIUM BROMIDE AND ALBUTEROL SULFATE 3 ML: 2.5; .5 SOLUTION RESPIRATORY (INHALATION) at 12:06

## 2023-02-23 RX ADMIN — Medication 10 ML: at 09:11

## 2023-02-23 RX ADMIN — INSULIN LISPRO 10 UNITS: 100 INJECTION, SOLUTION INTRAVENOUS; SUBCUTANEOUS at 17:24

## 2023-02-23 RX ADMIN — TAZOBACTAM SODIUM AND PIPERACILLIN SODIUM 3.38 G: 375; 3 INJECTION, SOLUTION INTRAVENOUS at 10:42

## 2023-02-23 RX ADMIN — Medication 10 ML: at 21:43

## 2023-02-24 LAB
ALBUMIN SERPL-MCNC: 3.2 G/DL (ref 3.5–5.2)
ALP SERPL-CCNC: 71 U/L (ref 39–117)
ALT SERPL W P-5'-P-CCNC: 10 U/L (ref 1–41)
ANION GAP SERPL CALCULATED.3IONS-SCNC: 10.1 MMOL/L (ref 5–15)
ANION GAP SERPL CALCULATED.3IONS-SCNC: 8.7 MMOL/L (ref 5–15)
ANION GAP SERPL CALCULATED.3IONS-SCNC: 9 MMOL/L (ref 5–15)
AST SERPL-CCNC: 12 U/L (ref 1–40)
BACTERIA SPEC AEROBE CULT: ABNORMAL
BASOPHILS # BLD AUTO: 0.04 10*3/MM3 (ref 0–0.2)
BASOPHILS NFR BLD AUTO: 0.2 % (ref 0–1.5)
BILIRUB CONJ SERPL-MCNC: <0.2 MG/DL (ref 0–0.3)
BILIRUB INDIRECT SERPL-MCNC: ABNORMAL MG/DL
BILIRUB SERPL-MCNC: 0.4 MG/DL (ref 0–1.2)
BUN SERPL-MCNC: 26 MG/DL (ref 8–23)
BUN SERPL-MCNC: 31 MG/DL (ref 8–23)
BUN SERPL-MCNC: 35 MG/DL (ref 8–23)
BUN/CREAT SERPL: 21.3 (ref 7–25)
BUN/CREAT SERPL: 27 (ref 7–25)
BUN/CREAT SERPL: 29.2 (ref 7–25)
CALCIUM SPEC-SCNC: 8.7 MG/DL (ref 8.6–10.5)
CALCIUM SPEC-SCNC: 9 MG/DL (ref 8.6–10.5)
CALCIUM SPEC-SCNC: 9.4 MG/DL (ref 8.6–10.5)
CHLORIDE SERPL-SCNC: 121 MMOL/L (ref 98–107)
CHLORIDE SERPL-SCNC: 126 MMOL/L (ref 98–107)
CHLORIDE SERPL-SCNC: 130 MMOL/L (ref 98–107)
CO2 SERPL-SCNC: 21.9 MMOL/L (ref 22–29)
CO2 SERPL-SCNC: 23 MMOL/L (ref 22–29)
CO2 SERPL-SCNC: 25.3 MMOL/L (ref 22–29)
CREAT SERPL-MCNC: 1.15 MG/DL (ref 0.76–1.27)
CREAT SERPL-MCNC: 1.2 MG/DL (ref 0.76–1.27)
CREAT SERPL-MCNC: 1.22 MG/DL (ref 0.76–1.27)
DEPRECATED RDW RBC AUTO: 51.1 FL (ref 37–54)
EGFRCR SERPLBLD CKD-EPI 2021: 64.6 ML/MIN/1.73
EGFRCR SERPLBLD CKD-EPI 2021: 65.9 ML/MIN/1.73
EGFRCR SERPLBLD CKD-EPI 2021: 69.3 ML/MIN/1.73
EOSINOPHIL # BLD AUTO: 0.01 10*3/MM3 (ref 0–0.4)
EOSINOPHIL NFR BLD AUTO: 0.1 % (ref 0.3–6.2)
ERYTHROCYTE [DISTWIDTH] IN BLOOD BY AUTOMATED COUNT: 14.6 % (ref 12.3–15.4)
GLUCOSE BLDC GLUCOMTR-MCNC: 262 MG/DL (ref 70–99)
GLUCOSE BLDC GLUCOMTR-MCNC: 325 MG/DL (ref 70–99)
GLUCOSE BLDC GLUCOMTR-MCNC: 393 MG/DL (ref 70–99)
GLUCOSE BLDC GLUCOMTR-MCNC: 86 MG/DL (ref 70–99)
GLUCOSE SERPL-MCNC: 194 MG/DL (ref 65–99)
GLUCOSE SERPL-MCNC: 395 MG/DL (ref 65–99)
GLUCOSE SERPL-MCNC: 77 MG/DL (ref 65–99)
GRAM STN SPEC: ABNORMAL
HCT VFR BLD AUTO: 34 % (ref 37.5–51)
HGB BLD-MCNC: 11.1 G/DL (ref 13–17.7)
IMM GRANULOCYTES # BLD AUTO: 0.08 10*3/MM3 (ref 0–0.05)
IMM GRANULOCYTES NFR BLD AUTO: 0.4 % (ref 0–0.5)
ISOLATED FROM: ABNORMAL
LYMPHOCYTES # BLD AUTO: 2.4 10*3/MM3 (ref 0.7–3.1)
LYMPHOCYTES NFR BLD AUTO: 12.6 % (ref 19.6–45.3)
MAGNESIUM SERPL-MCNC: 2.2 MG/DL (ref 1.6–2.4)
MCH RBC QN AUTO: 31.6 PG (ref 26.6–33)
MCHC RBC AUTO-ENTMCNC: 32.6 G/DL (ref 31.5–35.7)
MCV RBC AUTO: 96.9 FL (ref 79–97)
MONOCYTES # BLD AUTO: 1.11 10*3/MM3 (ref 0.1–0.9)
MONOCYTES NFR BLD AUTO: 5.8 % (ref 5–12)
NEUTROPHILS NFR BLD AUTO: 15.37 10*3/MM3 (ref 1.7–7)
NEUTROPHILS NFR BLD AUTO: 80.9 % (ref 42.7–76)
NRBC BLD AUTO-RTO: 0 /100 WBC (ref 0–0.2)
PHOSPHATE SERPL-MCNC: 3.1 MG/DL (ref 2.5–4.5)
PLATELET # BLD AUTO: 175 10*3/MM3 (ref 140–450)
PMV BLD AUTO: 10.6 FL (ref 6–12)
POTASSIUM SERPL-SCNC: 3.6 MMOL/L (ref 3.5–5.2)
POTASSIUM SERPL-SCNC: 4.1 MMOL/L (ref 3.5–5.2)
POTASSIUM SERPL-SCNC: 4.7 MMOL/L (ref 3.5–5.2)
PROT SERPL-MCNC: 5.5 G/DL (ref 6–8.5)
RBC # BLD AUTO: 3.51 10*6/MM3 (ref 4.14–5.8)
SODIUM SERPL-SCNC: 153 MMOL/L (ref 136–145)
SODIUM SERPL-SCNC: 160 MMOL/L (ref 136–145)
SODIUM SERPL-SCNC: 162 MMOL/L (ref 136–145)
WBC NRBC COR # BLD: 19.01 10*3/MM3 (ref 3.4–10.8)

## 2023-02-24 PROCEDURE — 99233 SBSQ HOSP IP/OBS HIGH 50: CPT | Performed by: FAMILY MEDICINE

## 2023-02-24 PROCEDURE — 84100 ASSAY OF PHOSPHORUS: CPT | Performed by: FAMILY MEDICINE

## 2023-02-24 PROCEDURE — 83735 ASSAY OF MAGNESIUM: CPT | Performed by: FAMILY MEDICINE

## 2023-02-24 PROCEDURE — 94799 UNLISTED PULMONARY SVC/PX: CPT

## 2023-02-24 PROCEDURE — 94760 N-INVAS EAR/PLS OXIMETRY 1: CPT

## 2023-02-24 PROCEDURE — 0DH67UZ INSERTION OF FEEDING DEVICE INTO STOMACH, VIA NATURAL OR ARTIFICIAL OPENING: ICD-10-PCS | Performed by: INTERNAL MEDICINE

## 2023-02-24 PROCEDURE — 87040 BLOOD CULTURE FOR BACTERIA: CPT | Performed by: FAMILY MEDICINE

## 2023-02-24 PROCEDURE — 80076 HEPATIC FUNCTION PANEL: CPT | Performed by: FAMILY MEDICINE

## 2023-02-24 PROCEDURE — 85025 COMPLETE CBC W/AUTO DIFF WBC: CPT | Performed by: FAMILY MEDICINE

## 2023-02-24 PROCEDURE — 25010000002 VANCOMYCIN 5 G RECONSTITUTED SOLUTION: Performed by: FAMILY MEDICINE

## 2023-02-24 PROCEDURE — 94664 DEMO&/EVAL PT USE INHALER: CPT

## 2023-02-24 PROCEDURE — 80048 BASIC METABOLIC PNL TOTAL CA: CPT | Performed by: FAMILY MEDICINE

## 2023-02-24 PROCEDURE — 97110 THERAPEUTIC EXERCISES: CPT

## 2023-02-24 PROCEDURE — 82962 GLUCOSE BLOOD TEST: CPT

## 2023-02-24 PROCEDURE — 80048 BASIC METABOLIC PNL TOTAL CA: CPT | Performed by: INTERNAL MEDICINE

## 2023-02-24 PROCEDURE — 25010000002 HEPARIN (PORCINE) PER 1000 UNITS: Performed by: STUDENT IN AN ORGANIZED HEALTH CARE EDUCATION/TRAINING PROGRAM

## 2023-02-24 PROCEDURE — 63710000001 INSULIN REGULAR HUMAN PER 5 UNITS: Performed by: FAMILY MEDICINE

## 2023-02-24 PROCEDURE — 25010000002 MORPHINE PER 10 MG: Performed by: STUDENT IN AN ORGANIZED HEALTH CARE EDUCATION/TRAINING PROGRAM

## 2023-02-24 PROCEDURE — 99233 SBSQ HOSP IP/OBS HIGH 50: CPT | Performed by: INTERNAL MEDICINE

## 2023-02-24 PROCEDURE — 94669 MECHANICAL CHEST WALL OSCILL: CPT

## 2023-02-24 PROCEDURE — 25010000002 PIPERACILLIN SOD-TAZOBACTAM PER 1 G: Performed by: STUDENT IN AN ORGANIZED HEALTH CARE EDUCATION/TRAINING PROGRAM

## 2023-02-24 RX ORDER — NICOTINE POLACRILEX 4 MG
15 LOZENGE BUCCAL
Status: DISCONTINUED | OUTPATIENT
Start: 2023-02-24 | End: 2023-03-07 | Stop reason: HOSPADM

## 2023-02-24 RX ORDER — DEXTROSE MONOHYDRATE 25 G/50ML
25 INJECTION, SOLUTION INTRAVENOUS
Status: DISCONTINUED | OUTPATIENT
Start: 2023-02-24 | End: 2023-03-07 | Stop reason: HOSPADM

## 2023-02-24 RX ORDER — DEXTROSE MONOHYDRATE 50 MG/ML
100 INJECTION, SOLUTION INTRAVENOUS CONTINUOUS
Status: DISCONTINUED | OUTPATIENT
Start: 2023-02-24 | End: 2023-02-25

## 2023-02-24 RX ORDER — ACETAMINOPHEN 500 MG
1000 TABLET ORAL EVERY 4 HOURS PRN
Status: DISCONTINUED | OUTPATIENT
Start: 2023-02-24 | End: 2023-03-07 | Stop reason: HOSPADM

## 2023-02-24 RX ORDER — POTASSIUM CHLORIDE 1.5 G/1.77G
40 POWDER, FOR SOLUTION ORAL ONCE
Status: COMPLETED | OUTPATIENT
Start: 2023-02-24 | End: 2023-02-24

## 2023-02-24 RX ADMIN — HEPARIN SODIUM 5000 UNITS: 5000 INJECTION INTRAVENOUS; SUBCUTANEOUS at 08:59

## 2023-02-24 RX ADMIN — HEPARIN SODIUM 5000 UNITS: 5000 INJECTION INTRAVENOUS; SUBCUTANEOUS at 20:17

## 2023-02-24 RX ADMIN — TAZOBACTAM SODIUM AND PIPERACILLIN SODIUM 3.38 G: 375; 3 INJECTION, SOLUTION INTRAVENOUS at 08:59

## 2023-02-24 RX ADMIN — BACITRACIN 0.9 G: 500 OINTMENT TOPICAL at 09:00

## 2023-02-24 RX ADMIN — SODIUM CHLORIDE 125 ML/HR: 4.5 INJECTION, SOLUTION INTRAVENOUS at 00:57

## 2023-02-24 RX ADMIN — IPRATROPIUM BROMIDE AND ALBUTEROL SULFATE 3 ML: 2.5; .5 SOLUTION RESPIRATORY (INHALATION) at 18:32

## 2023-02-24 RX ADMIN — POTASSIUM CHLORIDE 40 MEQ: 1.5 POWDER, FOR SOLUTION ORAL at 14:39

## 2023-02-24 RX ADMIN — Medication 10 ML: at 08:59

## 2023-02-24 RX ADMIN — BACITRACIN 0.9 G: 500 OINTMENT TOPICAL at 20:17

## 2023-02-24 RX ADMIN — DEXTROSE MONOHYDRATE 100 ML/HR: 50 INJECTION, SOLUTION INTRAVENOUS at 06:09

## 2023-02-24 RX ADMIN — Medication 10 ML: at 20:18

## 2023-02-24 RX ADMIN — MORPHINE SULFATE 2 MG: 2 INJECTION, SOLUTION INTRAMUSCULAR; INTRAVENOUS at 00:08

## 2023-02-24 RX ADMIN — Medication 1 LOZENGE: at 21:45

## 2023-02-24 RX ADMIN — IPRATROPIUM BROMIDE AND ALBUTEROL SULFATE 3 ML: 2.5; .5 SOLUTION RESPIRATORY (INHALATION) at 06:26

## 2023-02-24 RX ADMIN — TAZOBACTAM SODIUM AND PIPERACILLIN SODIUM 3.38 G: 375; 3 INJECTION, SOLUTION INTRAVENOUS at 00:08

## 2023-02-24 RX ADMIN — TAZOBACTAM SODIUM AND PIPERACILLIN SODIUM 3.38 G: 375; 3 INJECTION, SOLUTION INTRAVENOUS at 15:28

## 2023-02-24 RX ADMIN — IPRATROPIUM BROMIDE AND ALBUTEROL SULFATE 3 ML: 2.5; .5 SOLUTION RESPIRATORY (INHALATION) at 00:38

## 2023-02-24 RX ADMIN — IPRATROPIUM BROMIDE AND ALBUTEROL SULFATE 3 ML: 2.5; .5 SOLUTION RESPIRATORY (INHALATION) at 11:47

## 2023-02-24 RX ADMIN — DEXTROSE MONOHYDRATE 100 ML/HR: 50 INJECTION, SOLUTION INTRAVENOUS at 18:22

## 2023-02-24 RX ADMIN — VANCOMYCIN HYDROCHLORIDE 750 MG: 5 INJECTION, POWDER, LYOPHILIZED, FOR SOLUTION INTRAVENOUS at 04:55

## 2023-02-24 RX ADMIN — INSULIN HUMAN 12 UNITS: 100 INJECTION, SOLUTION PARENTERAL at 20:17

## 2023-02-25 LAB
ALBUMIN SERPL-MCNC: 2.8 G/DL (ref 3.5–5.2)
ALP SERPL-CCNC: 74 U/L (ref 39–117)
ALT SERPL W P-5'-P-CCNC: 10 U/L (ref 1–41)
ANION GAP SERPL CALCULATED.3IONS-SCNC: 10.1 MMOL/L (ref 5–15)
ANION GAP SERPL CALCULATED.3IONS-SCNC: 6.7 MMOL/L (ref 5–15)
ANION GAP SERPL CALCULATED.3IONS-SCNC: 6.9 MMOL/L (ref 5–15)
ANION GAP SERPL CALCULATED.3IONS-SCNC: 8.8 MMOL/L (ref 5–15)
AST SERPL-CCNC: 11 U/L (ref 1–40)
BASOPHILS # BLD AUTO: 0.03 10*3/MM3 (ref 0–0.2)
BASOPHILS NFR BLD AUTO: 0.2 % (ref 0–1.5)
BILIRUB CONJ SERPL-MCNC: <0.2 MG/DL (ref 0–0.3)
BILIRUB INDIRECT SERPL-MCNC: ABNORMAL MG/DL
BILIRUB SERPL-MCNC: 0.3 MG/DL (ref 0–1.2)
BUN SERPL-MCNC: 14 MG/DL (ref 8–23)
BUN SERPL-MCNC: 16 MG/DL (ref 8–23)
BUN SERPL-MCNC: 19 MG/DL (ref 8–23)
BUN SERPL-MCNC: 19 MG/DL (ref 8–23)
BUN/CREAT SERPL: 16.9 (ref 7–25)
BUN/CREAT SERPL: 18.2 (ref 7–25)
BUN/CREAT SERPL: 22.4 (ref 7–25)
BUN/CREAT SERPL: 22.4 (ref 7–25)
CALCIUM SPEC-SCNC: 8.4 MG/DL (ref 8.6–10.5)
CALCIUM SPEC-SCNC: 8.5 MG/DL (ref 8.6–10.5)
CHLORIDE SERPL-SCNC: 109 MMOL/L (ref 98–107)
CHLORIDE SERPL-SCNC: 111 MMOL/L (ref 98–107)
CHLORIDE SERPL-SCNC: 112 MMOL/L (ref 98–107)
CHLORIDE SERPL-SCNC: 114 MMOL/L (ref 98–107)
CO2 SERPL-SCNC: 20.9 MMOL/L (ref 22–29)
CO2 SERPL-SCNC: 22.2 MMOL/L (ref 22–29)
CO2 SERPL-SCNC: 25.1 MMOL/L (ref 22–29)
CO2 SERPL-SCNC: 25.3 MMOL/L (ref 22–29)
CREAT SERPL-MCNC: 0.83 MG/DL (ref 0.76–1.27)
CREAT SERPL-MCNC: 0.85 MG/DL (ref 0.76–1.27)
CREAT SERPL-MCNC: 0.85 MG/DL (ref 0.76–1.27)
CREAT SERPL-MCNC: 0.88 MG/DL (ref 0.76–1.27)
DEPRECATED RDW RBC AUTO: 47.1 FL (ref 37–54)
EGFRCR SERPLBLD CKD-EPI 2021: 93.7 ML/MIN/1.73
EGFRCR SERPLBLD CKD-EPI 2021: 94.6 ML/MIN/1.73
EGFRCR SERPLBLD CKD-EPI 2021: 94.6 ML/MIN/1.73
EGFRCR SERPLBLD CKD-EPI 2021: 95.3 ML/MIN/1.73
EOSINOPHIL # BLD AUTO: 0.18 10*3/MM3 (ref 0–0.4)
EOSINOPHIL NFR BLD AUTO: 1.4 % (ref 0.3–6.2)
ERYTHROCYTE [DISTWIDTH] IN BLOOD BY AUTOMATED COUNT: 14.3 % (ref 12.3–15.4)
GLUCOSE BLDC GLUCOMTR-MCNC: 200 MG/DL (ref 70–99)
GLUCOSE BLDC GLUCOMTR-MCNC: 229 MG/DL (ref 70–99)
GLUCOSE BLDC GLUCOMTR-MCNC: 247 MG/DL (ref 70–99)
GLUCOSE BLDC GLUCOMTR-MCNC: 258 MG/DL (ref 70–99)
GLUCOSE BLDC GLUCOMTR-MCNC: 339 MG/DL (ref 70–99)
GLUCOSE SERPL-MCNC: 235 MG/DL (ref 65–99)
GLUCOSE SERPL-MCNC: 238 MG/DL (ref 65–99)
GLUCOSE SERPL-MCNC: 281 MG/DL (ref 65–99)
GLUCOSE SERPL-MCNC: 285 MG/DL (ref 65–99)
HCT VFR BLD AUTO: 28.4 % (ref 37.5–51)
HGB BLD-MCNC: 9.7 G/DL (ref 13–17.7)
IMM GRANULOCYTES # BLD AUTO: 0.06 10*3/MM3 (ref 0–0.05)
IMM GRANULOCYTES NFR BLD AUTO: 0.5 % (ref 0–0.5)
LYMPHOCYTES # BLD AUTO: 1.93 10*3/MM3 (ref 0.7–3.1)
LYMPHOCYTES NFR BLD AUTO: 15.4 % (ref 19.6–45.3)
MAGNESIUM SERPL-MCNC: 1.9 MG/DL (ref 1.6–2.4)
MCH RBC QN AUTO: 31.3 PG (ref 26.6–33)
MCHC RBC AUTO-ENTMCNC: 34.2 G/DL (ref 31.5–35.7)
MCV RBC AUTO: 91.6 FL (ref 79–97)
MONOCYTES # BLD AUTO: 0.72 10*3/MM3 (ref 0.1–0.9)
MONOCYTES NFR BLD AUTO: 5.8 % (ref 5–12)
NEUTROPHILS NFR BLD AUTO: 76.7 % (ref 42.7–76)
NEUTROPHILS NFR BLD AUTO: 9.59 10*3/MM3 (ref 1.7–7)
NRBC BLD AUTO-RTO: 0 /100 WBC (ref 0–0.2)
PHOSPHATE SERPL-MCNC: 2.1 MG/DL (ref 2.5–4.5)
PLATELET # BLD AUTO: 135 10*3/MM3 (ref 140–450)
PMV BLD AUTO: 10.5 FL (ref 6–12)
POTASSIUM SERPL-SCNC: 3.7 MMOL/L (ref 3.5–5.2)
POTASSIUM SERPL-SCNC: 3.7 MMOL/L (ref 3.5–5.2)
POTASSIUM SERPL-SCNC: 3.8 MMOL/L (ref 3.5–5.2)
POTASSIUM SERPL-SCNC: 3.8 MMOL/L (ref 3.5–5.2)
PROT SERPL-MCNC: 4.6 G/DL (ref 6–8.5)
RBC # BLD AUTO: 3.1 10*6/MM3 (ref 4.14–5.8)
SODIUM SERPL-SCNC: 140 MMOL/L (ref 136–145)
SODIUM SERPL-SCNC: 142 MMOL/L (ref 136–145)
SODIUM SERPL-SCNC: 144 MMOL/L (ref 136–145)
SODIUM SERPL-SCNC: 146 MMOL/L (ref 136–145)
WBC NRBC COR # BLD: 12.51 10*3/MM3 (ref 3.4–10.8)

## 2023-02-25 PROCEDURE — 99233 SBSQ HOSP IP/OBS HIGH 50: CPT | Performed by: FAMILY MEDICINE

## 2023-02-25 PROCEDURE — 94669 MECHANICAL CHEST WALL OSCILL: CPT

## 2023-02-25 PROCEDURE — 25010000002 MORPHINE PER 10 MG: Performed by: STUDENT IN AN ORGANIZED HEALTH CARE EDUCATION/TRAINING PROGRAM

## 2023-02-25 PROCEDURE — 83735 ASSAY OF MAGNESIUM: CPT | Performed by: FAMILY MEDICINE

## 2023-02-25 PROCEDURE — 94799 UNLISTED PULMONARY SVC/PX: CPT

## 2023-02-25 PROCEDURE — 82962 GLUCOSE BLOOD TEST: CPT

## 2023-02-25 PROCEDURE — 25010000002 PIPERACILLIN SOD-TAZOBACTAM PER 1 G: Performed by: STUDENT IN AN ORGANIZED HEALTH CARE EDUCATION/TRAINING PROGRAM

## 2023-02-25 PROCEDURE — 80048 BASIC METABOLIC PNL TOTAL CA: CPT | Performed by: INTERNAL MEDICINE

## 2023-02-25 PROCEDURE — 94664 DEMO&/EVAL PT USE INHALER: CPT

## 2023-02-25 PROCEDURE — 80076 HEPATIC FUNCTION PANEL: CPT | Performed by: FAMILY MEDICINE

## 2023-02-25 PROCEDURE — 63710000001 INSULIN REGULAR HUMAN PER 5 UNITS: Performed by: FAMILY MEDICINE

## 2023-02-25 PROCEDURE — 25010000002 HEPARIN (PORCINE) PER 1000 UNITS: Performed by: STUDENT IN AN ORGANIZED HEALTH CARE EDUCATION/TRAINING PROGRAM

## 2023-02-25 PROCEDURE — 84100 ASSAY OF PHOSPHORUS: CPT | Performed by: FAMILY MEDICINE

## 2023-02-25 PROCEDURE — 94760 N-INVAS EAR/PLS OXIMETRY 1: CPT

## 2023-02-25 PROCEDURE — 85025 COMPLETE CBC W/AUTO DIFF WBC: CPT | Performed by: FAMILY MEDICINE

## 2023-02-25 RX ADMIN — IPRATROPIUM BROMIDE AND ALBUTEROL SULFATE 3 ML: 2.5; .5 SOLUTION RESPIRATORY (INHALATION) at 18:57

## 2023-02-25 RX ADMIN — DEXTROSE MONOHYDRATE 100 ML/HR: 50 INJECTION, SOLUTION INTRAVENOUS at 04:09

## 2023-02-25 RX ADMIN — IPRATROPIUM BROMIDE AND ALBUTEROL SULFATE 3 ML: 2.5; .5 SOLUTION RESPIRATORY (INHALATION) at 11:49

## 2023-02-25 RX ADMIN — TAZOBACTAM SODIUM AND PIPERACILLIN SODIUM 3.38 G: 375; 3 INJECTION, SOLUTION INTRAVENOUS at 00:49

## 2023-02-25 RX ADMIN — TAZOBACTAM SODIUM AND PIPERACILLIN SODIUM 3.38 G: 375; 3 INJECTION, SOLUTION INTRAVENOUS at 10:31

## 2023-02-25 RX ADMIN — INSULIN HUMAN 8 UNITS: 100 INJECTION, SOLUTION PARENTERAL at 06:26

## 2023-02-25 RX ADMIN — Medication 10 ML: at 21:41

## 2023-02-25 RX ADMIN — TAZOBACTAM SODIUM AND PIPERACILLIN SODIUM 3.38 G: 375; 3 INJECTION, SOLUTION INTRAVENOUS at 18:13

## 2023-02-25 RX ADMIN — IPRATROPIUM BROMIDE AND ALBUTEROL SULFATE 3 ML: 2.5; .5 SOLUTION RESPIRATORY (INHALATION) at 07:18

## 2023-02-25 RX ADMIN — INSULIN HUMAN 10 UNITS: 100 INJECTION, SOLUTION PARENTERAL at 00:48

## 2023-02-25 RX ADMIN — BACITRACIN 0.9 G: 500 OINTMENT TOPICAL at 10:32

## 2023-02-25 RX ADMIN — MORPHINE SULFATE 2 MG: 2 INJECTION, SOLUTION INTRAMUSCULAR; INTRAVENOUS at 00:48

## 2023-02-25 RX ADMIN — INSULIN HUMAN 5 UNITS: 100 INJECTION, SOLUTION PARENTERAL at 18:13

## 2023-02-25 RX ADMIN — Medication 10 ML: at 10:31

## 2023-02-25 RX ADMIN — IPRATROPIUM BROMIDE AND ALBUTEROL SULFATE 3 ML: 2.5; .5 SOLUTION RESPIRATORY (INHALATION) at 00:17

## 2023-02-25 RX ADMIN — HEPARIN SODIUM 5000 UNITS: 5000 INJECTION INTRAVENOUS; SUBCUTANEOUS at 10:31

## 2023-02-25 RX ADMIN — HEPARIN SODIUM 5000 UNITS: 5000 INJECTION INTRAVENOUS; SUBCUTANEOUS at 21:40

## 2023-02-25 RX ADMIN — INSULIN HUMAN 5 UNITS: 100 INJECTION, SOLUTION PARENTERAL at 13:49

## 2023-02-25 RX ADMIN — BACITRACIN 0.9 G: 500 OINTMENT TOPICAL at 21:40

## 2023-02-26 LAB
ALBUMIN SERPL-MCNC: 2.7 G/DL (ref 3.5–5.2)
ALP SERPL-CCNC: 75 U/L (ref 39–117)
ALT SERPL W P-5'-P-CCNC: 10 U/L (ref 1–41)
ANION GAP SERPL CALCULATED.3IONS-SCNC: 8 MMOL/L (ref 5–15)
AST SERPL-CCNC: 16 U/L (ref 1–40)
BASOPHILS # BLD AUTO: 0.03 10*3/MM3 (ref 0–0.2)
BASOPHILS NFR BLD AUTO: 0.3 % (ref 0–1.5)
BILIRUB CONJ SERPL-MCNC: <0.2 MG/DL (ref 0–0.3)
BILIRUB INDIRECT SERPL-MCNC: ABNORMAL MG/DL
BILIRUB SERPL-MCNC: 0.5 MG/DL (ref 0–1.2)
BUN SERPL-MCNC: 12 MG/DL (ref 8–23)
BUN/CREAT SERPL: 14.1 (ref 7–25)
CALCIUM SPEC-SCNC: 8.3 MG/DL (ref 8.6–10.5)
CHLORIDE SERPL-SCNC: 107 MMOL/L (ref 98–107)
CO2 SERPL-SCNC: 25 MMOL/L (ref 22–29)
CREAT SERPL-MCNC: 0.85 MG/DL (ref 0.76–1.27)
DEPRECATED RDW RBC AUTO: 44.9 FL (ref 37–54)
EGFRCR SERPLBLD CKD-EPI 2021: 94.6 ML/MIN/1.73
EOSINOPHIL # BLD AUTO: 0.26 10*3/MM3 (ref 0–0.4)
EOSINOPHIL NFR BLD AUTO: 2.5 % (ref 0.3–6.2)
ERYTHROCYTE [DISTWIDTH] IN BLOOD BY AUTOMATED COUNT: 13.9 % (ref 12.3–15.4)
GLUCOSE BLDC GLUCOMTR-MCNC: 159 MG/DL (ref 70–99)
GLUCOSE BLDC GLUCOMTR-MCNC: 164 MG/DL (ref 70–99)
GLUCOSE BLDC GLUCOMTR-MCNC: 174 MG/DL (ref 70–99)
GLUCOSE BLDC GLUCOMTR-MCNC: 195 MG/DL (ref 70–99)
GLUCOSE BLDC GLUCOMTR-MCNC: 220 MG/DL (ref 70–99)
GLUCOSE SERPL-MCNC: 234 MG/DL (ref 65–99)
HCT VFR BLD AUTO: 28.8 % (ref 37.5–51)
HGB BLD-MCNC: 9.9 G/DL (ref 13–17.7)
IMM GRANULOCYTES # BLD AUTO: 0.05 10*3/MM3 (ref 0–0.05)
IMM GRANULOCYTES NFR BLD AUTO: 0.5 % (ref 0–0.5)
LYMPHOCYTES # BLD AUTO: 2.06 10*3/MM3 (ref 0.7–3.1)
LYMPHOCYTES NFR BLD AUTO: 19.5 % (ref 19.6–45.3)
MAGNESIUM SERPL-MCNC: 1.8 MG/DL (ref 1.6–2.4)
MCH RBC QN AUTO: 30.9 PG (ref 26.6–33)
MCHC RBC AUTO-ENTMCNC: 34.4 G/DL (ref 31.5–35.7)
MCV RBC AUTO: 90 FL (ref 79–97)
MONOCYTES # BLD AUTO: 0.6 10*3/MM3 (ref 0.1–0.9)
MONOCYTES NFR BLD AUTO: 5.7 % (ref 5–12)
NEUTROPHILS NFR BLD AUTO: 7.54 10*3/MM3 (ref 1.7–7)
NEUTROPHILS NFR BLD AUTO: 71.5 % (ref 42.7–76)
NRBC BLD AUTO-RTO: 0 /100 WBC (ref 0–0.2)
PHOSPHATE SERPL-MCNC: 2 MG/DL (ref 2.5–4.5)
PLATELET # BLD AUTO: 131 10*3/MM3 (ref 140–450)
PMV BLD AUTO: 10.9 FL (ref 6–12)
POTASSIUM SERPL-SCNC: 3.8 MMOL/L (ref 3.5–5.2)
PROT SERPL-MCNC: 4.7 G/DL (ref 6–8.5)
RBC # BLD AUTO: 3.2 10*6/MM3 (ref 4.14–5.8)
SODIUM SERPL-SCNC: 140 MMOL/L (ref 136–145)
WBC NRBC COR # BLD: 10.54 10*3/MM3 (ref 3.4–10.8)

## 2023-02-26 PROCEDURE — 25010000002 HEPARIN (PORCINE) PER 1000 UNITS: Performed by: STUDENT IN AN ORGANIZED HEALTH CARE EDUCATION/TRAINING PROGRAM

## 2023-02-26 PROCEDURE — 94664 DEMO&/EVAL PT USE INHALER: CPT

## 2023-02-26 PROCEDURE — 84100 ASSAY OF PHOSPHORUS: CPT | Performed by: FAMILY MEDICINE

## 2023-02-26 PROCEDURE — 99233 SBSQ HOSP IP/OBS HIGH 50: CPT | Performed by: FAMILY MEDICINE

## 2023-02-26 PROCEDURE — 82962 GLUCOSE BLOOD TEST: CPT

## 2023-02-26 PROCEDURE — 85025 COMPLETE CBC W/AUTO DIFF WBC: CPT | Performed by: FAMILY MEDICINE

## 2023-02-26 PROCEDURE — 25010000002 PIPERACILLIN SOD-TAZOBACTAM PER 1 G: Performed by: STUDENT IN AN ORGANIZED HEALTH CARE EDUCATION/TRAINING PROGRAM

## 2023-02-26 PROCEDURE — 83735 ASSAY OF MAGNESIUM: CPT | Performed by: FAMILY MEDICINE

## 2023-02-26 PROCEDURE — 94669 MECHANICAL CHEST WALL OSCILL: CPT

## 2023-02-26 PROCEDURE — 63710000001 INSULIN REGULAR HUMAN PER 5 UNITS: Performed by: FAMILY MEDICINE

## 2023-02-26 PROCEDURE — 80048 BASIC METABOLIC PNL TOTAL CA: CPT | Performed by: INTERNAL MEDICINE

## 2023-02-26 PROCEDURE — 80076 HEPATIC FUNCTION PANEL: CPT | Performed by: FAMILY MEDICINE

## 2023-02-26 PROCEDURE — 94799 UNLISTED PULMONARY SVC/PX: CPT

## 2023-02-26 RX ORDER — FENTANYL/ROPIVACAINE/NS/PF 2-625MCG/1
15 PLASTIC BAG, INJECTION (ML) EPIDURAL ONCE
Status: COMPLETED | OUTPATIENT
Start: 2023-02-26 | End: 2023-02-26

## 2023-02-26 RX ADMIN — INSULIN HUMAN 3 UNITS: 100 INJECTION, SOLUTION PARENTERAL at 18:26

## 2023-02-26 RX ADMIN — IPRATROPIUM BROMIDE AND ALBUTEROL SULFATE 3 ML: 2.5; .5 SOLUTION RESPIRATORY (INHALATION) at 11:45

## 2023-02-26 RX ADMIN — POTASSIUM PHOSPHATE, MONOBASIC AND POTASSIUM PHOSPHATE, DIBASIC 15 MMOL: 224; 236 INJECTION, SOLUTION, CONCENTRATE INTRAVENOUS at 09:20

## 2023-02-26 RX ADMIN — IPRATROPIUM BROMIDE AND ALBUTEROL SULFATE 3 ML: 2.5; .5 SOLUTION RESPIRATORY (INHALATION) at 19:32

## 2023-02-26 RX ADMIN — Medication 10 ML: at 20:06

## 2023-02-26 RX ADMIN — HEPARIN SODIUM 5000 UNITS: 5000 INJECTION INTRAVENOUS; SUBCUTANEOUS at 09:20

## 2023-02-26 RX ADMIN — BACITRACIN 0.9 G: 500 OINTMENT TOPICAL at 20:06

## 2023-02-26 RX ADMIN — INSULIN HUMAN 3 UNITS: 100 INJECTION, SOLUTION PARENTERAL at 23:59

## 2023-02-26 RX ADMIN — INSULIN HUMAN 5 UNITS: 100 INJECTION, SOLUTION PARENTERAL at 00:59

## 2023-02-26 RX ADMIN — BACITRACIN 0.9 G: 500 OINTMENT TOPICAL at 09:21

## 2023-02-26 RX ADMIN — IPRATROPIUM BROMIDE AND ALBUTEROL SULFATE 3 ML: 2.5; .5 SOLUTION RESPIRATORY (INHALATION) at 06:45

## 2023-02-26 RX ADMIN — INSULIN HUMAN 3 UNITS: 100 INJECTION, SOLUTION PARENTERAL at 12:51

## 2023-02-26 RX ADMIN — TAZOBACTAM SODIUM AND PIPERACILLIN SODIUM 3.38 G: 375; 3 INJECTION, SOLUTION INTRAVENOUS at 00:59

## 2023-02-26 RX ADMIN — INSULIN HUMAN 3 UNITS: 100 INJECTION, SOLUTION PARENTERAL at 06:12

## 2023-02-26 RX ADMIN — IPRATROPIUM BROMIDE AND ALBUTEROL SULFATE 3 ML: 2.5; .5 SOLUTION RESPIRATORY (INHALATION) at 00:33

## 2023-02-26 RX ADMIN — Medication 10 ML: at 09:19

## 2023-02-26 RX ADMIN — HEPARIN SODIUM 5000 UNITS: 5000 INJECTION INTRAVENOUS; SUBCUTANEOUS at 20:06

## 2023-02-27 LAB
ALBUMIN SERPL-MCNC: 2.9 G/DL (ref 3.5–5.2)
ALP SERPL-CCNC: 76 U/L (ref 39–117)
ALT SERPL W P-5'-P-CCNC: 11 U/L (ref 1–41)
ANION GAP SERPL CALCULATED.3IONS-SCNC: 8.4 MMOL/L (ref 5–15)
AST SERPL-CCNC: 14 U/L (ref 1–40)
BACTERIA SPEC AEROBE CULT: NORMAL
BASOPHILS # BLD AUTO: 0.03 10*3/MM3 (ref 0–0.2)
BASOPHILS NFR BLD AUTO: 0.3 % (ref 0–1.5)
BILIRUB CONJ SERPL-MCNC: 0.2 MG/DL (ref 0–0.3)
BILIRUB INDIRECT SERPL-MCNC: 0.3 MG/DL
BILIRUB SERPL-MCNC: 0.5 MG/DL (ref 0–1.2)
BUN SERPL-MCNC: 9 MG/DL (ref 8–23)
BUN/CREAT SERPL: 13.8 (ref 7–25)
CALCIUM SPEC-SCNC: 8.2 MG/DL (ref 8.6–10.5)
CHLORIDE SERPL-SCNC: 105 MMOL/L (ref 98–107)
CO2 SERPL-SCNC: 23.6 MMOL/L (ref 22–29)
CREAT SERPL-MCNC: 0.65 MG/DL (ref 0.76–1.27)
DEPRECATED RDW RBC AUTO: 43 FL (ref 37–54)
EGFRCR SERPLBLD CKD-EPI 2021: 102.6 ML/MIN/1.73
EOSINOPHIL # BLD AUTO: 0.25 10*3/MM3 (ref 0–0.4)
EOSINOPHIL NFR BLD AUTO: 2.5 % (ref 0.3–6.2)
ERYTHROCYTE [DISTWIDTH] IN BLOOD BY AUTOMATED COUNT: 13.9 % (ref 12.3–15.4)
GLUCOSE BLDC GLUCOMTR-MCNC: 184 MG/DL (ref 70–99)
GLUCOSE BLDC GLUCOMTR-MCNC: 239 MG/DL (ref 70–99)
GLUCOSE BLDC GLUCOMTR-MCNC: 245 MG/DL (ref 70–99)
GLUCOSE SERPL-MCNC: 199 MG/DL (ref 65–99)
HCT VFR BLD AUTO: 28.5 % (ref 37.5–51)
HGB BLD-MCNC: 10.1 G/DL (ref 13–17.7)
IMM GRANULOCYTES # BLD AUTO: 0.05 10*3/MM3 (ref 0–0.05)
IMM GRANULOCYTES NFR BLD AUTO: 0.5 % (ref 0–0.5)
LYMPHOCYTES # BLD AUTO: 2.1 10*3/MM3 (ref 0.7–3.1)
LYMPHOCYTES NFR BLD AUTO: 21.3 % (ref 19.6–45.3)
MAGNESIUM SERPL-MCNC: 1.8 MG/DL (ref 1.6–2.4)
MCH RBC QN AUTO: 30.9 PG (ref 26.6–33)
MCHC RBC AUTO-ENTMCNC: 35.4 G/DL (ref 31.5–35.7)
MCV RBC AUTO: 87.2 FL (ref 79–97)
MONOCYTES # BLD AUTO: 0.68 10*3/MM3 (ref 0.1–0.9)
MONOCYTES NFR BLD AUTO: 6.9 % (ref 5–12)
NEUTROPHILS NFR BLD AUTO: 6.75 10*3/MM3 (ref 1.7–7)
NEUTROPHILS NFR BLD AUTO: 68.5 % (ref 42.7–76)
NRBC BLD AUTO-RTO: 0 /100 WBC (ref 0–0.2)
PHOSPHATE SERPL-MCNC: 2.1 MG/DL (ref 2.5–4.5)
PLATELET # BLD AUTO: 130 10*3/MM3 (ref 140–450)
PMV BLD AUTO: 10.6 FL (ref 6–12)
POTASSIUM SERPL-SCNC: 3.8 MMOL/L (ref 3.5–5.2)
PROT SERPL-MCNC: 5.1 G/DL (ref 6–8.5)
RBC # BLD AUTO: 3.27 10*6/MM3 (ref 4.14–5.8)
SODIUM SERPL-SCNC: 137 MMOL/L (ref 136–145)
WBC NRBC COR # BLD: 9.86 10*3/MM3 (ref 3.4–10.8)

## 2023-02-27 PROCEDURE — 82962 GLUCOSE BLOOD TEST: CPT

## 2023-02-27 PROCEDURE — 94799 UNLISTED PULMONARY SVC/PX: CPT

## 2023-02-27 PROCEDURE — 83735 ASSAY OF MAGNESIUM: CPT | Performed by: FAMILY MEDICINE

## 2023-02-27 PROCEDURE — 80048 BASIC METABOLIC PNL TOTAL CA: CPT | Performed by: FAMILY MEDICINE

## 2023-02-27 PROCEDURE — 63710000001 INSULIN REGULAR HUMAN PER 5 UNITS: Performed by: FAMILY MEDICINE

## 2023-02-27 PROCEDURE — 85025 COMPLETE CBC W/AUTO DIFF WBC: CPT | Performed by: FAMILY MEDICINE

## 2023-02-27 PROCEDURE — 25010000002 HEPARIN (PORCINE) PER 1000 UNITS: Performed by: STUDENT IN AN ORGANIZED HEALTH CARE EDUCATION/TRAINING PROGRAM

## 2023-02-27 PROCEDURE — 94664 DEMO&/EVAL PT USE INHALER: CPT

## 2023-02-27 PROCEDURE — 84100 ASSAY OF PHOSPHORUS: CPT | Performed by: FAMILY MEDICINE

## 2023-02-27 PROCEDURE — 99232 SBSQ HOSP IP/OBS MODERATE 35: CPT | Performed by: FAMILY MEDICINE

## 2023-02-27 PROCEDURE — 97110 THERAPEUTIC EXERCISES: CPT

## 2023-02-27 PROCEDURE — 92610 EVALUATE SWALLOWING FUNCTION: CPT

## 2023-02-27 PROCEDURE — 80076 HEPATIC FUNCTION PANEL: CPT | Performed by: FAMILY MEDICINE

## 2023-02-27 PROCEDURE — 94669 MECHANICAL CHEST WALL OSCILL: CPT

## 2023-02-27 RX ORDER — CHOLECALCIFEROL (VITAMIN D3) 125 MCG
10 CAPSULE ORAL NIGHTLY PRN
Status: DISCONTINUED | OUTPATIENT
Start: 2023-02-27 | End: 2023-02-28

## 2023-02-27 RX ORDER — FENTANYL/ROPIVACAINE/NS/PF 2-625MCG/1
15 PLASTIC BAG, INJECTION (ML) EPIDURAL ONCE
Status: COMPLETED | OUTPATIENT
Start: 2023-02-27 | End: 2023-02-27

## 2023-02-27 RX ADMIN — INSULIN HUMAN 3 UNITS: 100 INJECTION, SOLUTION PARENTERAL at 05:54

## 2023-02-27 RX ADMIN — Medication 10 MG: at 22:56

## 2023-02-27 RX ADMIN — IPRATROPIUM BROMIDE AND ALBUTEROL SULFATE 3 ML: 2.5; .5 SOLUTION RESPIRATORY (INHALATION) at 08:05

## 2023-02-27 RX ADMIN — HEPARIN SODIUM 5000 UNITS: 5000 INJECTION INTRAVENOUS; SUBCUTANEOUS at 20:20

## 2023-02-27 RX ADMIN — BACITRACIN 0.9 G: 500 OINTMENT TOPICAL at 20:20

## 2023-02-27 RX ADMIN — Medication 10 ML: at 10:11

## 2023-02-27 RX ADMIN — INSULIN HUMAN 5 UNITS: 100 INJECTION, SOLUTION PARENTERAL at 12:05

## 2023-02-27 RX ADMIN — IPRATROPIUM BROMIDE AND ALBUTEROL SULFATE 3 ML: 2.5; .5 SOLUTION RESPIRATORY (INHALATION) at 19:41

## 2023-02-27 RX ADMIN — POTASSIUM PHOSPHATE, MONOBASIC AND POTASSIUM PHOSPHATE, DIBASIC 15 MMOL: 224; 236 INJECTION, SOLUTION, CONCENTRATE INTRAVENOUS at 10:11

## 2023-02-27 RX ADMIN — HEPARIN SODIUM 5000 UNITS: 5000 INJECTION INTRAVENOUS; SUBCUTANEOUS at 10:11

## 2023-02-27 RX ADMIN — BACITRACIN 0.9 G: 500 OINTMENT TOPICAL at 10:11

## 2023-02-27 RX ADMIN — Medication 10 ML: at 20:20

## 2023-02-27 RX ADMIN — INSULIN HUMAN 5 UNITS: 100 INJECTION, SOLUTION PARENTERAL at 17:54

## 2023-02-27 RX ADMIN — IPRATROPIUM BROMIDE AND ALBUTEROL SULFATE 3 ML: 2.5; .5 SOLUTION RESPIRATORY (INHALATION) at 12:04

## 2023-02-27 RX ADMIN — IPRATROPIUM BROMIDE AND ALBUTEROL SULFATE 3 ML: 2.5; .5 SOLUTION RESPIRATORY (INHALATION) at 00:24

## 2023-02-28 LAB
ALBUMIN SERPL-MCNC: 3.1 G/DL (ref 3.5–5.2)
ALP SERPL-CCNC: 78 U/L (ref 39–117)
ALT SERPL W P-5'-P-CCNC: 15 U/L (ref 1–41)
ANION GAP SERPL CALCULATED.3IONS-SCNC: 6 MMOL/L (ref 5–15)
AST SERPL-CCNC: 16 U/L (ref 1–40)
BASOPHILS # BLD AUTO: 0.03 10*3/MM3 (ref 0–0.2)
BASOPHILS NFR BLD AUTO: 0.3 % (ref 0–1.5)
BILIRUB CONJ SERPL-MCNC: <0.2 MG/DL (ref 0–0.3)
BILIRUB INDIRECT SERPL-MCNC: ABNORMAL MG/DL
BILIRUB SERPL-MCNC: 0.3 MG/DL (ref 0–1.2)
BUN SERPL-MCNC: 11 MG/DL (ref 8–23)
BUN/CREAT SERPL: 17.2 (ref 7–25)
CALCIUM SPEC-SCNC: 8.5 MG/DL (ref 8.6–10.5)
CHLORIDE SERPL-SCNC: 106 MMOL/L (ref 98–107)
CO2 SERPL-SCNC: 25 MMOL/L (ref 22–29)
CREAT SERPL-MCNC: 0.64 MG/DL (ref 0.76–1.27)
DEPRECATED RDW RBC AUTO: 43.7 FL (ref 37–54)
EGFRCR SERPLBLD CKD-EPI 2021: 103.1 ML/MIN/1.73
EOSINOPHIL # BLD AUTO: 0.23 10*3/MM3 (ref 0–0.4)
EOSINOPHIL NFR BLD AUTO: 1.9 % (ref 0.3–6.2)
ERYTHROCYTE [DISTWIDTH] IN BLOOD BY AUTOMATED COUNT: 14.3 % (ref 12.3–15.4)
GLUCOSE BLDC GLUCOMTR-MCNC: 166 MG/DL (ref 70–99)
GLUCOSE BLDC GLUCOMTR-MCNC: 196 MG/DL (ref 70–99)
GLUCOSE BLDC GLUCOMTR-MCNC: 256 MG/DL (ref 70–99)
GLUCOSE BLDC GLUCOMTR-MCNC: 265 MG/DL (ref 70–99)
GLUCOSE SERPL-MCNC: 171 MG/DL (ref 65–99)
HCT VFR BLD AUTO: 30 % (ref 37.5–51)
HGB BLD-MCNC: 10.6 G/DL (ref 13–17.7)
IMM GRANULOCYTES # BLD AUTO: 0.09 10*3/MM3 (ref 0–0.05)
IMM GRANULOCYTES NFR BLD AUTO: 0.8 % (ref 0–0.5)
LYMPHOCYTES # BLD AUTO: 1.93 10*3/MM3 (ref 0.7–3.1)
LYMPHOCYTES NFR BLD AUTO: 16.4 % (ref 19.6–45.3)
MAGNESIUM SERPL-MCNC: 1.9 MG/DL (ref 1.6–2.4)
MCH RBC QN AUTO: 31.2 PG (ref 26.6–33)
MCHC RBC AUTO-ENTMCNC: 35.3 G/DL (ref 31.5–35.7)
MCV RBC AUTO: 88.2 FL (ref 79–97)
MONOCYTES # BLD AUTO: 1.02 10*3/MM3 (ref 0.1–0.9)
MONOCYTES NFR BLD AUTO: 8.6 % (ref 5–12)
NEUTROPHILS NFR BLD AUTO: 72 % (ref 42.7–76)
NEUTROPHILS NFR BLD AUTO: 8.5 10*3/MM3 (ref 1.7–7)
NRBC BLD AUTO-RTO: 0 /100 WBC (ref 0–0.2)
PHOSPHATE SERPL-MCNC: 2.4 MG/DL (ref 2.5–4.5)
PLATELET # BLD AUTO: 142 10*3/MM3 (ref 140–450)
PMV BLD AUTO: 10.8 FL (ref 6–12)
POTASSIUM SERPL-SCNC: 3.9 MMOL/L (ref 3.5–5.2)
PROT SERPL-MCNC: 5.2 G/DL (ref 6–8.5)
RBC # BLD AUTO: 3.4 10*6/MM3 (ref 4.14–5.8)
SODIUM SERPL-SCNC: 137 MMOL/L (ref 136–145)
WBC NRBC COR # BLD: 11.8 10*3/MM3 (ref 3.4–10.8)

## 2023-02-28 PROCEDURE — 94799 UNLISTED PULMONARY SVC/PX: CPT

## 2023-02-28 PROCEDURE — 85025 COMPLETE CBC W/AUTO DIFF WBC: CPT | Performed by: FAMILY MEDICINE

## 2023-02-28 PROCEDURE — 25010000002 HEPARIN (PORCINE) PER 1000 UNITS: Performed by: STUDENT IN AN ORGANIZED HEALTH CARE EDUCATION/TRAINING PROGRAM

## 2023-02-28 PROCEDURE — 84100 ASSAY OF PHOSPHORUS: CPT | Performed by: FAMILY MEDICINE

## 2023-02-28 PROCEDURE — 63710000001 INSULIN REGULAR HUMAN PER 5 UNITS: Performed by: FAMILY MEDICINE

## 2023-02-28 PROCEDURE — 92526 ORAL FUNCTION THERAPY: CPT

## 2023-02-28 PROCEDURE — 82962 GLUCOSE BLOOD TEST: CPT

## 2023-02-28 PROCEDURE — 94664 DEMO&/EVAL PT USE INHALER: CPT

## 2023-02-28 PROCEDURE — 83735 ASSAY OF MAGNESIUM: CPT | Performed by: FAMILY MEDICINE

## 2023-02-28 PROCEDURE — 94760 N-INVAS EAR/PLS OXIMETRY 1: CPT

## 2023-02-28 PROCEDURE — 80048 BASIC METABOLIC PNL TOTAL CA: CPT | Performed by: FAMILY MEDICINE

## 2023-02-28 PROCEDURE — 99233 SBSQ HOSP IP/OBS HIGH 50: CPT | Performed by: INTERNAL MEDICINE

## 2023-02-28 PROCEDURE — 80076 HEPATIC FUNCTION PANEL: CPT | Performed by: FAMILY MEDICINE

## 2023-02-28 RX ORDER — CHOLECALCIFEROL (VITAMIN D3) 125 MCG
10 CAPSULE ORAL NIGHTLY PRN
Status: DISCONTINUED | OUTPATIENT
Start: 2023-02-28 | End: 2023-03-07 | Stop reason: HOSPADM

## 2023-02-28 RX ADMIN — INSULIN HUMAN 5 UNITS: 100 INJECTION, SOLUTION PARENTERAL at 23:54

## 2023-02-28 RX ADMIN — BACITRACIN 0.9 G: 500 OINTMENT TOPICAL at 09:09

## 2023-02-28 RX ADMIN — BACITRACIN 0.9 G: 500 OINTMENT TOPICAL at 20:12

## 2023-02-28 RX ADMIN — Medication 10 ML: at 09:09

## 2023-02-28 RX ADMIN — IPRATROPIUM BROMIDE AND ALBUTEROL SULFATE 3 ML: 2.5; .5 SOLUTION RESPIRATORY (INHALATION) at 11:49

## 2023-02-28 RX ADMIN — IPRATROPIUM BROMIDE AND ALBUTEROL SULFATE 3 ML: 2.5; .5 SOLUTION RESPIRATORY (INHALATION) at 00:31

## 2023-02-28 RX ADMIN — HEPARIN SODIUM 5000 UNITS: 5000 INJECTION INTRAVENOUS; SUBCUTANEOUS at 09:08

## 2023-02-28 RX ADMIN — INSULIN HUMAN 3 UNITS: 100 INJECTION, SOLUTION PARENTERAL at 05:42

## 2023-02-28 RX ADMIN — HEPARIN SODIUM 5000 UNITS: 5000 INJECTION INTRAVENOUS; SUBCUTANEOUS at 20:12

## 2023-02-28 RX ADMIN — INSULIN HUMAN 8 UNITS: 100 INJECTION, SOLUTION PARENTERAL at 00:06

## 2023-02-28 RX ADMIN — Medication 10 ML: at 20:12

## 2023-02-28 RX ADMIN — INSULIN HUMAN 8 UNITS: 100 INJECTION, SOLUTION PARENTERAL at 11:32

## 2023-02-28 RX ADMIN — IPRATROPIUM BROMIDE AND ALBUTEROL SULFATE 3 ML: 2.5; .5 SOLUTION RESPIRATORY (INHALATION) at 19:48

## 2023-02-28 RX ADMIN — IPRATROPIUM BROMIDE AND ALBUTEROL SULFATE 3 ML: 2.5; .5 SOLUTION RESPIRATORY (INHALATION) at 06:50

## 2023-02-28 RX ADMIN — INSULIN HUMAN 3 UNITS: 100 INJECTION, SOLUTION PARENTERAL at 17:21

## 2023-03-01 LAB
ALBUMIN SERPL-MCNC: 3.3 G/DL (ref 3.5–5.2)
ANION GAP SERPL CALCULATED.3IONS-SCNC: 7.5 MMOL/L (ref 5–15)
BACTERIA SPEC AEROBE CULT: NORMAL
BACTERIA SPEC AEROBE CULT: NORMAL
BASOPHILS # BLD AUTO: 0.03 10*3/MM3 (ref 0–0.2)
BASOPHILS NFR BLD AUTO: 0.3 % (ref 0–1.5)
BUN SERPL-MCNC: 8 MG/DL (ref 8–23)
BUN/CREAT SERPL: 12.1 (ref 7–25)
CALCIUM SPEC-SCNC: 8.6 MG/DL (ref 8.6–10.5)
CHLORIDE SERPL-SCNC: 103 MMOL/L (ref 98–107)
CO2 SERPL-SCNC: 26.5 MMOL/L (ref 22–29)
CREAT SERPL-MCNC: 0.66 MG/DL (ref 0.76–1.27)
DEPRECATED RDW RBC AUTO: 44.6 FL (ref 37–54)
EGFRCR SERPLBLD CKD-EPI 2021: 102.2 ML/MIN/1.73
EOSINOPHIL # BLD AUTO: 0.16 10*3/MM3 (ref 0–0.4)
EOSINOPHIL NFR BLD AUTO: 1.4 % (ref 0.3–6.2)
ERYTHROCYTE [DISTWIDTH] IN BLOOD BY AUTOMATED COUNT: 14.6 % (ref 12.3–15.4)
GLUCOSE BLDC GLUCOMTR-MCNC: 191 MG/DL (ref 70–99)
GLUCOSE BLDC GLUCOMTR-MCNC: 205 MG/DL (ref 70–99)
GLUCOSE BLDC GLUCOMTR-MCNC: 227 MG/DL (ref 70–99)
GLUCOSE BLDC GLUCOMTR-MCNC: 227 MG/DL (ref 70–99)
GLUCOSE BLDC GLUCOMTR-MCNC: 248 MG/DL (ref 70–99)
GLUCOSE SERPL-MCNC: 216 MG/DL (ref 65–99)
HCT VFR BLD AUTO: 29.9 % (ref 37.5–51)
HGB BLD-MCNC: 10.5 G/DL (ref 13–17.7)
IMM GRANULOCYTES # BLD AUTO: 0.06 10*3/MM3 (ref 0–0.05)
IMM GRANULOCYTES NFR BLD AUTO: 0.5 % (ref 0–0.5)
LYMPHOCYTES # BLD AUTO: 1.86 10*3/MM3 (ref 0.7–3.1)
LYMPHOCYTES NFR BLD AUTO: 16.5 % (ref 19.6–45.3)
MCH RBC QN AUTO: 31.1 PG (ref 26.6–33)
MCHC RBC AUTO-ENTMCNC: 35.1 G/DL (ref 31.5–35.7)
MCV RBC AUTO: 88.5 FL (ref 79–97)
MONOCYTES # BLD AUTO: 0.98 10*3/MM3 (ref 0.1–0.9)
MONOCYTES NFR BLD AUTO: 8.7 % (ref 5–12)
NEUTROPHILS NFR BLD AUTO: 72.6 % (ref 42.7–76)
NEUTROPHILS NFR BLD AUTO: 8.15 10*3/MM3 (ref 1.7–7)
NRBC BLD AUTO-RTO: 0 /100 WBC (ref 0–0.2)
PHOSPHATE SERPL-MCNC: 2 MG/DL (ref 2.5–4.5)
PLATELET # BLD AUTO: 153 10*3/MM3 (ref 140–450)
PMV BLD AUTO: 10.5 FL (ref 6–12)
POTASSIUM SERPL-SCNC: 4 MMOL/L (ref 3.5–5.2)
RBC # BLD AUTO: 3.38 10*6/MM3 (ref 4.14–5.8)
SODIUM SERPL-SCNC: 137 MMOL/L (ref 136–145)
WBC NRBC COR # BLD: 11.24 10*3/MM3 (ref 3.4–10.8)

## 2023-03-01 PROCEDURE — 92526 ORAL FUNCTION THERAPY: CPT

## 2023-03-01 PROCEDURE — 80069 RENAL FUNCTION PANEL: CPT | Performed by: INTERNAL MEDICINE

## 2023-03-01 PROCEDURE — 97110 THERAPEUTIC EXERCISES: CPT

## 2023-03-01 PROCEDURE — 25010000002 HEPARIN (PORCINE) PER 1000 UNITS: Performed by: STUDENT IN AN ORGANIZED HEALTH CARE EDUCATION/TRAINING PROGRAM

## 2023-03-01 PROCEDURE — 82962 GLUCOSE BLOOD TEST: CPT

## 2023-03-01 PROCEDURE — 94760 N-INVAS EAR/PLS OXIMETRY 1: CPT

## 2023-03-01 PROCEDURE — 94799 UNLISTED PULMONARY SVC/PX: CPT

## 2023-03-01 PROCEDURE — 63710000001 INSULIN REGULAR HUMAN PER 5 UNITS: Performed by: FAMILY MEDICINE

## 2023-03-01 PROCEDURE — 85025 COMPLETE CBC W/AUTO DIFF WBC: CPT | Performed by: INTERNAL MEDICINE

## 2023-03-01 PROCEDURE — 94664 DEMO&/EVAL PT USE INHALER: CPT

## 2023-03-01 PROCEDURE — 99233 SBSQ HOSP IP/OBS HIGH 50: CPT | Performed by: INTERNAL MEDICINE

## 2023-03-01 RX ORDER — PANTOPRAZOLE SODIUM 40 MG/10ML
40 INJECTION, POWDER, LYOPHILIZED, FOR SOLUTION INTRAVENOUS
Status: DISCONTINUED | OUTPATIENT
Start: 2023-03-01 | End: 2023-03-07 | Stop reason: HOSPADM

## 2023-03-01 RX ORDER — DONEPEZIL HYDROCHLORIDE 10 MG/1
10 TABLET, FILM COATED ORAL DAILY
Status: DISCONTINUED | OUTPATIENT
Start: 2023-03-01 | End: 2023-03-01

## 2023-03-01 RX ORDER — ATORVASTATIN CALCIUM 40 MG/1
40 TABLET, FILM COATED ORAL NIGHTLY
Status: DISCONTINUED | OUTPATIENT
Start: 2023-03-01 | End: 2023-03-07 | Stop reason: HOSPADM

## 2023-03-01 RX ORDER — DONEPEZIL HYDROCHLORIDE 10 MG/1
10 TABLET, FILM COATED ORAL DAILY
Status: DISCONTINUED | OUTPATIENT
Start: 2023-03-01 | End: 2023-03-07 | Stop reason: HOSPADM

## 2023-03-01 RX ORDER — ATORVASTATIN CALCIUM 40 MG/1
40 TABLET, FILM COATED ORAL NIGHTLY
Status: DISCONTINUED | OUTPATIENT
Start: 2023-03-01 | End: 2023-03-01

## 2023-03-01 RX ORDER — PANTOPRAZOLE SODIUM 40 MG/1
40 TABLET, DELAYED RELEASE ORAL DAILY
Status: DISCONTINUED | OUTPATIENT
Start: 2023-03-01 | End: 2023-03-01

## 2023-03-01 RX ORDER — IPRATROPIUM BROMIDE AND ALBUTEROL SULFATE 2.5; .5 MG/3ML; MG/3ML
3 SOLUTION RESPIRATORY (INHALATION) EVERY 4 HOURS PRN
Status: DISCONTINUED | OUTPATIENT
Start: 2023-03-01 | End: 2023-03-07 | Stop reason: HOSPADM

## 2023-03-01 RX ORDER — LANOLIN ALCOHOL/MO/W.PET/CERES
3 CREAM (GRAM) TOPICAL NIGHTLY
Status: DISCONTINUED | OUTPATIENT
Start: 2023-03-01 | End: 2023-03-01

## 2023-03-01 RX ORDER — FLUTICASONE PROPIONATE 50 MCG
2 SPRAY, SUSPENSION (ML) NASAL EVERY MORNING
Status: DISCONTINUED | OUTPATIENT
Start: 2023-03-01 | End: 2023-03-06

## 2023-03-01 RX ADMIN — INSULIN HUMAN 5 UNITS: 100 INJECTION, SOLUTION PARENTERAL at 23:32

## 2023-03-01 RX ADMIN — HEPARIN SODIUM 5000 UNITS: 5000 INJECTION INTRAVENOUS; SUBCUTANEOUS at 08:34

## 2023-03-01 RX ADMIN — Medication 10 ML: at 08:36

## 2023-03-01 RX ADMIN — ATORVASTATIN CALCIUM 40 MG: 40 TABLET, FILM COATED ORAL at 21:47

## 2023-03-01 RX ADMIN — INSULIN HUMAN 5 UNITS: 100 INJECTION, SOLUTION PARENTERAL at 13:05

## 2023-03-01 RX ADMIN — INSULIN HUMAN 3 UNITS: 100 INJECTION, SOLUTION PARENTERAL at 05:43

## 2023-03-01 RX ADMIN — FLUTICASONE PROPIONATE 2 SPRAY: 50 SPRAY, METERED NASAL at 12:25

## 2023-03-01 RX ADMIN — INSULIN HUMAN 5 UNITS: 100 INJECTION, SOLUTION PARENTERAL at 17:41

## 2023-03-01 RX ADMIN — IPRATROPIUM BROMIDE AND ALBUTEROL SULFATE 3 ML: 2.5; .5 SOLUTION RESPIRATORY (INHALATION) at 07:11

## 2023-03-01 RX ADMIN — APIXABAN 5 MG: 5 TABLET, FILM COATED ORAL at 12:25

## 2023-03-01 RX ADMIN — CARBIDOPA AND LEVODOPA 1 TABLET: 25; 100 TABLET ORAL at 17:41

## 2023-03-01 RX ADMIN — PANTOPRAZOLE SODIUM 40 MG: 40 INJECTION, POWDER, FOR SOLUTION INTRAVENOUS at 12:25

## 2023-03-01 RX ADMIN — ACETAMINOPHEN 1000 MG: 500 TABLET ORAL at 21:47

## 2023-03-01 RX ADMIN — CARBIDOPA AND LEVODOPA 1 TABLET: 25; 100 TABLET ORAL at 21:48

## 2023-03-01 RX ADMIN — BACITRACIN 0.9 G: 500 OINTMENT TOPICAL at 08:40

## 2023-03-01 RX ADMIN — APIXABAN 5 MG: 5 TABLET, FILM COATED ORAL at 21:48

## 2023-03-01 RX ADMIN — BACITRACIN 0.9 G: 500 OINTMENT TOPICAL at 22:58

## 2023-03-01 RX ADMIN — Medication 10 ML: at 21:48

## 2023-03-01 RX ADMIN — IPRATROPIUM BROMIDE AND ALBUTEROL SULFATE 3 ML: 2.5; .5 SOLUTION RESPIRATORY (INHALATION) at 00:13

## 2023-03-01 RX ADMIN — DONEPEZIL HYDROCHLORIDE 10 MG: 10 TABLET, FILM COATED ORAL at 12:25

## 2023-03-01 RX ADMIN — CARBIDOPA AND LEVODOPA 1 TABLET: 25; 100 TABLET ORAL at 12:25

## 2023-03-01 NOTE — THERAPY TREATMENT NOTE
Patient Name: Dhaval Nieto  : 1955    MRN: [id number]                              Today's Date: 3/1/2023       Admit Date: 2023    Visit Dx:     ICD-10-CM ICD-9-CM   1. Dehydration  E86.0 276.51   2. Acute kidney injury ([facility])  N17.9 584.9   3. Leukocytosis, unspecified type  D72.829 288.60   4. Hypoglycemia  E16.2 251.2   5. 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 Aspiration into airway, initial encounter  T17.908A 934.9   7. Oropharyngeal dysphagia  R13.12 787.22   8. Decreased activities of daily living (ADL)  Z78.9 V49.89   9. Difficulty walking  R26.2 719.7     Patient Active Problem List   Diagnosis   • Chronic bronchitis, unspecified chronic bronchitis type ([facility])   • Right wrist pain   • Pain disorder with psychological factors   • Bladder disorder   • Abdominal hernia   • Stomach disorder   • Arthritis   • Asthma   • BPH with urinary obstruction   • Chest pain   • Decreased sensation   • Depression   • History of colon cancer   • Hyperlipemia   • Hyperlipemia, mixed   • Hypertension, essential   • Insomnia   • Limb swelling   • Loss of balance   • Low back pain   • Thoracic back pain   • Lumbar degenerative disc disease   • Degeneration of thoracic or thoracolumbar intervertebral disc   • Lumbar radiculopathy   • Migraines   • Muscle cramps   • Neck pain   • Neurologic disorder   • Headache   • Pain, generalized   • Shortness of breath   • Cervical radiculopathy   • Cervical stenosis of spinal canal   • Seasonal allergic rhinitis   • Leg pain   • Cancer of sigmoid colon ([facility])   • Closed fracture of left hip, initial encounter ([facility])   • Dysuria   • Type 2 diabetes mellitus, with long-term current use of insulin ([facility])   • Severe malnutrition ([facility])   • Memory loss   • Aftercare following surgery of left total hip arthroplasty, 8/15/2022   • Paroxysmal atrial  fibrillation ([facility])   • Severe sepsis ([facility])     Past Medical History:   Diagnosis Date   • Arteriovenous malformation 03/09/1980   • Arthritis    • Asthma    • Benign essential hypertension    • Bladder disorder    • Bleeding disorder ([facility])    • Blood disease    • BPH with urinary obstruction 05/30/2014   • Brain concussion 1999   • Cancer ([facility])    • Cervical disc disorder long time   • Cervical radiculopathy 05/01/2015    left   • Cervical spinal stenosis 03/10/2020   • Cervicalgia 12/18/2018   • Chest pain    • CHF (congestive heart failure) ([facility])    • Clotting disorder ([facility]) [place]   • Colon cancer ([facility]) 12/18/2018   • Condition not found     Hernia   • Condition not found     herniated disc   • COPD (chronic obstructive pulmonary disease) ([facility])    • Coronary artery disease    • CTS (carpal tunnel syndrome) 08/04/2002   • DDD (degenerative disc disease), thoracic 12/18/2018   • Decreased sensation 05/01/2015    left   • Deep vein thrombosis ([facility])    • Depression    • Diabetes ([facility])    • Diabetes mellitus type 1 with coma, insulin dependent ([facility])     controlled   • Headache    • Heart murmur    • Heart valve disease    • Hematuria, gross 05/30/2014   • Hyperlipemia    • Hyperlipidemia    • Hypertension    • Insomnia    • Leg pain    • Lewy body dementia ([facility])    • Limb swelling    • Loss of balance 12/18/2018   • Low back pain 03/10/2020   • Lumbar degenerative disc disease 12/18/2018   • Lumbar radiculopathy 03/10/2020   • Lumbosacral disc disease 05/05/1999   • Mid back pain 12/18/2018   • Migraines    • Muscle cramps    • Myocardial infarction ([facility])    • Neurologic disorder    • Pain in back    • Pain in joint    • Pain of cervical spine    • Pain, generalized    • Pulmonary arterial hypertension ([facility]) 1999   • Seasonal allergies    • Seizures ([facility]) 2000   • Shortness of breath    • Sleep apnea    • Stomach disorder      Past Surgical History:   Procedure Laterality Date   • ABDOMINAL SURGERY     • BLADDER  SURGERY     • CARPAL TUNNEL RELEASE     • CEREBRAL ANGIOGRAM  2021   • COLON RESECTION     • COLONOSCOPY  01/30/2017    [place]   • CORONARY ARTERY BYPASS GRAFT  12/2019    one vessel   • GALLBLADDER SURGERY      cholecystecomy   • HERNIA REPAIR     • JOINT REPLACEMENT      joint surgery   • MITRAL VALVE REPAIR/REPLACEMENT  02/2019    [facility]   • OTHER SURGICAL HISTORY  05/30/2014    office cystoscopy w/DR SHANICE Young   • SHOULDER SURGERY Bilateral    • TOTAL HIP ARTHROPLASTY Left 8/15/2022    Procedure: TOTAL HIP ARTHROPLASTY ANTERIOR;  Surgeon: Kaden Green MD;  Location: [facility] MAIN OR;  Service: Orthopedics;  Laterality: Left;   • VASCULAR SURGERY  2020   • VENTRAL HERNIA REPAIR        General Information     Row Name 03/01/23 0953          OT Time and Intention    Document Type therapy note (daily note)  -AV     Mode of Treatment individual therapy;occupational therapy  -AV     Row Name 03/01/23 0953          General Information    Existing Precautions/Restrictions fall  -AV     Row Name 03/01/23 0953          Safety Issues, Functional Mobility    Impairments Affecting Function (Mobility) balance;strength;cognition;endurance/activity tolerance  -AV           User Key  (r) = Recorded By, (t) = Taken By, (c) = Cosigned By    Initials Name Provider Type    AV Juancarlos Reyes OT Occupational Therapist                 Mobility/ADL's    No documentation.                Obj/Interventions     Row Name 03/01/23 0953          Shoulder (Therapeutic Exercise)    Shoulder (Therapeutic Exercise) AAROM (active assistive range of motion)  -AV     Shoulder AAROM (Therapeutic Exercise) bilateral;flexion;10 repetitions  -AV     Row Name 03/01/23 0953          Elbow/Forearm (Therapeutic Exercise)    Elbow/Forearm (Therapeutic Exercise) AAROM (active assistive range of motion)  -AV     Elbow/Forearm AAROM (Therapeutic Exercise) bilateral;flexion;extension;10 repetitions  -AV     Row Name 03/01/23 0953          Wrist  (Therapeutic Exercise)    Wrist (Therapeutic Exercise) AAROM (active assistive range of motion)  -AV     Wrist AAROM (Therapeutic Exercise) bilateral;flexion;extension;10 repetitions  -AV     Row Name 03/01/23 0953          Hand (Therapeutic Exercise)    Hand (Therapeutic Exercise) AROM (active range of motion)  -AV     Hand AROM/AAROM (Therapeutic Exercise) AAROM (active assistive range of motion);bilateral;finger flexion;finger extension;10 repetitions  -AV     Row Name 03/01/23 0953          Motor Skills    Therapeutic Exercise shoulder;elbow/forearm;wrist;hand  -AV           User Key  (r) = Recorded By, (t) = Taken By, (c) = Cosigned By    Initials Name Provider Type    Juancarlos Lopez OT Occupational Therapist               Goals/Plan    No documentation.                Clinical Impression     Row Name 03/01/23 0954          Pain Scale: FACES Pre/Post-Treatment    Pain: FACES Scale, Pretreatment 0-->no hurt  -AV     Posttreatment Pain Rating 0-->no hurt  -AV     Row Name 03/01/23 0954          Plan of Care Review    Progress no change  -AV     Outcome Evaluation Patient performed UE exercises with minimal active participation. Resistive at times. Family asleep on couch in room throughout session. continued OT is indicated to remediate/compensate for deficits to maximize independence and safety with functional tasks.  -AV     Row Name 03/01/23 0954          Vital Signs    O2 Delivery Pre Treatment room air  -AV     O2 Delivery Intra Treatment room air  -AV     O2 Delivery Post Treatment room air  -AV           User Key  (r) = Recorded By, (t) = Taken By, (c) = Cosigned By    Initials Name Provider Type    Juancarlos Lopez OT Occupational Therapist               Outcome Measures     Row Name 03/01/23 0956          How much help from another is currently needed...    Putting on and taking off regular lower body clothing? 1  -AV     Bathing (including washing, rinsing, and drying) 1  -AV     Toileting (which  includes using toilet bed pan or urinal) 1  -AV     Putting on and taking off regular upper body clothing 1  -AV     Taking care of personal grooming (such as brushing teeth) 1  -AV     Eating meals 1  -AV     AM-PAC 6 Clicks Score (OT) 6  -AV     Row Name 03/01/23 0956          Optimal Instrument    Bending/Stooping 5  -AV     Standing 5  -AV     Reaching 2  -AV           User Key  (r) = Recorded By, (t) = Taken By, (c) = Cosigned By    Initials Name Provider Type    Juancarlos Lopez OT Occupational Therapist                Occupational Therapy Education     Title: PT OT SLP Therapies (Done)     Topic: Occupational Therapy (Done)     Point: ADL training (Done)     Description:   Instruct learner(s) on proper safety adaptation and remediation techniques during self care or transfers.   Instruct in proper use of assistive devices.              Learning Progress Summary           Patient Acceptance, E, VU by AV at 2/23/2023 1159   Family Acceptance, E, VU,DU by RF at 2/28/2023 0957    Acceptance, E, VU,NR by RF at 2/27/2023 0956   Significant Other Acceptance, E, VU by AV at 2/23/2023 1159                   Point: Home exercise program (Done)     Description:   Instruct learner(s) on appropriate technique for monitoring, assisting and/or progressing therapeutic exercises/activities.              Learning Progress Summary           Patient Acceptance, E, VU by AV at 2/23/2023 1159   Family Acceptance, E, VU,DU by RF at 2/28/2023 0957    Acceptance, E, VU,NR by RF at 2/27/2023 0956   Significant Other Acceptance, E, VU by AV at 2/23/2023 1159                   Point: Precautions (Done)     Description:   Instruct learner(s) on prescribed precautions during self-care and functional transfers.              Learning Progress Summary           Patient Acceptance, E, VU by AV at 2/23/2023 1159   Family Acceptance, E, VU,DU by RF at 2/28/2023 0957    Acceptance, E, VU,NR by RF at 2/27/2023 0956   Significant Other  Acceptance, E, VU by AV at 2/23/2023 1159                   Point: Body mechanics (Done)     Description:   Instruct learner(s) on proper positioning and spine alignment during self-care, functional mobility activities and/or exercises.              Learning Progress Summary           Patient Acceptance, E, VU by AV at 2/23/2023 1159   Family Acceptance, E, VU,DU by RF at 2/28/2023 0957    Acceptance, E, VU,NR by RF at 2/27/2023 0956   Significant Other Acceptance, E, VU by AV at 2/23/2023 1159                               User Key     Initials Effective Dates Name Provider Type Discipline    AV 06/16/21 -  Juancarlos Reyes OT Occupational Therapist OT     01/19/22 -  Josr Sheridan RN Registered Nurse Nurse              OT Recommendation and Plan  Planned Therapy Interventions (OT): activity tolerance training, BADL retraining, functional balance retraining, occupation/activity based interventions, patient/caregiver education/training, transfer/mobility retraining, strengthening exercise  Therapy Frequency (OT): 5 times/wk  Plan of Care Review  Plan of Care Reviewed With: patient, spouse  Progress: no change  Outcome Evaluation: Patient performed UE exercises with minimal active participation. Resistive at times. Family asleep on couch in room throughout session. continued OT is indicated to remediate/compensate for deficits to maximize independence and safety with functional tasks.     Time Calculation:    Time Calculation- OT     Row Name 03/01/23 0957             Time Calculation- OT    OT Received On 03/01/23  -AV      OT Goal Re-Cert Due Date 03/04/23  -AV         Timed Charges    22209 - OT Therapeutic Exercise Minutes 10  -AV         Total Minutes    Timed Charges Total Minutes 10  -AV       Total Minutes 10  -AV            User Key  (r) = Recorded By, (t) = Taken By, (c) = Cosigned By    Initials Name Provider Type    AV Juancarlos Reyes OT Occupational Therapist              Therapy Charges for Today      Code Description Service Date Service Provider Modifiers Qty    [id number] HC OT THER PROC EA 15 MIN 3/1/2023 Juancarlos Reyes, OT GO 1               Juancarlos Reyes OT  3/1/2023

## 2023-03-01 NOTE — THERAPY TREATMENT NOTE
Acute Care - Physical Therapy Treatment Note  LITA Smyth     Patient Name: Dhaval iNeto  : 1955  MRN: [id number]  Today's Date: 3/1/2023      Visit Dx:     ICD-10-CM ICD-9-CM   1. Dehydration  E86.0 276.51   2. Acute kidney injury ([facility])  N17.9 584.9   3. Leukocytosis, unspecified type  D72.829 288.60   4. Hypoglycemia  E16.2 251.2   5. 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 Aspiration into airway, initial encounter  T17.908A 934.9   7. Oropharyngeal dysphagia  R13.12 787.22   8. Decreased activities of daily living (ADL)  Z78.9 V49.89   9. Difficulty walking  R26.2 719.7     Patient Active Problem List   Diagnosis   • Chronic bronchitis, unspecified chronic bronchitis type ([facility])   • Right wrist pain   • Pain disorder with psychological factors   • Bladder disorder   • Abdominal hernia   • Stomach disorder   • Arthritis   • Asthma   • BPH with urinary obstruction   • Chest pain   • Decreased sensation   • Depression   • History of colon cancer   • Hyperlipemia   • Hyperlipemia, mixed   • Hypertension, essential   • Insomnia   • Limb swelling   • Loss of balance   • Low back pain   • Thoracic back pain   • Lumbar degenerative disc disease   • Degeneration of thoracic or thoracolumbar intervertebral disc   • Lumbar radiculopathy   • Migraines   • Muscle cramps   • Neck pain   • Neurologic disorder   • Headache   • Pain, generalized   • Shortness of breath   • Cervical radiculopathy   • Cervical stenosis of spinal canal   • Seasonal allergic rhinitis   • Leg pain   • Cancer of sigmoid colon ([facility])   • Closed fracture of left hip, initial encounter ([facility])   • Dysuria   • Type 2 diabetes mellitus, with long-term current use of insulin ([facility])   • Severe malnutrition ([facility])   • Memory loss   • Aftercare following surgery of left total hip arthroplasty, 8/15/2022   • Paroxysmal  atrial fibrillation ([facility])   • Severe sepsis ([facility])     Past Medical History:   Diagnosis Date   • Arteriovenous malformation 03/09/1980   • Arthritis    • Asthma    • Benign essential hypertension    • Bladder disorder    • Bleeding disorder ([facility])    • Blood disease    • BPH with urinary obstruction 05/30/2014   • Brain concussion 1999   • Cancer ([facility])    • Cervical disc disorder long time   • Cervical radiculopathy 05/01/2015    left   • Cervical spinal stenosis 03/10/2020   • Cervicalgia 12/18/2018   • Chest pain    • CHF (congestive heart failure) ([facility])    • Clotting disorder ([facility]) [place]   • Colon cancer ([facility]) 12/18/2018   • Condition not found     Hernia   • Condition not found     herniated disc   • COPD (chronic obstructive pulmonary disease) ([facility])    • Coronary artery disease    • CTS (carpal tunnel syndrome) 08/04/2002   • DDD (degenerative disc disease), thoracic 12/18/2018   • Decreased sensation 05/01/2015    left   • Deep vein thrombosis ([facility])    • Depression    • Diabetes ([facility])    • Diabetes mellitus type 1 with coma, insulin dependent ([facility])     controlled   • Headache    • Heart murmur    • Heart valve disease    • Hematuria, gross 05/30/2014   • Hyperlipemia    • Hyperlipidemia    • Hypertension    • Insomnia    • Leg pain    • Lewy body dementia ([facility])    • Limb swelling    • Loss of balance 12/18/2018   • Low back pain 03/10/2020   • Lumbar degenerative disc disease 12/18/2018   • Lumbar radiculopathy 03/10/2020   • Lumbosacral disc disease 05/05/1999   • Mid back pain 12/18/2018   • Migraines    • Muscle cramps    • Myocardial infarction ([facility])    • Neurologic disorder    • Pain in back    • Pain in joint    • Pain of cervical spine    • Pain, generalized    • Pulmonary arterial hypertension ([facility]) 1999   • Seasonal allergies    • Seizures ([facility]) 2000   • Shortness of breath    • Sleep apnea    • Stomach disorder      Past Surgical History:   Procedure Laterality Date   • ABDOMINAL SURGERY     •  BLADDER SURGERY     • CARPAL TUNNEL RELEASE     • CEREBRAL ANGIOGRAM  2021   • COLON RESECTION     • COLONOSCOPY  01/30/2017    [place]   • CORONARY ARTERY BYPASS GRAFT  12/2019    one vessel   • GALLBLADDER SURGERY      cholecystecomy   • HERNIA REPAIR     • JOINT REPLACEMENT      joint surgery   • MITRAL VALVE REPAIR/REPLACEMENT  02/2019    [facility]   • OTHER SURGICAL HISTORY  05/30/2014    office cystoscopy w/DR SHANICE Young   • SHOULDER SURGERY Bilateral    • TOTAL HIP ARTHROPLASTY Left 8/15/2022    Procedure: TOTAL HIP ARTHROPLASTY ANTERIOR;  Surgeon: Kaden Green MD;  Location: [facility] MAIN OR;  Service: Orthopedics;  Laterality: Left;   • VASCULAR SURGERY  2020   • VENTRAL HERNIA REPAIR       PT Assessment (last 12 hours)     PT Evaluation and Treatment     Row Name 03/01/23 1418          Physical Therapy Time and Intention    Subjective Information no complaints (P)   -AT     Document Type therapy note (daily note) (P)   -AT     Mode of Treatment individual therapy;physical therapy (P)   -AT     Patient Effort fair (P)   -AT     Symptoms Noted During/After Treatment none (P)   -AT     Row Name 03/01/23 1418          General Information    Patient Profile Reviewed yes (P)   -AT     Patient Observations alert;cooperative;agree to therapy;lethargic (P)   answered yes/no questions  -AT     Equipment Currently Used at Home none (P)   -AT     Existing Precautions/Restrictions fall (P)   -AT     Row Name 03/01/23 1418          Cognition    Affect/Mental Status (Cognition) low arousal/lethargic (P)   -AT     Orientation Status (Cognition) oriented to;person (P)   -AT     Follows Commands (Cognition) physical/tactile prompts required;repetition of directions required;verbal cues/prompting required (P)   -AT     Cognitive Function attention deficit (P)   -AT     Attention Deficit (Cognition) minimal deficit;concentration;focused/sustained attention (P)   -AT     Row Name 03/01/23 1418          Bed Mobility     Bed Mobility supine-sit;sit-supine (P)   -AT     All Activities, [place] (Bed Mobility) dependent (less than 25% patient effort) (P)   -AT     Supine-Sit [place] (Bed Mobility) dependent (less than 25% patient effort) (P)   -AT     Sit-Supine [place] (Bed Mobility) dependent (less than 25% patient effort) (P)   -AT     Bed Mobility, Safety Issues cognitive deficits limit understanding;decreased use of arms for pushing/pulling;decreased use of legs for bridging/pushing (P)   -AT     Assistive Device (Bed Mobility) bed rails;draw sheet;head of bed elevated (P)   -AT     Row Name 03/01/23 1418          Transfers    Transfers other (see comments) (P)   not tested  -AT     Row Name 03/01/23 1418          Gait/Stairs (Locomotion)    Gait/Stairs Locomotion other (see comments) (P)   not tested  -AT     Row Name 03/01/23 1418          Balance    Balance Assessment sitting dynamic balance (P)   -AT     Dynamic Sitting Balance minimal assist (P)   -AT     Position, Sitting Balance supported;sitting edge of bed (P)   -AT     Row Name 03/01/23 1418          Motor Skills    Therapeutic Exercise hip;knee (P)   -AT     Row Name 03/01/23 1418          Hip (Therapeutic Exercise)    Hip (Therapeutic Exercise) strengthening exercise (P)   -AT     Hip Strengthening (Therapeutic Exercise) bilateral;aBduction;aDduction;marching while seated;10 repetitions (P)   Patient required James with verbal and tactile cues  -AT     Row Name 03/01/23 1418          Knee (Therapeutic Exercise)    Knee (Therapeutic Exercise) strengthening exercise (P)   -AT     Knee Strengthening (Therapeutic Exercise) bilateral;LAQ (long arc quad);10 repetitions (P)   Patient required tactile/verbal cues with min assist.  -AT     Row Name             Wound 02/22/23 1800 posterior coccyx Pressure Injury    Wound - Properties Group Placement Date: 02/22/23  -HL Placement Time: 1800  -HL Present on Hospital Admission: N  -HL Orientation: posterior  -HL  Location: coccyx  -HL Primary Wound Type: Pressure inj  -NH    Retired Wound - Properties Group Placement Date: 02/22/23  -HL Placement Time: 1800  -HL Present on Hospital Admission: N  -HL Orientation: posterior  -HL Location: coccyx  -HL Primary Wound Type: Pressure inj  -NH    Retired Wound - Properties Group Date first assessed: 02/22/23  -HL Time first assessed: 1800  -HL Present on Hospital Admission: N  -HL Location: coccyx  -HL Primary Wound Type: Pressure inj  -NH    Row Name             Wound 02/23/23 1136 Left posterior heel Pressure Injury    Wound - Properties Group Placement Date: 02/23/23  -NH Placement Time: 1136  -NH Side: Left  -NH Orientation: posterior  -NH Location: heel  -NH Primary Wound Type: Pressure inj  -NH    Retired Wound - Properties Group Placement Date: 02/23/23  -NH Placement Time: 1136  -NH Side: Left  -NH Orientation: posterior  -NH Location: heel  -NH Primary Wound Type: Pressure inj  -NH    Retired Wound - Properties Group Date first assessed: 02/23/23  -NH Time first assessed: 1136  -NH Side: Left  -NH Location: heel  -NH Primary Wound Type: Pressure inj  -NH    Row Name             Wound 02/23/23 1136 Right posterior heel Pressure Injury    Wound - Properties Group Placement Date: 02/23/23 -NH Placement Time: 1136  -NH Side: Right  -NH Orientation: posterior  -NH Location: heel  -NH Primary Wound Type: Pressure inj  -NH    Retired Wound - Properties Group Placement Date: 02/23/23 -NH Placement Time: 1136  -NH Side: Right  -NH Orientation: posterior  -NH Location: heel  -NH Primary Wound Type: Pressure inj  -NH    Retired Wound - Properties Group Date first assessed: 02/23/23  -NH Time first assessed: 1136  -NH Side: Right  -NH Location: heel  -NH Primary Wound Type: Pressure inj  -NH    Row Name             Wound 02/23/23 Left lateral knee Pressure Injury    Wound - Properties Group Placement Date: 02/23/23  -NH Side: Left  -NH Orientation: lateral  -NH Location: knee  -NH  Primary Wound Type: Pressure inj  -NH    Retired Wound - Properties Group Placement Date: 02/23/23 -NH Side: Left  -NH Orientation: lateral  -NH Location: knee  -NH Primary Wound Type: Pressure inj  -NH    Retired Wound - Properties Group Date first assessed: 02/23/23  -NH Side: Left  -NH Location: knee  -NH Primary Wound Type: Pressure inj  -NH    Row Name             Wound 02/23/23 1136 Right medial foot Pressure Injury    Wound - Properties Group Placement Date: 02/23/23 -NH Placement Time: 1136  -NH Side: Right  -NH Orientation: medial  -NH Location: foot  -NH Primary Wound Type: Pressure inj  -NH    Retired Wound - Properties Group Placement Date: 02/23/23 -NH Placement Time: 1136  -NH Side: Right  -NH Orientation: medial  -NH Location: foot  -NH Primary Wound Type: Pressure inj  -NH    Retired Wound - Properties Group Date first assessed: 02/23/23 -NH Time first assessed: 1136  -NH Side: Right  -NH Location: foot  -NH Primary Wound Type: Pressure inj  -NH    Row Name             Wound 02/23/23 1136 Right lateral foot Pressure Injury    Wound - Properties Group Placement Date: 02/23/23 -NH Placement Time: 1136  -NH Side: Right  -NH Orientation: lateral  -NH Location: foot  -NH Primary Wound Type: Pressure inj  -NH    Retired Wound - Properties Group Placement Date: 02/23/23 -NH Placement Time: 1136  -NH Side: Right  -NH Orientation: lateral  -NH Location: foot  -NH Primary Wound Type: Pressure inj  -NH    Retired Wound - Properties Group Date first assessed: 02/23/23 -NH Time first assessed: 1136  -NH Side: Right  -NH Location: foot  -NH Primary Wound Type: Pressure inj  -NH    Row Name             Wound 02/23/23 1136 Left lateral foot Pressure Injury    Wound - Properties Group Placement Date: 02/23/23 -NH Placement Time: 1136  -NH Side: Left  -NH Orientation: lateral  -NH Location: foot  -NH Primary Wound Type: Pressure inj  -NH    Retired Wound - Properties Group Placement Date: 02/23/23 -NH  Placement Time: 1136  -NH Side: Left  -NH Orientation: lateral  -NH Location: foot  -NH Primary Wound Type: Pressure inj  -NH    Retired Wound - Properties Group Date first assessed: 02/23/23  -NH Time first assessed: 1136  -NH Side: Left  -NH Location: foot  -NH Primary Wound Type: Pressure inj  -NH    Row Name             Wound 02/23/23 Right lower leg Skin Tear    Wound - Properties Group Placement Date: 02/23/23  -NH Side: Right  -NH Orientation: lower  -NH Location: leg  -NH Primary Wound Type: Skin tear  -NH    Retired Wound - Properties Group Placement Date: 02/23/23  -NH Side: Right  -NH Orientation: lower  -NH Location: leg  -NH Primary Wound Type: Skin tear  -NH    Retired Wound - Properties Group Date first assessed: 02/23/23  -NH Side: Right  -NH Location: leg  -NH Primary Wound Type: Skin tear  -NH    Row Name             Wound 02/23/23 1136 Left anterior great toe Pressure Injury    Wound - Properties Group Placement Date: 02/23/23  -NH Placement Time: 1136  -NH Side: Left  -NH Orientation: anterior  -NH Location: great toe  -NH Primary Wound Type: Pressure inj  -NH    Retired Wound - Properties Group Placement Date: 02/23/23  -NH Placement Time: 1136  -NH Side: Left  -NH Orientation: anterior  -NH Location: great toe  -NH Primary Wound Type: Pressure inj  -NH    Retired Wound - Properties Group Date first assessed: 02/23/23  -NH Time first assessed: 1136  -NH Side: Left  -NH Location: great toe  -NH Primary Wound Type: Pressure inj  -NH    Row Name 03/01/23 1418          Progress Summary (PT)    Daily Progress Summary (PT) Patient tolerated seated therex on the edge of the bed today. He required repetitive verbal and tactile cues for instructions. Patient will continue to benefit from physical therapy services while in the hospital. (P)   -AT           User Key  (r) = Recorded By, (t) = Taken By, (c) = Cosigned By    Initials Name Provider Type    Nelia Teran RN Registered Nurse    SHARAN James  [place], RN Registered Nurse    AT Tmaar Arias, PT Student PT Student                Physical Therapy Education     Title: PT OT SLP Therapies (Done)     Topic: Physical Therapy (Done)     Point: Mobility training (Done)     Learning Progress Summary           Family Acceptance, E, VU,DU by  at 2/28/2023 0957    Acceptance, E, VU,NR by  at 2/27/2023 0956                   Point: Precautions (Done)     Learning Progress Summary           Family Acceptance, E, VU,DU by  at 2/28/2023 0957    Acceptance, E, VU,NR by  at 2/27/2023 0956                               User Key     Initials Effective Dates Name Provider Type Discipline     01/19/22 -  Josr Sheridan, RN Registered Nurse Nurse              PT Recommendation and Plan     Progress Summary (PT)  Daily Progress Summary (PT): (P) Patient tolerated seated therex on the edge of the bed today. He required repetitive verbal and tactile cues for instructions. Patient will continue to benefit from physical therapy services while in the hospital.   Outcome Measures     Row Name 03/01/23 1424 02/27/23 0935          How much help from another person do you currently need...    Turning from your back to your side while in flat bed without using bedrails? 2 (P)   -AT 1  -DK     Moving from lying on back to sitting on the side of a flat bed without bedrails? 1 (P)   -AT 1  -DK     Moving to and from a bed to a chair (including a wheelchair)? 1 (P)   -AT 1  -DK     Standing up from a chair using your arms (e.g., wheelchair, bedside chair)? 1 (P)   -AT 1  -DK     Climbing 3-5 steps with a railing? 1 (P)   -AT 1  -DK     To walk in hospital room? 1 (P)   -AT 1  -DK     AM-PAC 6 Clicks Score (PT) 7 (P)   -AT 6  -DK        Functional Assessment    Outcome Measure Options AM-PAC 6 Clicks Basic Mobility (PT) (P)   -AT AM-PAC 6 Clicks Basic Mobility (PT)  -DK           User Key  (r) = Recorded By, (t) = Taken By, (c) = Cosigned By    Initials Name Provider Type    DK  Lavinia Mathur, PTA Physical Therapist Assistant    AT Tamar Arias, PT Student PT Student                 Time Calculation:    PT Charges     Row Name 03/01/23 1425             Time Calculation    PT Received On 03/01/23 (P)   -AT         Timed Charges    10856 - PT Therapeutic Exercise Minutes 10 (P)   -AT      15812 - PT Therapeutic Activity Minutes 5 (P)   -AT         Total Minutes    Timed Charges Total Minutes 15 (P)   -AT       Total Minutes 15 (P)   -AT            User Key  (r) = Recorded By, (t) = Taken By, (c) = Cosigned By    Initials Name Provider Type    AT Tamar Arias PT Student PT Student              Therapy Charges for Today     Code Description Service Date Service Provider Modifiers Qty    [id number] HC PT THER PROC EA 15 MIN 3/1/2023 Tamar Arias, PT Student GP 1          PT G-Codes  Outcome Measure Options: (P) AM-PAC 6 Clicks Basic Mobility (PT)  AM-PAC 6 Clicks Score (PT): (P) 7  AM-PAC 6 Clicks Score (OT): 6    Tamar Arias PT Student  3/1/2023

## 2023-03-01 NOTE — PROGRESS NOTES
[facility]   Hospitalist Progress Note  Date: 2023  Patient Name: Dhaval Nieto  : 1955  MRN: [id number]  Date of admission: 2023      Subjective   Subjective     Chief complaint: Weakness     Summary:  67-year-old male with history of COPD, chronic atrial fibrillation, depression, BPH with LUTS, Parkinson's disease, Lewy body dementia, diabetes mellitus, essential hypertension, GERD without esophagitis, CAD status post CABG, seizure disorder, history of mitral valve replacement with bioprosthetic mitral valve, chronic diastolic CHF, was hospitalized on 2023 with chief complaint of weakness, unable to eat or drink, unable to speak, found to be septic secondary to aspiration, RADHA, hyperglycemia, placed in the critical care unit for overflow, critical care consulted, hypernatremia, free water deficit, IV fluids ordered for replacement, sodium corrected, await reevaluation for speech therapy to remove cortrack and allow oral intake     Interval follow-up: wife at bedside, patient awake, cleared by speech to take some po, wife asking about home meds    Review of systems:  Unable to obtain review of systems due to weakness and fatigue, unable to stay focused answer all questions    Objective   Objective     Vitals:   Temp:  [97.7 °F (36.5 °C)-99 °F (37.2 °C)] 99 °F (37.2 °C)  Heart Rate:  [82-96] 93  Resp:  [18-20] 18  BP: ()/() 105/57  Physical Exam    Constitutional: Awake, alert, nad              Eyes: Pupils equal, sclerae anicteric, no conjunctival injection              HENT: NCAT, mucous membranes moist              Neck: Supple, no palpable masses              Respiratory: Diminished breath sounds bilaterally, wheezing throughout              Cardiovascular: Irregular rate, irregular rhythm, 2 out of 6 systolic murmurs              Gastrointestinal: Positive bowel sounds, soft, nontender, nondistended              Musculoskeletal: No bilateral ankle edema, no  clubbing or cyanosis to extremities              Psychiatric: Pleasant mood and affect              Neurologic: Awake, alert              Skin: No rashes visible on exposed skin    Result Review    Result Review:  I have personally reviewed the pertinent results from the past 24 hours to 2/28/2023 20:33 EST and agree with these findings:  [x]  Laboratory   CBC    CBC 2/26/23 2/27/23 2/28/23   WBC 10.54 9.86 11.80 (A)   RBC 3.20 (A) 3.27 (A) 3.40 (A)   Hemoglobin 9.9 (A) 10.1 (A) 10.6 (A)   Hematocrit 28.8 (A) 28.5 (A) 30.0 (A)   MCV 90.0 87.2 88.2   MCH 30.9 30.9 31.2   MCHC 34.4 35.4 35.3   RDW 13.9 13.9 14.3   Platelets 131 (A) 130 (A) 142   (A) Abnormal value            BMP    BMP 2/26/23 2/27/23 2/28/23   BUN 12 9 11   Creatinine 0.85 0.65 (A) 0.64 (A)   Sodium 140 137 137   Potassium 3.8 3.8 3.9   Chloride 107 105 106   CO2 25.0 23.6 25.0   Calcium 8.3 (A) 8.2 (A) 8.5 (A)   (A) Abnormal value            LIVER FUNCTION TESTS:      Lab 02/28/23  0537 02/27/23  0518 02/26/23  0607 02/25/23  0528 02/24/23  0453 02/23/23  0504 02/22/23  0401 02/22/23  0401 02/21/23  2323   TOTAL PROTEIN 5.2* 5.1* 4.7* 4.6* 5.5* 6.0  --  6.5 7.0   ALBUMIN 3.1* 2.9* 2.7* 2.8* 3.2* 3.6  --  4.0 4.1   GLOBULIN  --   --   --   --   --   --   --  2.5 2.9   ALT (SGPT) 15 11 10 10 10 11  --  11 13   AST (SGOT) 16 14 16 11 12 8  --  14 13   BILIRUBIN 0.3 0.5 0.5 0.3 0.4 0.6  --  0.8 0.6   INDIRECT BILIRUBIN  --  0.3  --   --   --  0.4  --   --   --    BILIRUBIN DIRECT <0.2 0.2 <0.2 <0.2 <0.2 0.2   < >  --   --    ALK PHOS 78 76 75 74 71 79  --  85 91    < > = values in this interval not displayed.       [x]  Microbiology   Blood Culture   Date Value Ref Range Status   02/24/2023 No growth at 24 hours  Preliminary     BCID, PCR   Date Value Ref Range Status   02/22/2023 (A) Negative by BCID PCR. Culture to Follow. Final    Staph spp, not aureus or lugdunensis. Identification by BCID2 PCR.     No results found for: CULTURES, HSVCX, URCX  No  results found for: EYECULTURE, GCCX, HSVCULTURE, LABHSV  No results found for: LEGIONELLA, MRSACX, MUMPSCX, MYCOPLASCX  No results found for: NOCARDIACX, STOOLCX  No results found for: THROATCX, UNSTIMCULT, URINECX, CULTURE, VZVCULTUR  No results found for: VIRALCULTU, WOUNDCX    [x]  Radiology XR Foot 3+ View Left    Result Date: 2/11/2023  PROCEDURE: XR FOOT 3+ VW LEFT  COMPARISON: None  INDICATIONS: heel injury, foot pain  FINDINGS:  BONES: Normal.  No significant arthropathy or acute abnormality.  SOFT TISSUES: Negative.  No visible soft tissue swelling.  EFFUSION: None visible.  OTHER: Atherosclerotic vascular calcification is present.       No acute osseous abnormalities are identified in the left foot.      CARMEN GREGORY MD       Electronically Signed and Approved By: CARMEN GREGORY MD on 2/11/2023 at 14:50             CT Head Without Contrast    Result Date: 2/22/2023  PROCEDURE: CT HEAD WO CONTRAST  COMPARISON:  [facility], CT, CT HEAD WO CONTRAST, 3/20/2022, 5:30. INDICATIONS: Mental status change, unknown cause  PROTOCOL:   Standard imaging protocol performed    RADIATION:   DLP: 2008mGy*cm   MA and/or KV was adjusted to minimize radiation dose.     TECHNIQUE: Axial images of the head without intravenous contrast.  FINDINGS:  There is mild generalized atrophy.  There are mild chronic appearing changes in.  The ventricles have a normal size and configuration. There is no evidence of acute intracranial hemorrhage, mass or midline shift. No extra-axial fluid collections are identified. There are no skull fractures. The visualized paranasal sinuses and mastoid air cells are grossly clear.  IMPRESSION:  No acute intracranial abnormalities are identified.  CARMEN GREGORY MD       Electronically Signed and Approved By: CARMEN GREGORY MD on 2/22/2023 at 13:04             CT Chest Without Contrast Diagnostic    Result Date: 2/22/2023  PROCEDURE: CT CHEST WO CONTRAST DIAGNOSTIC   COMPARISONS: 2/21/2023; 12/7/2022.  INDICATIONS: DYSPNEA; SEPSIS.  TECHNIQUE: 542 CT images were created without the administration of contrast material.   PROTOCOL:   Standard CT imaging protocol performed.    RADIATION:   Total DLP: 240.2 mGy*cm   Automated exposure control was utilized to minimize radiation dose.  FINDINGS: Mild subsegmental atelectasis and/or fibrosis is (are) seen in the lung bases, new or increased in degree since the 12/7/2022 CT study.  No definite acute infiltrate is seen.  No pleural or pericardial effusion.  The patient has undergone median sternotomy.  There is a mitral valve prosthesis, as before.  A tiny hiatal hernia is present.  No cardiac enlargement.  Coronary artery calcifications are seen.  Atherosclerotic changes are present elsewhere.  No aneurysmal dilatation of the thoracic aorta is identified.  The maximum diameter of the ascending aorta is about 3.8 cm, which is at the upper limits of normal.  No acute findings are seen in the partially imaged upper abdomen.  There may be constipation.  The patient's upper extremities in the scan field of view obscure detail.  There is also motion artifact on the exam.  The best possible CT images were obtained, considering the patient's history of Parkinson's syndrome.  No acute fracture or acute malalignment is seen.  There are degenerative changes of the imaged spine.  No change in the CT appearance of the sternum.  No suspicious pulmonary nodules are seen.  The central tracheobronchial tree reveals minimal linear filling defects, which may be related to retained secretions, such as mucus.  These findings are probably new or increased since the prior exam.  No definite large obstructing endotracheal or endobronchial lesion is appreciated.  There is incidental transient venous gas identified, predominantly in the right supraclavicular region, likely iatrogenic in nature.       No significant acute findings are seen by nonenhanced CT.   Please see above comments for further detail.     Please note that portions of this note were completed with a voice recognition program.  JOAO RECINOS JR, MD       Electronically Signed and Approved By: JOAO RECINOS JR, MD on 2/22/2023 at 3:02              XR Chest 1 View    Result Date: 2/22/2023  PROCEDURE: XR CHEST 1 VW  COMPARISONS: 12/7/2022; 8/18/2022.  INDICATIONS: WEAK/DIZZY/AMS TRIAGE PROTOCOL/AMS/UNSPECIFIED WEAKNESS.  FINDINGS:  A single AP upright portable chest radiograph is provided for review.  The patient has undergone median sternotomy.  There is a mitral valve prosthesis, as before.  No acute infiltrate is seen.  No cardiac enlargement.  External artifacts obscure detail.  There is chronic calcified granulomatous disease of the chest.  The patient has undergone cholecystectomy, as well.  No pleural effusion.  No pneumothorax.  The thoracic aorta is atherosclerotic.        No acute cardiopulmonary disease is appreciated.  No cardiac enlargement.      Please note that portions of this note were completed with a voice recognition program.  JOAO RECINOS JR, MD       Electronically Signed and Approved By: JOAO RECINOS JR, MD on 2/22/2023 at 1:19              US Renal Bilateral    Result Date: 2/22/2023  PROCEDURE: US RENAL BILATERAL  COMPARISON: None  INDICATIONS: zeferino  FINDINGS:  The right kidney has a maximal pole to pole length of 9.8 cm.  The left kidney has a maximal pole to pole length of 9.7 cm.  There is an incidental right renal cyst measuring 2.2 x 1.8 x 2.2 cm.  There is an incidental lower pole left renal cyst measuring 4.1 x 3.4 x 2.7 cm.  There is no evidence of hydronephrosis or hydroureter.  The urinary bladder has a normal sonographic appearance.  The prostate appears enlarged.         1. Bilateral simple renal cysts 2. No evidence of obstructive uropathy 3. Enlarged prostate      Kaden Kuhn MD       Electronically Signed and Approved By: Kaden Kuhn MD on 2/22/2023 at  14:03             XR Abdomen KUB    Result Date: 2/23/2023  PROCEDURE: XR ABDOMEN KUB  COMPARISON: None  INDICATIONS: feeding tube verification  FINDINGS:  Feeding tube tip appears postpyloric with tip terminating near the 4th portion of the duodenum and ligament of Treitz region.  There is a nonspecific nonobstructive bowel gas pattern.  There is a moderate to large colonic stool burden.  There is a prosthetic cardiac valve.        1. Feeding tube tip appears post pyloric with tip terminating at the 4th portion of the duodenum and ligament of Treitz region.     NICK GOMEZ MD       Electronically Signed and Approved By: NICK GOMEZ MD on 2/23/2023 at 17:24               [x]  EKG/Telemetry   []  Cardiology/Vascular   []  Pathology  [x]  Old records  []  Other:    Assessment & Plan   Assessment / Plan     Assessment:  · Sepsis secondary to concern for aspiration versus viral etiology  · Gram-positive cocci bacteremia ruled contamination of specimen  · Concern for aspiration  · Acute metabolic encephalopathy secondary to hypernatremia and dehydration  · COPD with acute exacerbation  · Hypoglycemia  · Physical debility  · Hypernatremia due to volume depletion  · RADHA  · Hypercalcemia due to dehydration  · COPD  · Chronic atrial fibrillation  · Depression  · BPH with LUTS  · Parkinson's disease  · Lewy body dementia  · Diabetes mellitus with hyperglycemia and hypoglycemia  · Essential hypertension  · GERD without esophagitis  · CAD status post CABG  · Seizure disorder  · History of mitral valve replacement bioprosthetic mitral valve  · Chronic diastolic CHF     Plan:  · Mood and mentation has improved  · Diet per speech  · I will review him home medications and make adjustments as needed  · Labs in am  · Will reach out to his daughter tomorrow per family request    garrison rn  DVT prophylaxis:  Medical DVT prophylaxis orders are present.    CODE STATUS:   Medical Intervention Limits: NO intubation (DNI)  Level Of  Support Discussed With: Next of Kin (If No Surrogate); Health Care Surrogate  Code Status (Patient has no pulse and is not breathing): CPR (Attempt to Resuscitate)  Medical Interventions (Patient has pulse or is breathing): Limited Support  Release to patient: Routine Release    Electronically signed by Kaden Sepulveda MD, 02/28/23, 8:39 PM EST.\

## 2023-03-01 NOTE — CONSULTS
Nutrition Services    Patient Name: Dhaval Nieto  YOB: 1955  MRN: [id number]  Admission date: 2/21/2023      CLINICAL NUTRITION ASSESSMENT      Reason for Assessment  Follow-up protocol, EN   H&P:    Past Medical History:   Diagnosis Date   • Arteriovenous malformation 03/09/1980   • Arthritis    • Asthma    • Benign essential hypertension    • Bladder disorder    • Bleeding disorder ([facility])    • Blood disease    • BPH with urinary obstruction 05/30/2014   • Brain concussion 1999   • Cancer ([facility])    • Cervical disc disorder long time   • Cervical radiculopathy 05/01/2015    left   • Cervical spinal stenosis 03/10/2020   • Cervicalgia 12/18/2018   • Chest pain    • CHF (congestive heart failure) ([facility])    • Clotting disorder ([facility]) [place]   • Colon cancer ([facility]) 12/18/2018   • Condition not found     Hernia   • Condition not found     herniated disc   • COPD (chronic obstructive pulmonary disease) ([facility])    • Coronary artery disease    • CTS (carpal tunnel syndrome) 08/04/2002   • DDD (degenerative disc disease), thoracic 12/18/2018   • Decreased sensation 05/01/2015    left   • Deep vein thrombosis ([facility])    • Depression    • Diabetes ([facility])    • Diabetes mellitus type 1 with coma, insulin dependent ([facility])     controlled   • Headache    • Heart murmur    • Heart valve disease    • Hematuria, gross 05/30/2014   • Hyperlipemia    • Hyperlipidemia    • Hypertension    • Insomnia    • Leg pain    • Lewy body dementia ([facility])    • Limb swelling    • Loss of balance 12/18/2018   • Low back pain 03/10/2020   • Lumbar degenerative disc disease 12/18/2018   • Lumbar radiculopathy 03/10/2020   • Lumbosacral disc disease 05/05/1999   • Mid back pain 12/18/2018   • Migraines    • Muscle cramps    • Myocardial infarction ([facility])    • Neurologic disorder    • Pain in back    • Pain in joint    • Pain of cervical spine    • Pain, generalized    • Pulmonary arterial hypertension ([facility]) 1999   • Seasonal allergies   "  • Seizures (HCC) 2000   • Shortness of breath    • Sleep apnea    • Stomach disorder         Current Problems:   Active Hospital Problems    Diagnosis    • **Severe sepsis (HCC)         Nutrition/Diet History         Narrative     Pt consuming 50-75% of the pureed diet along with nectar thickened Boost Glucose Control.  Since po intake not consistent at 75% of meals or greater recommend to continue enteral feedings at this time.  Pt doesn't c/o of being full with diet & enteral nutrition at this time, if becomes a problem could change to night feedings.     Anthropometrics        Current Height, Weight Height: 175.3 cm (69\")  Weight: 51.5 kg (113 lb 8.6 oz)   Current BMI Body mass index is 16.77 kg/m². underweight       Weight Hx  Wt Readings from Last 30 Encounters:   03/01/23 0600 51.5 kg (113 lb 8.6 oz)   02/27/23 0600 50.1 kg (110 lb 7.2 oz)   02/25/23 0600 50.5 kg (111 lb 5.3 oz)   02/24/23 1118 48.1 kg (106 lb 0.7 oz)   02/24/23 0600 49.3 kg (108 lb 11 oz)   02/23/23 0350 54.7 kg (120 lb 9.5 oz)   02/21/23 2316 55 kg (121 lb 4.1 oz)   02/17/23 1350 66.7 kg (147 lb)   02/11/23 1401 67 kg (147 lb 11.3 oz)   01/17/23 1350 66.7 kg (147 lb)   11/09/22 0808 67.1 kg (147 lb 14.9 oz)   09/20/22 1916 66.4 kg (146 lb 6.2 oz)   09/10/22 0136 63.9 kg (140 lb 14 oz)   08/29/22 1354 67.3 kg (148 lb 6.4 oz)   08/15/22 0156 68.4 kg (150 lb 12.7 oz)   08/14/22 1818 71.1 kg (156 lb 12 oz)   08/11/22 1346 70.2 kg (154 lb 12.2 oz)   07/14/22 1428 65.8 kg (145 lb)   07/13/22 1359 68.3 kg (150 lb 9.2 oz)   07/07/22 1111 70.6 kg (155 lb 9.6 oz)   05/10/22 1301 72.6 kg (160 lb)   04/19/22 1409 76.7 kg (169 lb)   03/20/22 0351 76.7 kg (169 lb 1.5 oz)   03/17/22 1328 75.8 kg (167 lb)   03/08/22 0935 75.8 kg (167 lb 1.7 oz)   02/17/22 1331 78.9 kg (174 lb)   01/25/22 0927 78.9 kg (174 lb)   01/15/22 1240 79 kg (174 lb 2.6 oz)   01/13/22 1321 76 kg (167 lb 8.8 oz)   11/26/21 0912 76.3 kg (168 lb 3.4 oz)   09/07/21 1343 78 kg (172 lb) "   08/30/21 1512 78.9 kg (174 lb)   08/25/21 1356 79.7 kg (175 lb 12.8 oz)   07/13/21 1254 78.9 kg (173 lb 15.1 oz)   06/15/21 1400 78.6 kg (173 lb 3.2 oz)   05/13/21 0000 80.3 kg (177 lb)   04/27/21 0000 78.2 kg (172 lb 8 oz)            Wt Change Observation -22.6% x 6 months     Estimated/Assessed Needs       Energy Requirements 35 kcal/kg   EST Needs (kcal/day) 1915 kcal        Protein Requirements 1.5-2 g/kg   EST Daily Needs (g/day)  g       Fluid Requirements 30 ml/kg    Estimated Needs (mL/day) 1650 ml      Labs/Medications         Pertinent Labs Reviewed.   Results from last 7 days   Lab Units 03/01/23  0555 02/28/23  0537 02/27/23  0518 02/26/23  0607   SODIUM mmol/L 137 137 137  --    POTASSIUM mmol/L 4.0 3.9 3.8  --    CHLORIDE mmol/L 103 106 105  --    CO2 mmol/L 26.5 25.0 23.6  --    BUN mg/dL 8 11 9  --    CREATININE mg/dL 0.66* 0.64* 0.65*  --    CALCIUM mg/dL 8.6 8.5* 8.2*  --    BILIRUBIN mg/dL  --  0.3 0.5 0.5   ALK PHOS U/L  --  78 76 75   ALT (SGPT) U/L  --  15 11 10   AST (SGOT) U/L  --  16 14 16   GLUCOSE mg/dL 216* 171* 199*  --      Results from last 7 days   Lab Units 03/01/23  0555 02/28/23  0537 02/27/23  0518 02/26/23  0607   MAGNESIUM mg/dL  --  1.9 1.8 1.8   PHOSPHORUS mg/dL 2.0* 2.4* 2.1* 2.0*   HEMOGLOBIN g/dL 10.5* 10.6* 10.1* 9.9*   HEMATOCRIT % 29.9* 30.0* 28.5* 28.8*     Lab Results   Component Value Date    HGBA1C 9.8 (H) 07/08/2019         Pertinent Medications Reviewed.     Current Nutrition Orders & Evaluation of Intake       Oral Nutrition     Current PO Diet Diet: Regular/House Diet; Texture: Pureed (NDD 1); Fluid Consistency: Nectar Thick   Supplement Orders Placed This Encounter      DIET MESSAGE Please send regular guest tray with all meals.      Place Feeding Tube Per Can[facility]P System      Diet, Tube Feeding Tube Feeding Formula: Diabetisource AC; Tube Feeding Type: Continuous; Continuous Tube Feeding Start Rate (mL/hr): 22; Then Advance Rate By (mL/hr): RD To Manage;  Every __ Hours: Patient at Goal Rate; To Goal Rate of (mL/hr): RD...      Dietary Nutrition Supplements Boost Glucose Control (Glucerna Shake); vanilla       Malnutrition Severity Assessment      Patient meets criteria for : Severe Malnutrition         Nutrition Diagnosis         Nutrition Dx Problem 1 Severe malnutrition related to decreased ability to consume sufficient energy as evidenced by inadequate energy intake., unintended wt change., body composition changes., family report., SLP/swallow eval, report of minimal PO intake. and impaired skin integrity     Nutrition Intervention         Diet per SLP  Continue thickened Boost Glucose control BID (570 kcal & 48 gm protein)  Continue Cortrak at current rate for now as pt beginning to eat.        Medical Nutrition Therapy/Nutrition Education          Learner     Readiness Patient  Acceptance     Method     Response Explanation  Verbalizes understanding     Monitor/Evaluation        Monitor PO intake, Supplement intake, Pertinent labs, EN delivery/tolerance, Weight, Swallow function, RFS     Nutrition Discharge Plan         To be determined     Electronically signed by:  Disha Gan RD  03/01/23 16:19 EST

## 2023-03-01 NOTE — THERAPY TREATMENT NOTE
Acute Care - Speech Language Pathology   Swallow Treatment Note [facility]     Patient Name: Dhaval Nieto  : 1955  MRN: [id number]  Today's Date: 3/1/2023               Admit Date: 2023    Visit Dx:     ICD-10-CM ICD-9-CM   1. Dehydration  E86.0 276.51   2. Acute kidney injury ([facility])  N17.9 584.9   3. Leukocytosis, unspecified type  D72.829 288.60   4. Hypoglycemia  E16.2 251.2   5. Severe dementia due to Parkinson's disease, without behavioral disturbance, psychotic disturbance, mood disturbance, or anxiety ([facility])  G20 332.0    F02.C0 294.10   6. Aspiration into airway, initial encounter  T17.908A 934.9   7. Oropharyngeal dysphagia  R13.12 787.22   8. Decreased activities of daily living (ADL)  Z78.9 V49.89   9. Difficulty walking  R26.2 719.7     Patient Active Problem List   Diagnosis   • Chronic bronchitis, unspecified chronic bronchitis type ([facility])   • Right wrist pain   • Pain disorder with psychological factors   • Bladder disorder   • Abdominal hernia   • Stomach disorder   • Arthritis   • Asthma   • BPH with urinary obstruction   • Chest pain   • Decreased sensation   • Depression   • History of colon cancer   • Hyperlipemia   • Hyperlipemia, mixed   • Hypertension, essential   • Insomnia   • Limb swelling   • Loss of balance   • Low back pain   • Thoracic back pain   • Lumbar degenerative disc disease   • Degeneration of thoracic or thoracolumbar intervertebral disc   • Lumbar radiculopathy   • Migraines   • Muscle cramps   • Neck pain   • Neurologic disorder   • Headache   • Pain, generalized   • Shortness of breath   • Cervical radiculopathy   • Cervical stenosis of spinal canal   • Seasonal allergic rhinitis   • Leg pain   • Cancer of sigmoid colon ([facility])   • Closed fracture of left hip, initial encounter ([facility])   • Dysuria   • Type 2 diabetes mellitus, with long-term current use of insulin ([facility])   • Severe malnutrition ([facility])   • Memory loss   • Aftercare following surgery of left  total hip arthroplasty, 8/15/2022   • Paroxysmal atrial fibrillation ([facility])   • Severe sepsis ([facility])     Past Medical History:   Diagnosis Date   • Arteriovenous malformation 03/09/1980   • Arthritis    • Asthma    • Benign essential hypertension    • Bladder disorder    • Bleeding disorder ([facility])    • Blood disease    • BPH with urinary obstruction 05/30/2014   • Brain concussion 1999   • Cancer ([facility])    • Cervical disc disorder long time   • Cervical radiculopathy 05/01/2015    left   • Cervical spinal stenosis 03/10/2020   • Cervicalgia 12/18/2018   • Chest pain    • CHF (congestive heart failure) ([facility])    • Clotting disorder ([facility]) [place]   • Colon cancer ([facility]) 12/18/2018   • Condition not found     Hernia   • Condition not found     herniated disc   • COPD (chronic obstructive pulmonary disease) ([facility])    • Coronary artery disease    • CTS (carpal tunnel syndrome) 08/04/2002   • DDD (degenerative disc disease), thoracic 12/18/2018   • Decreased sensation 05/01/2015    left   • Deep vein thrombosis ([facility])    • Depression    • Diabetes ([facility])    • Diabetes mellitus type 1 with coma, insulin dependent ([facility])     controlled   • Headache    • Heart murmur    • Heart valve disease    • Hematuria, gross 05/30/2014   • Hyperlipemia    • Hyperlipidemia    • Hypertension    • Insomnia    • Leg pain    • Lewy body dementia ([facility])    • Limb swelling    • Loss of balance 12/18/2018   • Low back pain 03/10/2020   • Lumbar degenerative disc disease 12/18/2018   • Lumbar radiculopathy 03/10/2020   • Lumbosacral disc disease 05/05/1999   • Mid back pain 12/18/2018   • Migraines    • Muscle cramps    • Myocardial infarction ([facility])    • Neurologic disorder    • Pain in back    • Pain in joint    • Pain of cervical spine    • Pain, generalized    • Pulmonary arterial hypertension ([facility]) 1999   • Seasonal allergies    • Seizures ([facility]) 2000   • Shortness of breath    • Sleep apnea    • Stomach disorder      Past Surgical History:   Procedure  Laterality Date   • ABDOMINAL SURGERY     • BLADDER SURGERY     • CARPAL TUNNEL RELEASE     • CEREBRAL ANGIOGRAM  2021   • COLON RESECTION     • COLONOSCOPY  01/30/2017    [place]   • CORONARY ARTERY BYPASS GRAFT  12/2019    one vessel   • GALLBLADDER SURGERY      cholecystecomy   • HERNIA REPAIR     • JOINT REPLACEMENT      joint surgery   • MITRAL VALVE REPAIR/REPLACEMENT  02/2019    [facility]   • OTHER SURGICAL HISTORY  05/30/2014    office cystoscopy w/DR SHANICE Young   • SHOULDER SURGERY Bilateral    • TOTAL HIP ARTHROPLASTY Left 8/15/2022    Procedure: TOTAL HIP ARTHROPLASTY ANTERIOR;  Surgeon: Kaden Green MD;  Location: [facility] OR;  Service: Orthopedics;  Laterality: Left;   • VASCULAR SURGERY  2020   • VENTRAL HERNIA REPAIR       SPEECH PATHOLOGY DYSPHAGIA TREATMENT    Subjective/Behavioral Observations: Awake, wife at bedside.  Wife reports patient constantly trying to remove cortrak overnight.        Day/time of Treatment: 3/1/2023        Current Diet: Puréed, nectar thickened liquids        Treatment received: Treatment focused on tolerance of current diet recommendations.  Also isolated trials of thin liquids        Results of treatment: Patient assisting with straw drink of nectar thickened liquids.  Single sips taken with mild delayed swallows completed.  Total assist for purée solids with mild delayed swallows completed.  Occasional double swallow however laryngeal sounds remain clear to auscultation.  Isolated trials of thin liquids via spoon with consistent wet throat clearing.  Patient did tolerate single sips of nectar thick liquids via straw, consuming approximately 120 mL without any overt signs or symptoms of aspiration.        Progress toward goals: Tolerating current diet recommendations of purée solids and nectar thickened liquids.  Continues to demonstrate signs of aspiration with thin liquid trials.        Barriers to Achieving goals: Medical status        Plan of  care:/changes in plan: Continue per diet recommendations, compensatory strategies and positioning.                 SLP Recommendation and Plan                                                                                       EDUCATION  The patient has been educated in the following areas:   Dysphagia (Swallowing Impairment).              Time Calculation:    Time Calculation- SLP     Row Name 03/01/23 1054             Time Calculation- SLP    SLP Stop Time 1000  -SN      SLP Received On 03/01/23  -SN         Untimed Charges    23645-FE Treatment Swallow Minutes 45  -SN         Total Minutes    Untimed Charges Total Minutes 45  -SN       Total Minutes 45  -SN            User Key  (r) = Recorded By, (t) = Taken By, (c) = Cosigned By    Initials Name Provider Type    SN Orly Stokes MS-CCC/SLP, MARIA ANTONIA Speech and Language Pathologist                Therapy Charges for Today     Code Description Service Date Service Provider Modifiers Qty    83183947799 HC ST TREATMENT SWALLOW 3 2/28/2023 Orly Stokes MS-CCC/SLP, CNT GN 1    47887005836 HC ST TREATMENT SWALLOW 3 3/1/2023 Orly Stokes MS-CCC/SLP, MARIA ANTONIA GN 1               KELLEY Myers/SLP, CNT  3/1/2023

## 2023-03-01 NOTE — PROGRESS NOTES
[facility]   Hospitalist Progress Note  Date: 3/1/2023  Patient Name: Dhaval Nieto  : 1955  MRN: [id number]  Date of admission: 2023      Subjective   Subjective     Chief complaint: Weakness     Summary:  67-year-old male with history of COPD, chronic atrial fibrillation, depression, BPH with LUTS, Parkinson's disease, Lewy body dementia, diabetes mellitus, essential hypertension, GERD without esophagitis, CAD status post CABG, seizure disorder, history of mitral valve replacement with bioprosthetic mitral valve, chronic diastolic CHF, was hospitalized on 2023 with chief complaint of weakness, unable to eat or drink, unable to speak, found to be septic secondary to aspiration, RADHA, hyperglycemia, placed in the critical care unit for overflow, critical care consulted, hypernatremia, free water deficit, IV fluids ordered for replacement, sodium corrected, await reevaluation for speech therapy to remove cortrack and allow oral intake     Interval follow-up: awake, more alert, no issues per nursing    Review of systems:  Unable to obtain review of systems  Objective   Objective     Vitals:   Temp:  [97.7 °F (36.5 °C)-99 °F (37.2 °C)] 97.7 °F (36.5 °C)  Heart Rate:  [82-98] 88  Resp:  [18-20] 18  BP: ()/(54-72) 99/54  Physical Exam    Constitutional: Awake, alert, nad              Eyes: Pupils equal, sclerae anicteric, no conjunctival injection              HENT: NCAT, mucous membranes moist              Neck: Supple, no palpable masses              Respiratory: Diminished breath sounds bilaterally, wheezing throughout              Cardiovascular: Irregular rate, irregular rhythm, 2 out of 6 systolic murmurs              Gastrointestinal: Positive bowel sounds, soft, nontender, nondistended              Musculoskeletal: No bilateral ankle edema, no clubbing or cyanosis to extremities              Psychiatric: Pleasant mood and affect              Neurologic: Awake, alert               Skin: No rashes visible on exposed skin    Result Review    Result Review:  I have personally reviewed the pertinent results from the past 24 hours to 3/1/2023 17:28 EST and agree with these findings:  [x]  Laboratory   CBC    CBC 2/27/23 2/28/23 3/1/23   WBC 9.86 11.80 (A) 11.24 (A)   RBC 3.27 (A) 3.40 (A) 3.38 (A)   Hemoglobin 10.1 (A) 10.6 (A) 10.5 (A)   Hematocrit 28.5 (A) 30.0 (A) 29.9 (A)   MCV 87.2 88.2 88.5   MCH 30.9 31.2 31.1   MCHC 35.4 35.3 35.1   RDW 13.9 14.3 14.6   Platelets 130 (A) 142 153   (A) Abnormal value            BMP    BMP 2/27/23 2/28/23 3/1/23   BUN 9 11 8   Creatinine 0.65 (A) 0.64 (A) 0.66 (A)   Sodium 137 137 137   Potassium 3.8 3.9 4.0   Chloride 105 106 103   CO2 23.6 25.0 26.5   Calcium 8.2 (A) 8.5 (A) 8.6   (A) Abnormal value            LIVER FUNCTION TESTS:      Lab 03/01/23  0555 02/28/23  0537 02/27/23  0518 02/26/23  0607 02/25/23  0528 02/24/23  0453 02/23/23  0504   TOTAL PROTEIN  --  5.2* 5.1* 4.7* 4.6* 5.5* 6.0   ALBUMIN 3.3* 3.1* 2.9* 2.7* 2.8* 3.2* 3.6   ALT (SGPT)  --  15 11 10 10 10 11   AST (SGOT)  --  16 14 16 11 12 8   BILIRUBIN  --  0.3 0.5 0.5 0.3 0.4 0.6   INDIRECT BILIRUBIN  --   --  0.3  --   --   --  0.4   BILIRUBIN DIRECT  --  <0.2 0.2 <0.2 <0.2 <0.2 0.2   ALK PHOS  --  78 76 75 74 71 79       [x]  Microbiology   Blood Culture   Date Value Ref Range Status   02/24/2023 No growth at 24 hours  Preliminary     BCID, PCR   Date Value Ref Range Status   02/22/2023 (A) Negative by BCID PCR. Culture to Follow. Final    Staph spp, not aureus or lugdunensis. Identification by BCID2 PCR.       [x]  Radiology   [x]  EKG/Telemetry   []  Cardiology/Vascular   []  Pathology  [x]  Old records  []  Other:    Assessment & Plan   Assessment / Plan     Assessment:  · Sepsis secondary to concern for aspiration versus viral etiology  · Gram-positive cocci bacteremia ruled contamination of specimen  · Concern for aspiration  · Acute metabolic encephalopathy secondary to  hypernatremia and dehydration  · COPD with acute exacerbation  · Hypoglycemia  · Physical debility  · Hypernatremia due to volume depletion  · RADHA  · Hypercalcemia due to dehydration  · COPD  · Chronic atrial fibrillation  · Depression  · BPH with LUTS  · Parkinson's disease  · Lewy body dementia  · Diabetes mellitus with hyperglycemia and hypoglycemia  · Essential hypertension  · GERD without esophagitis  · CAD status post CABG  · Seizure disorder  · History of mitral valve replacement bioprosthetic mitral valve  · Chronic diastolic CHF     Plan:  · Mood and mentation has improved  · Resumed majority of home meds  · Diet per speech  · Labs in am  · Will reach out to his daughter    dw rn  DVT prophylaxis:  Medical DVT prophylaxis orders are present.    CODE STATUS:   Medical Intervention Limits: NO intubation (DNI)  Level Of Support Discussed With: Next of Kin (If No Surrogate); Health Care Surrogate  Code Status (Patient has no pulse and is not breathing): CPR (Attempt to Resuscitate)  Medical Interventions (Patient has pulse or is breathing): Limited Support  Release to patient: Routine Release    Electronically signed by Kaden Sepulveda MD, 03/01/23, 5:29 PM EST.

## 2023-03-01 NOTE — PROGRESS NOTES
Respiratory Therapist Broncho-Pulmonary Hygiene Progress Note      Patient Name:  Dhaval Nieto  YOB: 1955    Dhaval Nieto meets the qualification for Level 1 of the [facility]-Pulmonary Hygiene Protocol. This was based on my daily patient assessment and includes review of chest x-ray results, cough ability and quality, oxygenation, secretions or risk for secretion development and patient mobility.     Broncho-Pulmonary Hygiene Assessment:    Level of Movement: Inability or reluctance to change body positions   Obtunded    Breath Sounds: Clear to slightly diminished    Cough: Ineffective, weak or not cough efforts    Chest X-Ray: Possible signs of consolidation and/or atelectasis or clear.     Sputum Productions: None or small amount of thin or watery secretions with effective cough    History and Physical: Chronic condition    SpO2 to Oxygen Need: greater than 92% on room air or  less than 3L nasal canula    Current SpO2 is: 100 on Room Air    Based on this information, I have completed the following interventions: Teach/Instruct patient on cough and deep breathe.  Patient is not able to complete Aerobika.  Patient's CXR shows no new infiltrate, BS are clear and patient is on room air.  Patient will most benefit from our current aspiration precautions.  We will continue to monitor and change BPH as needed.         Electronically signed by Gayla Dallas RRT, 03/01/23, 9:36 AM EST.

## 2023-03-01 NOTE — PLAN OF CARE
Goal Outcome Evaluation:  Plan of Care Reviewed With: patient        Progress: no change  Outcome Evaluation: Pt remains on tube feeds, at goal of 22ml/hr diabetasource. Pt wound care completed per orders. Pt started on home medications per NG tube, see MAR. Pt spouse remains at bedside. Pt in no apparent distress at this time, denies any needs currently, call light in reach.

## 2023-03-02 LAB
ALBUMIN SERPL-MCNC: 3.5 G/DL (ref 3.5–5.2)
ANION GAP SERPL CALCULATED.3IONS-SCNC: 7.2 MMOL/L (ref 5–15)
BASOPHILS # BLD AUTO: 0.03 10*3/MM3 (ref 0–0.2)
BASOPHILS NFR BLD AUTO: 0.2 % (ref 0–1.5)
BUN SERPL-MCNC: 9 MG/DL (ref 8–23)
BUN/CREAT SERPL: 12.7 (ref 7–25)
CALCIUM SPEC-SCNC: 9 MG/DL (ref 8.6–10.5)
CHLORIDE SERPL-SCNC: 103 MMOL/L (ref 98–107)
CO2 SERPL-SCNC: 25.8 MMOL/L (ref 22–29)
CREAT SERPL-MCNC: 0.71 MG/DL (ref 0.76–1.27)
DEPRECATED RDW RBC AUTO: 46 FL (ref 37–54)
EGFRCR SERPLBLD CKD-EPI 2021: 99.9 ML/MIN/1.73
EOSINOPHIL # BLD AUTO: 0.24 10*3/MM3 (ref 0–0.4)
EOSINOPHIL NFR BLD AUTO: 1.9 % (ref 0.3–6.2)
ERYTHROCYTE [DISTWIDTH] IN BLOOD BY AUTOMATED COUNT: 15.1 % (ref 12.3–15.4)
FOLATE SERPL-MCNC: 13.3 NG/ML (ref 4.78–24.2)
GLUCOSE BLDC GLUCOMTR-MCNC: 133 MG/DL (ref 70–99)
GLUCOSE BLDC GLUCOMTR-MCNC: 180 MG/DL (ref 70–99)
GLUCOSE BLDC GLUCOMTR-MCNC: 209 MG/DL (ref 70–99)
GLUCOSE BLDC GLUCOMTR-MCNC: 257 MG/DL (ref 70–99)
GLUCOSE SERPL-MCNC: 186 MG/DL (ref 65–99)
HCT VFR BLD AUTO: 31.5 % (ref 37.5–51)
HGB BLD-MCNC: 11 G/DL (ref 13–17.7)
IMM GRANULOCYTES # BLD AUTO: 0.08 10*3/MM3 (ref 0–0.05)
IMM GRANULOCYTES NFR BLD AUTO: 0.6 % (ref 0–0.5)
LYMPHOCYTES # BLD AUTO: 2.15 10*3/MM3 (ref 0.7–3.1)
LYMPHOCYTES NFR BLD AUTO: 16.9 % (ref 19.6–45.3)
MCH RBC QN AUTO: 31.2 PG (ref 26.6–33)
MCHC RBC AUTO-ENTMCNC: 34.9 G/DL (ref 31.5–35.7)
MCV RBC AUTO: 89.2 FL (ref 79–97)
MONOCYTES # BLD AUTO: 1.11 10*3/MM3 (ref 0.1–0.9)
MONOCYTES NFR BLD AUTO: 8.7 % (ref 5–12)
NEUTROPHILS NFR BLD AUTO: 71.7 % (ref 42.7–76)
NEUTROPHILS NFR BLD AUTO: 9.11 10*3/MM3 (ref 1.7–7)
NRBC BLD AUTO-RTO: 0 /100 WBC (ref 0–0.2)
PHOSPHATE SERPL-MCNC: 2.1 MG/DL (ref 2.5–4.5)
PLATELET # BLD AUTO: 183 10*3/MM3 (ref 140–450)
PMV BLD AUTO: 10.5 FL (ref 6–12)
POTASSIUM SERPL-SCNC: 4.3 MMOL/L (ref 3.5–5.2)
RBC # BLD AUTO: 3.53 10*6/MM3 (ref 4.14–5.8)
SODIUM SERPL-SCNC: 136 MMOL/L (ref 136–145)
VIT B12 BLD-MCNC: 1155 PG/ML (ref 211–946)
WBC NRBC COR # BLD: 12.72 10*3/MM3 (ref 3.4–10.8)

## 2023-03-02 PROCEDURE — 82962 GLUCOSE BLOOD TEST: CPT

## 2023-03-02 PROCEDURE — 80069 RENAL FUNCTION PANEL: CPT | Performed by: INTERNAL MEDICINE

## 2023-03-02 PROCEDURE — 94760 N-INVAS EAR/PLS OXIMETRY 1: CPT

## 2023-03-02 PROCEDURE — 99233 SBSQ HOSP IP/OBS HIGH 50: CPT | Performed by: INTERNAL MEDICINE

## 2023-03-02 PROCEDURE — 63710000001 INSULIN REGULAR HUMAN PER 5 UNITS: Performed by: FAMILY MEDICINE

## 2023-03-02 PROCEDURE — 82607 VITAMIN B-12: CPT | Performed by: INTERNAL MEDICINE

## 2023-03-02 PROCEDURE — 85025 COMPLETE CBC W/AUTO DIFF WBC: CPT | Performed by: INTERNAL MEDICINE

## 2023-03-02 PROCEDURE — 25010000002 MORPHINE PER 10 MG: Performed by: STUDENT IN AN ORGANIZED HEALTH CARE EDUCATION/TRAINING PROGRAM

## 2023-03-02 PROCEDURE — 82746 ASSAY OF FOLIC ACID SERUM: CPT | Performed by: INTERNAL MEDICINE

## 2023-03-02 PROCEDURE — 94799 UNLISTED PULMONARY SVC/PX: CPT

## 2023-03-02 RX ORDER — HYDROXYZINE PAMOATE 25 MG/1
25 CAPSULE ORAL ONCE
Status: COMPLETED | OUTPATIENT
Start: 2023-03-02 | End: 2023-03-02

## 2023-03-02 RX ADMIN — CARBIDOPA AND LEVODOPA 1 TABLET: 25; 100 TABLET ORAL at 22:10

## 2023-03-02 RX ADMIN — ATORVASTATIN CALCIUM 40 MG: 40 TABLET, FILM COATED ORAL at 22:10

## 2023-03-02 RX ADMIN — Medication 10 ML: at 22:10

## 2023-03-02 RX ADMIN — INSULIN HUMAN 3 UNITS: 100 INJECTION, SOLUTION PARENTERAL at 18:17

## 2023-03-02 RX ADMIN — BACITRACIN 0.9 G: 500 OINTMENT TOPICAL at 09:34

## 2023-03-02 RX ADMIN — INSULIN HUMAN 5 UNITS: 100 INJECTION, SOLUTION PARENTERAL at 06:27

## 2023-03-02 RX ADMIN — INSULIN HUMAN 8 UNITS: 100 INJECTION, SOLUTION PARENTERAL at 13:21

## 2023-03-02 RX ADMIN — FLUTICASONE PROPIONATE 2 SPRAY: 50 SPRAY, METERED NASAL at 06:34

## 2023-03-02 RX ADMIN — APIXABAN 5 MG: 5 TABLET, FILM COATED ORAL at 09:33

## 2023-03-02 RX ADMIN — ACETAMINOPHEN 1000 MG: 500 TABLET ORAL at 05:05

## 2023-03-02 RX ADMIN — CARBIDOPA AND LEVODOPA 1 TABLET: 25; 100 TABLET ORAL at 18:17

## 2023-03-02 RX ADMIN — CARBIDOPA AND LEVODOPA 1 TABLET: 25; 100 TABLET ORAL at 09:33

## 2023-03-02 RX ADMIN — DONEPEZIL HYDROCHLORIDE 10 MG: 10 TABLET, FILM COATED ORAL at 09:33

## 2023-03-02 RX ADMIN — PANTOPRAZOLE SODIUM 40 MG: 40 INJECTION, POWDER, FOR SOLUTION INTRAVENOUS at 05:05

## 2023-03-02 RX ADMIN — Medication 10 ML: at 09:33

## 2023-03-02 RX ADMIN — MORPHINE SULFATE 2 MG: 2 INJECTION, SOLUTION INTRAMUSCULAR; INTRAVENOUS at 23:55

## 2023-03-02 RX ADMIN — APIXABAN 5 MG: 5 TABLET, FILM COATED ORAL at 22:10

## 2023-03-02 RX ADMIN — HYDROXYZINE PAMOATE 25 MG: 25 CAPSULE ORAL at 05:05

## 2023-03-02 NOTE — SIGNIFICANT NOTE
Wound Eval / Progress Noted    LITA Smyth     Patient Name: Dhaval Nieto  : 1955  MRN: [id number]  Today's Date: 3/2/2023                 Admit Date: 2023    Visit Dx:    ICD-10-CM ICD-9-CM   1. Dehydration  E86.0 276.51   2. Acute kidney injury ([facility])  N17.9 584.9   3. Leukocytosis, unspecified type  D72.829 288.60   4. Hypoglycemia  E16.2 251.2   5. 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 Aspiration into airway, initial encounter  T17.908A 934.9   7. Oropharyngeal dysphagia  R13.12 787.22   8. Decreased activities of daily living (ADL)  Z78.9 V49.89   9. Difficulty walking  R26.2 719.7       Patient Active Problem List   Diagnosis   • Chronic bronchitis, unspecified chronic bronchitis type ([facility])   • Right wrist pain   • Pain disorder with psychological factors   • Bladder disorder   • Abdominal hernia   • Stomach disorder   • Arthritis   • Asthma   • BPH with urinary obstruction   • Chest pain   • Decreased sensation   • Depression   • History of colon cancer   • Hyperlipemia   • Hyperlipemia, mixed   • Hypertension, essential   • Insomnia   • Limb swelling   • Loss of balance   • Low back pain   • Thoracic back pain   • Lumbar degenerative disc disease   • Degeneration of thoracic or thoracolumbar intervertebral disc   • Lumbar radiculopathy   • Migraines   • Muscle cramps   • Neck pain   • Neurologic disorder   • Headache   • Pain, generalized   • Shortness of breath   • Cervical radiculopathy   • Cervical stenosis of spinal canal   • Seasonal allergic rhinitis   • Leg pain   • Cancer of sigmoid colon ([facility])   • Closed fracture of left hip, initial encounter ([facility])   • Dysuria   • Type 2 diabetes mellitus, with long-term current use of insulin ([facility])   • Severe malnutrition ([facility])   • Memory loss   • Aftercare following surgery of left total hip arthroplasty,  8/15/2022   • Paroxysmal atrial fibrillation ([facility])   • Severe sepsis ([facility])        Past Medical History:   Diagnosis Date   • Arteriovenous malformation 03/09/1980   • Arthritis    • Asthma    • Benign essential hypertension    • Bladder disorder    • Bleeding disorder ([facility])    • Blood disease    • BPH with urinary obstruction 05/30/2014   • Brain concussion 1999   • Cancer ([facility])    • Cervical disc disorder long time   • Cervical radiculopathy 05/01/2015    left   • Cervical spinal stenosis 03/10/2020   • Cervicalgia 12/18/2018   • Chest pain    • CHF (congestive heart failure) ([facility])    • Clotting disorder ([facility]) [place]   • Colon cancer ([facility]) 12/18/2018   • Condition not found     Hernia   • Condition not found     herniated disc   • COPD (chronic obstructive pulmonary disease) ([facility])    • Coronary artery disease    • CTS (carpal tunnel syndrome) 08/04/2002   • DDD (degenerative disc disease), thoracic 12/18/2018   • Decreased sensation 05/01/2015    left   • Deep vein thrombosis ([facility])    • Depression    • Diabetes ([facility])    • Diabetes mellitus type 1 with coma, insulin dependent ([facility])     controlled   • Headache    • Heart murmur    • Heart valve disease    • Hematuria, gross 05/30/2014   • Hyperlipemia    • Hyperlipidemia    • Hypertension    • Insomnia    • Leg pain    • Lewy body dementia ([facility])    • Limb swelling    • Loss of balance 12/18/2018   • Low back pain 03/10/2020   • Lumbar degenerative disc disease 12/18/2018   • Lumbar radiculopathy 03/10/2020   • Lumbosacral disc disease 05/05/1999   • Mid back pain 12/18/2018   • Migraines    • Muscle cramps    • Myocardial infarction ([facility])    • Neurologic disorder    • Pain in back    • Pain in joint    • Pain of cervical spine    • Pain, generalized    • Pulmonary arterial hypertension ([facility]) 1999   • Seasonal allergies    • Seizures ([facility]) 2000   • Shortness of breath    • Sleep apnea    • Stomach disorder         Past Surgical History:   Procedure Laterality Date    • ABDOMINAL SURGERY     • BLADDER SURGERY     • CARPAL TUNNEL RELEASE     • CEREBRAL ANGIOGRAM  2021   • COLON RESECTION     • COLONOSCOPY  01/30/2017    [place]   • CORONARY ARTERY BYPASS GRAFT  12/2019    one vessel   • GALLBLADDER SURGERY      cholecystecomy   • HERNIA REPAIR     • JOINT REPLACEMENT      joint surgery   • MITRAL VALVE REPAIR/REPLACEMENT  02/2019    [facility]   • OTHER SURGICAL HISTORY  05/30/2014    office cystoscopy w/DR SHANICE Young   • SHOULDER SURGERY Bilateral    • TOTAL HIP ARTHROPLASTY Left 8/15/2022    Procedure: TOTAL HIP ARTHROPLASTY ANTERIOR;  Surgeon: Kaden Green MD;  Location: [facility] MAIN OR;  Service: Orthopedics;  Laterality: Left;   • VASCULAR SURGERY  2020   • VENTRAL HERNIA REPAIR           Physical Assessment:  Wound 02/22/23 1800 posterior coccyx Pressure Injury (Active)   Wound Image   03/02/23 1207   Dressing Appearance dry;intact 03/02/23 1207   Closure None 03/02/23 1207   Base moist;pink;red;non-blanchable 03/02/23 1207   Periwound redness;blanchable;intact 03/02/23 1207   Periwound Temperature warm 03/02/23 1207   Periwound Skin Turgor soft 03/02/23 1207   Edges open 03/02/23 1207   Drainage Characteristics/Odor serosanguineous 03/02/23 1207   Drainage Amount scant 03/02/23 1207   Care, Wound cleansed with;sterile normal saline 03/02/23 1207   Dressing Care dressing changed;hydrofiber;silver impregnated;hydrocolloid 03/02/23 1207   Periwound Care absorptive dressing applied;hydrocolloid barrier applied 03/02/23 1207       Wound 02/23/23 1136 Left posterior heel Pressure Injury (Active)   Wound Image   03/02/23 1207   Pressure Injury Stage DTPI 03/02/23 1207   Dressing Appearance dry;intact 03/02/23 1207   Closure None 03/02/23 1207   Base non-blanchable;maroon/purple;dry 03/02/23 1207   Periwound intact;dry;pink;blistered 03/02/23 1207   Periwound Temperature warm 03/02/23 1207   Periwound Skin Turgor soft 03/02/23 1207   Edges rolled/closed 03/02/23 1207    Drainage Amount none 03/02/23 1207   Care, Wound cleansed with;sterile normal saline 03/02/23 1207   Dressing Care dressing changed;petroleum-based;non-adherent;gauze;silicone;border dressing 03/02/23 1207   Periwound Care absorptive dressing applied 03/02/23 1207       Wound 02/23/23 1136 Right posterior heel Pressure Injury (Active)   Wound Image   03/02/23 1207   Pressure Injury Stage DTPI 03/02/23 1207   Dressing Appearance dry;intact 03/02/23 1207   Closure None 03/02/23 1207   Base maroon/purple;non-blanchable;dry 03/02/23 1207   Periwound intact;dry;pink;blistered 03/02/23 1207   Periwound Temperature warm 03/02/23 1207   Periwound Skin Turgor soft 03/02/23 1207   Edges rolled/closed 03/02/23 1207   Drainage Amount none 03/02/23 1207   Care, Wound cleansed with;sterile normal saline 03/02/23 1207   Dressing Care dressing changed;petroleum-based;non-adherent;gauze;silicone;border dressing 03/02/23 1207   Periwound Care absorptive dressing applied 03/02/23 1207       Wound 02/23/23 Left lateral knee Pressure Injury (Active)   Wound Image   03/02/23 1207   Pressure Injury Stage DTPI 03/02/23 1207   Dressing Appearance open to air 03/02/23 1207   Closure None 03/02/23 1207   Base non-blanchable;maroon/purple;dry 03/02/23 1207   Periwound dry;intact 03/02/23 1207   Periwound Temperature warm 03/02/23 1207   Periwound Skin Turgor soft 03/02/23 1207   Edges rolled/closed 03/02/23 1207   Drainage Amount none 03/02/23 1207   Care, Wound cleansed with;sterile normal saline;barrier applied 03/02/23 1207   Dressing Care open to air 03/02/23 1207   Periwound Care barrier ointment applied 03/02/23 1207       Wound 02/23/23 1136 Right medial foot Pressure Injury (Active)   Wound Image   03/02/23 1207   Pressure Injury Stage 2 03/02/23 1207   Dressing Appearance dry;intact 03/02/23 1207   Closure None 03/02/23 1207   Base red;non-blanchable 03/02/23 1207   Periwound dry;blistered;intact;pink 03/02/23 1207   Periwound  Temperature warm 03/02/23 1207   Periwound Skin Turgor soft 03/02/23 1207   Edges rolled/closed 03/02/23 1207   Drainage Amount none 03/02/23 1207   Care, Wound cleansed with;sterile normal saline 03/02/23 1207   Dressing Care dressing changed;petroleum-based;non-adherent;gauze;silicone;border dressing 03/02/23 1207   Periwound Care absorptive dressing applied 03/02/23 1207       Wound 02/23/23 1136 Right lateral foot Pressure Injury (Active)   Wound Image   03/02/23 1207   Pressure Injury Stage 2 03/02/23 1207   Dressing Appearance dry;intact 03/02/23 1207   Closure None 03/02/23 1207   Base red;non-blanchable;dry 03/02/23 1207   Periwound blistered;dry;intact;pink 03/02/23 1207   Periwound Temperature warm 03/02/23 1207   Periwound Skin Turgor soft 03/02/23 1207   Edges rolled/closed 03/02/23 1207   Drainage Amount none 03/02/23 1207   Care, Wound cleansed with;sterile normal saline 03/02/23 1207   Dressing Care dressing applied;petroleum-based;non-adherent;gauze;silicone;border dressing 03/02/23 1207   Periwound Care absorptive dressing applied 03/02/23 1207       Wound 02/23/23 1136 Left lateral foot Pressure Injury (Active)   Wound Image   03/02/23 1207   Pressure Injury Stage DTPI 03/02/23 1207   Dressing Appearance dry;intact 03/02/23 1207   Closure None 03/02/23 1207   Base maroon/purple;non-blanchable 03/02/23 1207   Periwound blistered;dry;intact 03/02/23 1207   Periwound Temperature warm 03/02/23 1207   Periwound Skin Turgor soft 03/02/23 1207   Edges rolled/closed 03/02/23 1207   Drainage Amount none 03/02/23 1207   Care, Wound cleansed with;sterile normal saline 03/02/23 1207   Dressing Care dressing changed;petroleum-based;non-adherent;gauze;silicone;border dressing 03/02/23 1207   Periwound Care absorptive dressing applied 03/02/23 1207       Wound 02/23/23 1136 Left anterior great toe Pressure Injury (Active)   Wound Image   03/02/23 1207   Pressure Injury Stage DTPI 03/02/23 1207   Dressing  Appearance open to air 03/02/23 1207   Closure None 03/02/23 1207   Base dry;non-blanchable;maroon/purple 03/02/23 1207   Periwound intact;dry 03/02/23 1207   Periwound Temperature warm 03/02/23 1207   Periwound Skin Turgor soft 03/02/23 1207   Edges rolled/closed 03/02/23 1207   Drainage Amount none 03/02/23 1207   Care, Wound cleansed with;sterile normal saline;barrier applied 03/02/23 1207   Dressing Care open to air 03/02/23 1207   Periwound Care barrier ointment applied 03/02/23 1207       Wound 03/02/23 Left medial foot Pressure Injury (Active)   Wound Image   03/02/23 1207   Pressure Injury Stage 2 03/02/23 1207   Dressing Appearance dry;intact 03/02/23 1207   Closure None 03/02/23 1207   Base red;non-blanchable 03/02/23 1207   Periwound blistered;dry;intact 03/02/23 1207   Periwound Temperature warm 03/02/23 1207   Periwound Skin Turgor soft 03/02/23 1207   Edges rolled/closed 03/02/23 1207   Drainage Amount none 03/02/23 1207   Care, Wound cleansed with;sterile normal saline 03/02/23 1207   Dressing Care dressing changed;petroleum-based;non-adherent;gauze;silicone;border dressing 03/02/23 1207   Periwound Care absorptive dressing applied 03/02/23 1207      Wound Check / Follow-up:  Patient seen today for wound follow-up and dressing changes. Deep tissue injuries remain to bilateral heels, left lateral foot, left great toe, and left lateral knee. Cleansed all areas with normal saline and gauze then applied thin layer of calazime to left lateral knee and left great toe. Non-adherent petroleum based gauze and silicone border dressing applied to bilateral heels as these areas are blistered. Recommending to continue current care. Deep tissue injury to coccyx now with open moist pink and red tissue. Cleansed with normal saline and gauze. Applied silver impregnated hydrofiber and secured with hydrocolloid dressing. Recommending to continue current care. Stage II pressure injuries remain to right medial foot, left  medial foot, and right lateral foot. Areas with blistered reddened tissue. Cleansed with normal saline and gauze. Applied non-adherent petroleum based gauze and secured with silicone border dressing. Recommending to continue current care. Skin tear to right anterior lower leg healed at this time with pink epithelial tissue and closed tissue noted. Scabbed abrasion to right lower leg now with moist yellow sloughing tissue. Cleansed with normal saline and gauze. Applied silver impregnated hydrofiber and secured with silicone border dressing. Recommending daily dressing changes. Continue every two hour turns, offload heels, keep patient free from moisture. Patient on specialty mattress. External male catheter in place for bladder management.      Impression: Deep tissue injuries, stage II pressure injuries, stage I pressure injury, scabbed abrasions, high risk for skin breakdown.      Short term goals:  Regain skin integrity, moisture prevention, pressure reduction, skin protection, daily dressing changes, every other day dressing changes.      Sintia James RN    3/2/2023    13:19 EST

## 2023-03-02 NOTE — SIGNIFICANT NOTE
03/02/23 1100   SOCIAL WORK ASSESSMENT   Referral Source physician   Reason for Consult palliative care   Visit Type ongoing       Spoke with daughter about goals of care and care options. Daughter spoke with provider yesterday and believes [facility] is the best option. Daughter understands pt is at baseline and will likely not improve. Goal is to provide maximum support for pt and spouse in the home.   [facility]   Spouse is also a paid caregiver through [facility] for patient and there was concern this may hinder services. [facility] was notified to ensure this would not cause any issues.   SAPNA/MALIA updated.   Update: 3/3/ at 1500 [facility] meeting.     Palliative will continue to work with family on discharge plan.     BRETT Arredondo

## 2023-03-02 NOTE — PLAN OF CARE
Goal Outcome Evaluation:  Plan of Care Reviewed With: patient        Progress: no change  Pt more alert this shift, laughing and smiling-following commands. Primary RN spoke with wound care nurse for clarification about wound care orders d/t confusion on frequency of some of the mepilex changes-several noted on feet but not seen on flowsheet for charting. Wound care nurse clarified and modified orders with primary RN. Will pass on in shift change report. Pt in no apparent distress at this time, denies any needs currently, call light in reach, wife at bedside.

## 2023-03-02 NOTE — PROGRESS NOTES
[facility]   Hospitalist Progress Note  Date: 3/2/2023  Patient Name: Dhaval Nieto  : 1955  MRN: [id number]  Date of admission: 2023      Subjective   Subjective     Chief complaint: Weakness     Summary:  67-year-old male with history of COPD, chronic atrial fibrillation, depression, BPH with LUTS, Parkinson's disease, Lewy body dementia, diabetes mellitus, essential hypertension, GERD without esophagitis, CAD status post CABG, seizure disorder, history of mitral valve replacement with bioprosthetic mitral valve, chronic diastolic CHF, was hospitalized on 2023 with chief complaint of weakness, unable to eat or drink, unable to speak, found to be septic secondary to aspiration, RADHA, hyperglycemia, placed in the critical care unit for overflow, critical care consulted, hypernatremia, free water deficit, IV fluids ordered for replacement, sodium corrected, await reevaluation for speech therapy to remove cortrack and allow oral intake     Interval follow-up: per wife, pt awake at night and asleep during day, trying to keep him awake today.  Eating well.    Review of systems:  Unable to obtain review of systems  Objective   Objective     Vitals:   Temp:  [97.2 °F (36.2 °C)-98.8 °F (37.1 °C)] 97.2 °F (36.2 °C)  Heart Rate:  [78-98] 94  Resp:  [18] 18  BP: ()/(53-67) 92/66  Physical Exam    Constitutional: sleeping                HENT: NCAT, mucous membranes moist              Neck: Supple, no palpable masses              Respiratory: Diminished breath sounds bilaterally, wheezing throughout              Cardiovascular: Irregular rate, irregular rhythm, 2 out of 6 systolic murmurs              Gastrointestinal: Positive bowel sounds, soft, nontender, nondistended              Musculoskeletal: No bilateral ankle edema, no clubbing or cyanosis to extremities              Psychiatric: Pleasant mood and affect              Neurologic: Awake, alert              Skin: No rashes visible  on exposed skin    Result Review    Result Review:  I have personally reviewed the pertinent results from the past 24 hours to 3/2/2023 10:35 EST and agree with these findings:  [x]  Laboratory   CBC    CBC 2/28/23 3/1/23 3/2/23   WBC 11.80 (A) 11.24 (A) 12.72 (A)   RBC 3.40 (A) 3.38 (A) 3.53 (A)   Hemoglobin 10.6 (A) 10.5 (A) 11.0 (A)   Hematocrit 30.0 (A) 29.9 (A) 31.5 (A)   MCV 88.2 88.5 89.2   MCH 31.2 31.1 31.2   MCHC 35.3 35.1 34.9   RDW 14.3 14.6 15.1   Platelets 142 153 183   (A) Abnormal value            BMP    BMP 2/28/23 3/1/23 3/2/23   BUN 11 8 9   Creatinine 0.64 (A) 0.66 (A) 0.71 (A)   Sodium 137 137 136   Potassium 3.9 4.0 4.3   Chloride 106 103 103   CO2 25.0 26.5 25.8   Calcium 8.5 (A) 8.6 9.0   (A) Abnormal value            LIVER FUNCTION TESTS:      Lab 03/02/23  0504 03/01/23  0555 02/28/23  0537 02/27/23  0518 02/26/23  0607 02/25/23  0528 02/24/23  0453   TOTAL PROTEIN  --   --  5.2* 5.1* 4.7* 4.6* 5.5*   ALBUMIN 3.5 3.3* 3.1* 2.9* 2.7* 2.8* 3.2*   ALT (SGPT)  --   --  15 11 10 10 10   AST (SGOT)  --   --  16 14 16 11 12   BILIRUBIN  --   --  0.3 0.5 0.5 0.3 0.4   INDIRECT BILIRUBIN  --   --   --  0.3  --   --   --    BILIRUBIN DIRECT  --   --  <0.2 0.2 <0.2 <0.2 <0.2   ALK PHOS  --   --  78 76 75 74 71       [x]  Microbiology   Blood Culture   Date Value Ref Range Status   02/24/2023 No growth at 24 hours  Preliminary     BCID, PCR   Date Value Ref Range Status   02/22/2023 (A) Negative by BCID PCR. Culture to Follow. Final    Staph spp, not aureus or lugdunensis. Identification by BCID2 PCR.       [x]  Radiology   [x]  EKG/Telemetry   []  Cardiology/Vascular   []  Pathology  [x]  Old records  []  Other:    Assessment & Plan   Assessment / Plan     Assessment:  · Sepsis secondary to concern for aspiration versus viral etiology  · Gram-positive cocci bacteremia ruled contamination of specimen  · Concern for aspiration  · Acute metabolic encephalopathy secondary to hypernatremia and  dehydration  · COPD with acute exacerbation  · Hypoglycemia  · Physical debility  · Hypernatremia due to volume depletion  · RADHA  · Hypercalcemia due to dehydration  · COPD  · Chronic atrial fibrillation  · Depression  · BPH with LUTS  · Parkinson's disease  · Lewy body dementia  · Diabetes mellitus with hyperglycemia and hypoglycemia  · Essential hypertension  · GERD without esophagitis  · CAD status post CABG  · Seizure disorder  · History of mitral valve replacement bioprosthetic mitral valve  · Chronic diastolic CHF     Plan:  · Mood and mentation has improved, but getting days/nights mixed up  · Discussed sleep hygeine - wife already getting lights on/shades up etc during day  · Resumed majority of home meds  · Diet per speech  · Hold tube feeds for now  · DC [place] tomorrow if eating okay  · Renal function is stable  · Discussed with daughter last night per wife request.  I recommend they consider palliative care/Hospice, but at a minimum needs home health.  Not interested in placement at this time.      garrison morales  DVT prophylaxis:  Medical DVT prophylaxis orders are present.    CODE STATUS:   Medical Intervention Limits: NO intubation (DNI)  Level Of Support Discussed With: Next of Kin (If No Surrogate); Health Care Surrogate  Code Status (Patient has no pulse and is not breathing): CPR (Attempt to Resuscitate)  Medical Interventions (Patient has pulse or is breathing): Limited Support  Release to patient: Routine Release    Electronically signed by Kaden Sepulveda MD, 03/02/23, 10:38 AM EST.

## 2023-03-02 NOTE — PLAN OF CARE
Goal Outcome Evaluation:  Plan of Care Reviewed With: patient, family        Progress: no change  Outcome Evaluation: Pain stated he was hurting a little bit this shift, administered prn pain med as ordered. Provided skin care and wound care as ordered. On continuous tube feeds as ordered. Spouse at bedside. No new issues or needs at this time.

## 2023-03-03 LAB
GLUCOSE BLDC GLUCOMTR-MCNC: 158 MG/DL (ref 70–99)
GLUCOSE BLDC GLUCOMTR-MCNC: 179 MG/DL (ref 70–99)
GLUCOSE BLDC GLUCOMTR-MCNC: 213 MG/DL (ref 70–99)
GLUCOSE BLDC GLUCOMTR-MCNC: 257 MG/DL (ref 70–99)

## 2023-03-03 PROCEDURE — 82962 GLUCOSE BLOOD TEST: CPT

## 2023-03-03 PROCEDURE — 97164 PT RE-EVAL EST PLAN CARE: CPT

## 2023-03-03 PROCEDURE — 63710000001 INSULIN REGULAR HUMAN PER 5 UNITS: Performed by: PHYSICIAN ASSISTANT

## 2023-03-03 PROCEDURE — 94799 UNLISTED PULMONARY SVC/PX: CPT

## 2023-03-03 PROCEDURE — 99233 SBSQ HOSP IP/OBS HIGH 50: CPT | Performed by: INTERNAL MEDICINE

## 2023-03-03 RX ADMIN — CARBIDOPA AND LEVODOPA 1 TABLET: 25; 100 TABLET ORAL at 21:29

## 2023-03-03 RX ADMIN — INSULIN HUMAN 3 UNITS: 100 INJECTION, SOLUTION PARENTERAL at 10:21

## 2023-03-03 RX ADMIN — PANTOPRAZOLE SODIUM 40 MG: 40 INJECTION, POWDER, FOR SOLUTION INTRAVENOUS at 05:14

## 2023-03-03 RX ADMIN — CARBIDOPA AND LEVODOPA 1 TABLET: 25; 100 TABLET ORAL at 10:21

## 2023-03-03 RX ADMIN — APIXABAN 5 MG: 5 TABLET, FILM COATED ORAL at 21:29

## 2023-03-03 RX ADMIN — APIXABAN 5 MG: 5 TABLET, FILM COATED ORAL at 10:21

## 2023-03-03 RX ADMIN — Medication 10 ML: at 10:22

## 2023-03-03 RX ADMIN — INSULIN HUMAN 3 UNITS: 100 INJECTION, SOLUTION PARENTERAL at 17:29

## 2023-03-03 RX ADMIN — CARBIDOPA AND LEVODOPA 1 TABLET: 25; 100 TABLET ORAL at 17:29

## 2023-03-03 RX ADMIN — ATORVASTATIN CALCIUM 40 MG: 40 TABLET, FILM COATED ORAL at 21:29

## 2023-03-03 RX ADMIN — INSULIN HUMAN 8 UNITS: 100 INJECTION, SOLUTION PARENTERAL at 12:48

## 2023-03-03 RX ADMIN — INSULIN HUMAN 5 UNITS: 100 INJECTION, SOLUTION PARENTERAL at 21:29

## 2023-03-03 RX ADMIN — Medication 10 ML: at 21:29

## 2023-03-03 RX ADMIN — DONEPEZIL HYDROCHLORIDE 10 MG: 10 TABLET, FILM COATED ORAL at 10:21

## 2023-03-03 RX ADMIN — FLUTICASONE PROPIONATE 2 SPRAY: 50 SPRAY, METERED NASAL at 06:11

## 2023-03-03 NOTE — SIGNIFICANT NOTE
03/03/23 1400   SOCIAL WORK ASSESSMENT   Referral Source physician   Reason for Consult palliative care   Visit Type ongoing   [facility] appointment completed and pt is eligible for their services.     Family requesting a few more days on tube feeds prior to discharge (projecting Monday 3/6 or Tuesday 3/7).     Palliative will continue to monitor and provide support with goal to discharge with [facility].    BRETT Arredondo

## 2023-03-03 NOTE — CONSULTS
Nutrition Services    Patient Name: Dhaval Nieto  YOB: 1955  MRN: [id number]  Admission date: 2/21/2023      CLINICAL NUTRITION ASSESSMENT      Reason for Assessment  Follow-up protocol, EN   H&P:    Past Medical History:   Diagnosis Date   • Arteriovenous malformation 03/09/1980   • Arthritis    • Asthma    • Benign essential hypertension    • Bladder disorder    • Bleeding disorder ([facility])    • Blood disease    • BPH with urinary obstruction 05/30/2014   • Brain concussion 1999   • Cancer ([facility])    • Cervical disc disorder long time   • Cervical radiculopathy 05/01/2015    left   • Cervical spinal stenosis 03/10/2020   • Cervicalgia 12/18/2018   • Chest pain    • CHF (congestive heart failure) ([facility])    • Clotting disorder ([facility]) Elequis   • Colon cancer ([facility]) 12/18/2018   • Condition not found     Hernia   • Condition not found     herniated disc   • COPD (chronic obstructive pulmonary disease) ([facility])    • Coronary artery disease    • CTS (carpal tunnel syndrome) 08/04/2002   • DDD (degenerative disc disease), thoracic 12/18/2018   • Decreased sensation 05/01/2015    left   • Deep vein thrombosis ([facility])    • Depression    • Diabetes ([facility])    • Diabetes mellitus type 1 with coma, insulin dependent ([facility])     controlled   • Headache    • Heart murmur    • Heart valve disease    • Hematuria, gross 05/30/2014   • Hyperlipemia    • Hyperlipidemia    • Hypertension    • Insomnia    • Leg pain    • Lewy body dementia ([facility])    • Limb swelling    • Loss of balance 12/18/2018   • Low back pain 03/10/2020   • Lumbar degenerative disc disease 12/18/2018   • Lumbar radiculopathy 03/10/2020   • Lumbosacral disc disease 05/05/1999   • Mid back pain 12/18/2018   • Migraines    • Muscle cramps    • Myocardial infarction ([facility])    • Neurologic disorder    • Pain in back    • Pain in joint    • Pain of cervical spine    • Pain, generalized    • Pulmonary arterial hypertension ([facility]) 1999   • Seasonal allergies   "  • Seizures ([facility]) 2000   • Shortness of breath    • Sleep apnea    • Stomach disorder         Current Problems:   Active Hospital Problems    Diagnosis    • **Severe sepsis (HCC)         Nutrition/Diet History         Narrative     Wt down 6# over 2 weeks, a 5% clinically significant change.  Po intake remains variable from 25-75% of meals, therefore Cortrak remains in place for nutrition.  Po4 remains decreased at 2.1, recommend replacement.  Since wounds remains substantial, will change feedings to Impact Peptide 1.5 since contains arginine to help with wound healing & higher protein volume compared to current order of Diabetisource.  Impact at same rate of 22 ml/hr will provide 792 kcal, 50 gm protein, & 407 ml water.     Anthropometrics        Current Height, Weight Height: 175.3 cm (69\")  Weight: 52.1 kg (114 lb 13.8 oz)   Current BMI Body mass index is 16.96 kg/m². underweight       Weight Hx  Wt Readings from Last 30 Encounters:   03/02/23 0500 52.1 kg (114 lb 13.8 oz)   03/01/23 0600 51.5 kg (113 lb 8.6 oz)   02/27/23 0600 50.1 kg (110 lb 7.2 oz)   02/25/23 0600 50.5 kg (111 lb 5.3 oz)   02/24/23 1118 48.1 kg (106 lb 0.7 oz)   02/24/23 0600 49.3 kg (108 lb 11 oz)   02/23/23 0350 54.7 kg (120 lb 9.5 oz)   02/21/23 2316 55 kg (121 lb 4.1 oz)   02/17/23 1350 66.7 kg (147 lb)   02/11/23 1401 67 kg (147 lb 11.3 oz)   01/17/23 1350 66.7 kg (147 lb)   11/09/22 0808 67.1 kg (147 lb 14.9 oz)   09/20/22 1916 66.4 kg (146 lb 6.2 oz)   09/10/22 0136 63.9 kg (140 lb 14 oz)   08/29/22 1354 67.3 kg (148 lb 6.4 oz)   08/15/22 0156 68.4 kg (150 lb 12.7 oz)   08/14/22 1818 71.1 kg (156 lb 12 oz)   08/11/22 1346 70.2 kg (154 lb 12.2 oz)   07/14/22 1428 65.8 kg (145 lb)   07/13/22 1359 68.3 kg (150 lb 9.2 oz)   07/07/22 1111 70.6 kg (155 lb 9.6 oz)   05/10/22 1301 72.6 kg (160 lb)   04/19/22 1409 76.7 kg (169 lb)   03/20/22 0351 76.7 kg (169 lb 1.5 oz)   03/17/22 1328 75.8 kg (167 lb)   03/08/22 0935 75.8 kg (167 lb 1.7 oz) "   02/17/22 1331 78.9 kg (174 lb)   01/25/22 0927 78.9 kg (174 lb)   01/15/22 1240 79 kg (174 lb 2.6 oz)   01/13/22 1321 76 kg (167 lb 8.8 oz)   11/26/21 0912 76.3 kg (168 lb 3.4 oz)   09/07/21 1343 78 kg (172 lb)   08/30/21 1512 78.9 kg (174 lb)   08/25/21 1356 79.7 kg (175 lb 12.8 oz)   07/13/21 1254 78.9 kg (173 lb 15.1 oz)   06/15/21 1400 78.6 kg (173 lb 3.2 oz)   05/13/21 0000 80.3 kg (177 lb)   04/27/21 0000 78.2 kg (172 lb 8 oz)            Wt Change Observation -22.6% x 6 months     Estimated/Assessed Needs       Energy Requirements 35 kcal/kg   EST Needs (kcal/day) 1915 kcal        Protein Requirements 1.5-2 g/kg   EST Daily Needs (g/day)  g       Fluid Requirements 30 ml/kg    Estimated Needs (mL/day) 1650 ml      Labs/Medications         Pertinent Labs Reviewed.   Results from last 7 days   Lab Units 03/02/23  0504 03/01/23  0555 02/28/23  0537 02/27/23  0518 02/26/23  0607   SODIUM mmol/L 136 137 137 137  --    POTASSIUM mmol/L 4.3 4.0 3.9 3.8  --    CHLORIDE mmol/L 103 103 106 105  --    CO2 mmol/L 25.8 26.5 25.0 23.6  --    BUN mg/dL 9 8 11 9  --    CREATININE mg/dL 0.71* 0.66* 0.64* 0.65*  --    CALCIUM mg/dL 9.0 8.6 8.5* 8.2*  --    BILIRUBIN mg/dL  --   --  0.3 0.5 0.5   ALK PHOS U/L  --   --  78 76 75   ALT (SGPT) U/L  --   --  15 11 10   AST (SGOT) U/L  --   --  16 14 16   GLUCOSE mg/dL 186* 216* 171* 199*  --      Results from last 7 days   Lab Units 03/02/23  0504 03/01/23  0555 02/28/23  0537 02/27/23  0518 02/26/23  0607   MAGNESIUM mg/dL  --   --  1.9 1.8 1.8   PHOSPHORUS mg/dL 2.1*   < > 2.4* 2.1* 2.0*   HEMOGLOBIN g/dL 11.0*   < > 10.6* 10.1* 9.9*   HEMATOCRIT % 31.5*   < > 30.0* 28.5* 28.8*    < > = values in this interval not displayed.     Lab Results   Component Value Date    HGBA1C 9.8 (H) 07/08/2019         Pertinent Medications Reviewed.     Current Nutrition Orders & Evaluation of Intake       Oral Nutrition     Current PO Diet Diet: Regular/House Diet; Texture: Pureed (NDD 1);  Fluid Consistency: Nectar Thick   Supplement Orders Placed This Encounter      DIET MESSAGE Please send regular guest tray with all meals.      Place Feeding Tube Per [employer] System      Diet, Tube Feeding Tube Feeding Formula: Diabetisource AC; Tube Feeding Type: Continuous; Continuous Tube Feeding Start Rate (mL/hr): 22; Then Advance Rate By (mL/hr): RD To Manage; Every __ Hours: Patient at Goal Rate; To Goal Rate of (mL/hr): RD...      Dietary Nutrition Supplements Boost Glucose Control (Glucerna Shake); vanilla       Malnutrition Severity Assessment      Patient meets criteria for : Severe Malnutrition         Nutrition Diagnosis         Nutrition Dx Problem 1 Severe malnutrition related to decreased ability to consume sufficient energy as evidenced by inadequate energy intake., unintended wt change., body composition changes., family report., SLP/swallow eval, report of minimal PO intake. and impaired skin integrity     Nutrition Intervention         Diet per SLP  Continue thickened Boost Glucose control BID (570 kcal & 48 gm protein)  Continue Cortrak, change formula to Impact Peptide 1.5 at same rate of 22 ml/hk=359 kcal, 50 gm protein, & 407 ml water      Medical Nutrition Therapy/Nutrition Education          Learner     Readiness Patient  Acceptance     Method     Response Explanation  Verbalizes understanding     Monitor/Evaluation        Monitor PO intake, Supplement intake, Pertinent labs, EN delivery/tolerance, Weight, Swallow function, RFS     Nutrition Discharge Plan         To be determined     Electronically signed by:  Disha Gan RD  03/03/23 09:16 EST

## 2023-03-03 NOTE — PROGRESS NOTES
[facility]   Hospitalist Progress Note  Date: 3/3/2023  Patient Name: Dhaval Nieto  : 1955  MRN: [id number]  Date of admission: 2023      Subjective   Subjective     Chief complaint: Weakness     Summary:  67-year-old male with history of COPD, chronic atrial fibrillation, depression, BPH with LUTS, Parkinson's disease, Lewy body dementia, diabetes mellitus, essential hypertension, GERD without esophagitis, CAD status post CABG, seizure disorder, history of mitral valve replacement with bioprosthetic mitral valve, chronic diastolic CHF, was hospitalized on 2023 with chief complaint of weakness, unable to eat or drink, unable to speak, found to be septic secondary to aspiration, RADHA, hyperglycemia, placed in the critical care unit for overflow, critical care consulted, hypernatremia, free water deficit, IV fluids ordered for replacement, sodium corrected, await reevaluation for speech therapy to remove cortrack and allow oral intake     Interval follow-up: eating well, tolerating food    Review of systems:  Unable to obtain review of systems  Objective   Objective     Vitals:   Temp:  [97.7 °F (36.5 °C)-98.6 °F (37 °C)] 97.7 °F (36.5 °C)  Heart Rate:  [] 99  Resp:  [18-20] 20  BP: ()/(53-67) 97/61  Physical Exam    Constitutional: awake, alert, being fed by staff                HENT: NCAT, mucous membranes moist              Neck: Supple, no palpable masses              Respiratory: Diminished breath sounds bilaterally, wheezing throughout              Cardiovascular: Irregular rate, irregular rhythm, 2 out of 6 systolic murmurs              Gastrointestinal: Positive bowel sounds, soft, nontender, nondistended              Musculoskeletal: No bilateral ankle edema, no clubbing or cyanosis to extremities              Psychiatric: Pleasant mood and affect              Neurologic: Awake, alert              Skin: No rashes visible on exposed skin    Result Review     Result Review:  I have personally reviewed the pertinent results from the past 24 hours to 3/3/2023 12:22 EST and agree with these findings:  [x]  Laboratory   CBC    CBC 2/28/23 3/1/23 3/2/23   WBC 11.80 (A) 11.24 (A) 12.72 (A)   RBC 3.40 (A) 3.38 (A) 3.53 (A)   Hemoglobin 10.6 (A) 10.5 (A) 11.0 (A)   Hematocrit 30.0 (A) 29.9 (A) 31.5 (A)   MCV 88.2 88.5 89.2   MCH 31.2 31.1 31.2   MCHC 35.3 35.1 34.9   RDW 14.3 14.6 15.1   Platelets 142 153 183   (A) Abnormal value            BMP    BMP 2/28/23 3/1/23 3/2/23   BUN 11 8 9   Creatinine 0.64 (A) 0.66 (A) 0.71 (A)   Sodium 137 137 136   Potassium 3.9 4.0 4.3   Chloride 106 103 103   CO2 25.0 26.5 25.8   Calcium 8.5 (A) 8.6 9.0   (A) Abnormal value            LIVER FUNCTION TESTS:      Lab 03/02/23  0504 03/01/23  0555 02/28/23  0537 02/27/23  0518 02/26/23  0607 02/25/23  0528   TOTAL PROTEIN  --   --  5.2* 5.1* 4.7* 4.6*   ALBUMIN 3.5 3.3* 3.1* 2.9* 2.7* 2.8*   ALT (SGPT)  --   --  15 11 10 10   AST (SGOT)  --   --  16 14 16 11   BILIRUBIN  --   --  0.3 0.5 0.5 0.3   INDIRECT BILIRUBIN  --   --   --  0.3  --   --    BILIRUBIN DIRECT  --   --  <0.2 0.2 <0.2 <0.2   ALK PHOS  --   --  78 76 75 74       [x]  Microbiology   Blood Culture   Date Value Ref Range Status   02/24/2023 No growth at 24 hours  Preliminary     BCID, PCR   Date Value Ref Range Status   02/22/2023 (A) Negative by BCID PCR. Culture to Follow. Final    Staph spp, not aureus or lugdunensis. Identification by BCID2 PCR.       [x]  Radiology   [x]  EKG/Telemetry   []  Cardiology/Vascular   []  Pathology  [x]  Old records  []  Other:    Assessment & Plan   Assessment / Plan     Assessment:  · Sepsis secondary to concern for aspiration versus viral etiology  · Gram-positive cocci bacteremia ruled contamination of specimen  · Concern for aspiration  · Acute metabolic encephalopathy secondary to hypernatremia and dehydration  · COPD with acute exacerbation  · Hypoglycemia  · Physical  debility  · Hypernatremia due to volume depletion  · RADHA  · Hypercalcemia due to dehydration  · COPD  · Chronic atrial fibrillation  · Depression  · BPH with LUTS  · Parkinson's disease  · Lewy body dementia  · Diabetes mellitus with hyperglycemia and hypoglycemia  · Essential hypertension  · GERD without esophagitis  · CAD status post CABG  · Seizure disorder  · History of mitral valve replacement bioprosthetic mitral valve  · Chronic diastolic CHF     Plan:  · Mood and mentation has improved, but getting days/nights mixed up  · Discussed sleep hygeine - wife already getting lights on/shades up etc during day  · Resumed majority of home meds  · Diet per speech  · Tolerating food, eating well, d/w wife, will keep some gentle tube feeds going just to help supplement his nutritional status, but we can otherwise get rid of the Coretrak prior to DC or if family requests  · Renal function is stable  · To have [facility] meeting today at 1500    dw rn  DVT prophylaxis:  Medical DVT prophylaxis orders are present.    CODE STATUS:   Medical Intervention Limits: NO intubation (DNI)  Level Of Support Discussed With: Next of Kin (If No Surrogate); Health Care Surrogate  Code Status (Patient has no pulse and is not breathing): CPR (Attempt to Resuscitate)  Medical Interventions (Patient has pulse or is breathing): Limited Support  Release to patient: Routine Release    Electronically signed by Kaden Sepulveda MD, 03/03/23, 12:23 PM EST.

## 2023-03-03 NOTE — THERAPY RE-EVALUATION
Acute Care - Physical Therapy Re-Evaluation   [facility]     Patient Name: Dhaval Nieto  : 1955  MRN: [id number]  Today's Date: 3/3/2023      Visit Dx:     ICD-10-CM ICD-9-CM   1. Dehydration  E86.0 276.51   2. Acute kidney injury ([facility])  N17.9 584.9   3. Leukocytosis, unspecified type  D72.829 288.60   4. Hypoglycemia  E16.2 251.2   5. 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 Aspiration into airway, initial encounter  T17.908A 934.9   7. Oropharyngeal dysphagia  R13.12 787.22   8. Decreased activities of daily living (ADL)  Z78.9 V49.89   9. Difficulty walking  R26.2 719.7     Patient Active Problem List   Diagnosis   • Chronic bronchitis, unspecified chronic bronchitis type ([facility])   • Right wrist pain   • Pain disorder with psychological factors   • Bladder disorder   • Abdominal hernia   • Stomach disorder   • Arthritis   • Asthma   • BPH with urinary obstruction   • Chest pain   • Decreased sensation   • Depression   • History of colon cancer   • Hyperlipemia   • Hyperlipemia, mixed   • Hypertension, essential   • Insomnia   • Limb swelling   • Loss of balance   • Low back pain   • Thoracic back pain   • Lumbar degenerative disc disease   • Degeneration of thoracic or thoracolumbar intervertebral disc   • Lumbar radiculopathy   • Migraines   • Muscle cramps   • Neck pain   • Neurologic disorder   • Headache   • Pain, generalized   • Shortness of breath   • Cervical radiculopathy   • Cervical stenosis of spinal canal   • Seasonal allergic rhinitis   • Leg pain   • Cancer of sigmoid colon ([facility])   • Closed fracture of left hip, initial encounter ([facility])   • Dysuria   • Type 2 diabetes mellitus, with long-term current use of insulin ([facility])   • Severe malnutrition ([facility])   • Memory loss   • Aftercare following surgery of left total hip arthroplasty, 8/15/2022   • Paroxysmal  atrial fibrillation ([facility])   • Severe sepsis ([facility])     Past Medical History:   Diagnosis Date   • Arteriovenous malformation 03/09/1980   • Arthritis    • Asthma    • Benign essential hypertension    • Bladder disorder    • Bleeding disorder ([facility])    • Blood disease    • BPH with urinary obstruction 05/30/2014   • Brain concussion 1999   • Cancer ([facility])    • Cervical disc disorder long time   • Cervical radiculopathy 05/01/2015    left   • Cervical spinal stenosis 03/10/2020   • Cervicalgia 12/18/2018   • Chest pain    • CHF (congestive heart failure) ([facility])    • Clotting disorder ([facility]) [place]   • Colon cancer ([facility]) 12/18/2018   • Condition not found     Hernia   • Condition not found     herniated disc   • COPD (chronic obstructive pulmonary disease) ([facility])    • Coronary artery disease    • CTS (carpal tunnel syndrome) 08/04/2002   • DDD (degenerative disc disease), thoracic 12/18/2018   • Decreased sensation 05/01/2015    left   • Deep vein thrombosis ([facility])    • Depression    • Diabetes ([facility])    • Diabetes mellitus type 1 with coma, insulin dependent ([facility])     controlled   • Headache    • Heart murmur    • Heart valve disease    • Hematuria, gross 05/30/2014   • Hyperlipemia    • Hyperlipidemia    • Hypertension    • Insomnia    • Leg pain    • Lewy body dementia ([facility])    • Limb swelling    • Loss of balance 12/18/2018   • Low back pain 03/10/2020   • Lumbar degenerative disc disease 12/18/2018   • Lumbar radiculopathy 03/10/2020   • Lumbosacral disc disease 05/05/1999   • Mid back pain 12/18/2018   • Migraines    • Muscle cramps    • Myocardial infarction ([facility])    • Neurologic disorder    • Pain in back    • Pain in joint    • Pain of cervical spine    • Pain, generalized    • Pulmonary arterial hypertension ([facility]) 1999   • Seasonal allergies    • Seizures ([facility]) 2000   • Shortness of breath    • Sleep apnea    • Stomach disorder      Past Surgical History:   Procedure Laterality Date   • ABDOMINAL SURGERY     •  BLADDER SURGERY     • CARPAL TUNNEL RELEASE     • CEREBRAL ANGIOGRAM  2021   • COLON RESECTION     • COLONOSCOPY  01/30/2017    [place]   • CORONARY ARTERY BYPASS GRAFT  12/2019    one vessel   • GALLBLADDER SURGERY      cholecystecomy   • HERNIA REPAIR     • JOINT REPLACEMENT      joint surgery   • MITRAL VALVE REPAIR/REPLACEMENT  02/2019    [facility]   • OTHER SURGICAL HISTORY  05/30/2014    office cystoscopy w/DR SHANICE Young   • SHOULDER SURGERY Bilateral    • TOTAL HIP ARTHROPLASTY Left 8/15/2022    Procedure: TOTAL HIP ARTHROPLASTY ANTERIOR;  Surgeon: Kaden Green MD;  Location: [facility] MAIN OR;  Service: Orthopedics;  Laterality: Left;   • VASCULAR SURGERY  2020   • VENTRAL HERNIA REPAIR       PT Assessment (last 12 hours)     PT Evaluation and Treatment     Row Name 03/03/23 1103          Physical Therapy Time and Intention    Subjective Information no complaints (P)   -TG     Document Type re-evaluation (P)   -TG     Mode of Treatment individual therapy;physical therapy (P)   -TG     Patient Effort good (P)   -TG     Symptoms Noted During/After Treatment fatigue (P)   -TG     Row Name 03/03/23 1103          General Information    Patient Profile Reviewed yes (P)   -TG     Patient Observations alert;cooperative;agree to therapy (P)   Nodded head in agreement  -TG     General Observations of Patient Pt was non verbal throughout session. (P)   -TG     Row Name 03/03/23 1103          Cognition    Orientation Status (Cognition) oriented to;person (P)   -TG     Row Name 03/03/23 1103          Bed Mobility    Bed Mobility supine-sit-supine (P)   -TG     Supine-Sit-Supine [place] (Bed Mobility) maximum assist (25% patient effort) (P)   -TG     Assistive Device (Bed Mobility) draw sheet;head of bed elevated (P)   -TG     Row Name 03/03/23 1103          Transfers    Comment, (Transfers) deferred for safety (P)   -TG     Row Name 03/03/23 1103          Gait/Stairs (Locomotion)    Comment, (Gait/Stairs)  deferred for safety (P)   -TG     Row Name 03/03/23 1103          Balance    Balance Assessment sitting static balance (P)   -TG     Static Sitting Balance maximum assist (P)   -TG     Position, Sitting Balance sitting edge of bed (P)   -TG     Comment, Balance Sat  EOB with maxA x 3 min (P)   -TG     Row Name             Wound 02/22/23 1800 posterior coccyx Pressure Injury    Wound - Properties Group Placement Date: 02/22/23  - Placement Time: 1800  -HL Present on Hospital Admission: N  -HL Orientation: posterior  -HL Location: coccyx  -HL Primary Wound Type: Pressure inj  -NH    Retired Wound - Properties Group Placement Date: 02/22/23  - Placement Time: 1800  -HL Present on Hospital Admission: N  -HL Orientation: posterior  -HL Location: coccyx  -HL Primary Wound Type: Pressure inj  -NH    Retired Wound - Properties Group Date first assessed: 02/22/23  - Time first assessed: 1800  -HL Present on Hospital Admission: N  -HL Location: coccyx  -HL Primary Wound Type: Pressure inj  -NH    Row Name             Wound 02/23/23 1136 Left posterior heel Pressure Injury    Wound - Properties Group Placement Date: 02/23/23  -NH Placement Time: 1136  -NH Side: Left  -NH Orientation: posterior  -NH Location: heel  -NH Primary Wound Type: Pressure inj  -NH    Retired Wound - Properties Group Placement Date: 02/23/23  -NH Placement Time: 1136  -NH Side: Left  -NH Orientation: posterior  -NH Location: heel  -NH Primary Wound Type: Pressure inj  -NH    Retired Wound - Properties Group Date first assessed: 02/23/23  -NH Time first assessed: 1136  -NH Side: Left  -NH Location: heel  -NH Primary Wound Type: Pressure inj  -NH    Row Name             Wound 02/23/23 1136 Right posterior heel Pressure Injury    Wound - Properties Group Placement Date: 02/23/23  -NH Placement Time: 1136  -NH Side: Right  -NH Orientation: posterior  -NH Location: heel  -NH Primary Wound Type: Pressure inj  -NH    Retired Wound - Properties Group  Placement Date: 02/23/23 -NH Placement Time: 1136  -NH Side: Right  -NH Orientation: posterior  -NH Location: heel  -NH Primary Wound Type: Pressure inj  -NH    Retired Wound - Properties Group Date first assessed: 02/23/23 -NH Time first assessed: 1136  -NH Side: Right  -NH Location: heel  -NH Primary Wound Type: Pressure inj  -NH    Row Name             Wound 02/23/23 Left lateral knee Pressure Injury    Wound - Properties Group Placement Date: 02/23/23  -NH Side: Left  -NH Orientation: lateral  -NH Location: knee  -NH Primary Wound Type: Pressure inj  -NH    Retired Wound - Properties Group Placement Date: 02/23/23  -NH Side: Left  -NH Orientation: lateral  -NH Location: knee  -NH Primary Wound Type: Pressure inj  -NH    Retired Wound - Properties Group Date first assessed: 02/23/23  -NH Side: Left  -NH Location: knee  -NH Primary Wound Type: Pressure inj  -NH    Row Name             Wound 02/23/23 1136 Right medial foot Pressure Injury    Wound - Properties Group Placement Date: 02/23/23 -NH Placement Time: 1136  -NH Side: Right  -NH Orientation: medial  -NH Location: foot  -NH Primary Wound Type: Pressure inj  -NH    Retired Wound - Properties Group Placement Date: 02/23/23 -NH Placement Time: 1136  -NH Side: Right  -NH Orientation: medial  -NH Location: foot  -NH Primary Wound Type: Pressure inj  -NH    Retired Wound - Properties Group Date first assessed: 02/23/23 -NH Time first assessed: 1136  -NH Side: Right  -NH Location: foot  -NH Primary Wound Type: Pressure inj  -NH    Row Name             Wound 02/23/23 1136 Right lateral foot Pressure Injury    Wound - Properties Group Placement Date: 02/23/23 -NH Placement Time: 1136  -NH Side: Right  -NH Orientation: lateral  -NH Location: foot  -NH Primary Wound Type: Pressure inj  -NH    Retired Wound - Properties Group Placement Date: 02/23/23 -NH Placement Time: 1136  -NH Side: Right  -NH Orientation: lateral  -NH Location: foot  -NH Primary Wound Type:  Pressure inj  -NH    Retired Wound - Properties Group Date first assessed: 02/23/23  -NH Time first assessed: 1136  -NH Side: Right  -NH Location: foot  -NH Primary Wound Type: Pressure inj  -NH    Row Name             Wound 02/23/23 1136 Left lateral foot Pressure Injury    Wound - Properties Group Placement Date: 02/23/23 -NH Placement Time: 1136  -NH Side: Left  -NH Orientation: lateral  -NH Location: foot  -NH Primary Wound Type: Pressure inj  -NH    Retired Wound - Properties Group Placement Date: 02/23/23 -NH Placement Time: 1136  -NH Side: Left  -NH Orientation: lateral  -NH Location: foot  -NH Primary Wound Type: Pressure inj  -NH    Retired Wound - Properties Group Date first assessed: 02/23/23 -NH Time first assessed: 1136  -NH Side: Left  -NH Location: foot  -NH Primary Wound Type: Pressure inj  -NH    Row Name             Wound 02/23/23 1136 Left anterior great toe Pressure Injury    Wound - Properties Group Placement Date: 02/23/23 -NH Placement Time: 1136  -NH Side: Left  -NH Orientation: anterior  -NH Location: great toe  -NH Primary Wound Type: Pressure inj  -NH    Retired Wound - Properties Group Placement Date: 02/23/23 -NH Placement Time: 1136  -NH Side: Left  -NH Orientation: anterior  -NH Location: great toe  -NH Primary Wound Type: Pressure inj  -NH    Retired Wound - Properties Group Date first assessed: 02/23/23 -NH Time first assessed: 1136  -NH Side: Left  -NH Location: great toe  -NH Primary Wound Type: Pressure inj  -NH    Row Name             Wound 03/02/23 1207 Right anterior leg Traumatic    Wound - Properties Group Placement Date: 03/02/23 -NH Placement Time: 1207  -NH Side: Right  -NH Orientation: anterior  -NH Location: leg  -NH Primary Wound Type: Traumatic  -NH    Retired Wound - Properties Group Placement Date: 03/02/23 -NH Placement Time: 1207  -NH Side: Right  -NH Orientation: anterior  -NH Location: leg  -NH Primary Wound Type: Traumatic  -NH    Retired Wound -  Properties Group Date first assessed: 03/02/23  -NH Time first assessed: 1207  -NH Side: Right  -NH Location: leg  -NH Primary Wound Type: Traumatic  -NH    Row Name             Wound 03/02/23 Left medial foot Pressure Injury    Wound - Properties Group Placement Date: 03/02/23  -NH Side: Left  -NH Orientation: medial  -NH Location: foot  -NH Primary Wound Type: Pressure inj  -NH    Retired Wound - Properties Group Placement Date: 03/02/23  -NH Side: Left  -NH Orientation: medial  -NH Location: foot  -NH Primary Wound Type: Pressure inj  -NH    Retired Wound - Properties Group Date first assessed: 03/02/23  -NH Side: Left  -NH Location: foot  -NH Primary Wound Type: Pressure inj  -NH    Row Name 03/03/23 1103          Plan of Care Review    Plan of Care Reviewed With patient (P)   -TG     Progress no change (P)   -TG     Outcome Evaluation Pt continues to demonstrate deficits in balance, strength, and mobility. He will continue to benefit from in patient PT services. (P)   -TG     Row Name 03/03/23 1103          Therapy Assessment/Plan (PT)    Rehab Potential (PT) fair, will monitor progress closely (P)   -TG     Criteria for Skilled Interventions Met (PT) skilled treatment is necessary (P)   -TG     Therapy Frequency (PT) 3 times/wk (P)   -TG     Predicted Duration of Therapy Intervention (PT) 10 (P)   -TG     Row Name 03/03/23 1103          Progress Summary (PT)    Progress Toward Functional Goals (PT) progress toward functional goals is fair (P)   -TG     Row Name 03/03/23 1103          Physical Therapy Goals    Bed Mobility Goal Selection (PT) bed mobility, PT goal 1 (P)   -TG     Transfer Goal Selection (PT) transfer, PT goal 1 (P)   -TG     Row Name 03/03/23 1103          Bed Mobility Goal 1 (PT)    Activity/Assistive Device (Bed Mobility Goal 1, PT) bed mobility activities, all (P)   -TG     [place] Level/Cues Needed (Bed Mobility Goal 1, PT) minimum assist (75% or more patient effort) (P)   -TG     Time  Frame (Bed Mobility Goal 1, PT) long term goal (LTG);10 days (P)   -TG     Progress/Outcomes (Bed Mobility Goal 1, PT) goal ongoing;goal partially met (P)   -TG     Row Name 03/03/23 1103          Transfer Goal 1 (PT)    Activity/Assistive Device (Transfer Goal 1, PT) sit-to-stand/stand-to-sit;bed-to-chair/chair-to-bed;walker, rolling (P)   -TG     [place] Level/Cues Needed (Transfer Goal 1, PT) minimum assist (75% or more patient effort) (P)   -TG     Time Frame (Transfer Goal 1, PT) 10 days (P)   -TG     Progress/Outcome (Transfer Goal 1, PT) goal ongoing;progress slower than expected (P)   -TG           User Key  (r) = Recorded By, (t) = Taken By, (c) = Cosigned By    Initials Name Provider Type     Nelia Cardona, RN Registered Nurse    NH Sintia James, RN Registered Nurse    TG Nory Coyle, PT Student PT Student                Physical Therapy Education     Title: PT OT SLP Therapies (Done)     Topic: Physical Therapy (Done)     Point: Mobility training (Done)     Learning Progress Summary           Family Acceptance, E, VU,DU by  at 2/28/2023 0957    Acceptance, E, VU,NR by  at 2/27/2023 0956                   Point: Precautions (Done)     Learning Progress Summary           Family Acceptance, E, VU,DU by  at 2/28/2023 0957    Acceptance, E, VU,NR by  at 2/27/2023 0956                               User Key     Initials Effective Dates Name Provider Type [facility] 01/19/22 -  Josr Sheridan, RN Registered Nurse Nurse              PT Recommendation and Plan  Therapy Frequency (PT): (P) 3 times/wk  Progress Summary (PT)  Progress Toward Functional Goals (PT): (P) progress toward functional goals is fair  Plan of Care Reviewed With: (P) patient  Progress: (P) no change  Outcome Evaluation: (P) Pt continues to demonstrate deficits in balance, strength, and mobility. He will continue to benefit from in patient PT services.   Outcome Measures     Row Name 03/03/23 1100 03/01/23 1423           How much help from another person do you currently need...    Turning from your back to your side while in flat bed without using bedrails? 2 (P)   -TG 2  -MEGAN (r) AT (t) MEGAN (c)     Moving from lying on back to sitting on the side of a flat bed without bedrails? 1 (P)   -TG 1  -MEGAN (r) AT (t) MEGAN (c)     Moving to and from a bed to a chair (including a wheelchair)? 1 (P)   -TG 1  -MEGAN (r) AT (t) MEGAN (c)     Standing up from a chair using your arms (e.g., wheelchair, bedside chair)? 1 (P)   -TG 1  -MEGAN (r) AT (t) MEGAN (c)     Climbing 3-5 steps with a railing? 1 (P)   -TG 1  -MEGAN (r) AT (t) MEGAN (c)     To walk in hospital room? 1 (P)   -TG 1  -MEGAN (r) AT (t) MEGAN (c)     AM-PAC 6 Clicks Score (PT) 7 (P)   -TG 7  -MEGAN (r) AT (t)        Functional Assessment    Outcome Measure Options AM-PAC 6 Clicks Basic Mobility (PT) (P)   -TG AM-PAC 6 Clicks Basic Mobility (PT)  -MEGAN (r) AT (t) MEGAN (c)           User Key  (r) = Recorded By, (t) = Taken By, (c) = Cosigned By    Initials Name Provider Type    MEGAN Rory Sibley, PT Physical Therapist    Nory Ravi, PT Student PT Student    AT HugoTamar PT Student PT Student                 Time Calculation:    PT Charges     Row Name 03/03/23 1119             Time Calculation    PT Received On 03/03/23 (P)   -TG      PT Goal Re-Cert Due Date 03/12/23 (P)   -TG         Untimed Charges    PT Eval/Re-eval Minutes 25 (P)   -TG         Total Minutes    Untimed Charges Total Minutes 25 (P)   -TG       Total Minutes 25 (P)   -TG            User Key  (r) = Recorded By, (t) = Taken By, (c) = Cosigned By    Initials Name Provider Type    TG Nory Coyle, PT Student PT Student              Therapy Charges for Today     Code Description Service Date Service Provider Modifiers Qty    [id number] HC PT RE-EVAL ESTABLISHED PLAN 2 3/3/2023 Nory Coyle, PT Student GP 1          PT G-Codes  Outcome Measure Options: (P) AM-PAC 6 Clicks Basic Mobility (PT)  AM-PAC 6 Clicks Score (PT): (P) 7  AM-PAC 6  Clicks Score (OT): 6    Nory Coyle PT Student  3/3/2023

## 2023-03-03 NOTE — PLAN OF CARE
Goal Outcome Evaluation:  Plan of Care Reviewed With: patient           Outcome Evaluation: Patient alert to self. On room air. Cortrak in right nare. Tube feeds resumed at 20 ml/hr per MD nursing communication. Wound care and skin care completed. No complaints of pain. No other issues/needs at this time.

## 2023-03-03 NOTE — PLAN OF CARE
Goal Outcome Evaluation:  Plan of Care Reviewed With: patient, spouse        Progress: no change  Outcome Evaluation: Pt showed signs of poain/discomfort this shift, administered prn pain meds as ordered. Provided skin care adn wound care as ordered. Pt was able to rest better this shift by clustering care. Spouse at bedside. No new issues or needs at this time.

## 2023-03-04 LAB
GLUCOSE BLDC GLUCOMTR-MCNC: 189 MG/DL (ref 70–99)
GLUCOSE BLDC GLUCOMTR-MCNC: 195 MG/DL (ref 70–99)
GLUCOSE BLDC GLUCOMTR-MCNC: 242 MG/DL (ref 70–99)
GLUCOSE BLDC GLUCOMTR-MCNC: 299 MG/DL (ref 70–99)

## 2023-03-04 PROCEDURE — 82962 GLUCOSE BLOOD TEST: CPT

## 2023-03-04 PROCEDURE — 94799 UNLISTED PULMONARY SVC/PX: CPT

## 2023-03-04 PROCEDURE — 99233 SBSQ HOSP IP/OBS HIGH 50: CPT | Performed by: INTERNAL MEDICINE

## 2023-03-04 PROCEDURE — 63710000001 INSULIN REGULAR HUMAN PER 5 UNITS: Performed by: PHYSICIAN ASSISTANT

## 2023-03-04 RX ADMIN — ATORVASTATIN CALCIUM 40 MG: 40 TABLET, FILM COATED ORAL at 21:14

## 2023-03-04 RX ADMIN — SODIUM CHLORIDE, POTASSIUM CHLORIDE, SODIUM LACTATE AND CALCIUM CHLORIDE 500 ML: 600; 310; 30; 20 INJECTION, SOLUTION INTRAVENOUS at 12:00

## 2023-03-04 RX ADMIN — INSULIN HUMAN 3 UNITS: 100 INJECTION, SOLUTION PARENTERAL at 21:14

## 2023-03-04 RX ADMIN — INSULIN HUMAN 5 UNITS: 100 INJECTION, SOLUTION PARENTERAL at 09:49

## 2023-03-04 RX ADMIN — CARBIDOPA AND LEVODOPA 1 TABLET: 25; 100 TABLET ORAL at 21:13

## 2023-03-04 RX ADMIN — INSULIN HUMAN 8 UNITS: 100 INJECTION, SOLUTION PARENTERAL at 12:06

## 2023-03-04 RX ADMIN — PANTOPRAZOLE SODIUM 40 MG: 40 INJECTION, POWDER, FOR SOLUTION INTRAVENOUS at 06:31

## 2023-03-04 RX ADMIN — Medication 10 ML: at 09:50

## 2023-03-04 RX ADMIN — DONEPEZIL HYDROCHLORIDE 10 MG: 10 TABLET, FILM COATED ORAL at 09:49

## 2023-03-04 RX ADMIN — CARBIDOPA AND LEVODOPA 1 TABLET: 25; 100 TABLET ORAL at 09:49

## 2023-03-04 RX ADMIN — FLUTICASONE PROPIONATE 2 SPRAY: 50 SPRAY, METERED NASAL at 09:50

## 2023-03-04 RX ADMIN — CARBIDOPA AND LEVODOPA 1 TABLET: 25; 100 TABLET ORAL at 16:35

## 2023-03-04 RX ADMIN — APIXABAN 5 MG: 5 TABLET, FILM COATED ORAL at 09:49

## 2023-03-04 RX ADMIN — APIXABAN 5 MG: 5 TABLET, FILM COATED ORAL at 21:13

## 2023-03-04 RX ADMIN — INSULIN HUMAN 3 UNITS: 100 INJECTION, SOLUTION PARENTERAL at 16:35

## 2023-03-04 NOTE — PLAN OF CARE
Goal Outcome Evaluation:              Outcome Evaluation: AOxself. Able to anwer yes and no questions. Wound and skin care performed per orders. Tube feed out per family this shift. Eating all meals. Blood glucose monitored. Plan to discharge home with hospice at the beginning of the week.

## 2023-03-04 NOTE — PROGRESS NOTES
[facility]   Hospitalist Progress Note  Date: 3/4/2023  Patient Name: Dhaval Nieto  : 1955  MRN: [id number]  Date of admission: 2023      Subjective   Subjective     Chief complaint: Weakness     Summary:  67-year-old male with history of COPD, chronic atrial fibrillation, depression, BPH with LUTS, Parkinson's disease, Lewy body dementia, diabetes mellitus, essential hypertension, GERD without esophagitis, CAD status post CABG, seizure disorder, history of mitral valve replacement with bioprosthetic mitral valve, chronic diastolic CHF, was hospitalized on 2023 with chief complaint of weakness, unable to eat or drink, unable to speak, found to be septic secondary to aspiration, RADHA, hyperglycemia, placed in the critical care unit for overflow, critical care consulted, hypernatremia, free water deficit, IV fluids ordered for replacement, sodium corrected, await reevaluation for speech therapy to remove cortrack and allow oral intake     Interval follow-up: per RN eating well; wife okay to remove NGT    Review of systems:  Unable to obtain review of systems  Objective   Objective     Vitals:   Temp:  [97.5 °F (36.4 °C)-98.8 °F (37.1 °C)] 97.7 °F (36.5 °C)  Heart Rate:  [85-97] 85  Resp:  [16-18] 16  BP: ()/(49-65) 97/52  Physical Exam    Constitutional: awake, alert                HENT: NCAT, mucous membranes moist              Neck: Supple, no palpable masses              Respiratory: Diminished breath sounds bilaterally, wheezing throughout              Cardiovascular: Irregular rate, irregular rhythm, 2 out of 6 systolic murmurs              Gastrointestinal: Positive bowel sounds, soft, nontender, nondistended              Musculoskeletal: No bilateral ankle edema, no clubbing or cyanosis to extremities              Psychiatric: Pleasant mood and affect              Neurologic: Awake, alert              Skin: No rashes visible on exposed skin    Result Review    Result  Review:  I have personally reviewed the pertinent results from the past 24 hours to 3/4/2023 17:21 EST and agree with these findings:  [x]  Laboratory   CBC    CBC 2/28/23 3/1/23 3/2/23   WBC 11.80 (A) 11.24 (A) 12.72 (A)   RBC 3.40 (A) 3.38 (A) 3.53 (A)   Hemoglobin 10.6 (A) 10.5 (A) 11.0 (A)   Hematocrit 30.0 (A) 29.9 (A) 31.5 (A)   MCV 88.2 88.5 89.2   MCH 31.2 31.1 31.2   MCHC 35.3 35.1 34.9   RDW 14.3 14.6 15.1   Platelets 142 153 183   (A) Abnormal value            BMP    BMP 2/28/23 3/1/23 3/2/23   BUN 11 8 9   Creatinine 0.64 (A) 0.66 (A) 0.71 (A)   Sodium 137 137 136   Potassium 3.9 4.0 4.3   Chloride 106 103 103   CO2 25.0 26.5 25.8   Calcium 8.5 (A) 8.6 9.0   (A) Abnormal value            LIVER FUNCTION TESTS:      Lab 03/02/23  0504 03/01/23  0555 02/28/23  0537 02/27/23  0518 02/26/23  0607   TOTAL PROTEIN  --   --  5.2* 5.1* 4.7*   ALBUMIN 3.5 3.3* 3.1* 2.9* 2.7*   ALT (SGPT)  --   --  15 11 10   AST (SGOT)  --   --  16 14 16   BILIRUBIN  --   --  0.3 0.5 0.5   INDIRECT BILIRUBIN  --   --   --  0.3  --    BILIRUBIN DIRECT  --   --  <0.2 0.2 <0.2   ALK PHOS  --   --  78 76 75       [x]  Microbiology   Blood Culture   Date Value Ref Range Status   02/24/2023 No growth at 24 hours  Preliminary     BCID, PCR   Date Value Ref Range Status   02/22/2023 (A) Negative by BCID PCR. Culture to Follow. Final    Staph spp, not aureus or lugdunensis. Identification by BCID2 PCR.       [x]  Radiology   [x]  EKG/Telemetry   []  Cardiology/Vascular   []  Pathology  [x]  Old records  []  Other:    Assessment & Plan   Assessment / Plan     Assessment:  · Sepsis secondary to concern for aspiration versus viral etiology  · Gram-positive cocci bacteremia ruled contamination of specimen  · Concern for aspiration  · Acute metabolic encephalopathy secondary to hypernatremia and dehydration  · COPD with acute exacerbation  · Hypoglycemia  · Physical debility  · Hypernatremia due to volume depletion  · RADHA  · Hypercalcemia due  to dehydration  · COPD  · Chronic atrial fibrillation  · Depression  · BPH with LUTS  · Parkinson's disease  · Lewy body dementia  · Diabetes mellitus with hyperglycemia and hypoglycemia  · Essential hypertension  · GERD without esophagitis  · CAD status post CABG  · Seizure disorder  · History of mitral valve replacement bioprosthetic mitral valve  · Chronic diastolic CHF     Plan:  · Continue current care  · On PD meds  · NGT out  · Eating  · [facility] meeting yesterday, plan per note to go home with Hospice  · Will see when family okay to take him home    garrison rn  DVT prophylaxis:  Medical DVT prophylaxis orders are present.    CODE STATUS:   Medical Intervention Limits: NO intubation (DNI)  Level Of Support Discussed With: Next of Kin (If No Surrogate); Health Care Surrogate  Code Status (Patient has no pulse and is not breathing): CPR (Attempt to Resuscitate)  Medical Interventions (Patient has pulse or is breathing): Limited Support  Release to patient: Routine Release    Electronically signed by Kaden Sepulveda MD, 03/04/23, 5:22 PM EST.

## 2023-03-05 LAB
GLUCOSE BLDC GLUCOMTR-MCNC: 145 MG/DL (ref 70–99)
GLUCOSE BLDC GLUCOMTR-MCNC: 185 MG/DL (ref 70–99)
GLUCOSE BLDC GLUCOMTR-MCNC: 257 MG/DL (ref 70–99)
GLUCOSE BLDC GLUCOMTR-MCNC: 264 MG/DL (ref 70–99)

## 2023-03-05 PROCEDURE — 99233 SBSQ HOSP IP/OBS HIGH 50: CPT | Performed by: INTERNAL MEDICINE

## 2023-03-05 PROCEDURE — 94799 UNLISTED PULMONARY SVC/PX: CPT

## 2023-03-05 PROCEDURE — 82962 GLUCOSE BLOOD TEST: CPT

## 2023-03-05 PROCEDURE — 63710000001 INSULIN REGULAR HUMAN PER 5 UNITS: Performed by: PHYSICIAN ASSISTANT

## 2023-03-05 RX ADMIN — APIXABAN 5 MG: 5 TABLET, FILM COATED ORAL at 20:26

## 2023-03-05 RX ADMIN — Medication 10 ML: at 09:49

## 2023-03-05 RX ADMIN — PANTOPRAZOLE SODIUM 40 MG: 40 INJECTION, POWDER, FOR SOLUTION INTRAVENOUS at 04:50

## 2023-03-05 RX ADMIN — APIXABAN 5 MG: 5 TABLET, FILM COATED ORAL at 09:47

## 2023-03-05 RX ADMIN — CARBIDOPA AND LEVODOPA 1 TABLET: 25; 100 TABLET ORAL at 20:26

## 2023-03-05 RX ADMIN — INSULIN HUMAN 8 UNITS: 100 INJECTION, SOLUTION PARENTERAL at 12:11

## 2023-03-05 RX ADMIN — CARBIDOPA AND LEVODOPA 1 TABLET: 25; 100 TABLET ORAL at 16:40

## 2023-03-05 RX ADMIN — CARBIDOPA AND LEVODOPA 1 TABLET: 25; 100 TABLET ORAL at 09:47

## 2023-03-05 RX ADMIN — INSULIN HUMAN 8 UNITS: 100 INJECTION, SOLUTION PARENTERAL at 09:48

## 2023-03-05 RX ADMIN — Medication 10 ML: at 20:26

## 2023-03-05 RX ADMIN — DONEPEZIL HYDROCHLORIDE 10 MG: 10 TABLET, FILM COATED ORAL at 09:47

## 2023-03-05 RX ADMIN — ATORVASTATIN CALCIUM 40 MG: 40 TABLET, FILM COATED ORAL at 20:26

## 2023-03-05 RX ADMIN — INSULIN HUMAN 3 UNITS: 100 INJECTION, SOLUTION PARENTERAL at 16:40

## 2023-03-05 NOTE — PROGRESS NOTES
[facility]   Hospitalist Progress Note  Date: 3/5/2023  Patient Name: Dhaval Nieto  : 1955  MRN: [id number]  Date of admission: 2023      Subjective   Subjective     Chief complaint: Weakness     Summary:  67-year-old male with history of COPD, chronic atrial fibrillation, depression, BPH with LUTS, Parkinson's disease, Lewy body dementia, diabetes mellitus, essential hypertension, GERD without esophagitis, CAD status post CABG, seizure disorder, history of mitral valve replacement with bioprosthetic mitral valve, chronic diastolic CHF, was hospitalized on 2023 with chief complaint of weakness, unable to eat or drink, unable to speak, found to be septic secondary to aspiration, RADHA, hyperglycemia, placed in the critical care unit for overflow, critical care consulted, hypernatremia, free water deficit, IV fluids ordered for replacement, sodium corrected, await reevaluation for speech therapy to remove cortrack and allow oral intake     Interval follow-up: NGT removed yesterday, eating.  Planning to go home with Hospice on Tuesday.    Review of systems:  Unable to obtain review of systems  Objective   Objective     Vitals:   Temp:  [97.7 °F (36.5 °C)-98.6 °F (37 °C)] 98.1 °F (36.7 °C)  Heart Rate:  [51-97] 91  Resp:  [16] 16  BP: ()/(52-59) 108/54  Physical Exam    Constitutional: awake, alert                HENT: NCAT, mucous membranes moist              Neck: Supple, no palpable masses              Respiratory: Diminished breath sounds bilaterally, wheezing throughout              Cardiovascular: Irregular rate, irregular rhythm, 2 out of 6 systolic murmurs              Gastrointestinal: Positive bowel sounds, soft, nontender, nondistended              Musculoskeletal: No bilateral ankle edema, no clubbing or cyanosis to extremities              Psychiatric: Pleasant mood and affect              Neurologic: Awake, alert              Skin: No rashes visible on exposed  skin    Result Review    Result Review:  I have personally reviewed the pertinent results from the past 24 hours to 3/5/2023 07:54 EST and agree with these findings:  [x]  Laboratory   CBC    CBC 2/28/23 3/1/23 3/2/23   WBC 11.80 (A) 11.24 (A) 12.72 (A)   RBC 3.40 (A) 3.38 (A) 3.53 (A)   Hemoglobin 10.6 (A) 10.5 (A) 11.0 (A)   Hematocrit 30.0 (A) 29.9 (A) 31.5 (A)   MCV 88.2 88.5 89.2   MCH 31.2 31.1 31.2   MCHC 35.3 35.1 34.9   RDW 14.3 14.6 15.1   Platelets 142 153 183   (A) Abnormal value            BMP    BMP 2/28/23 3/1/23 3/2/23   BUN 11 8 9   Creatinine 0.64 (A) 0.66 (A) 0.71 (A)   Sodium 137 137 136   Potassium 3.9 4.0 4.3   Chloride 106 103 103   CO2 25.0 26.5 25.8   Calcium 8.5 (A) 8.6 9.0   (A) Abnormal value            LIVER FUNCTION TESTS:      Lab 03/02/23  0504 03/01/23  0555 02/28/23  0537 02/27/23  0518   TOTAL PROTEIN  --   --  5.2* 5.1*   ALBUMIN 3.5 3.3* 3.1* 2.9*   ALT (SGPT)  --   --  15 11   AST (SGOT)  --   --  16 14   BILIRUBIN  --   --  0.3 0.5   INDIRECT BILIRUBIN  --   --   --  0.3   BILIRUBIN DIRECT  --   --  <0.2 0.2   ALK PHOS  --   --  78 76       [x]  Microbiology   Blood Culture   Date Value Ref Range Status   02/24/2023 No growth at 24 hours  Preliminary     BCID, PCR   Date Value Ref Range Status   02/22/2023 (A) Negative by BCID PCR. Culture to Follow. Final    Staph spp, not aureus or lugdunensis. Identification by BCID2 PCR.       [x]  Radiology   [x]  EKG/Telemetry   []  Cardiology/Vascular   []  Pathology  [x]  Old records  []  Other:    Assessment & Plan   Assessment / Plan     Assessment:  · Sepsis secondary to concern for aspiration versus viral etiology  · Gram-positive cocci bacteremia ruled contamination of specimen  · Concern for aspiration  · Acute metabolic encephalopathy secondary to hypernatremia and dehydration  · COPD with acute exacerbation  · Hypoglycemia  · Physical debility  · Hypernatremia due to volume depletion  · RADHA  · Hypercalcemia due to  dehydration  · COPD  · Chronic atrial fibrillation  · Depression  · BPH with LUTS  · Parkinson's disease  · Lewy body dementia  · Diabetes mellitus with hyperglycemia and hypoglycemia  · Essential hypertension  · GERD without esophagitis  · CAD status post CABG  · Seizure disorder  · History of mitral valve replacement bioprosthetic mitral valve  · Chronic diastolic CHF     Plan:  · Continue current care  · On PD meds  · NGT out  · Eating  · [facility] meeting yesterday, plan per note to go home with Hospice  · Plan for home with Hospice on Tuesday 3/7/23    garrison rn  DVT prophylaxis:  Medical DVT prophylaxis orders are present.    CODE STATUS:   Medical Intervention Limits: NO intubation (DNI)  Level Of Support Discussed With: Next of Kin (If No Surrogate); Health Care Surrogate  Code Status (Patient has no pulse and is not breathing): CPR (Attempt to Resuscitate)  Medical Interventions (Patient has pulse or is breathing): Limited Support  Release to patient: Routine Release    Electronically signed by Kaden Sepulveda MD, 03/05/23, 12:00 PM EST.

## 2023-03-05 NOTE — PLAN OF CARE
Goal Outcome Evaluation:              Outcome Evaluation: AOxself. Continues to have some conversation. Wound and skin care performed per orders, frequently turned and repositioned. Blood glucose monitored. Plan for discharge home with hospice on Tuesday.

## 2023-03-06 LAB
GLUCOSE BLDC GLUCOMTR-MCNC: 131 MG/DL (ref 70–99)
GLUCOSE BLDC GLUCOMTR-MCNC: 164 MG/DL (ref 70–99)
GLUCOSE BLDC GLUCOMTR-MCNC: 239 MG/DL (ref 70–99)
GLUCOSE BLDC GLUCOMTR-MCNC: 342 MG/DL (ref 70–99)

## 2023-03-06 PROCEDURE — 63710000001 INSULIN REGULAR HUMAN PER 5 UNITS: Performed by: PHYSICIAN ASSISTANT

## 2023-03-06 PROCEDURE — 99232 SBSQ HOSP IP/OBS MODERATE 35: CPT | Performed by: STUDENT IN AN ORGANIZED HEALTH CARE EDUCATION/TRAINING PROGRAM

## 2023-03-06 PROCEDURE — 92526 ORAL FUNCTION THERAPY: CPT

## 2023-03-06 PROCEDURE — 82962 GLUCOSE BLOOD TEST: CPT

## 2023-03-06 PROCEDURE — 94799 UNLISTED PULMONARY SVC/PX: CPT

## 2023-03-06 RX ORDER — FLUTICASONE PROPIONATE 50 MCG
2 SPRAY, SUSPENSION (ML) NASAL
Status: DISCONTINUED | OUTPATIENT
Start: 2023-03-06 | End: 2023-03-07 | Stop reason: HOSPADM

## 2023-03-06 RX ADMIN — DONEPEZIL HYDROCHLORIDE 10 MG: 10 TABLET, FILM COATED ORAL at 10:17

## 2023-03-06 RX ADMIN — INSULIN HUMAN 10 UNITS: 100 INJECTION, SOLUTION PARENTERAL at 12:43

## 2023-03-06 RX ADMIN — Medication 10 ML: at 20:09

## 2023-03-06 RX ADMIN — INSULIN HUMAN 3 UNITS: 100 INJECTION, SOLUTION PARENTERAL at 17:17

## 2023-03-06 RX ADMIN — INSULIN HUMAN 5 UNITS: 100 INJECTION, SOLUTION PARENTERAL at 10:17

## 2023-03-06 RX ADMIN — CARBIDOPA AND LEVODOPA 1 TABLET: 25; 100 TABLET ORAL at 20:08

## 2023-03-06 RX ADMIN — CARBIDOPA AND LEVODOPA 1 TABLET: 25; 100 TABLET ORAL at 10:17

## 2023-03-06 RX ADMIN — Medication 2 SPRAY: at 05:46

## 2023-03-06 RX ADMIN — Medication 10 ML: at 10:17

## 2023-03-06 RX ADMIN — PANTOPRAZOLE SODIUM 40 MG: 40 INJECTION, POWDER, FOR SOLUTION INTRAVENOUS at 05:46

## 2023-03-06 RX ADMIN — ATORVASTATIN CALCIUM 40 MG: 40 TABLET, FILM COATED ORAL at 20:08

## 2023-03-06 RX ADMIN — APIXABAN 5 MG: 5 TABLET, FILM COATED ORAL at 20:08

## 2023-03-06 RX ADMIN — APIXABAN 5 MG: 5 TABLET, FILM COATED ORAL at 10:17

## 2023-03-06 RX ADMIN — CARBIDOPA AND LEVODOPA 1 TABLET: 25; 100 TABLET ORAL at 17:18

## 2023-03-06 NOTE — THERAPY TREATMENT NOTE
Acute Care - Speech Language Pathology   Swallow Treatment Note [facility]     Patient Name: Dhaval Nieto  : 1955  MRN: [id number]  Today's Date: 3/6/2023               Admit Date: 2023    Visit Dx:     ICD-10-CM ICD-9-CM   1. Dehydration  E86.0 276.51   2. Acute kidney injury ([facility])  N17.9 584.9   3. Leukocytosis, unspecified type  D72.829 288.60   4. Hypoglycemia  E16.2 251.2   5. Severe dementia due to Parkinson's disease, without behavioral disturbance, psychotic disturbance, mood disturbance, or anxiety ([facility])  G20 332.0    F02.C0 294.10   6. Aspiration into airway, initial encounter  T17.908A 934.9   7. Oropharyngeal dysphagia  R13.12 787.22   8. Decreased activities of daily living (ADL)  Z78.9 V49.89   9. Difficulty walking  R26.2 719.7     Patient Active Problem List   Diagnosis   • Chronic bronchitis, unspecified chronic bronchitis type ([facility])   • Right wrist pain   • Pain disorder with psychological factors   • Bladder disorder   • Abdominal hernia   • Stomach disorder   • Arthritis   • Asthma   • BPH with urinary obstruction   • Chest pain   • Decreased sensation   • Depression   • History of colon cancer   • Hyperlipemia   • Hyperlipemia, mixed   • Hypertension, essential   • Insomnia   • Limb swelling   • Loss of balance   • Low back pain   • Thoracic back pain   • Lumbar degenerative disc disease   • Degeneration of thoracic or thoracolumbar intervertebral disc   • Lumbar radiculopathy   • Migraines   • Muscle cramps   • Neck pain   • Neurologic disorder   • Headache   • Pain, generalized   • Shortness of breath   • Cervical radiculopathy   • Cervical stenosis of spinal canal   • Seasonal allergic rhinitis   • Leg pain   • Cancer of sigmoid colon ([facility])   • Closed fracture of left hip, initial encounter ([facility])   • Dysuria   • Type 2 diabetes mellitus, with long-term current use of insulin ([facility])   • Severe malnutrition ([facility])   • Memory loss   • Aftercare following surgery of left  total hip arthroplasty, 8/15/2022   • Paroxysmal atrial fibrillation ([facility])   • Severe sepsis ([facility])     Past Medical History:   Diagnosis Date   • Arteriovenous malformation 03/09/1980   • Arthritis    • Asthma    • Benign essential hypertension    • Bladder disorder    • Bleeding disorder ([facility])    • Blood disease    • BPH with urinary obstruction 05/30/2014   • Brain concussion 1999   • Cancer ([facility])    • Cervical disc disorder long time   • Cervical radiculopathy 05/01/2015    left   • Cervical spinal stenosis 03/10/2020   • Cervicalgia 12/18/2018   • Chest pain    • CHF (congestive heart failure) ([facility])    • Clotting disorder ([facility]) [place]   • Colon cancer ([facility]) 12/18/2018   • Condition not found     Hernia   • Condition not found     herniated disc   • COPD (chronic obstructive pulmonary disease) ([facility])    • Coronary artery disease    • CTS (carpal tunnel syndrome) 08/04/2002   • DDD (degenerative disc disease), thoracic 12/18/2018   • Decreased sensation 05/01/2015    left   • Deep vein thrombosis ([facility])    • Depression    • Diabetes ([facility])    • Diabetes mellitus type 1 with coma, insulin dependent ([facility])     controlled   • Headache    • Heart murmur    • Heart valve disease    • Hematuria, gross 05/30/2014   • Hyperlipemia    • Hyperlipidemia    • Hypertension    • Insomnia    • Leg pain    • Lewy body dementia ([facility])    • Limb swelling    • Loss of balance 12/18/2018   • Low back pain 03/10/2020   • Lumbar degenerative disc disease 12/18/2018   • Lumbar radiculopathy 03/10/2020   • Lumbosacral disc disease 05/05/1999   • Mid back pain 12/18/2018   • Migraines    • Muscle cramps    • Myocardial infarction ([facility])    • Neurologic disorder    • Pain in back    • Pain in joint    • Pain of cervical spine    • Pain, generalized    • Pulmonary arterial hypertension ([facility]) 1999   • Seasonal allergies    • Seizures ([facility]) 2000   • Shortness of breath    • Sleep apnea    • Stomach disorder      Past Surgical History:   Procedure  Laterality Date   • ABDOMINAL SURGERY     • BLADDER SURGERY     • CARPAL TUNNEL RELEASE     • CEREBRAL ANGIOGRAM  2021   • COLON RESECTION     • COLONOSCOPY  01/30/2017    [place]   • CORONARY ARTERY BYPASS GRAFT  12/2019    one vessel   • GALLBLADDER SURGERY      [email or web address]   • HERNIA REPAIR     • JOINT REPLACEMENT      joint surgery   • MITRAL VALVE REPAIR/REPLACEMENT  02/2019    [facility]   • OTHER SURGICAL HISTORY  05/30/2014    office cystoscopy w/DR SHANICE Young   • SHOULDER SURGERY Bilateral    • TOTAL HIP ARTHROPLASTY Left 8/15/2022    Procedure: TOTAL HIP ARTHROPLASTY ANTERIOR;  Surgeon: Kaden Green MD;  Location: [facility] MAIN OR;  Service: Orthopedics;  Laterality: Left;   • VASCULAR SURGERY  2020   • VENTRAL HERNIA REPAIR     SPEECH PATHOLOGY DYSPHAGIA TREATMENT    Subjective/Behavioral Observations: Patient sleeping however awakes easily with stimulation. Possible d/c home tomorrow with hospice. Wife not currently at bedside.        Day/time of Treatment:3/6/23        Current Diet:pureed, nectar thick liquids        Results of treatment:Patient did not offer any assistance with self feeding. Required total assist for positioning. Single sips of nectar thick liquids given. Mild delayed swallows completed with no overt s/s of aspiration observed. Pureed by spoon with moderate lingual pumping prior to swallow completions. Occasional double swallow however laryngeal sounds clear. Isolated trials of thin liquids with consistent wet cough.         Progress toward goals:Tolerating current diet recommendations without overt s/s of aspiration. S/s of aspiration with trials of thin liquids.         Barriers to Achieving goals: medical status, possible d/c home with hospice        Plan of care:/changes in plan: Continue per diet recommendations, strategies and positioning. Wife not at bedside for education regarding diet at home. Patient is at risk of aspiration due to swallow delay.                    SLP Recommendation and Plan                                                                                       EDUCATION  The patient has been educated in the following areas:   Dysphagia (Swallowing Impairment).              Time Calculation:    Time Calculation- SLP     Row Name 03/06/23 1336             Time Calculation- SLP    SLP Stop Time 1330  -SN      SLP Received On 03/06/23  -SN         Untimed Charges    [id number] Treatment Swallow Minutes 45  -SN         Total Minutes    Untimed Charges Total Minutes 45  -SN       Total Minutes 45  -SN            User Key  (r) = Recorded By, (t) = Taken By, (c) = Cosigned By    Initials Name Provider Type    SN Orly Stokes MS-CCC/SLP, MARIA ANTONIA Speech and Language Pathologist                Therapy Charges for Today     Code Description Service Date Service Provider Modifiers Qty    [id number] HC ST TREATMENT SWALLOW 3 3/6/2023 Orly Stokes MS-CCC/SLP, MARIA ANTONIA GN 1               KELLEY Myers/SLP, CNT  3/6/2023

## 2023-03-06 NOTE — CONSULTS
Nutrition Services    Patient Name: Dhaval Neito  YOB: 1955  MRN: [id number]  Admission date: 2/21/2023      CLINICAL NUTRITION ASSESSMENT      Reason for Assessment  Follow-up protocol   H&P:    Past Medical History:   Diagnosis Date   • Arteriovenous malformation 03/09/1980   • Arthritis    • Asthma    • Benign essential hypertension    • Bladder disorder    • Bleeding disorder ([facility])    • Blood disease    • BPH with urinary obstruction 05/30/2014   • Brain concussion 1999   • Cancer ([facility])    • Cervical disc disorder long time   • Cervical radiculopathy 05/01/2015    left   • Cervical spinal stenosis 03/10/2020   • Cervicalgia 12/18/2018   • Chest pain    • CHF (congestive heart failure) ([facility])    • Clotting disorder ([facility]) [place]   • Colon cancer ([facility]) 12/18/2018   • Condition not found     Hernia   • Condition not found     herniated disc   • COPD (chronic obstructive pulmonary disease) ([facility])    • Coronary artery disease    • CTS (carpal tunnel syndrome) 08/04/2002   • DDD (degenerative disc disease), thoracic 12/18/2018   • Decreased sensation 05/01/2015    left   • Deep vein thrombosis ([facility])    • Depression    • Diabetes ([facility])    • Diabetes mellitus type 1 with coma, insulin dependent ([facility])     controlled   • Headache    • Heart murmur    • Heart valve disease    • Hematuria, gross 05/30/2014   • Hyperlipemia    • Hyperlipidemia    • Hypertension    • Insomnia    • Leg pain    • Lewy body dementia ([facility])    • Limb swelling    • Loss of balance 12/18/2018   • Low back pain 03/10/2020   • Lumbar degenerative disc disease 12/18/2018   • Lumbar radiculopathy 03/10/2020   • Lumbosacral disc disease 05/05/1999   • Mid back pain 12/18/2018   • Migraines    • Muscle cramps    • Myocardial infarction ([facility])    • Neurologic disorder    • Pain in back    • Pain in joint    • Pain of cervical spine    • Pain, generalized    • Pulmonary arterial hypertension ([facility]) 1999   • Seasonal allergies    •  "Seizures ([facility]) 2000   • Shortness of breath    • Sleep apnea    • Stomach disorder         Current Problems:   Active Hospital Problems    Diagnosis    • **Severe sepsis ([facility])         Nutrition/Diet History         Narrative     NG removed on 3/4.  Pt consuming % of meals over past few days.  Per MD notes goal is to discharge home with Hospice on Tuesday.  RD signing off case.     Anthropometrics        Current Height, Weight Height: 175.3 cm (69\")  Weight: 48.8 kg (107 lb 9.4 oz)   Current BMI Body mass index is 15.89 kg/m². underweight       Weight Hx  Wt Readings from Last 30 Encounters:   03/05/23 0500 48.8 kg (107 lb 9.4 oz)   03/04/23 0410 49.7 kg (109 lb 9.1 oz)   03/02/23 0500 52.1 kg (114 lb 13.8 oz)   03/01/23 0600 51.5 kg (113 lb 8.6 oz)   02/27/23 0600 50.1 kg (110 lb 7.2 oz)   02/25/23 0600 50.5 kg (111 lb 5.3 oz)   02/24/23 1118 48.1 kg (106 lb 0.7 oz)   02/24/23 0600 49.3 kg (108 lb 11 oz)   02/23/23 0350 54.7 kg (120 lb 9.5 oz)   02/21/23 2316 55 kg (121 lb 4.1 oz)   02/17/23 1350 66.7 kg (147 lb)   02/11/23 1401 67 kg (147 lb 11.3 oz)   01/17/23 1350 66.7 kg (147 lb)   11/09/22 0808 67.1 kg (147 lb 14.9 oz)   09/20/22 1916 66.4 kg (146 lb 6.2 oz)   09/10/22 0136 63.9 kg (140 lb 14 oz)   08/29/22 1354 67.3 kg (148 lb 6.4 oz)   08/15/22 0156 68.4 kg (150 lb 12.7 oz)   08/14/22 1818 71.1 kg (156 lb 12 oz)   08/11/22 1346 70.2 kg (154 lb 12.2 oz)   07/14/22 1428 65.8 kg (145 lb)   07/13/22 1359 68.3 kg (150 lb 9.2 oz)   07/07/22 1111 70.6 kg (155 lb 9.6 oz)   05/10/22 1301 72.6 kg (160 lb)   04/19/22 1409 76.7 kg (169 lb)   03/20/22 0351 76.7 kg (169 lb 1.5 oz)   03/17/22 1328 75.8 kg (167 lb)   03/08/22 0935 75.8 kg (167 lb 1.7 oz)   02/17/22 1331 78.9 kg (174 lb)   01/25/22 0927 78.9 kg (174 lb)   01/15/22 1240 79 kg (174 lb 2.6 oz)   01/13/22 1321 76 kg (167 lb 8.8 oz)   11/26/21 0912 76.3 kg (168 lb 3.4 oz)   09/07/21 1343 78 kg (172 lb)   08/30/21 1512 78.9 kg (174 lb)   08/25/21 1356 79.7 kg " (175 lb 12.8 oz)   07/13/21 1254 78.9 kg (173 lb 15.1 oz)   06/15/21 1400 78.6 kg (173 lb 3.2 oz)   05/13/21 0000 80.3 kg (177 lb)   04/27/21 0000 78.2 kg (172 lb 8 oz)            Wt Change Observation -22.6% x 6 months     Estimated/Assessed Needs       Energy Requirements 35 kcal/kg   EST Needs (kcal/day) 1915 kcal        Protein Requirements 1.5-2 g/kg   EST Daily Needs (g/day)  g       Fluid Requirements 30 ml/kg    Estimated Needs (mL/day) 1650 ml      Labs/Medications         Pertinent Labs Reviewed.   Results from last 7 days   Lab Units 03/02/23  0504 03/01/23  0555 02/28/23  0537   SODIUM mmol/L 136 137 137   POTASSIUM mmol/L 4.3 4.0 3.9   CHLORIDE mmol/L 103 103 106   CO2 mmol/L 25.8 26.5 25.0   BUN mg/dL 9 8 11   CREATININE mg/dL 0.71* 0.66* 0.64*   CALCIUM mg/dL 9.0 8.6 8.5*   BILIRUBIN mg/dL  --   --  0.3   ALK PHOS U/L  --   --  78   ALT (SGPT) U/L  --   --  15   AST (SGOT) U/L  --   --  16   GLUCOSE mg/dL 186* 216* 171*     Results from last 7 days   Lab Units 03/02/23  0504 03/01/23  0555 02/28/23  0537   MAGNESIUM mg/dL  --   --  1.9   PHOSPHORUS mg/dL 2.1*   < > 2.4*   HEMOGLOBIN g/dL 11.0*   < > 10.6*   HEMATOCRIT % 31.5*   < > 30.0*    < > = values in this interval not displayed.     Lab Results   Component Value Date    HGBA1C 9.8 (H) 07/08/2019         Pertinent Medications Reviewed.     Current Nutrition Orders & Evaluation of Intake       Oral Nutrition     Current PO Diet Diet: Regular/House Diet; Texture: Pureed (NDD 1); Fluid Consistency: Nectar Thick   Supplement Orders Placed This Encounter      DIET MESSAGE Please send regular guest tray with all meals.      Dietary Nutrition Supplements Boost Glucose Control (Glucerna Shake); vanilla       Malnutrition Severity Assessment      Patient meets criteria for : Severe Malnutrition         Nutrition Diagnosis         Nutrition Dx Problem 1 Severe malnutrition related to decreased ability to consume sufficient energy as evidenced by  inadequate energy intake., unintended wt change., body composition changes., family report., SLP/swallow eval, report of minimal PO intake. and impaired skin integrity     Nutrition Intervention         Diet per SLP  Continue thickened Boost Glucose control BID (570 kcal & 48 gm protein)      Medical Nutrition Therapy/Nutrition Education          Learner     Readiness N/A  N/A     Method     Response N/A  N/A     Monitor/Evaluation        Monitor PO intake, Supplement intake, Pertinent labs, Weight, Swallow function     Nutrition Discharge Plan         No nutrition discharge needs identified at this time     Electronically signed by:  Disha Gan RD  03/06/23 08:50 EST

## 2023-03-06 NOTE — SIGNIFICANT NOTE
03/06/23 1000   SOCIAL WORK ASSESSMENT   Referral Source physician   Reason for Consult palliative care   Visit Type ongoing         Pt resting with eyes closed with spouse at bedside. Discussed discharge plan with [facility] tomorrow. Spouse reported family has been helping prepare for dietary needs (puree) and readying the home for discharge. Spouse agreed for prescriptions to be sent to [facility] pharmacy.     Palliative will follow-up with spouse tomorrow regarding discharge.     [facility] following.    BRETT Arredondo

## 2023-03-06 NOTE — PLAN OF CARE
Goal Outcome Evaluation:  Plan of Care Reviewed With: patient           Outcome Evaluation: AOxself. Wound care completed per orders. Continues to tolerate pureed, nectar thickened liquids diet. Plan for discharge home tomorrow with hospice.

## 2023-03-06 NOTE — PLAN OF CARE
Goal Outcome Evaluation:  Plan of Care Reviewed With: patient        Progress: no change  Outcome Evaluation: aox1, Vs stable, coccyx dressing changed this shift d/t rolling. no new issues at this time

## 2023-03-06 NOTE — PROGRESS NOTES
[facility]   Hospitalist Progress Note  Date: 3/6/2023  Patient Name: Dhaval Nieto  : 1955  MRN: [id number]  Date of admission: 2023      Subjective   Subjective     Chief complaint: Weakness     Summary:  67-year-old male with history of COPD, chronic atrial fibrillation, depression, BPH with LUTS, Parkinson's disease, Lewy body dementia, diabetes mellitus, essential hypertension, GERD without esophagitis, CAD status post CABG, seizure disorder, history of mitral valve replacement with bioprosthetic mitral valve, chronic diastolic CHF, was hospitalized on 2023 with chief complaint of weakness, unable to eat or drink, unable to speak, found to be septic secondary to aspiration, RADHA, hyperglycemia, placed in the critical care unit for overflow, critical care consulted, hypernatremia, free water deficit, IV fluids ordered for replacement, sodium corrected, await reevaluation for speech therapy to remove cortrack and allow oral intake     Interval follow-up: No events overnight.  Patient on comfort care measures.  Patient does respond however difficult to understand.  No family at bedside.      Review of systems:  Unable to obtain review of systems    Objective   Objective     Vitals:   Temp:  [97.7 °F (36.5 °C)-98.1 °F (36.7 °C)] 97.7 °F (36.5 °C)  Heart Rate:  [] 102  Resp:  [18-20] 18  BP: ()/(54-63) 96/63  Physical Exam    Constitutional: Laying in bed, chronically ill, weak, cachectic                HENT: NCAT, mucous membranes moist              Neck: Supple, no palpable masses              Respiratory: Diminished breath sounds bilaterally, wheezing throughout              Cardiovascular: Irregular rate, irregular rhythm, 2 out of 6 systolic murmurs              Gastrointestinal: Positive bowel sounds, soft, nontender, nondistended              Musculoskeletal: No bilateral ankle edema, no clubbing or cyanosis to extremities              Psychiatric: Pleasant mood  and affect              Neurologic: Awake, alert              Skin: No rashes visible on exposed skin    Result Review    Result Review:  I have personally reviewed the pertinent results from the past 24 hours to 3/6/2023 07:59 EST and agree with these findings:  [x]  Laboratory   CBC    CBC 2/28/23 3/1/23 3/2/23   WBC 11.80 (A) 11.24 (A) 12.72 (A)   RBC 3.40 (A) 3.38 (A) 3.53 (A)   Hemoglobin 10.6 (A) 10.5 (A) 11.0 (A)   Hematocrit 30.0 (A) 29.9 (A) 31.5 (A)   MCV 88.2 88.5 89.2   MCH 31.2 31.1 31.2   MCHC 35.3 35.1 34.9   RDW 14.3 14.6 15.1   Platelets 142 153 183   (A) Abnormal value            BMP    BMP 2/28/23 3/1/23 3/2/23   BUN 11 8 9   Creatinine 0.64 (A) 0.66 (A) 0.71 (A)   Sodium 137 137 136   Potassium 3.9 4.0 4.3   Chloride 106 103 103   CO2 25.0 26.5 25.8   Calcium 8.5 (A) 8.6 9.0   (A) Abnormal value            LIVER FUNCTION TESTS:      Lab 03/02/23  0504 03/01/23  0555 02/28/23  0537   TOTAL PROTEIN  --   --  5.2*   ALBUMIN 3.5 3.3* 3.1*   ALT (SGPT)  --   --  15   AST (SGOT)  --   --  16   BILIRUBIN  --   --  0.3   BILIRUBIN DIRECT  --   --  <0.2   ALK PHOS  --   --  78       [x]  Microbiology   Blood Culture   Date Value Ref Range Status   02/24/2023 No growth at 24 hours  Preliminary     BCID, PCR   Date Value Ref Range Status   02/22/2023 (A) Negative by BCID PCR. Culture to Follow. Final    Staph spp, not aureus or lugdunensis. Identification by BCID2 PCR.       [x]  Radiology   [x]  EKG/Telemetry   []  Cardiology/Vascular   []  Pathology  [x]  Old records  []  Other:    Assessment & Plan   Assessment / Plan     Assessment:  · Sepsis secondary to concern for aspiration versus viral etiology  · Gram-positive cocci bacteremia ruled contamination of specimen  · Concern for aspiration  · Acute metabolic encephalopathy secondary to hypernatremia and dehydration  · COPD with acute exacerbation  · Hypoglycemia  · Physical debility  · Hypernatremia due to volume depletion  · RADHA  · Hypercalcemia due to  dehydration  · COPD  · Chronic atrial fibrillation  · Depression  · BPH with LUTS  · Parkinson's disease  · Lewy body dementia  · Diabetes mellitus with hyperglycemia and hypoglycemia  · Essential hypertension  · GERD without esophagitis  · CAD status post CABG  · Seizure disorder  · History of mitral valve replacement bioprosthetic mitral valve  · Chronic diastolic CHF     Plan:  · Continue current care  · Patient not currently on any medications and appears comfortable, will discharge with medications to [facility] pharmacy at discharge  · Plan for home with Hospice on Tuesday 3/7/23    Discussed with RN.    DVT prophylaxis:  Medical DVT prophylaxis orders are present.    CODE STATUS:   Medical Intervention Limits: NO intubation (DNI)  Level Of Support Discussed With: Next of Kin (If No Surrogate); Health Care Surrogate  Code Status (Patient has no pulse and is not breathing): CPR (Attempt to Resuscitate)  Medical Interventions (Patient has pulse or is breathing): Limited Support  Release to patient: Routine Release    Electronically signed by Ned Valdes DO, 03/06/23, 7:59 AM EST.

## 2023-03-07 VITALS
WEIGHT: 107.58 LBS | TEMPERATURE: 98.2 F | HEART RATE: 87 BPM | HEIGHT: 69 IN | RESPIRATION RATE: 18 BRPM | SYSTOLIC BLOOD PRESSURE: 96 MMHG | OXYGEN SATURATION: 100 % | DIASTOLIC BLOOD PRESSURE: 58 MMHG | BODY MASS INDEX: 15.93 KG/M2

## 2023-03-07 LAB
GLUCOSE BLDC GLUCOMTR-MCNC: 210 MG/DL (ref 70–99)
GLUCOSE BLDC GLUCOMTR-MCNC: 221 MG/DL (ref 70–99)
GLUCOSE BLDC GLUCOMTR-MCNC: 264 MG/DL (ref 70–99)

## 2023-03-07 PROCEDURE — 94799 UNLISTED PULMONARY SVC/PX: CPT

## 2023-03-07 PROCEDURE — 82962 GLUCOSE BLOOD TEST: CPT

## 2023-03-07 PROCEDURE — 63710000001 INSULIN REGULAR HUMAN PER 5 UNITS: Performed by: PHYSICIAN ASSISTANT

## 2023-03-07 PROCEDURE — 99239 HOSP IP/OBS DSCHRG MGMT >30: CPT | Performed by: INTERNAL MEDICINE

## 2023-03-07 RX ADMIN — Medication 10 ML: at 08:28

## 2023-03-07 RX ADMIN — APIXABAN 5 MG: 5 TABLET, FILM COATED ORAL at 08:27

## 2023-03-07 RX ADMIN — CARBIDOPA AND LEVODOPA 1 TABLET: 25; 100 TABLET ORAL at 16:33

## 2023-03-07 RX ADMIN — INSULIN HUMAN 5 UNITS: 100 INJECTION, SOLUTION PARENTERAL at 12:00

## 2023-03-07 RX ADMIN — INSULIN HUMAN 5 UNITS: 100 INJECTION, SOLUTION PARENTERAL at 16:33

## 2023-03-07 RX ADMIN — DONEPEZIL HYDROCHLORIDE 10 MG: 10 TABLET, FILM COATED ORAL at 08:27

## 2023-03-07 RX ADMIN — PANTOPRAZOLE SODIUM 40 MG: 40 INJECTION, POWDER, FOR SOLUTION INTRAVENOUS at 05:20

## 2023-03-07 RX ADMIN — Medication 2 SPRAY: at 05:20

## 2023-03-07 RX ADMIN — CARBIDOPA AND LEVODOPA 1 TABLET: 25; 100 TABLET ORAL at 08:27

## 2023-03-07 RX ADMIN — INSULIN HUMAN 8 UNITS: 100 INJECTION, SOLUTION PARENTERAL at 08:27

## 2023-03-07 NOTE — SIGNIFICANT NOTE
03/07/23 1000   SOCIAL WORK ASSESSMENT   Referral Source physician   Reason for Consult palliative care   Visit Type ongoing       Plan to discharge with [facility] today. Medical Equipment ordered and en route to home.   Request for medications to be sent to [facility] pharmacy and meds to bed.   Pt will need transport via EMS.   MD and RN notified.     Palliative will continue to monitor and provide support.     BRETT Arredondo

## 2023-03-07 NOTE — PLAN OF CARE
Goal Outcome Evaluation:  Plan of Care Reviewed With: patient           Outcome Evaluation: AOxself. Wound and skin care completed per orders. Frequently turned and repositioned. Dsicharge home with hospice.

## 2023-03-07 NOTE — PLAN OF CARE
Goal Outcome Evaluation:  Plan of Care Reviewed With: patient        Progress: no change  Outcome Evaluation: aox1, VS stable. repositioned frequently. no new issues at this time

## 2023-03-07 NOTE — DISCHARGE SUMMARY
[facility]         HOSPITALIST  DISCHARGE SUMMARY    Patient Name: Dhaval Nieto  : 1955  MRN: [id number]    Date of Admission: 2023  Date of Discharge:  3/7/2023  Primary Care Physician: Christiano Mcbride MD    Consults     Date and Time Order Name Status Description    2023  7:04 AM Inpatient Pulmonology Consult Completed     2023  3:17 AM Inpatient Hospitalist Consult            Active and Resolved Hospital Problems:  • Sepsis secondary to concern for aspiration versus viral etiology  • Gram-positive cocci bacteremia ruled contamination of specimen  • Concern for aspiration  • Acute metabolic encephalopathy secondary to hypernatremia and dehydration  • COPD with acute exacerbation  • Hypoglycemia  • Physical debility  • Hypernatremia due to volume depletion  • RADHA  • Hypercalcemia due to dehydration  • COPD  • Chronic atrial fibrillation  • Depression  • BPH with LUTS  • Parkinson's disease  • Lewy body dementia  • Diabetes mellitus with hyperglycemia and hypoglycemia  • Essential hypertension  • GERD without esophagitis  • CAD status post CABG  • Seizure disorder  • History of mitral valve replacement bioprosthetic mitral valve  • Chronic diastolic CHF    Hospital Course     Hospital Course:  67-year-old male with history of COPD, chronic atrial fibrillation, depression, BPH with LUTS, Parkinson's disease, Lewy body dementia, diabetes mellitus, essential hypertension, GERD without esophagitis, CAD status post CABG, seizure disorder, history of mitral valve replacement with bioprosthetic mitral valve, chronic diastolic CHF, was hospitalized on 2023 with chief complaint of weakness, unable to eat or drink, unable to speak, found to be septic secondary to aspiration, RADHA, hyperglycemia, placed in the critical care unit, critical care consulted, hypernatremia, free water deficit, IV fluids ordered for replacement, sodium corrected, patient evaluated by  speech, was cleared for oral intake with modifiers.  Patient did have core track removed.  Patient's family met with palliative care, hospice.  Patient's family have elected to take the patient home with hospice services.  The patient is discharged home today in terminal condition    Day of Discharge     Vital Signs:  Temp:  [97.3 °F (36.3 °C)-98.9 °F (37.2 °C)] 98.2 °F (36.8 °C)  Heart Rate:  [87-97] 87  Resp:  [16-18] 18  BP: ()/(56-66) 96/58    Physical Exam:   General appearance: NAD, conversant   Eyes: anicteric sclerae, moist conjunctivae; no lid-lag; PERRLA  HENT: Atraumatic; oropharynx clear with moist mucous membranes and no mucosal ulcerations; normal hard and soft palate  Neck: Trachea midline; FROM, supple, no thyromegaly or lymphadenopathy    Discharge Details        Discharge Medications      Continue These Medications      Instructions Start Date   acetaminophen 325 MG tablet  Commonly known as: TYLENOL   Oral, Every 6 Hours PRN      apixaban 5 MG tablet tablet  Commonly known as: ELIQUIS   5 mg, Oral, Every 12 Hours Scheduled      AQUAPHOR ADV PROTECT HEALING EX   Apply externally      atorvastatin 40 MG tablet  Commonly known as: LIPITOR   40 mg, Oral, Daily      carbidopa-levodopa  MG per tablet  Commonly known as: SINEMET   1 tablet, Oral, 3 Times Daily      carboxymethylcellulose 0.5 % solution  Commonly known as: REFRESH PLUS   1 drop, Both Eyes, 3 Times Daily PRN      donepezil 10 MG tablet  Commonly known as: ARICEPT   10 mg, Oral, Daily      famotidine 20 MG tablet  Commonly known as: PEPCID   20 mg, Oral, Every Night at Bedtime      fluticasone 50 MCG/ACT nasal spray  Commonly known as: FLONASE   2 sprays, Each Nare, Every Morning      insulin glargine 100 UNIT/ML injection  Commonly known as: LANTUS, SEMGLEE   20 Units, Subcutaneous, Nightly      melatonin 3 MG tablet   3 mg, Oral, Nightly      metFORMIN 500 MG tablet  Commonly known as: GLUCOPHAGE   500 mg, Oral, Daily       nicotine polacrilex 4 MG gum  Commonly known as: NICORETTE   4 mg, Mouth/Throat, As Needed      NovoLOG FlexPen 100 UNIT/ML solution pen-injector sc pen  Generic drug: insulin aspart   Subcutaneous, 3 Times Daily With Meals      pantoprazole 40 MG EC tablet  Commonly known as: PROTONIX   40 mg, Oral, Daily      REMEDY 4-IN-1 BODY CLEANSER EX   Apply externally      REMEDY PHYTOPLEX HYDRAGUARD EX   Apply externally             Allergies   Allergen Reactions   • Latex Itching   • Aspirin Unknown - High Severity   • Furosemide Unknown - High Severity   • Silver Unknown - High Severity   • Cefaclor Rash       Discharge Disposition:  Hospice/Home    Diet:  Hospital:  Diet Order   Procedures   • Diet: Regular/House Diet; Texture: Pureed (NDD 1); Fluid Consistency: Nectar Thick       Discharge Activity:       CODE STATUS:  Code Status and Medical Interventions:   Ordered at: 02/27/23 1315     Medical Intervention Limits:    NO intubation (DNI)     Level Of Support Discussed With:    Next of Kin (If No Surrogate)    Health Care Surrogate     Code Status (Patient has no pulse and is not breathing):    CPR (Attempt to Resuscitate)     Medical Interventions (Patient has pulse or is breathing):    Limited Support     Release to patient:    Routine Release       Future Appointments   Date Time Provider Department Center   5/30/2023  2:15 PM Viral Alvarez MD [facility] CD ETOWN [facility]   10/17/2023  2:30 PM Tarsha Nichols APRN [facility] GE ETWH [facility]   1/11/2024  1:00 PM Tacos Newby MD [facility] [place] [facility] ONC [id number]KE       Additional Instructions for the Follow-ups that You Need to Schedule     Discharge Follow-up with Specified Provider: hospice   As directed      To: hospice               Pertinent  and/or Most Recent Results     IMAGING:  XR Foot 3+ View Left    Result Date: 2/11/2023  PROCEDURE: XR FOOT 3+ VW LEFT  COMPARISON: None  INDICATIONS: heel injury, foot pain  FINDINGS:  BONES: Normal.  No significant arthropathy or acute  abnormality.  SOFT TISSUES: Negative.  No visible soft tissue swelling.  EFFUSION: None visible.  OTHER: Atherosclerotic vascular calcification is present.       No acute osseous abnormalities are identified in the left foot.      CARMEN GREGORY MD       Electronically Signed and Approved By: CARMEN GREGORY MD on 2/11/2023 at 14:50             CT Head Without Contrast    Result Date: 2/22/2023  PROCEDURE: CT HEAD WO CONTRAST  COMPARISON:  [facility], CT, CT HEAD WO CONTRAST, 3/20/2022, 5:30. INDICATIONS: Mental status change, unknown cause  PROTOCOL:   Standard imaging protocol performed    RADIATION:   DLP: 2008mGy*cm   MA and/or KV was adjusted to minimize radiation dose.     TECHNIQUE: Axial images of the head without intravenous contrast.  FINDINGS:  There is mild generalized atrophy.  There are mild chronic appearing changes in.  The ventricles have a normal size and configuration. There is no evidence of acute intracranial hemorrhage, mass or midline shift. No extra-axial fluid collections are identified. There are no skull fractures. The visualized paranasal sinuses and mastoid air cells are grossly clear.  IMPRESSION:  No acute intracranial abnormalities are identified.  CARMEN GREGORY MD       Electronically Signed and Approved By: CARMEN GREGORY MD on 2/22/2023 at 13:04             CT Chest Without Contrast Diagnostic    Result Date: 2/22/2023  PROCEDURE: CT CHEST WO CONTRAST DIAGNOSTIC  COMPARISONS: 2/21/2023; 12/7/2022.  INDICATIONS: DYSPNEA; SEPSIS.  TECHNIQUE: 542 CT images were created without the administration of contrast material.   PROTOCOL:   Standard CT imaging protocol performed.    RADIATION:   Total DLP: 240.2 mGy*cm   Automated exposure control was utilized to minimize radiation dose.  FINDINGS: Mild subsegmental atelectasis and/or fibrosis is (are) seen in the lung bases, new or increased in degree since the 12/7/2022 CT study.  No definite acute infiltrate is seen.   No pleural or pericardial effusion.  The patient has undergone median sternotomy.  There is a mitral valve prosthesis, as before.  A tiny hiatal hernia is present.  No cardiac enlargement.  Coronary artery calcifications are seen.  Atherosclerotic changes are present elsewhere.  No aneurysmal dilatation of the thoracic aorta is identified.  The maximum diameter of the ascending aorta is about 3.8 cm, which is at the upper limits of normal.  No acute findings are seen in the partially imaged upper abdomen.  There may be constipation.  The patient's upper extremities in the scan field of view obscure detail.  There is also motion artifact on the exam.  The best possible CT images were obtained, considering the patient's history of Parkinson's syndrome.  No acute fracture or acute malalignment is seen.  There are degenerative changes of the imaged spine.  No change in the CT appearance of the sternum.  No suspicious pulmonary nodules are seen.  The central tracheobronchial tree reveals minimal linear filling defects, which may be related to retained secretions, such as mucus.  These findings are probably new or increased since the prior exam.  No definite large obstructing endotracheal or endobronchial lesion is appreciated.  There is incidental transient venous gas identified, predominantly in the right supraclavicular region, likely iatrogenic in nature.       No significant acute findings are seen by nonenhanced CT.  Please see above comments for further detail.     Please note that portions of this note were completed with a voice recognition program.  JOAO RECINOS JR, MD       Electronically Signed and Approved By: JOAO RECINOS JR, MD on 2/22/2023 at 3:02              XR Chest 1 View    Result Date: 2/22/2023  PROCEDURE: XR CHEST 1 VW  COMPARISONS: 12/7/2022; 8/18/2022.  INDICATIONS: WEAK/DIZZY/AMS TRIAGE PROTOCOL/AMS/UNSPECIFIED WEAKNESS.  FINDINGS:  A single AP upright portable chest radiograph is provided  for review.  The patient has undergone median sternotomy.  There is a mitral valve prosthesis, as before.  No acute infiltrate is seen.  No cardiac enlargement.  External artifacts obscure detail.  There is chronic calcified granulomatous disease of the chest.  The patient has undergone cholecystectomy, as well.  No pleural effusion.  No pneumothorax.  The thoracic aorta is atherosclerotic.        No acute cardiopulmonary disease is appreciated.  No cardiac enlargement.      Please note that portions of this note were completed with a voice recognition program.  JOAO RECINOS JR, MD       Electronically Signed and Approved By: JOAO RECINOS JR, MD on 2/22/2023 at 1:19              US Renal Bilateral    Result Date: 2/22/2023  PROCEDURE: US RENAL BILATERAL  COMPARISON: None  INDICATIONS: zeferino  FINDINGS:  The right kidney has a maximal pole to pole length of 9.8 cm.  The left kidney has a maximal pole to pole length of 9.7 cm.  There is an incidental right renal cyst measuring 2.2 x 1.8 x 2.2 cm.  There is an incidental lower pole left renal cyst measuring 4.1 x 3.4 x 2.7 cm.  There is no evidence of hydronephrosis or hydroureter.  The urinary bladder has a normal sonographic appearance.  The prostate appears enlarged.         1. Bilateral simple renal cysts 2. No evidence of obstructive uropathy 3. Enlarged prostate      Kaden Kuhn MD       Electronically Signed and Approved By: Kaden Kuhn MD on 2/22/2023 at 14:03             XR Abdomen KUB    Result Date: 2/23/2023  PROCEDURE: XR ABDOMEN KUB  COMPARISON: None  INDICATIONS: feeding tube verification  FINDINGS:  Feeding tube tip appears postpyloric with tip terminating near the 4th portion of the duodenum and ligament of Treitz region.  There is a nonspecific nonobstructive bowel gas pattern.  There is a moderate to large colonic stool burden.  There is a prosthetic cardiac valve.        1. Feeding tube tip appears post pyloric with tip terminating at the 4th  portion of the duodenum and ligament of Treitz region.     NICK GOMEZ MD       Electronically Signed and Approved By: NICK GOMEZ MD on 2/23/2023 at 17:24               LAB RESULTS:      Lab 03/02/23  0504 03/01/23  0555   WBC 12.72* 11.24*   HEMOGLOBIN 11.0* 10.5*   HEMATOCRIT 31.5* 29.9*   PLATELETS 183 153   NEUTROS ABS 9.11* 8.15*   IMMATURE GRANS (ABS) 0.08* 0.06*   LYMPHS ABS 2.15 1.86   MONOS ABS 1.11* 0.98*   EOS ABS 0.24 0.16   MCV 89.2 88.5         Lab 03/02/23  0504 03/01/23  0555   SODIUM 136 137   POTASSIUM 4.3 4.0   CHLORIDE 103 103   CO2 25.8 26.5   ANION GAP 7.2 7.5   BUN 9 8   CREATININE 0.71* 0.66*   EGFR 99.9 102.2   GLUCOSE 186* 216*   CALCIUM 9.0 8.6   PHOSPHORUS 2.1* 2.0*         Lab 03/02/23  0504 03/01/23  0555   ALBUMIN 3.5 3.3*                 Lab 03/02/23  0504   FOLATE 13.30   VITAMIN B 12 1,155*         Brief Urine Lab Results  (Last result in the past 365 days)      Color   Clarity   Blood   Leuk Est   Nitrite   Protein   CREAT   Urine HCG        02/22/23 0040 Yellow   Clear   Negative   Trace   Negative   30 mg/dL (1+)               Microbiology Results (last 10 days)     ** No results found for the last 240 hours. **            Results for orders placed in visit on 08/30/21    Adult Transthoracic Echo Complete W/ Cont if Necessary Per Protocol    Interpretation Summary  Normal left ventricular systolic function.  Bioprosthetic mitral valve functioning normally.  Mild aortic stenosis.  Trace aortic regurgitation.  Trace tricuspid regurgitation.  Mild left-ventricular hypertrophy noted.      Time spent on Discharge including face to face service: Greater than 30 minutes      Electronically signed by Sincere Reid MD, 03/07/23, 4:11 PM EST.

## 2023-03-21 NOTE — PROGRESS NOTES
"Enter Query Response Below      Query Response: Sepsis secondary to aspiration pneumonia    Electronically signed by Sincere Reid MD, 23, 1:41 PM EDT.               If applicable, please update the problem list.     Patient: Dhaval Allen        : 1955  Account: [id number]           Admit Date: 2023        How to Respond to this query:       a. Click New Note     b. Answer query within the yellow box.                c. Update the Problem List, if applicable.      If you have any questions about this query contact me at: [email or web address]    Dr. Reid,     67 male noted with documentation of \"Sepsis secondary to concern for aspiration versus viral etiology\" in discharge and patient \"found to be septic secondary to aspiration\" in the hospital course of the discharge summary. Patient started on Zysyn  thru  and vancomycin given  thru .     After study, please clarify the underlying infection.    _________________________________________________    By submitting this query, we are merely seeking further clarification of documentation to accurately reflect all conditions that you are monitoring, evaluating, treating or that extend the hospitalization or utilize additional resources of care. Please utilize your independent clinical judgment when addressing the question(s) above.     This query and your response, once completed, will be entered into the legal medical record.    Sincerely,  Natty AARON RN   Clinical Documentation Integrity Program   "

## (undated) DEVICE — SOL IRR NACL 0.9PCT BT 1000ML

## (undated) DEVICE — GLV SURG SENSICARE PI ORTHO SZ8 LF STRL

## (undated) DEVICE — Device

## (undated) DEVICE — APPL CHLORAPREP HI/LITE 26ML ORNG

## (undated) DEVICE — 450 ML BOTTLE OF 0.05% CHLORHEXIDINE GLUCONATE IN 99.95% STERILE WATER FOR IRRIGATION, USP AND APPLICATOR.: Brand: IRRISEPT ANTIMICROBIAL WOUND LAVAGE

## (undated) DEVICE — TOWEL,OR,DSP,ST,BLUE,STD,4/PK,20PK/CS: Brand: MEDLINE

## (undated) DEVICE — CVR LEG BOOTLEG F/R NOSKID 33IN

## (undated) DEVICE — STRYKER PERFORMANCE SERIES SAGITTAL BLADE: Brand: STRYKER PERFORMANCE SERIES

## (undated) DEVICE — KT PT POSITION SUPINE HANA/PROFX TABL

## (undated) DEVICE — GAUZE,SPONGE,4"X4",16PLY,STRL,LF,10/TRAY: Brand: MEDLINE

## (undated) DEVICE — FAN SPRAY KIT: Brand: PULSAVAC®

## (undated) DEVICE — ANTIBACTERIAL VIOLET BRAIDED (POLYGLACTIN 910), SYNTHETIC ABSORBABLE SURGICAL SUTURE: Brand: COATED VICRYL

## (undated) DEVICE — SUT VIC 2/0 CT1 36IN

## (undated) DEVICE — MAT FLR ABS W/BLU/LINER 56X72IN WHT

## (undated) DEVICE — STERILE POLYISOPRENE POWDER-FREE SURGICAL GLOVES WITH EMOLLIENT COATING: Brand: PROTEXIS

## (undated) DEVICE — SUT POLY FORCEFIBER W/CUT/NDL NO5 38IN

## (undated) DEVICE — BIPOLAR SEALER 23-112-1 AQM 6.0: Brand: AQUAMANTYS™

## (undated) DEVICE — SLV SCD KN/LEN ADJ EXPRSS BLENDED MD 1P/U

## (undated) DEVICE — ELECTRD BLD EXT EDGE 1P COAT 6.5IN STRL

## (undated) DEVICE — MEDI-VAC NON-CONDUCTIVE SUCTION TUBING: Brand: CARDINAL HEALTH

## (undated) DEVICE — STERILE POLYISOPRENE POWDER-FREE SURGICAL GLOVES: Brand: PROTEXIS

## (undated) DEVICE — SOL IRR NACL 0.9PCT 3000ML

## (undated) DEVICE — PENCL E/S SMOKEEVAC W/TELESCP CANN

## (undated) DEVICE — PULLOVER TOGA, 2X LARGE: Brand: FLYTE, SURGICOOL

## (undated) DEVICE — ELECTRD BLD EDGE COAT 3IN

## (undated) DEVICE — TOTAL ANTERIOR HIP-LF: Brand: MEDLINE INDUSTRIES, INC.

## (undated) DEVICE — 3M™ STERI-DRAPE™ U-DRAPE 1015: Brand: STERI-DRAPE™